# Patient Record
Sex: FEMALE | Race: WHITE | Employment: OTHER | URBAN - METROPOLITAN AREA
[De-identification: names, ages, dates, MRNs, and addresses within clinical notes are randomized per-mention and may not be internally consistent; named-entity substitution may affect disease eponyms.]

---

## 2017-10-02 ENCOUNTER — TELEPHONE (OUTPATIENT)
Dept: FAMILY MEDICINE CLINIC | Age: 47
End: 2017-10-02

## 2017-10-02 NOTE — TELEPHONE ENCOUNTER
Pt hoping to get in sooner with PCP, will be running out of medications by the end of October. Pt hoping to see if can move appt up, if Dr Glen Rice approves. Pt's family already sees Dr Glen Rice.

## 2017-10-16 ENCOUNTER — OFFICE VISIT (OUTPATIENT)
Dept: FAMILY MEDICINE CLINIC | Age: 47
End: 2017-10-16

## 2017-10-16 VITALS
BODY MASS INDEX: 20.62 KG/M2 | DIASTOLIC BLOOD PRESSURE: 67 MMHG | HEIGHT: 63 IN | OXYGEN SATURATION: 97 % | SYSTOLIC BLOOD PRESSURE: 105 MMHG | WEIGHT: 116.4 LBS | TEMPERATURE: 97.9 F | RESPIRATION RATE: 12 BRPM | HEART RATE: 73 BPM

## 2017-10-16 DIAGNOSIS — D50.9 IRON DEFICIENCY ANEMIA, UNSPECIFIED IRON DEFICIENCY ANEMIA TYPE: ICD-10-CM

## 2017-10-16 DIAGNOSIS — Z00.00 ROUTINE GENERAL MEDICAL EXAMINATION AT A HEALTH CARE FACILITY: Primary | ICD-10-CM

## 2017-10-16 DIAGNOSIS — Z23 NEEDS FLU SHOT: ICD-10-CM

## 2017-10-16 DIAGNOSIS — J30.2 SEASONAL ALLERGIC RHINITIS, UNSPECIFIED CHRONICITY, UNSPECIFIED TRIGGER: ICD-10-CM

## 2017-10-16 DIAGNOSIS — K21.9 GASTROESOPHAGEAL REFLUX DISEASE WITHOUT ESOPHAGITIS: ICD-10-CM

## 2017-10-16 DIAGNOSIS — F41.8 SITUATIONAL ANXIETY: ICD-10-CM

## 2017-10-16 LAB
FERRITIN: 292.3 NG/ML (ref 15–150)
HCT VFR BLD CALC: 38.6 % (ref 36–48)
HEMOGLOBIN: 13.2 G/DL (ref 12–16)
IRON SATURATION: 41 % (ref 15–50)
IRON: 103 UG/DL (ref 37–145)
MCH RBC QN AUTO: 32 PG (ref 26–34)
MCHC RBC AUTO-ENTMCNC: 34.1 G/DL (ref 31–36)
MCV RBC AUTO: 94 FL (ref 80–100)
PDW BLD-RTO: 13.7 % (ref 12.4–15.4)
PLATELET # BLD: 189 K/UL (ref 135–450)
PMV BLD AUTO: 8.1 FL (ref 5–10.5)
RBC # BLD: 4.11 M/UL (ref 4–5.2)
TOTAL IRON BINDING CAPACITY: 250 UG/DL (ref 260–445)
WBC # BLD: 4.3 K/UL (ref 4–11)

## 2017-10-16 PROCEDURE — 90471 IMMUNIZATION ADMIN: CPT | Performed by: FAMILY MEDICINE

## 2017-10-16 PROCEDURE — 90686 IIV4 VACC NO PRSV 0.5 ML IM: CPT | Performed by: FAMILY MEDICINE

## 2017-10-16 PROCEDURE — 99386 PREV VISIT NEW AGE 40-64: CPT | Performed by: FAMILY MEDICINE

## 2017-10-16 RX ORDER — BUTALBITAL, ACETAMINOPHEN AND CAFFEINE 50; 325; 40 MG/1; MG/1; MG/1
1 TABLET ORAL EVERY 4 HOURS PRN
COMMUNITY
End: 2017-10-20 | Stop reason: SDUPTHER

## 2017-10-16 RX ORDER — FLUTICASONE PROPIONATE 50 MCG
1 SPRAY, SUSPENSION (ML) NASAL DAILY
COMMUNITY
End: 2021-04-23

## 2017-10-16 RX ORDER — BUPROPION HYDROCHLORIDE 150 MG/1
150 TABLET ORAL EVERY MORNING
COMMUNITY
End: 2020-03-17 | Stop reason: SDUPTHER

## 2017-10-16 RX ORDER — BUPROPION HYDROCHLORIDE 150 MG/1
150 TABLET, EXTENDED RELEASE ORAL 2 TIMES DAILY
COMMUNITY
End: 2017-10-16 | Stop reason: CLARIF

## 2017-10-16 ASSESSMENT — PATIENT HEALTH QUESTIONNAIRE - PHQ9
SUM OF ALL RESPONSES TO PHQ9 QUESTIONS 1 & 2: 0
2. FEELING DOWN, DEPRESSED OR HOPELESS: 0
SUM OF ALL RESPONSES TO PHQ QUESTIONS 1-9: 0
1. LITTLE INTEREST OR PLEASURE IN DOING THINGS: 0

## 2017-10-16 NOTE — PROGRESS NOTES
SUBJECTIVE:   52 y.o. female for annual routine  Checkup. Patient is new patient, whose daughter has recently been diagnosed with anorexia and recently moved from [place]. She had labs in 5/17 at PCP and recent labs at GYN in 10/17. Current Outpatient Prescriptions   Medication Sig Dispense Refill    Fexofenadine HCl (ALLEGRA PO) Take by mouth      buPROPion (WELLBUTRIN SR) 150 MG extended release tablet Take 150 mg by mouth 2 times daily      fluticasone (FLONASE) 50 MCG/ACT nasal spray 1 spray by Nasal route daily      butalbital-acetaminophen-caffeine (FIORICET, ESGIC) -40 MG per tablet Take 1 tablet by mouth every 4 hours as needed for Headaches       No current facility-administered medications for this visit. Allergies: Review of patient's allergies indicates no known allergies. No LMP recorded (lmp unknown). Patient has had a hysterectomy. Colonoscopy : 2010, 2015   Last PAP:5/17 in [place]. History of Abnormal PAP: never  Prior mammogram: 6/17 in [place]  Sexually active: yes  Self breast exam: yes, but sporadic. LMP: 2009 s/p hysterectomy. Smoker: no   Alcohol: 0-1/ month  Caffeine: 1-2 coffee or tea/ day   Exercise: walks- was daily . Not as much now with her daughter's diagnosis with anorexia. Trying to keep her sedentary. Her Gynecologist recommended she start on Wellbutrin  mg qd. She is having some irritation with intercourse and dryness. Oldest son is in [place] . Middle son, Rohit Bautista, is in [place]. He has a girlfriend in [place] as of 5/17, who is flying up this weekend. Her son was with his girlfriend more now. Haydee Estrella lives in [place] and gets along with her . The girlfriend is coming up on Friday. She did get hot flashes the past month,but better the past 1 week. She is planning on doing [employer] school for her 17 yo daughter and is having a bit of stress about it . She is wanting to do the best for her.       H/o silent reflex per barium and laryngoscopy. Her bowel movements are qod . No black stools or rectal bleeding. H/o anemia. She had an iron infusion in 6/17. ROS:  Feeling well. No dyspnea or chest pain on exertion. No abdominal pain, change in bowel habits, black or bloody stools. No urinary tract symptoms. GYN ROS: no breast pain or new or enlarging lumps on self exam. No neurological complaints. See patient physical/  ROS questionnaire. Patient's allergies and medications were reviewed. Patient's past medical, surgical, social , and family history were reviewed. Wt Readings from Last 3 Encounters:   10/16/17 116 lb 6.4 oz (52.8 kg)     OBJECTIVE:   The patient appears well, alert, oriented x 3, in no distress, cooperative. /67   Pulse 73   Temp 97.1379414784224 °F (36.6 °C) (Oral)   Resp 12   Ht 5' 2.99\" (1.6 m)   Wt 116 lb 6.4 oz (52.8 kg)   LMP  (LMP Unknown)   SpO2 97%   Breastfeeding? No   BMI 20.62 kg/m²   HEENT: normocephalic, atraumatic, PERRLA, EOMI, tympanic membranes and nasopharynx are normal.  Neck : supple. No adenopathy or thyromegaly. FROM. Upper extremities : DTRs 2+ biceps/ triceps/ brachioradialis bilateral.  FROM. Strength 5/5. Lungs are clear, good air entry, no wheezes, rhonchi or rales. Breathing comfortably. Cardiovascular: Regular rate  and rhythm. S1 and S2 are normal, no murmurs,rubs, and gallops. No edema. Abdomen is soft without tenderness, guarding, mass or organomegaly. Normal bowel sounds. Non distended. Back: Cervical, thoracic and lumbar spine exam is normal without tenderness, masses or kyphoscoliosis. Full range of motion without pain is noted. Lower extremities : DTRs 2+ knees and ankles bilateral.  FROM. Strength 5/5. Negative straight leg-raise. No edema or erythema bilateral.  normal peripheral pulses. Neuro: Cranial nerves 2-12 are normal. Deep tendon reflexes are 2+ and equal to all extremities.   No focal sensory,

## 2017-10-18 ENCOUNTER — TELEPHONE (OUTPATIENT)
Dept: FAMILY MEDICINE CLINIC | Age: 47
End: 2017-10-18

## 2017-10-18 DIAGNOSIS — N30.10 INTERSTITIAL CYSTITIS: Primary | ICD-10-CM

## 2017-10-20 RX ORDER — BUTALBITAL, ACETAMINOPHEN AND CAFFEINE 50; 325; 40 MG/1; MG/1; MG/1
1 TABLET ORAL EVERY 6 HOURS PRN
Qty: 25 TABLET | Refills: 0 | Status: SHIPPED | OUTPATIENT
Start: 2017-10-20 | End: 2018-01-02 | Stop reason: SDUPTHER

## 2017-10-20 NOTE — TELEPHONE ENCOUNTER
RX for Fioricet was sent to the pharmacy. I am not sure why she is taking Elimiron? Please ask the patient to spell medication and verify dose too? She was a new patient on 10/16/17.

## 2017-10-20 NOTE — TELEPHONE ENCOUNTER
Medication name:butalbital-acetaminophen-caffeine (FIORICET, ESGIC) -40 MG per tablet   Medication dose:  Frequency:Take 1 tablet by mouth every 4 hours as needed for Headaches  Quantity:60 tablet  Duvall Dennis, [facility]  Pharmacy number:  Last OV:10/16  Last Labs:10/16/17    Medication name: Elimiron 100mg  Medication dose:  Frequency:take one tablet as needed  Quantity:30 tablets  Pharmacy name:  [facility] number::[facility] [address] [phone]  Last OV: 10/16/17  Last Labs: 10/16/17    (Pt states that she was unable to request these at her appt. PCP may want to review to see if refill appropriate.  Marked as urgent per pt request, as almost 72 hr christofer since initial request. Thank you!)

## 2017-10-23 PROBLEM — N30.10 INTERSTITIAL CYSTITIS: Status: ACTIVE | Noted: 2017-10-23

## 2017-10-23 NOTE — TELEPHONE ENCOUNTER
Med is Elmiron 100 mg pt takes PRN. Rx given for interstitial cystitis. Please read over pend Rx and sign.  Thanks

## 2017-11-20 RX ORDER — BUTALBITAL, ACETAMINOPHEN AND CAFFEINE 50; 325; 40 MG/1; MG/1; MG/1
TABLET ORAL
Qty: 25 TABLET | Refills: 0 | OUTPATIENT
Start: 2017-11-20

## 2018-01-02 RX ORDER — BUTALBITAL, ACETAMINOPHEN AND CAFFEINE 50; 325; 40 MG/1; MG/1; MG/1
1 TABLET ORAL EVERY 6 HOURS PRN
Qty: 25 TABLET | Refills: 1 | Status: SHIPPED | OUTPATIENT
Start: 2018-01-02 | End: 2018-02-14 | Stop reason: SDUPTHER

## 2018-01-12 ENCOUNTER — OFFICE VISIT (OUTPATIENT)
Dept: FAMILY MEDICINE CLINIC | Age: 48
End: 2018-01-12

## 2018-01-12 VITALS
OXYGEN SATURATION: 96 % | BODY MASS INDEX: 20.87 KG/M2 | WEIGHT: 117.8 LBS | TEMPERATURE: 98.1 F | SYSTOLIC BLOOD PRESSURE: 118 MMHG | HEART RATE: 89 BPM | DIASTOLIC BLOOD PRESSURE: 73 MMHG | RESPIRATION RATE: 14 BRPM

## 2018-01-12 DIAGNOSIS — G44.209 TENSION HEADACHE: ICD-10-CM

## 2018-01-12 DIAGNOSIS — R35.0 URINE FREQUENCY: Primary | ICD-10-CM

## 2018-01-12 DIAGNOSIS — N94.10 DYSPAREUNIA IN FEMALE: ICD-10-CM

## 2018-01-12 DIAGNOSIS — M50.30 DDD (DEGENERATIVE DISC DISEASE), CERVICAL: ICD-10-CM

## 2018-01-12 DIAGNOSIS — N30.10 INTERSTITIAL CYSTITIS: ICD-10-CM

## 2018-01-12 LAB
BILIRUBIN, POC: NEGATIVE
BLOOD URINE, POC: ABNORMAL
CLARITY, POC: CLEAR
COLOR, POC: YELLOW
GLUCOSE URINE, POC: NEGATIVE
KETONES, POC: NEGATIVE
LEUKOCYTE EST, POC: ABNORMAL
NITRITE, POC: NEGATIVE
PH, POC: 6.5
PROTEIN, POC: NEGATIVE
SPECIFIC GRAVITY, POC: 1.01
UROBILINOGEN, POC: 0.2

## 2018-01-12 PROCEDURE — 99214 OFFICE O/P EST MOD 30 MIN: CPT | Performed by: FAMILY MEDICINE

## 2018-01-12 PROCEDURE — 81002 URINALYSIS NONAUTO W/O SCOPE: CPT | Performed by: FAMILY MEDICINE

## 2018-01-12 RX ORDER — ESTRADIOL 0.1 MG/G
CREAM VAGINAL
Qty: 1 TUBE | Refills: 2 | Status: SHIPPED
Start: 2018-01-12 | End: 2020-06-03 | Stop reason: SDUPTHER

## 2018-01-12 RX ORDER — CHLORZOXAZONE 500 MG/1
500 TABLET ORAL 4 TIMES DAILY PRN
Qty: 60 TABLET | Refills: 1 | Status: SHIPPED | OUTPATIENT
Start: 2018-01-12 | End: 2022-08-22 | Stop reason: SDUPTHER

## 2018-01-12 RX ORDER — CHLORAL HYDRATE 500 MG
CAPSULE ORAL
COMMUNITY
End: 2018-06-13

## 2018-01-12 RX ORDER — PREDNISONE 20 MG/1
TABLET ORAL
Qty: 10 TABLET | Refills: 0 | Status: SHIPPED | OUTPATIENT
Start: 2018-01-12 | End: 2018-01-22

## 2018-01-12 NOTE — PROGRESS NOTES
gallop. No edema. Lungs : CTA bilaterally, breathing comfortably  Abdomen: positive bowel sounds, soft , non tender, non distended. No hepatosplenomegaly. No CVA tenderness. Skin: no rashes. Non tender. ASSESSMENT/  PLAN:  Luke Mclaughlin was seen today for urinary tract infection. Diagnoses and all orders for this visit:    Urine frequency with h/o Interstitial cystitis  -     POCT Urinalysis no Micro  -     Urine Culture  -     Trial of Estradiol (ESTRACE VAGINAL) 0.1 MG/GM vaginal cream; Use 2-3 x/ week. Okay to use nightly for 1 week at onset but then decrease to 2-3 times per week. -     Avoid caffeine and alcohol. Dyspareunia in female  -     Monitor with trial of Estradiol (ESTRACE VAGINAL) 0.1 MG/GM vaginal cream; Use 2-3 x/ week. Tension headache  -     Discussed rebound headaches from long term daily use of Fioricet. Will need to taper likely. -     PredniSONE (DELTASONE) 20 MG tablet; 40 mg qd X 5 days to help with rebound headaches. -     Moist heat . ROM exercises. Modify activities. -     Trial of Chlorzoxazone (PARAFON FORTE DSC) 500 MG tablet; Take 1 tablet by mouth 4 times daily as needed for Muscle spasms  DDD (degenerative disc disease), cervical  -     Omega-3-Acid Eth Est, Dietary, 1 g CAPS; 2 po bid  -     Moist heat . ROM exercises. Modify activities. -     PredniSONE (DELTASONE) 20 MG tablet; 40 mg qd X 5 days. Follow up 4 -6 weeks/ prn.

## 2018-01-14 LAB — URINE CULTURE, ROUTINE: NORMAL

## 2018-01-15 ENCOUNTER — TELEPHONE (OUTPATIENT)
Dept: FAMILY MEDICINE CLINIC | Age: 48
End: 2018-01-15

## 2018-01-15 NOTE — TELEPHONE ENCOUNTER
Office has been notified that pt is requiring Prior Authorization for the following medication:    Jessica Avila   Please initiate this request through CoverMyMeds, contacting the following Payor/Insurance:    [employer] [place]    Please see below, or the documentation attached to this encounter for any additional information that may assist in processing PA:    --     Thank you!

## 2018-01-23 ENCOUNTER — TELEPHONE (OUTPATIENT)
Dept: FAMILY MEDICINE CLINIC | Age: 48
End: 2018-01-23

## 2018-01-26 ENCOUNTER — OFFICE VISIT (OUTPATIENT)
Dept: FAMILY MEDICINE CLINIC | Age: 48
End: 2018-01-26

## 2018-01-26 VITALS
SYSTOLIC BLOOD PRESSURE: 92 MMHG | WEIGHT: 117 LBS | HEIGHT: 64 IN | HEART RATE: 76 BPM | BODY MASS INDEX: 19.97 KG/M2 | DIASTOLIC BLOOD PRESSURE: 62 MMHG | OXYGEN SATURATION: 99 %

## 2018-01-26 DIAGNOSIS — R31.29 MICROSCOPIC HEMATURIA: ICD-10-CM

## 2018-01-26 DIAGNOSIS — G44.209 TENSION HEADACHE: ICD-10-CM

## 2018-01-26 DIAGNOSIS — M50.30 DDD (DEGENERATIVE DISC DISEASE), CERVICAL: ICD-10-CM

## 2018-01-26 DIAGNOSIS — N30.10 INTERSTITIAL CYSTITIS: Primary | ICD-10-CM

## 2018-01-26 PROCEDURE — 99214 OFFICE O/P EST MOD 30 MIN: CPT | Performed by: FAMILY MEDICINE

## 2018-01-26 RX ORDER — OSELTAMIVIR PHOSPHATE 75 MG/1
75 CAPSULE ORAL 2 TIMES DAILY
Qty: 10 CAPSULE | Refills: 0 | Status: SHIPPED | OUTPATIENT
Start: 2018-01-26 | End: 2018-02-05

## 2018-01-26 RX ORDER — CHLORZOXAZONE 500 MG/1
500 TABLET ORAL 4 TIMES DAILY PRN
COMMUNITY
End: 2018-06-13

## 2018-01-26 NOTE — PATIENT INSTRUCTIONS
bend your head to the side while using gentle pressure from your fingers to keep your head from bending. 7. Hold for about 6 seconds. 8. Repeat 8 to 12 times. 9. Switch hands and repeat the same exercise on your left side. Forward bend strengthening    4. Place your first two fingers of either hand on your forehead. 5. Start to bend your head forward while using gentle pressure from your fingers to keep your head from bending. 6. Hold for about 6 seconds. 7. Repeat 8 to 12 times. Neutral position strengthening    1. Using one hand, place your fingertips on the back of your head at the top of your neck. 2. Start to bend your head backward while using gentle pressure from your fingers to keep your head from bending. 3. Hold for about 6 seconds. 4. Repeat 8 to 12 times. Chin tuck    1. Lie on the floor with a rolled-up towel under your neck. Your head should be touching the floor. 2. Slowly bring your chin toward your chest.  3. Hold for a count of 6, and then relax for up to 10 seconds. 4. Repeat 8 to 12 times. Follow-up care is a key part of your treatment and safety. Be sure to make and go to all appointments, and call your doctor if you are having problems. It's also a good idea to know your test results and keep a list of the medicines you take. Where can you learn more? Go to [email or web address] and sign in to your [employer] account. Enter M679 in the Latest Medical box to learn more about \"Neck Strain or Sprain: Rehab Exercises. \"     If you do not have an account, please click on the \"Sign Up Now\" link. Current as of: March 21, 2017  Content Version: 11.5  © 5439-4894 Healthwise, Incorporated. Care instructions adapted under license by [facility] ([facility]). If you have questions about a medical condition or this instruction, always ask your healthcare professional. [address] any warranty or liability for your use of this information.

## 2018-01-26 NOTE — PROGRESS NOTES
Patient is here for follow up . She is doing Estrace cream 2-3 times / week. She has a history of IC . Her  recommended Dr. Tenny Gowers , urologist.  Has not been taking the Elmiron. She is taking Parafon Forte at night. She is struggling to take it during the day due to it causes sedation. She has not been taking  Fioricet daily , but took 1 yesterday. She was usually taking 1 Fioricet per day. At times was taking 2 pills per day. Denies headache now. History of cervical fusion in 2013 in [facility]. She was doing better in [place]. She has been on Lyrica in the past , but is leary to take it. She is sleeping okay , except getting up 2-3 times per night due to urinary frequency. She has done Physical Therapy in the past and helped. ROS: All other systems were reviewed and are negative . Patient's allergies and medications were reviewed. Patient's past medical, surgical, social , and family history were reviewed. OBJECTIVE:  BP 92/62 (Site: Left Arm, Position: Sitting, Cuff Size: Medium Adult)   Pulse 76   Ht 5' 4\" (1.626 m)   Wt 117 lb (53.1 kg)   LMP  (LMP Unknown)   SpO2 99% Comment: RA  BMI 20.08 kg/m²   General: NAD, cooperative, alert and oriented X 3. Mood / affect is good. good insight. well hydrated. Neck : no lymphadenopathy, supple, FROM. Mild trapezius tenderness . Mild pain with hyperextension. Upper extremities : DTRs 2+ biceps/ triceps/ brachioradialis bilateral.  FROM. Strength 5/5. CV: Regular rate and rhythm , no murmurs/ rub/ gallop. No edema. Lungs : CTA bilaterally, breathing comfortably  Abdomen: positive bowel sounds, soft , non tender, non distended. No hepatosplenomegaly. No CVA tenderness. Skin: no rashes. Non tender. ASSESSMENT/  PLAN:  Miller Borrego was seen today for neck pain. Diagnoses and all orders for this visit:    Interstitial cystitis  -     Microscopic Urinalysis;  Future  -     Urology referral - [facility] Kiran Lehman MD ([facility])  -     Monitor with starting of Estrace cream   -     Discuss with Urologist restart of Elmiron.   -     Avoid caffeine, alcohol. Microscopic hematuria  -     Microscopic Urinalysis; Future  -     Urology referral - Kate Tiwari MD ([facility])  Vaginal Dryness        -     Monitor with start of Estrace cream.   DDD (degenerative disc disease), cervical        -     Moist heat . ROM exercises. Modify activities. -     Parafon Forte qhs , mostly at night prn.         -     Hold on Neurontin or Lyrica, but discussed. Tension headache  -     Moist heat . ROM exercises. Modify activities. -     Parafon Forte qhs prn.   -     External Referral To Physical Therapy        -     Recommended minimizing Fioricet to < 1x/ week. Other orders  -     oseltamivir (TAMIFLU) 75 MG capsule; Take 1 capsule by mouth 2 times daily for 10 days    F/u in 3-4 weeks/ prn increased symptoms.

## 2018-02-07 DIAGNOSIS — N30.10 INTERSTITIAL CYSTITIS: ICD-10-CM

## 2018-02-07 DIAGNOSIS — R31.29 MICROSCOPIC HEMATURIA: ICD-10-CM

## 2018-02-07 LAB
EPITHELIAL CELLS, UA: 0 /HPF (ref 0–5)
HYALINE CASTS: 0 /LPF (ref 0–8)
RBC UA: 3 /HPF (ref 0–4)
WBC UA: 0 /HPF (ref 0–5)

## 2018-02-14 RX ORDER — BUTALBITAL, ACETAMINOPHEN AND CAFFEINE 50; 325; 40 MG/1; MG/1; MG/1
1 TABLET ORAL EVERY 6 HOURS PRN
Qty: 25 TABLET | Refills: 1 | Status: SHIPPED | OUTPATIENT
Start: 2018-02-14 | End: 2018-04-13 | Stop reason: SDUPTHER

## 2018-02-19 ENCOUNTER — OFFICE VISIT (OUTPATIENT)
Dept: ORTHOPEDIC SURGERY | Age: 48
End: 2018-02-19

## 2018-02-19 VITALS
DIASTOLIC BLOOD PRESSURE: 68 MMHG | HEIGHT: 64 IN | BODY MASS INDEX: 19.63 KG/M2 | SYSTOLIC BLOOD PRESSURE: 106 MMHG | WEIGHT: 115 LBS | HEART RATE: 80 BPM

## 2018-02-19 DIAGNOSIS — R22.32 MASS OF LEFT HAND: ICD-10-CM

## 2018-02-19 DIAGNOSIS — M79.642 LEFT HAND PAIN: Primary | ICD-10-CM

## 2018-02-19 PROCEDURE — 99203 OFFICE O/P NEW LOW 30 MIN: CPT | Performed by: ORTHOPAEDIC SURGERY

## 2018-02-21 ENCOUNTER — TELEPHONE (OUTPATIENT)
Dept: FAMILY MEDICINE CLINIC | Age: 48
End: 2018-02-21

## 2018-02-21 ENCOUNTER — TELEPHONE (OUTPATIENT)
Dept: ORTHOPEDIC SURGERY | Age: 48
End: 2018-02-21

## 2018-02-21 DIAGNOSIS — N30.10 INTERSTITIAL CYSTITIS: ICD-10-CM

## 2018-02-22 ENCOUNTER — TELEPHONE (OUTPATIENT)
Dept: ORTHOPEDIC SURGERY | Age: 48
End: 2018-02-22

## 2018-02-23 ENCOUNTER — HOSPITAL ENCOUNTER (OUTPATIENT)
Dept: MRI IMAGING | Age: 48
Discharge: HOME | End: 2018-02-23
Attending: ORTHOPAEDIC SURGERY | Admitting: ORTHOPAEDIC SURGERY

## 2018-02-23 DIAGNOSIS — R22.32 LOCALIZED SWELLING, MASS AND LUMP, LEFT UPPER LIMB: ICD-10-CM

## 2018-02-23 DIAGNOSIS — R22.32 MASS OF LEFT HAND: ICD-10-CM

## 2018-02-26 ENCOUNTER — OFFICE VISIT (OUTPATIENT)
Dept: ORTHOPEDIC SURGERY | Age: 48
End: 2018-02-26

## 2018-02-26 VITALS — WEIGHT: 115.08 LBS | BODY MASS INDEX: 19.65 KG/M2 | HEIGHT: 64 IN

## 2018-02-26 DIAGNOSIS — M67.442 GANGLION CYST OF FINGER OF LEFT HAND: Primary | ICD-10-CM

## 2018-02-26 PROCEDURE — 99212 OFFICE O/P EST SF 10 MIN: CPT | Performed by: ORTHOPAEDIC SURGERY

## 2018-02-26 NOTE — LETTER
Nilda Lei M.D. Harini Linn  Your surgery has been scheduled on    Fri. 3/30/2018 . Your surgery time is at: 7:30am.          You will need to arrive for surgery by  5:30am.      Your surgery has been scheduled at:        XXXX [address]  [address]  [phone]         Your post operative appointment has been scheduled on  @ , @ the  office  location. Your hand therapy appointment has been scheduled on  @ , @ the  office location with           [facility], GO TO [email or web address] AND CLICK ON  LOCATIONS ON THE TOP TOOLBAR      INSTRUCTIONS FOR SURGERY    You will need to contact your primary care physician to schedule your History & Physical prior to surgery. Attached   is the History & Physical form. The form needs to be completed by your PCP and faxed back to the appropriate numbers. NOTHING to eat or drink after midnight prior to your surgery. No Juice, water, milk, coffee, tea, Ensure, etc,.    DO NOT wear any jewelry, make-up on face or eye make-up. Do not bring any valuable to the hospital with you. Stop any aspirin or anti-inflammatories (i.e. Advil, Motrin, Aleve) prior to surgery. If you are taking prescription   medications, please call the prescribing physician. The hospital will be calling you to discuss your medical history prior to surgery. You will need someone to drive you home after surgery. DRIVERS MUST STAY AT [address] [place] [address]. Our billing office will contact your insurance company to authorize your surgery. If you should have any questions, please contact Alysia Olson @ [phone]  OR @  [email or web address] or visit the [facility] website @ [employer]                              Romel Pérez M.D. KEEP YOUR HAND ELEVATED - Surgery always results in swelling. Most of the pain and stiffness right after surgery is due to internal pressure from swelling. To relieve the pressure, raise your hand higher than your heart as often as possible to drain fluid out of your hand for at least three (3) days after surgery. Swelling may also make the cast or bandage tight, which will cause more pain and swelling. If you feel that your cast or bandage is too tight, please contact me or Gene Cano- we would rather change it than for you to have a problem. KEEP YOUR BANDAGE DRY -  Wounds heal with the fewest problems if they are kept clean and dry. When bathing, protect your bandage in a plastic bag. If you bandage gets wet on the inside, it should be changed not simply allowed to dry. I would rather change your cast or bandage then risk a problem with your wound. DO NOT REMOVE OR CHANGE YOUR BANDAGES - unless you have been given specific instructions from Dr. Sergey Howell to change them before being seen for you post operative appointment. BRUISING OR BLEEDING - This is  Common after surgery. Bandages often become stained with blood on the day of surgery. Bruising often worsens several days after surgery. Bruising or bleeding is usually not a source of concern unless accompanied by steady drainage, worsening pain, or progressive swelling. MEDICATIONS - You will most likely be given at least one prescription following surgery. All medications should be taken only as directed on the prescription. Nausea is common when taking pain medications. Take the pain medicine only as needed and not on an empty stomach. Itching or a rash are signs of possible mild allergic reaction.   Contact our office should this persist.    HAND THERAPY:  (Only as checked off here)       No Therapy will be needed at this time      Every two (2) hours, do this (4) times daily:  With your bandage on, try

## 2018-02-27 ENCOUNTER — TELEPHONE (OUTPATIENT)
Dept: ORTHOPEDIC SURGERY | Age: 48
End: 2018-02-27

## 2018-02-27 NOTE — TELEPHONE ENCOUNTER
Auth: NPR  Date: 3/30/18  Reference # None  Type of SX: Outpatient  CPT [id number]   Location: [facility]  SX area: LT hand

## 2018-03-08 PROBLEM — M67.442 GANGLION CYST OF FINGER OF LEFT HAND: Status: ACTIVE | Noted: 2018-03-08

## 2018-03-08 NOTE — PROGRESS NOTES
[facility] / [facility] ([facility]) [address] [facility], [address]    Acknowledgment of Informed Consent for Surgical or Medical Procedure and Sedation  I agree to allow doctor(s) [facility] and his/her associates or assistants, including residents and/or other qualified medical practitioner to perform the following medical treatment or procedure and to administer or direct the administration of sedation as necessary:  Procedure(s): EXCISIONAL BIOPSY LEFT PALMAR SOFT TISSUE MASS, ANY OTHER INDICATED PROCEDURES  My doctor has explained the following regarding the proposed procedure:   the explanation of the procedure   the benefits of the procedure   the potential problems that might occur during recuperation   the risks and side effects of the procedure which could include but are not limited to severe blood loss, infection, stroke or death   the benefits, risks and side effect of alternative procedures including the consequences of declining this procedure or any alternative procedures   the likelihood of achieving satisfactory results. I acknowledge no guarantee or assurance has been made to me regarding the results. I understand that during the course of this treatment/procedure, unforeseen conditions can occur which require an additional or different procedure. I agree to allow my physician or assistants to perform such extension of the original procedure as they may find necessary. I understand that sedation will often result in temporary impairment of memory and fine motor skills and that sedation can occasionally progress to a state of deep sedation or general anesthesia. I understand the risks of anesthesia for surgery include, but are not limited to, sore throat, hoarseness, injury to face, mouth, or teeth; nausea; headache; injury to blood vessels or nerves; death, brain damage, or paralysis.     I understand that if I have a Limitation of Treatment order in effect during my hospitalization, the order may or may not be in effect during this procedure. I give my doctor permission to give me blood or blood products. I understand that there are risks with receiving blood such as hepatitis, AIDS, fever, or allergic reaction. I acknowledge that the risks, benefits, and alternatives of this treatment have been explained to me and that no express or implied warranty has been given by the hospital, any blood bank, or any person or entity as to the blood or blood components transfused. At the discretion of my doctor, I agree to allow observers, equipment/product representatives and allow photographing, and/or televising of the procedure, provided my name or identity is maintained confidentially. I agree the hospital may dispose of or use for scientific or educational purposes any tissue, fluid, or body parts which may be removed.     ________________________________Date________Time______ am/pm  ([place] [facility])  Patient or Signature of Closest Relative or Legal Guardian    ________________________________Date________Time______am/pm      Page 1 of  1  Witness

## 2018-03-09 NOTE — PROGRESS NOTES
Assessment: 63-year-old female presenting with left palmar mass, symptomatic  1. Likely ganglion cyst flexor tendon sheath left index finger    Treatment Plan: I spoke with the patient regarding findings from MRI. Correlating with her physical exam, we did discuss the likelihood of a ganglion cyst arising from her index finger flexor tendon sheath near her A1 pulley. Options for treatment were discussed today including both operative and nonoperative. I do think that observation could be considered with activity modification. Operative treatment was also discussed including excisional biopsy of the mass. After thorough discussion, the patient is symptomatic with gripping and lifting with pain in the mass. She desires removal and therefore we will plan for excisional biopsy  Risks, benefits alternatives were discussed with patient today the risks included but not limited to infection bleeding, damage to nerves to tendons and blood vessels, need for additional procedures, regrowth of the mass or recurrence, scar sensitivity, stiffness and tendon adhesions, continued pain, risks of anesthesia including stroke heart attack blood clot and death  The patient is understanding of the risks and would like to proceed. We will plan for local Mac anesthesia an outpatient surgery to be performed within the next one month. She understands if she has any change in her symptoms prior to surgery she is to notify our office for possible reevaluation    No Follow-up on file. History of Present Illness  Ariadna Gillespie is a 50 y.o. female here today for a follow-up for Her left palmar soft tissue mass with associated symptoms of pain. An MRI was performed in the interval since her last visit to further characterize the mass. She has had no change in her symptoms since last visit with continued pain with gripping and lifting between her index and long finger near her palmar MP joints.   He denies any new swelling, numbness or

## 2018-03-14 ENCOUNTER — OFFICE VISIT (OUTPATIENT)
Dept: FAMILY MEDICINE CLINIC | Age: 48
End: 2018-03-14

## 2018-03-14 VITALS
DIASTOLIC BLOOD PRESSURE: 53 MMHG | HEART RATE: 85 BPM | BODY MASS INDEX: 20.62 KG/M2 | SYSTOLIC BLOOD PRESSURE: 92 MMHG | TEMPERATURE: 95.9 F | WEIGHT: 120.2 LBS | RESPIRATION RATE: 12 BRPM | OXYGEN SATURATION: 100 %

## 2018-03-14 DIAGNOSIS — Z13.220 SCREENING CHOLESTEROL LEVEL: ICD-10-CM

## 2018-03-14 DIAGNOSIS — G47.9 SLEEP DIFFICULTIES: ICD-10-CM

## 2018-03-14 DIAGNOSIS — R51.9 FREQUENT HEADACHES: ICD-10-CM

## 2018-03-14 DIAGNOSIS — N89.8 VAGINAL DRYNESS: ICD-10-CM

## 2018-03-14 DIAGNOSIS — N30.10 INTERSTITIAL CYSTITIS: ICD-10-CM

## 2018-03-14 DIAGNOSIS — R23.2 HOT FLASHES: Primary | ICD-10-CM

## 2018-03-14 PROCEDURE — 99214 OFFICE O/P EST MOD 30 MIN: CPT | Performed by: FAMILY MEDICINE

## 2018-03-14 RX ORDER — GABAPENTIN 100 MG/1
100 CAPSULE ORAL NIGHTLY
Qty: 90 CAPSULE | Refills: 0 | Status: SHIPPED | OUTPATIENT
Start: 2018-03-14 | End: 2018-06-13

## 2018-03-15 ENCOUNTER — TELEPHONE (OUTPATIENT)
Dept: ORTHOPEDIC SURGERY | Age: 48
End: 2018-03-15

## 2018-03-19 ENCOUNTER — TELEPHONE (OUTPATIENT)
Dept: ORTHOPEDIC SURGERY | Age: 48
End: 2018-03-19

## 2018-03-19 NOTE — TELEPHONE ENCOUNTER
Returned patient call- states her cyst in her finger went down therefore she would like to cancel her surgery.  Patient states if it develops again she would call back

## 2018-03-30 ENCOUNTER — HOSPITAL ENCOUNTER (OUTPATIENT)
Dept: SURGERY | Age: 48
Discharge: HOME | End: 2018-03-08
Admitting: ORTHOPAEDIC SURGERY

## 2018-03-30 DIAGNOSIS — N30.10 INTERSTITIAL CYSTITIS: ICD-10-CM

## 2018-03-30 DIAGNOSIS — Z13.220 SCREENING CHOLESTEROL LEVEL: ICD-10-CM

## 2018-03-30 DIAGNOSIS — N89.8 VAGINAL DRYNESS: ICD-10-CM

## 2018-03-30 DIAGNOSIS — R23.2 HOT FLASHES: ICD-10-CM

## 2018-03-30 LAB
A/G RATIO: 2.1 (ref 1.1–2.2)
ALBUMIN SERPL-MCNC: 5 G/DL (ref 3.4–5)
ALP BLD-CCNC: 71 U/L (ref 40–129)
ALT SERPL-CCNC: 21 U/L (ref 10–40)
ANION GAP SERPL CALCULATED.3IONS-SCNC: 14 MMOL/L (ref 3–16)
AST SERPL-CCNC: 21 U/L (ref 15–37)
BILIRUB SERPL-MCNC: 0.4 MG/DL (ref 0–1)
BUN BLDV-MCNC: 17 MG/DL (ref 7–20)
CALCIUM SERPL-MCNC: 9.8 MG/DL (ref 8.3–10.6)
CHLORIDE BLD-SCNC: 102 MMOL/L (ref 99–110)
CHOLESTEROL, TOTAL: 223 MG/DL (ref 0–199)
CO2: 28 MMOL/L (ref 21–32)
CREAT SERPL-MCNC: 0.6 MG/DL (ref 0.6–1.1)
ESTRADIOL LEVEL: 25 PG/ML
FOLLICLE STIMULATING HORMONE: 86.1 MIU/ML
GFR AFRICAN AMERICAN: >60
GFR NON-AFRICAN AMERICAN: >60
GLOBULIN: 2.4 G/DL
GLUCOSE BLD-MCNC: 90 MG/DL (ref 70–99)
HCT VFR BLD CALC: 42.3 % (ref 36–48)
HDLC SERPL-MCNC: 104 MG/DL (ref 40–60)
HEMOGLOBIN: 14.2 G/DL (ref 12–16)
LDL CHOLESTEROL CALCULATED: 107 MG/DL
LUTEINIZING HORMONE: 57 MIU/ML
MCH RBC QN AUTO: 31.8 PG (ref 26–34)
MCHC RBC AUTO-ENTMCNC: 33.7 G/DL (ref 31–36)
MCV RBC AUTO: 94.5 FL (ref 80–100)
PDW BLD-RTO: 13.1 % (ref 12.4–15.4)
PLATELET # BLD: 211 K/UL (ref 135–450)
PMV BLD AUTO: 7.8 FL (ref 5–10.5)
POTASSIUM SERPL-SCNC: 4.6 MMOL/L (ref 3.5–5.1)
RBC # BLD: 4.48 M/UL (ref 4–5.2)
SODIUM BLD-SCNC: 144 MMOL/L (ref 136–145)
TOTAL PROTEIN: 7.4 G/DL (ref 6.4–8.2)
TRIGL SERPL-MCNC: 62 MG/DL (ref 0–150)
TSH SERPL DL<=0.05 MIU/L-ACNC: 2.11 UIU/ML (ref 0.27–4.2)
VLDLC SERPL CALC-MCNC: 12 MG/DL
WBC # BLD: 3.9 K/UL (ref 4–11)

## 2018-04-13 ENCOUNTER — OFFICE VISIT (OUTPATIENT)
Dept: FAMILY MEDICINE CLINIC | Age: 48
End: 2018-04-13

## 2018-04-13 VITALS
WEIGHT: 120.6 LBS | DIASTOLIC BLOOD PRESSURE: 73 MMHG | TEMPERATURE: 97.6 F | HEART RATE: 88 BPM | SYSTOLIC BLOOD PRESSURE: 99 MMHG | BODY MASS INDEX: 20.69 KG/M2 | RESPIRATION RATE: 16 BRPM | OXYGEN SATURATION: 98 %

## 2018-04-13 DIAGNOSIS — M54.2 NECK PAIN: Primary | ICD-10-CM

## 2018-04-13 DIAGNOSIS — R23.2 HOT FLASHES: ICD-10-CM

## 2018-04-13 DIAGNOSIS — N89.8 VAGINAL DRYNESS: ICD-10-CM

## 2018-04-13 DIAGNOSIS — G44.209 TENSION HEADACHE: ICD-10-CM

## 2018-04-13 PROCEDURE — 99214 OFFICE O/P EST MOD 30 MIN: CPT | Performed by: FAMILY MEDICINE

## 2018-04-13 RX ORDER — BUTALBITAL, ACETAMINOPHEN AND CAFFEINE 50; 325; 40 MG/1; MG/1; MG/1
1 TABLET ORAL EVERY 6 HOURS PRN
Qty: 25 TABLET | Refills: 1 | Status: SHIPPED | OUTPATIENT
Start: 2018-04-13 | End: 2020-03-17 | Stop reason: SDUPTHER

## 2018-06-13 ENCOUNTER — OFFICE VISIT (OUTPATIENT)
Dept: FAMILY MEDICINE CLINIC | Age: 48
End: 2018-06-13

## 2018-06-13 VITALS
SYSTOLIC BLOOD PRESSURE: 116 MMHG | RESPIRATION RATE: 16 BRPM | OXYGEN SATURATION: 95 % | BODY MASS INDEX: 19.56 KG/M2 | TEMPERATURE: 97.8 F | DIASTOLIC BLOOD PRESSURE: 71 MMHG | HEART RATE: 88 BPM | WEIGHT: 114 LBS

## 2018-06-13 DIAGNOSIS — H69.82 EUSTACHIAN TUBE DYSFUNCTION, LEFT: Primary | ICD-10-CM

## 2018-06-13 DIAGNOSIS — J34.89 SINUS PRESSURE: ICD-10-CM

## 2018-06-13 PROCEDURE — 99213 OFFICE O/P EST LOW 20 MIN: CPT | Performed by: FAMILY MEDICINE

## 2018-06-13 RX ORDER — AZITHROMYCIN 250 MG/1
TABLET, FILM COATED ORAL
Qty: 1 PACKET | Refills: 0 | Status: SHIPPED | OUTPATIENT
Start: 2018-06-13 | End: 2018-06-17

## 2018-07-17 ENCOUNTER — HOSPITAL ENCOUNTER (OUTPATIENT)
Dept: MAMMOGRAPHY | Age: 48
Discharge: HOME | End: 2018-07-17
Payer: COMMERCIAL

## 2018-07-17 ENCOUNTER — HOSPITAL ENCOUNTER (OUTPATIENT)
Dept: GENERAL RADIOLOGY | Age: 48
Discharge: HOME | End: 2018-07-17
Payer: COMMERCIAL

## 2018-07-17 DIAGNOSIS — Z12.31 VISIT FOR SCREENING MAMMOGRAM: ICD-10-CM

## 2018-07-17 DIAGNOSIS — E28.310 SYMPTOMATIC PREMATURE MENOPAUSE: ICD-10-CM

## 2018-07-17 PROCEDURE — 77080 DXA BONE DENSITY AXIAL: CPT

## 2018-07-17 PROCEDURE — 77067 SCR MAMMO BI INCL CAD: CPT

## 2018-09-10 ENCOUNTER — OFFICE VISIT (OUTPATIENT)
Dept: DERMATOLOGY | Age: 48
End: 2018-09-10

## 2018-09-10 DIAGNOSIS — L82.1 SK (SEBORRHEIC KERATOSIS): ICD-10-CM

## 2018-09-10 DIAGNOSIS — L57.0 AK (ACTINIC KERATOSIS): Primary | ICD-10-CM

## 2018-09-10 PROCEDURE — 17000 DESTRUCT PREMALG LESION: CPT | Performed by: DERMATOLOGY

## 2018-09-10 PROCEDURE — 99201 PR OFFICE OUTPATIENT NEW 10 MINUTES: CPT | Performed by: DERMATOLOGY

## 2018-09-10 NOTE — PROGRESS NOTES
[facility]  Jeanelle Cranker, MD  [address]  1970    50 y.o. female     Date of Visit: 9/10/2018    Chief Complaint: skin lesions    History of Present Illness:    1. She presents today for a persistent scaly lesion on the nasal dorsum. 2.  She also complains of a persistent dark lesion on the left frontal scalp. She saw a dermatologist over the summer months for a full skin examination in [place]. Splits time between [place] and [place]. Mom with hx of melanoma. Review of Systems:  Skin: No new or changing moles. Past Medical History, Family History, Surgical History, Medications and Allergies reviewed. Past Medical History:   Diagnosis Date    Allergic rhinitis     Atrophic vaginitis     DDD (degenerative disc disease), cervical     Dyspareunia in female     Interstitial cystitis 10/23/2017    Iron deficiency anemia 2017    Vocal cord nodule 2016     Past Surgical History:   Procedure Laterality Date    APPENDECTOMY  1996    CERVICAL FUSION  2013    [address]4-6   [address]    bilateral, right    LARYNGOSCOPY  2016   [facility] PARTIAL HYSTERECTOMY  2009    DUB -        No Known Allergies  Outpatient Prescriptions Marked as Taking for the 9/10/18 encounter (Office Visit) with Michelet Bryan MD   Medication Sig Dispense Refill    butalbital-acetaminophen-caffeine (FIORICET, ESGIC) -40 MG per tablet Take 1 tablet by mouth every 6 hours as needed for Headaches 25 tablet 1    UNABLE TO FIND       estradiol (ESTRACE VAGINAL) 0.1 MG/GM vaginal cream Use 2-3 x/ week. 1 Tube 2    Fexofenadine HCl (ALLEGRA PO) Take by mouth      buPROPion (WELLBUTRIN XL) 150 MG extended release tablet Take 150 mg by mouth every morning           Physical Examination     Full skin exam declined. Well appearing. 1.  Mid nasal dorsum - 1 scaly pink macule. 2.  Left frontal scalp - stuck on appearing verrucous light brown papule.

## 2019-01-29 ENCOUNTER — NURSE TRIAGE (OUTPATIENT)
Dept: OTHER | Facility: CLINIC | Age: 49
End: 2019-01-29

## 2019-05-06 ENCOUNTER — OFFICE VISIT (OUTPATIENT)
Dept: FAMILY MEDICINE CLINIC | Age: 49
End: 2019-05-06
Payer: COMMERCIAL

## 2019-05-06 VITALS
SYSTOLIC BLOOD PRESSURE: 113 MMHG | HEART RATE: 73 BPM | BODY MASS INDEX: 21.45 KG/M2 | TEMPERATURE: 98.4 F | DIASTOLIC BLOOD PRESSURE: 80 MMHG | WEIGHT: 125 LBS | RESPIRATION RATE: 16 BRPM | OXYGEN SATURATION: 97 %

## 2019-05-06 DIAGNOSIS — M77.12 LEFT LATERAL EPICONDYLITIS: Primary | ICD-10-CM

## 2019-05-06 DIAGNOSIS — S83.429A SPRAIN OF LATERAL COLLATERAL LIGAMENT OF KNEE, UNSPECIFIED LATERALITY, INITIAL ENCOUNTER: ICD-10-CM

## 2019-05-06 PROCEDURE — 99214 OFFICE O/P EST MOD 30 MIN: CPT | Performed by: FAMILY MEDICINE

## 2019-05-06 RX ORDER — METHYLPREDNISOLONE 4 MG/1
TABLET ORAL
Qty: 1 KIT | Refills: 0 | Status: SHIPPED | OUTPATIENT
Start: 2019-05-06 | End: 2019-05-12

## 2019-05-06 ASSESSMENT — PATIENT HEALTH QUESTIONNAIRE - PHQ9
2. FEELING DOWN, DEPRESSED OR HOPELESS: 0
SUM OF ALL RESPONSES TO PHQ QUESTIONS 1-9: 0
1. LITTLE INTEREST OR PLEASURE IN DOING THINGS: 0
SUM OF ALL RESPONSES TO PHQ9 QUESTIONS 1 & 2: 0
SUM OF ALL RESPONSES TO PHQ QUESTIONS 1-9: 0

## 2019-05-06 NOTE — PATIENT INSTRUCTIONS
Patient Education        Patellar Tracking Disorder: Exercises  Your Care Instructions  Here are some examples of exercises of typical rehabilitation exercises for your condition. Start each exercise slowly. Ease off the exercise if you start to have pain. Your doctor or physical therapist will tell you when you can start these exercises and which ones will work best for you. How to do the exercises  Quad sets    1. Sit with your leg straight and supported on the floor or a firm bed. (If you feel discomfort in the front or back of your knee, place a small towel roll under your knee.)  2. Tighten the muscles on top of your thigh by pressing the back of your knee flat down to the floor. (If you feel discomfort under your kneecap, place a small towel roll under your knee.)  3. Hold for about 6 seconds, then rest up to 10 seconds. 4. Do this for 8 to 12 repetitions several times a day. Mini squat    1. Stand with your feet about hip-width apart and 12 inches from a wall. 2. Lean against the wall and slide down until your knees are bent about 20 to 30 degrees. 3. Place a ball about the size of a soccer ball between your knees and squeeze your knees against the ball for about 6 seconds at a time. 4. Rest a few seconds, then squeeze again. 5. Repeat 8 to 12 times, at least 3 times a day. Straight-leg raises to the front    1. Lie on your back with your good knee bent so that your foot rests flat on the floor. Your injured leg should be straight. Make sure that your low back has a normal curve. You should be able to slip your flat hand in between the floor and the small of your back, with your palm touching the floor and your back touching the back of your hand. 2. Tighten the thigh muscles in the injured leg by pressing the back of your knee flat down to the floor. Hold your knee straight. 3. Tighten the quadriceps muscles of your straight leg and lift the leg 12 to 18 inches off the floor.  Hold for about 6 seconds, then slowly lower the leg back down and rest a few seconds. 4. Do 8 to 12 repetitions, 3 times a day. Straight-leg raises to the inside    1. Lie on your side with the leg you are going to exercise on the bottom and your other foot up on a chair. 2. Tighten your thigh muscles, and then lift your leg straight up away from the floor. 3. Hold for about 6 seconds, slowly lower the leg back down, and rest a few seconds. 4. Do 8 to 12 repetitions, 3 times a day. Straight-leg raises to the outside    1. Lie on your side with the leg you are going to exercise on top. 2. Tighten your thigh muscles, and then lift your leg straight up away from the floor. 3. Keep your hip and your leg straight in line with the rest of your body, and keep your knee pointing forward. Do not drop your hip back. 4. Hold for about 6 seconds, slowly lower the leg back down, and rest a few seconds. 5. Do 8 to 12 repetitions, 3 times a day. Straight-leg raises to the back    1. Lie on your belly. 2. Tighten your thigh muscles, and then lift your leg straight up away from the floor. 3. Hold for about 6 seconds, slowly lower the leg back down, and rest a few seconds. 4. Do 8 to 12 repetitions, 3 times a day. Shallow standing knee bends    1. Stand with your hands lightly resting on a counter or chair in front of you. Place your feet shoulder-width apart. 2. Slowly bend your knees so that you squat down like you are going to sit in a chair. Make sure your knees do not go in front of your toes. 3. Lower yourself about 6 inches. Your heels should remain on the floor at all times. 4. Rise slowly to a standing position. 5. Do 8 to 12 repetitions, 3 times a day.   6. Note for shallow knee bend on one leg: Remember to limit the bend of your knee to a 30-degree angle at first. When your knee is bent past this point, your kneecap will have more contact with the thighbone, causing more pressure, pain, and possible cartilage damage. Shallow knee bend on one leg    1. Stand on a step, on the leg you want to exercise. Let your other leg hang down off the step. 2. Keeping your head up and your back straight, lean slightly forward. Hold on to a banister if you feel unsteady. 3. Slowly bend your knee so the foot hanging down moves down toward the floor, then slowly straighten your knee again. Your heel should stay on the step, and your knee should not go any farther forward than your toe. As you bend and straighten your leg, try to keep your knee moving in a straight line with your middle toe. 4. Do 8 to 12 repetitions. Standing quadriceps stretch    1. If you are steady on your feet, stand holding a chair, counter, or wall. You can also lie on your stomach or your side to do this exercise. 2. Bend the knee of the leg you want to stretch, and grab the front of your foot with the hand on the same side. For example, if you are stretching your right leg, use your right hand. 3. Keeping your knees next to each other, pull your foot toward your buttock until you feel a gentle stretch across the front of your hip and down the front of your thigh. Your knee should be pointed directly to the ground, and not out to the side. 4. Hold the stretch for at least 15 to 30 seconds. Repeat 2 to 4 times. Hamstring stretch in doorway    1. Lie on the floor near a doorway, with your buttocks close to the wall. 2. Let the leg you are not stretching extend through the doorway. 3. Put the leg you want to stretch up on the wall, and straighten your knee to feel a gentle stretch at the back of your leg. 4. Hold the stretch for at least 15 to 30 seconds. Repeat 2 to 4 times. Hip rotator stretch    1. Lie on your back with both knees bent and your feet on the floor. 2. Put the ankle of the leg you are going to stretch on your opposite thigh near your knee.   3. Push gently on the knee of the leg you are stretching until you feel a gentle stretch around your hip. 4. Hold the stretch for at least 15 to 30 seconds. Repeat 2 to 4 times. Iliotibial band and buttock stretch    1. Sit on the floor with your legs out in front of you. 2. Bend the knee of the leg you want to stretch, and put that foot on the floor on the outside of the opposite leg. (Your legs will be crossed.)  3. Twist your shoulders toward your bent leg, and put your opposite elbow on that knee. 4. Push your arm against your knee to feel a gentle stretch at the back of your buttock and around your hip. 5. Hold the stretch for at least 15 to 30 seconds. Repeat 2 to 4 times. Calf stretch    1. Stand facing a wall with your hands on the wall at about eye level. 2. Put the leg you want to stretch about a step behind your other leg. 3. Keeping your back heel on the floor, bend your front knee until you feel a stretch in the back leg. 4. Hold the stretch for at least 15 to 30 seconds. Repeat 2 to 4 times. Follow-up care is a key part of your treatment and safety. Be sure to make and go to all appointments, and call your doctor if you are having problems. It's also a good idea to know your test results and keep a list of the medicines you take. Where can you learn more? Go to https://Fermentas InternationalermaYurbuds.Laser View. org and sign in to your Preen.Me account. Enter [phone] in the [facility] box to learn more about \"Patellar Tracking Disorder: Exercises. \"     If you do not have an account, please click on the \"Sign Up Now\" link. Current as of: September 20, 2018  Content Version: 11.9  © 7230-1447 [employer]. Care instructions adapted under license by [facility] ([facility]). If you have questions about a medical condition or this instruction, always ask your healthcare professional. [address]  any warranty or liability for your use of this information.          Patient Education        Lateral Collateral Ligament Sprain: Rehab Exercises  Your Care Instructions  Here are some examples of typical rehabilitation exercises for your condition. Start each exercise slowly. Ease off the exercise if you start to have pain. Your doctor or physical therapist will tell you when you can start these exercises and which ones will work best for you. How to do the exercises  Knee flexion with heel slide    1. Lie on your back with your knees bent. 2. Slide your heel back by bending your affected knee as far as you can. Then hook your other foot around your ankle to help pull your heel even farther back. 3. Hold for about 6 seconds, then rest for up to 10 seconds. 4. Repeat 8 to 12 times. Heel slides on a wall    1. Lie on the floor close enough to a wall so that you can place both legs up on the wall. Your hips should be as close to the wall as is comfortable for you. 2. Start with both feet resting on the wall. Slowly let the foot of your affected leg slide down the wall until you feel a stretch in your knee. 3. Hold for 15 to 30 seconds. 4. Then slowly slide your foot up to where you started. 5. Repeat 2 to 4 times. Quad sets    1. Sit with your affected leg straight and supported on the floor or a firm bed. Place a small, rolled-up towel under your knee. Your other leg should be bent, with that foot flat on the floor. 2. Tighten the thigh muscles of your affected leg by pressing the back of your knee down into the towel. 3. Hold for about 6 seconds, then rest for up to 10 seconds. 4. Repeat 8 to 12 times. Short-arc quad    1. Lie on your back with your knees bent over a foam roll or a large rolled-up towel. 2. Lift the lower part of your affected leg and straighten your knee by tightening your thigh muscle. Keep the bottom of your knee on the foam roll or rolled-up towel. 3. Hold your knee straight for about 6 seconds, then slowly bend your knee and lower your leg back to the floor. Rest for up to 10 seconds between repetitions.   4. Repeat 8 to 12 the step and your other foot on the floor. Hold on to the banister or wall. 2. Use your injured leg to raise yourself up, bringing your other foot level with the stair step. Make sure to keep your hips level as you do this. And try to keep your knee moving in a straight line with your middle toe. Do not put the foot you are raising on the stair step. 3. Slowly lower your foot back down. 4. Repeat 8 to 12 times. Wall squats with ball    1. Stand with your back facing a wall. Place your feet about a shoulder-width apart. 2. Place the therapy ball between your back and the wall, and move your feet out in front of you so they are about a foot in front of your hips. 3. Keep your arms at your sides, or put your hands on your hips. 4. Slowly squat down as if you are going to sit in a chair, rolling your back over the ball as you squat. The ball should move with you but stay pressed into the wall. 5. Be sure that your knees do not go in front of your toes as you squat. 6. Hold for 6 seconds. 7. Slowly rise to your standing position. 8. Repeat 8 to 12 times. Follow-up care is a key part of your treatment and safety. Be sure to make and go to all appointments, and call your doctor if you are having problems. It's also a good idea to know your test results and keep a list of the medicines you take. Where can you learn more? Go to https://PEMREDpelettyCAD Best.Cartago Software. org and sign in to your SkyBridge account. Enter A255 in the [facility] box to learn more about \"Lateral Collateral Ligament Sprain: Rehab Exercises. \"     If you do not have an account, please click on the \"Sign Up Now\" link. Current as of: September 20, 2018  Content Version: 11.9  © 3402-3972 [employer]. Care instructions adapted under license by [address]  If you have questions about a medical condition or this instruction, always ask your healthcare professional. Chris Bagley disclaims any warranty or liability for your use of this information.

## 2019-05-06 NOTE — PROGRESS NOTES
Patient is here for elbows left > right. She is also having knee pain bilateral x 3 months. She walks on beach almost daily. No pain with wrist and ankles. No pain to fingers and toes, except right index finger. Right handed. Pain is more to left elbow. She had labs in [place] ( CBC, sed rate, CYNDI, RA , TSH). She is stiff in the am . Her daughter is a jeffrey and living in [place]. She plans to dual enroll at [employer] in the fall and finish high school at [employer] - 2 classes left. She is willing to come back to [place]. She does no;t like to socialize as much. She is past high school drama. She is not want to go to the beach. She does not want to go out to eat with friends often. She goes to gym, every other day. She got hurt in soccer and continued to run. She is in a boot. She states doing laundry may have irritated left elbow as she has to go outside house she was/ is renting in [place]. She was started on LInzess 145 mg qd likely 2/19  and on Omeprazole. 40 mg initially ,but decreasd to 20 mg qd or Lansaprazole 15 mg qd. No prior EGD. She had abdominal ultrasound in [place] which was normal, as well ad HIDA scan was done on Friday . She is seeing Gastroenterologist in [place] . She is to have EGD 5/10/19 in [place]. No heartburn or indigestion. Some RUQ abdominal pain . Normal bowel movements with Ctrucel, Colace off and on , daily probiotic qd. She feels Linzess helps with RUQ abdominal pain ,but not constipation issues. She is moving back to [place] 6/2/19. Review of Systems    ROS: All other systems were reviewed and are negative . Patient's allergies and medications were reviewed. Patient's past medical, surgical, social , and family history were reviewed. OBJECTIVE:  LMP  (LMP Unknown)     Physical Exam    General: NAD, cooperative, alert and oriented X 3. Mood / affect is good. good insight. well hydrated.   Neck : no lymphadenopathy, supple, FROM  Left elbow with tenderness to lateral epicondyle. Pain with resistance of wrist hyperextension and supination. Upper extremities : DTRs 2+ biceps/ triceps/ brachioradialis bilateral.  FROM. Strength 5/5. CV: Regular rate and rhythm , no murmurs/ rub/ gallop. No edema. Lungs : CTA bilaterally, breathing comfortably  Abdomen: positive bowel sounds, soft , non tender, non distended. No hepatosplenomegaly. No CVA tenderness. Knees: no edema or erythema. FROM . Negative anterior/ posterior drawer. Negative Sweetie's / Lachman's bilaterally. Strength 5/5. DTRs 2+ bilateral. Tenderness to bilateral collateral lateral ligament. Skin: no rashes. Non tender. ASSESSMENT/  PLAN:  1. Left lateral epicondylitis  - Moist heat . ROM exercises- handout given. Modify activities. - methylPREDNISolone (MEDROL DOSEPACK) 4 MG tablet; Take by mouth. Dispense: 1 kit; Refill: 0  - wrist splint . 2. Sprain of lateral collateral ligament of knee, unspecified laterality, initial encounter  - Moist heat . ROM exercises- handout given. Modify activities. - methylPREDNISolone (MEDROL DOSEPACK) 4 MG tablet; Take by mouth. Dispense: 1 kit; Refill: 0     F/u if no improvement 7-10d/ prn increased symptoms.

## 2019-05-14 ENCOUNTER — NURSE ONLY (OUTPATIENT)
Dept: FAMILY MEDICINE CLINIC | Age: 49
End: 2019-05-14
Payer: COMMERCIAL

## 2019-05-14 DIAGNOSIS — Z23 NEED FOR HEPATITIS A AND B VACCINATION: Primary | ICD-10-CM

## 2019-05-14 PROCEDURE — 90746 HEPB VACCINE 3 DOSE ADULT IM: CPT | Performed by: FAMILY MEDICINE

## 2019-05-14 PROCEDURE — 90632 HEPA VACCINE ADULT IM: CPT | Performed by: FAMILY MEDICINE

## 2019-05-14 PROCEDURE — 90472 IMMUNIZATION ADMIN EACH ADD: CPT | Performed by: FAMILY MEDICINE

## 2019-05-14 PROCEDURE — 90471 IMMUNIZATION ADMIN: CPT | Performed by: FAMILY MEDICINE

## 2019-05-14 NOTE — PROGRESS NOTES
Patient received the Hep. B injection in her right deltoid and Hep A in left deltoid today and tolerated it well.

## 2019-05-15 ENCOUNTER — TELEPHONE (OUTPATIENT)
Dept: FAMILY MEDICINE CLINIC | Age: 49
End: 2019-05-15

## 2019-05-15 ENCOUNTER — NURSE ONLY (OUTPATIENT)
Dept: FAMILY MEDICINE CLINIC | Age: 49
End: 2019-05-15
Payer: COMMERCIAL

## 2019-05-15 DIAGNOSIS — Z23 NEED FOR TDAP VACCINATION: Primary | ICD-10-CM

## 2019-05-15 PROCEDURE — 90471 IMMUNIZATION ADMIN: CPT | Performed by: FAMILY MEDICINE

## 2019-05-15 PROCEDURE — 90715 TDAP VACCINE 7 YRS/> IM: CPT | Performed by: FAMILY MEDICINE

## 2019-05-15 NOTE — TELEPHONE ENCOUNTER
Patient Just Had her Hep A & B done yesterday and realized her Tetanus Shot is also Due. Wanted to know if she's able to come in today to get it so close to getting those other Vaccinations? Will be leaving tomorrow to go out of town.

## 2019-05-15 NOTE — TELEPHONE ENCOUNTER
Yes . That is fine to give TdaP. I am unsure of her prior Tdap,but am assuming she knows and  > 5 years.

## 2019-05-30 PROBLEM — K29.70 GASTRITIS: Status: ACTIVE | Noted: 2019-05-01

## 2019-05-30 PROBLEM — K20.90 ESOPHAGITIS: Status: ACTIVE | Noted: 2019-05-01

## 2019-06-18 ENCOUNTER — TELEPHONE (OUTPATIENT)
Dept: DERMATOLOGY | Age: 49
End: 2019-06-18

## 2019-06-18 NOTE — TELEPHONE ENCOUNTER
Patient is scheduled for a skin check for 08/19   But has a wart on her finger that she will like to be treated prior to her appt. It is on her middle finger, and she had it treated with cryotherapy while in [place]. She c/o it is a little sore. Please review.  Thank you   Phone [phone]

## 2019-06-19 ENCOUNTER — NURSE ONLY (OUTPATIENT)
Dept: FAMILY MEDICINE CLINIC | Age: 49
End: 2019-06-19
Payer: COMMERCIAL

## 2019-06-19 DIAGNOSIS — Z23 NEED FOR HEPATITIS B VACCINATION: Primary | ICD-10-CM

## 2019-06-19 PROCEDURE — 90471 IMMUNIZATION ADMIN: CPT | Performed by: FAMILY MEDICINE

## 2019-06-19 PROCEDURE — 90746 HEPB VACCINE 3 DOSE ADULT IM: CPT | Performed by: FAMILY MEDICINE

## 2019-06-20 ENCOUNTER — TELEPHONE (OUTPATIENT)
Dept: FAMILY MEDICINE CLINIC | Age: 49
End: 2019-06-20

## 2019-06-20 RX ORDER — OMEPRAZOLE 40 MG/1
40 CAPSULE, DELAYED RELEASE ORAL DAILY
Qty: 30 CAPSULE | Refills: 3 | Status: SHIPPED | OUTPATIENT
Start: 2019-06-20 | End: 2019-11-18 | Stop reason: SDUPTHER

## 2019-06-20 NOTE — TELEPHONE ENCOUNTER
[facility] called and said Ms Derrek Baumgarten needs a prescription filled for OMEPRAZOLE PO 40 mg  Last ov 5/6/19     Please send to:  [address] [facility] [phone] - F [phone]

## 2019-07-22 ENCOUNTER — OFFICE VISIT (OUTPATIENT)
Dept: DERMATOLOGY | Age: 49
End: 2019-07-22
Payer: COMMERCIAL

## 2019-07-22 DIAGNOSIS — L82.1 SK (SEBORRHEIC KERATOSIS): ICD-10-CM

## 2019-07-22 DIAGNOSIS — D22.72 NEVUS OF LEFT THIGH: ICD-10-CM

## 2019-07-22 DIAGNOSIS — L81.4 SOLAR LENTIGO: ICD-10-CM

## 2019-07-22 DIAGNOSIS — B07.8 OTHER VIRAL WARTS: Primary | ICD-10-CM

## 2019-07-22 PROCEDURE — 17110 DESTRUCTION B9 LES UP TO 14: CPT | Performed by: DERMATOLOGY

## 2019-07-22 PROCEDURE — 99213 OFFICE O/P EST LOW 20 MIN: CPT | Performed by: DERMATOLOGY

## 2019-07-22 NOTE — PROGRESS NOTES
(WELLBUTRIN XL) 150 MG extended release tablet Take 150 mg by mouth every morning           Physical Examination       The following were examined and determined to be normal: Psych/Neuro, Scalp/hair, Head/face, Conjunctivae/eyelids, Gums/teeth/lips, Neck, Breast/axilla/chest, Abdomen, Back, RUE, LUE, RLE and LLE. The following were examined and determined to be abnormal: Nails/digits. Well appearing. 1.  Dorsum of the right middle finger overlying the PIP joint - 3 round verrucous pink papules. 2.  Central face with scattered small tan macules. 3.  Medial aspect of the left thigh with a round soft smooth pink-brown papule. 4.  Left central chest with a small verrucous light brown papule. Assessment and Plan     1. Other viral warts -     2 cycles of liquid nitrogen applied to 3 warts on the R middle finger. Patient was educated regarding the potential risks of blister formation, discomfort, hypopigmentation, and scar. Wound care was discussed. 2. Solar lentigines     Reassurance. Continue sun protective behaviors. 3. Nevus of left thigh - benign appearing    Reassurance. 4. SK (seborrheic keratosis)     Reassurance. Return in about 4 weeks (around 8/19/2019).

## 2019-07-23 ENCOUNTER — HOSPITAL ENCOUNTER (OUTPATIENT)
Dept: MAMMOGRAPHY | Age: 49
Discharge: HOME | End: 2019-07-23
Payer: COMMERCIAL

## 2019-07-23 DIAGNOSIS — Z12.31 VISIT FOR SCREENING MAMMOGRAM: ICD-10-CM

## 2019-07-23 PROBLEM — E55.9 VITAMIN D DEFICIENCY: Status: ACTIVE | Noted: 2019-07-01

## 2019-07-23 PROCEDURE — 77063 BREAST TOMOSYNTHESIS BI: CPT

## 2019-07-23 RX ORDER — MELATONIN
1000 DAILY
Qty: 90 TABLET | Refills: 3 | COMMUNITY
Start: 2019-07-23

## 2019-08-16 ENCOUNTER — OFFICE VISIT (OUTPATIENT)
Dept: DERMATOLOGY | Age: 49
End: 2019-08-16
Payer: COMMERCIAL

## 2019-08-16 DIAGNOSIS — B07.8 OTHER VIRAL WARTS: Primary | ICD-10-CM

## 2019-08-16 PROCEDURE — 17110 DESTRUCTION B9 LES UP TO 14: CPT | Performed by: DERMATOLOGY

## 2019-09-18 ENCOUNTER — OFFICE VISIT (OUTPATIENT)
Dept: DERMATOLOGY | Age: 49
End: 2019-09-18
Payer: COMMERCIAL

## 2019-09-18 DIAGNOSIS — B07.8 OTHER VIRAL WARTS: Primary | ICD-10-CM

## 2019-09-18 PROCEDURE — 17110 DESTRUCTION B9 LES UP TO 14: CPT | Performed by: DERMATOLOGY

## 2019-09-23 ENCOUNTER — NURSE TRIAGE (OUTPATIENT)
Dept: OTHER | Facility: CLINIC | Age: 49
End: 2019-09-23

## 2019-10-16 ENCOUNTER — IMMUNIZATION (OUTPATIENT)
Dept: FAMILY MEDICINE CLINIC | Age: 49
End: 2019-10-16
Payer: COMMERCIAL

## 2019-10-16 DIAGNOSIS — Z23 NEED FOR INFLUENZA VACCINATION: Primary | ICD-10-CM

## 2019-10-16 PROCEDURE — 90471 IMMUNIZATION ADMIN: CPT | Performed by: FAMILY MEDICINE

## 2019-10-16 PROCEDURE — 90686 IIV4 VACC NO PRSV 0.5 ML IM: CPT | Performed by: FAMILY MEDICINE

## 2019-11-18 ENCOUNTER — OFFICE VISIT (OUTPATIENT)
Dept: FAMILY MEDICINE CLINIC | Age: 49
End: 2019-11-18
Payer: COMMERCIAL

## 2019-11-18 VITALS
DIASTOLIC BLOOD PRESSURE: 56 MMHG | TEMPERATURE: 96.8 F | BODY MASS INDEX: 20.72 KG/M2 | SYSTOLIC BLOOD PRESSURE: 95 MMHG | WEIGHT: 120.8 LBS | RESPIRATION RATE: 12 BRPM | OXYGEN SATURATION: 98 % | HEART RATE: 80 BPM

## 2019-11-18 DIAGNOSIS — K21.9 GASTROESOPHAGEAL REFLUX DISEASE WITHOUT ESOPHAGITIS: ICD-10-CM

## 2019-11-18 DIAGNOSIS — R10.11 RUQ ABDOMINAL PAIN: Primary | ICD-10-CM

## 2019-11-18 DIAGNOSIS — Z23 NEED FOR HEPATITIS B VACCINATION: ICD-10-CM

## 2019-11-18 DIAGNOSIS — Z23 NEED FOR HEPATITIS A IMMUNIZATION: ICD-10-CM

## 2019-11-18 PROCEDURE — 90746 HEPB VACCINE 3 DOSE ADULT IM: CPT | Performed by: FAMILY MEDICINE

## 2019-11-18 PROCEDURE — 90472 IMMUNIZATION ADMIN EACH ADD: CPT | Performed by: FAMILY MEDICINE

## 2019-11-18 PROCEDURE — 90632 HEPA VACCINE ADULT IM: CPT | Performed by: FAMILY MEDICINE

## 2019-11-18 PROCEDURE — 99214 OFFICE O/P EST MOD 30 MIN: CPT | Performed by: FAMILY MEDICINE

## 2019-11-18 PROCEDURE — 90471 IMMUNIZATION ADMIN: CPT | Performed by: FAMILY MEDICINE

## 2019-11-18 RX ORDER — OMEPRAZOLE 40 MG/1
40 CAPSULE, DELAYED RELEASE ORAL DAILY
Qty: 90 CAPSULE | Refills: 1 | Status: SHIPPED | OUTPATIENT
Start: 2019-11-18 | End: 2020-07-21

## 2019-11-18 RX ORDER — LUBIPROSTONE 24 UG/1
24 CAPSULE, GELATIN COATED ORAL 2 TIMES DAILY WITH MEALS
COMMUNITY
End: 2020-06-22

## 2020-02-10 ENCOUNTER — OFFICE VISIT (OUTPATIENT)
Dept: FAMILY MEDICINE CLINIC | Age: 50
End: 2020-02-10
Payer: COMMERCIAL

## 2020-02-10 ENCOUNTER — HOSPITAL ENCOUNTER (OUTPATIENT)
Dept: GENERAL RADIOLOGY | Age: 50
Discharge: HOME | End: 2020-02-10
Payer: COMMERCIAL

## 2020-02-10 ENCOUNTER — HOSPITAL ENCOUNTER (OUTPATIENT)
Age: 50
Discharge: HOME | End: 2020-02-10
Payer: COMMERCIAL

## 2020-02-10 ENCOUNTER — TELEPHONE (OUTPATIENT)
Dept: FAMILY MEDICINE CLINIC | Age: 50
End: 2020-02-10

## 2020-02-10 VITALS
BODY MASS INDEX: 21.38 KG/M2 | RESPIRATION RATE: 16 BRPM | DIASTOLIC BLOOD PRESSURE: 70 MMHG | SYSTOLIC BLOOD PRESSURE: 106 MMHG | HEART RATE: 88 BPM | OXYGEN SATURATION: 98 % | WEIGHT: 124.6 LBS

## 2020-02-10 PROCEDURE — 73080 X-RAY EXAM OF ELBOW: CPT

## 2020-02-10 PROCEDURE — 99214 OFFICE O/P EST MOD 30 MIN: CPT | Performed by: FAMILY MEDICINE

## 2020-02-10 RX ORDER — ICOSAPENT ETHYL 1000 MG/1
2 CAPSULE ORAL 2 TIMES DAILY
Qty: 120 CAPSULE | Refills: 3 | Status: SHIPPED | OUTPATIENT
Start: 2020-02-10

## 2020-02-10 RX ORDER — MELOXICAM 7.5 MG/1
7.5 TABLET ORAL DAILY
Qty: 30 TABLET | Refills: 1 | Status: SHIPPED | OUTPATIENT
Start: 2020-02-10 | End: 2020-04-17 | Stop reason: SDUPTHER

## 2020-02-10 ASSESSMENT — PATIENT HEALTH QUESTIONNAIRE - PHQ9
SUM OF ALL RESPONSES TO PHQ9 QUESTIONS 1 & 2: 0
SUM OF ALL RESPONSES TO PHQ QUESTIONS 1-9: 0
2. FEELING DOWN, DEPRESSED OR HOPELESS: 0
1. LITTLE INTEREST OR PLEASURE IN DOING THINGS: 0
SUM OF ALL RESPONSES TO PHQ QUESTIONS 1-9: 0

## 2020-02-10 NOTE — TELEPHONE ENCOUNTER
Pt called and keyona said that they usaully cover a tennis elbow strap vs Elastic Bandages & Supports (WRIST SPLINT/ELASTIC LEFT SM) MIS . please call pt back at [phone]

## 2020-02-10 NOTE — PROGRESS NOTES
Patient is here for follow up . Her daughter is graduating from high school. She is loving psychology classes. Daughter is going to run [place] marathon- 4 weeks. Her son is getting  in 8/19. He went to [employer] . Her son will be going to [employer]. Her middle son is in [place] and will go to pharmacy school likely. She is off Prilosec and abdominal pain has received off the Prilosec. Her bowel movements are qd to qod. She takes Miralax qd . She is using Amitiza prn. She is taking stool softener every day. She complains of bilateral elbow pain X 1 years. No swelling . No hand /wrist or feet / ankle pain. She has tried CBD Oil on neck and has helped some. Denies lifting weights or playing tennis. Right handed. Energy is okay . Last PAP= 5/19 . Never had abnormal PAP. Review of Systems    ROS: All other systems were reviewed and are negative . Patient's allergies and medications were reviewed. Patient's past medical, surgical, social , and family history were reviewed. OBJECTIVE:  /70   Pulse 88   Resp 16   Wt 124 lb 9.6 oz (56.5 kg)   LMP  (LMP Unknown)   SpO2 98%   BMI 21.38 kg/m²     Physical Exam    General: NAD, cooperative, alert and oriented X 3. Mood / affect is good. good insight. well hydrated. Neck : no lymphadenopathy, supple, FROM. Non tender. Upper extremities : DTRs 2+ biceps/ triceps/ brachioradialis bilateral.  FROM. Strength 5/5. Tenderness to lateral epicondyle right > left . Pain with resistance of wrist hyperextension and pronation. CV: Regular rate and rhythm , no murmurs/ rub/ gallop. No edema. Lungs : CTA bilaterally, breathing comfortably  Abdomen: positive bowel sounds, soft , non tender, non distended. No hepatosplenomegaly. No CVA tenderness. Skin: no rashes. Non tender. ASSESSMENT/  PLAN:  1. Constipation, unspecified constipation type  - Advised to take Amitiza bid, but verify dose.    - Continue fiber supplement 1x/ day. (Fibercon, Citrucel, Metamucil ). - Continue Miralax qd, but use prn.      2. Inflammation of elbow joint  - likely related to lateral epicondylitis. - patient wanting to try prescription omega 3 fisho oil- VASCEPA 1 g CAPS capsule; Take 2 capsules by mouth 2 times daily  Dispense: 120 capsule; Refill: 3. Advised likely would not be covered . Recommended Mobic instead for lateral epicondylitis. - XR ELBOW RIGHT (MIN 3 VIEWS); Future    3. Right elbow pain/ 4. Lateral epicondylitis of both elbows  - Moist heat . ROM exercises. Modify activities. - XR ELBOW RIGHT (MIN 3 VIEWS); Future  - meloxicam (MOBIC) 7.5 MG tablet; Take 1 tablet by mouth daily Prn elbow pain bilateral.  Dispense: 30 tablet; Refill: 1  - Elastic Bandages & Supports (WRIST SPLINT/ELASTIC RIGHT SM) MISC; DX: lateral epicondylitis  Dispense: 1 each; Refill: 0  - [facility] [place]    5. Menopause  - per patient request, referred to Tony Duncan MD, Gynecology, [facility]    Follow up 2-3 months/ prn.

## 2020-02-10 NOTE — PATIENT INSTRUCTIONS
Patient Education        Tennis Elbow: Exercises  Introduction  Here are some examples of exercises for you to try. The exercises may be suggested for a condition or for rehabilitation. Start each exercise slowly. Ease off the exercises if you start to have pain. You will be told when to start these exercises and which ones will work best for you. How to do the exercises  Wrist flexor stretch   1. Extend your arm in front of you with your palm up. 2. Bend your wrist, pointing your hand toward the floor. 3. With your other hand, gently bend your wrist farther until you feel a mild to moderate stretch in your forearm. 4. Hold for at least 15 to 30 seconds. Repeat 2 to 4 times. Wrist extensor stretch   1. Repeat steps 1 to 4 of the stretch above but begin with your extended hand palm down. Ball or sock squeeze   1. Hold a tennis ball (or a rolled-up sock) in your hand. 2. Make a fist around the ball (or sock) and squeeze. 3. Hold for about 6 seconds, and then relax for up to 10 seconds. 4. Repeat 8 to 12 times. 5. Switch the ball (or sock) to your other hand and do 8 to 12 times. Wrist deviation   1. Sit so that your arm is supported but your hand hangs off the edge of a flat surface, such as a table. 2. Hold your hand out like you are shaking hands with someone. 3. Move your hand up and down. 4. Repeat this motion 8 to 12 times. 5. Switch arms. 6. Try to do this exercise twice with each hand. Wrist curls   1. Place your forearm on a table with your hand hanging over the edge of the table, palm up. 2. Place a 1- to 2-pound weight in your hand. This may be a dumbbell, a can of food, or a filled water bottle. 3. Slowly raise and lower the weight while keeping your forearm on the table and your palm facing up. 4. Repeat this motion 8 to 12 times. 5. Switch arms, and do steps 1 through 4.  6. Repeat with your hand facing down toward the floor. Switch arms. Biceps curls   1.  Sit leaning

## 2020-03-17 ENCOUNTER — TELEPHONE (OUTPATIENT)
Dept: FAMILY MEDICINE CLINIC | Age: 50
End: 2020-03-17

## 2020-03-17 RX ORDER — BUPROPION HYDROCHLORIDE 150 MG/1
150 TABLET ORAL EVERY MORNING
Qty: 90 TABLET | Refills: 1 | Status: SHIPPED | OUTPATIENT
Start: 2020-03-17 | End: 2021-02-18

## 2020-03-17 RX ORDER — BUTALBITAL, ACETAMINOPHEN AND CAFFEINE 50; 325; 40 MG/1; MG/1; MG/1
1 TABLET ORAL EVERY 6 HOURS PRN
Qty: 25 TABLET | Refills: 1 | Status: SHIPPED | OUTPATIENT
Start: 2020-03-17

## 2020-04-09 ENCOUNTER — VIRTUAL VISIT (OUTPATIENT)
Dept: FAMILY MEDICINE CLINIC | Age: 50
End: 2020-04-09
Payer: COMMERCIAL

## 2020-04-09 PROCEDURE — 99214 OFFICE O/P EST MOD 30 MIN: CPT | Performed by: FAMILY MEDICINE

## 2020-04-16 ENCOUNTER — PATIENT MESSAGE (OUTPATIENT)
Dept: FAMILY MEDICINE CLINIC | Age: 50
End: 2020-04-16

## 2020-04-16 NOTE — TELEPHONE ENCOUNTER
From: Lynn Almendarez  To: Humaira Pickard MD  Sent: 4/16/2020  5:12 AM EDT  Subject: Non-Urgent Medical Question    Good  Morning Dr. Rand Clay,        I wanted to touch base per my last  tele-health appointment . I am still having vaginal discomfort. I finished the Monistat yeast cream but the discomfort came right back. I do not think I am having a yeast problem but possibly a bacterial issue. I wasnt sure what to do since we are trying to avoid appointments. Appreciate your insight.      Arianna Loredo

## 2020-04-17 ENCOUNTER — OFFICE VISIT (OUTPATIENT)
Dept: FAMILY MEDICINE CLINIC | Age: 50
End: 2020-04-17
Payer: COMMERCIAL

## 2020-04-17 VITALS
DIASTOLIC BLOOD PRESSURE: 75 MMHG | RESPIRATION RATE: 14 BRPM | OXYGEN SATURATION: 99 % | HEART RATE: 81 BPM | SYSTOLIC BLOOD PRESSURE: 108 MMHG | TEMPERATURE: 97.8 F

## 2020-04-17 LAB
CANDIDA SPECIES, DNA PROBE: ABNORMAL
GARDNERELLA VAGINALIS, DNA PROBE: ABNORMAL
TRICHOMONAS VAGINALIS DNA: ABNORMAL

## 2020-04-17 PROCEDURE — 99214 OFFICE O/P EST MOD 30 MIN: CPT | Performed by: FAMILY MEDICINE

## 2020-04-17 RX ORDER — MELOXICAM 7.5 MG/1
7.5 TABLET ORAL DAILY
Qty: 90 TABLET | Refills: 0 | Status: SHIPPED | OUTPATIENT
Start: 2020-04-17 | End: 2021-08-18 | Stop reason: SDUPTHER

## 2020-04-17 NOTE — PROGRESS NOTES
Patient is here for vaginal irritation . She used Monistat for yellow discharge. She denies abnormal vaginal bleeding, discharge or unusual pelvic pain, no dysuria or hematuria. Some urinary frequency. Gets up 1x/ day . Caffeine - 2 coffee/ day . Alcohol is 1x/ month or so . Monistat seemed to help initially , but then symptoms to return. No itching. S/p hysterectomy. No odor vaginally or with urine. No rash. Right ear is better with Flonase NS qhs. She is taking Mobic 7.5 mg qod usually and helps with elbow. Takes every other day or so. Review of Systems    ROS: All other systems were reviewed and are negative . Patient's allergies and medications were reviewed. Patient's past medical, surgical, social , and family history were reviewed. OBJECTIVE:  /75   Pulse 81   Temp 97.8 °F (36.6 °C) (Oral)   Resp 14   LMP  (LMP Unknown)   SpO2 99%   Breastfeeding No     Physical Exam    General: NAD, cooperative, alert and oriented X 3. Mood / affect is good. good insight. well hydrated. HEENT: PERRLA, EOMI, TMs - clear. Nasopharynx clear. Neck : no lymphadenopathy, supple, FROM  CV: Regular rate and rhythm , no murmurs/ rub/ gallop. No edema. Lungs : CTA bilaterally, breathing comfortably  Abdomen: positive bowel sounds, soft , non tender, non distended. No hepatosplenomegaly. No CVA tenderness. : Vagina and vulva are normal, except mild erythema and irritation at vaginal vault;  clear discharge is noted. Cervix normal without lesions. Uterus anteverted and mobile, normal in size and shape without tenderness. Adnexa normal in size without masses or tenderness  Skin: no rashes. Non tender. ASSESSMENT/  PLAN:  1. Vaginal irritation/ 2. Vaginal discharge  - further treatment pending results. Suspect BV.  - C.trachomatis N.gonorrhoeae DNA  - Vaginal Pathogens Probes *A  - Culture, Genital    3. Lateral epicondylitis of both elbows  - stable.  Continue Mobic prn.   - meloxicam

## 2020-04-19 LAB — GENITAL CULTURE, ROUTINE: NORMAL

## 2020-04-19 RX ORDER — METRONIDAZOLE 500 MG/1
500 TABLET ORAL 2 TIMES DAILY
Qty: 14 TABLET | Refills: 0 | Status: SHIPPED | OUTPATIENT
Start: 2020-04-19 | End: 2020-04-26

## 2020-04-21 LAB
C TRACH DNA GENITAL QL NAA+PROBE: NEGATIVE
N. GONORRHOEAE DNA: NEGATIVE

## 2020-04-23 ENCOUNTER — PATIENT MESSAGE (OUTPATIENT)
Dept: FAMILY MEDICINE CLINIC | Age: 50
End: 2020-04-23

## 2020-04-24 ENCOUNTER — PATIENT MESSAGE (OUTPATIENT)
Dept: FAMILY MEDICINE CLINIC | Age: 50
End: 2020-04-24

## 2020-05-01 ENCOUNTER — PATIENT MESSAGE (OUTPATIENT)
Dept: FAMILY MEDICINE CLINIC | Age: 50
End: 2020-05-01

## 2020-05-01 NOTE — TELEPHONE ENCOUNTER
From: Valerio Bullock  To: Sebastián Hurt MD  Sent: 5/1/2020 12:11 PM EDT  Subject: Non-Urgent Cindy Sheen Dr. Jemima Valentine,    My chest has felt heavy and some pain (with slight congestion). Do I need to take any precautions for Covid? I have taken Mucinex and Sudafed this morning.     Earlean Osler

## 2020-05-02 ENCOUNTER — NURSE TRIAGE (OUTPATIENT)
Dept: OTHER | Facility: CLINIC | Age: 50
End: 2020-05-02

## 2020-05-02 ENCOUNTER — OFFICE VISIT (OUTPATIENT)
Dept: PRIMARY CARE CLINIC | Age: 50
End: 2020-05-02
Payer: COMMERCIAL

## 2020-05-02 VITALS — OXYGEN SATURATION: 98 % | HEART RATE: 87 BPM | TEMPERATURE: 98 F

## 2020-05-02 PROCEDURE — 99213 OFFICE O/P EST LOW 20 MIN: CPT | Performed by: NURSE PRACTITIONER

## 2020-05-02 NOTE — TELEPHONE ENCOUNTER
Reason for Disposition   [1] COVID-19 suspected (e.g., cough, fever, shortness of breath) AND [2] mild symptoms AND [0] public health department recommends testing   [1] Mild body aches, chills, diarrhea, headache, runny nose, or sore throat AND [2] within 14 days of COVID-19 EXPOSURE    Answer Assessment - Initial Assessment Questions  1. COVID-19 DIAGNOSIS: \"Who made your Coronavirus (COVID-19) diagnosis? \" \"Was it confirmed by a positive lab test?\" If not diagnosed by a HCP, ask \"Are there lots of cases (community spread) where you live? \" (See public health department website, if unsure)    * MAJOR community spread: high number of cases; numbers of cases are increasing; many people hospitalized. * MINOR community spread: low number of cases; not increasing; few or no people hospitalized      major  2. ONSET: \"When did the COVID-19 symptoms start? \"       2 days ago  3. WORST SYMPTOM: \"What is your worst symptom? \" (e.g., cough, fever, shortness of breath, muscle aches)      Chest pain/ heaviness with breathing  4. COUGH: \"Do you have a cough? \" If so, ask: \"How bad is the cough? \"        Cough is persistent irritant. Not a deep cough  5. FEVER: \"Do you have a fever? \" If so, ask: \"What is your temperature, how was it measured, and when did it start? \"      No fever 97.0, 98.0  6. RESPIRATORY STATUS: \"Describe your breathing? \" (e.g., shortness of breath, wheezing, unable to speak)       None,   7. BETTER-SAME-WORSE: Gisel Maffucci you getting better, staying the same or getting worse compared to yesterday? \"  If getting worse, ask, \"In what way? \"      Chest pressure is getting worse  8. HIGH RISK DISEASE: \"Do you have any chronic medical problems? \" (e.g., asthma, heart or lung disease, weak immune system, etc.)      none  9. PREGNANCY: \"Is there any chance you are pregnant? \" \"When was your last menstrual period? \"      non3  10. OTHER SYMPTOMS: \"Do you have any other symptoms? \"  (e.g., runny nose, headache, sore throat,

## 2020-05-04 ENCOUNTER — TELEPHONE (OUTPATIENT)
Dept: PRIMARY CARE CLINIC | Age: 50
End: 2020-05-04

## 2020-05-05 LAB
SARS-COV-2: NOT DETECTED
SOURCE: NORMAL

## 2020-05-11 ENCOUNTER — TELEMEDICINE (OUTPATIENT)
Dept: FAMILY MEDICINE CLINIC | Age: 50
End: 2020-05-11
Payer: COMMERCIAL

## 2020-05-11 PROCEDURE — 99213 OFFICE O/P EST LOW 20 MIN: CPT | Performed by: FAMILY MEDICINE

## 2020-05-11 NOTE — PROGRESS NOTES
2020    TELEHEALTH EVALUATION -- Audio/Visual (During YHHFV-84 public health emergency)    Due to Surya 19 outbreak, patient's office visit was converted to a virtual visit. Patient was contacted and agreed to proceed with a virtual visit via [address] Visit  The risks and benefits of converting to a virtual visit were discussed in light of the current infectious disease epidemic. Patient also understood that insurance coverage and co-pays are up to their individual insurance plans. HPI:    Roopa Mercado (:  1970) has requested an audio/video evaluation for the following concern(s):    Nasal congestion and cough started 1-2 weeks ago. She had COVID test negative on 2020. No fever now or prior. Yellow green sputum at times. Not able to blow much out of nose. No headaches . No watery, scratchy eyes. No sneezing. H/o allergies , but taking Allegra. Allergies tend to be worse in the Fall. No shortness of breath or wheezing. Inhaling is painful to chest.  Touch chest hurts. Sleeping okay . Taking Sudafed generic and Mucinex. Some rib pain . Cough is slowly worsening. Review of Systems    Prior to Visit Medications    Medication Sig Taking? Authorizing Provider   buPROPion (WELLBUTRIN XL) 150 MG extended release tablet Take 1 tablet by mouth every morning Yes Ramez Tan MD   VASCEPA 1 g CAPS capsule Take 2 capsules by mouth 2 times daily Yes Ramez Tan MD   omeprazole (PRILOSEC) 40 MG delayed release capsule Take 1 capsule by mouth daily Yes Ramez Tan MD   Cholecalciferol (VITAMIN D3) 1000 units TABS Take 1 tablet by mouth daily Yes Ramez Tan MD   estradiol (ESTRACE VAGINAL) 0.1 MG/GM vaginal cream Use 2-3 x/ week.  Yes Ramez Tan MD   meloxicam (MOBIC) 7.5 MG tablet Take 1 tablet by mouth daily Prn elbow pain bilateral.  Patient not taking: Reported on 2020  Ramez Tan MD   butalbital-acetaminophen-caffeine (FIORICET, ESGIC) -56 MG per tablet Take 1 tablet by mouth every 6 hours as needed for Headaches  Patient not taking: Reported on 4/17/2020  Stanislav Haines MD   Elastic Bandages & Supports (WRIST SPLINT/ELASTIC RIGHT SM) MISC DX: lateral epicondylitis  Patient not taking: Reported on 4/17/2020  Stanislav Haines MD   Elastic Bandages & Supports (WRIST SPLINT/ELASTIC LEFT SM) MISC DX: Lateral Epicondylitis  Patient not taking: Reported on 4/17/2020  Stanislav Haines MD   lubiprostone (AMITIZA) 24 MCG capsule Take 24 mcg by mouth 2 times daily (with meals)  Historical Provider, MD   Fexofenadine HCl (ALLEGRA PO) Take by mouth  Historical Provider, MD   fluticasone (FLONASE) 50 MCG/ACT nasal spray 1 spray by Nasal route daily  Historical Provider, MD       No Known Allergies    Social History     Tobacco Use    Smoking status: Never Smoker    Smokeless tobacco: Never Used   Substance Use Topics    Alcohol use:  Yes     Alcohol/week: 0.0 standard drinks    Drug use: No        Past Medical History:   Diagnosis Date    Allergic rhinitis     Atrophic vaginitis     DDD (degenerative disc disease), cervical     Dyspareunia in female     Esophagitis 05/2019    Gastritis 05/2019    Interstitial cystitis 10/23/2017    Iron deficiency anemia 2017    Vitamin D deficiency 07/2019    Vocal cord nodule 2016       Past Surgical History:   Procedure Laterality Date    APPENDECTOMY  1996    CERVICAL FUSION  2013    C4-6   [facility] [address]    bilateral, right    LARYNGOSCOPY  2016    PARTIAL HYSTERECTOMY  2009    [facility] -     UPPER GASTROINTESTINAL ENDOSCOPY  05/2019       Health Maintenance   Topic Date Due    HIV screen  01/20/1985    Cervical cancer screen  01/20/1991    Shingles Vaccine (1 of 2) 01/20/2020    Breast cancer screen  07/23/2021    Lipid screen  07/23/2024    Colon cancer screen colonoscopy  12/06/2026    DTaP/Tdap/Td vaccine (2 - Td) 05/15/2029    Flu vaccine  Completed    Hepatitis A vaccine  Aged Out    Hepatitis B vaccine  Aged Moist heat . ROM exercises. Modify activities. - Aleve 1-2 po bid prn. F/u if no improvement 7-10d/ prn increased symptoms. The time that was spent with the family/patient addressing care on this video call was 15 minutes. An  electronic signature was used to authenticate this note. --Roberto Humphreys MD on 5/11/2020 at 8:30 AM    Pursuant to the emergency declaration under the [address] [place] [address], Critical access hospital waiver authority and the [employer] and [employer] Act, this Virtual  Visit was conducted, with patient's consent, to reduce the patient's risk of exposure to COVID-19 and provide continuity of care for an established patient. Services were provided through a video synchronous discussion virtually to substitute for in-person clinic visit.

## 2020-05-22 ENCOUNTER — TELEPHONE (OUTPATIENT)
Dept: FAMILY MEDICINE CLINIC | Age: 50
End: 2020-05-22

## 2020-05-22 RX ORDER — BUPROPION HYDROCHLORIDE 300 MG/1
300 TABLET ORAL EVERY MORNING
Qty: 90 TABLET | Refills: 0 | Status: SHIPPED | OUTPATIENT
Start: 2020-05-22 | End: 2020-07-21

## 2020-05-22 NOTE — TELEPHONE ENCOUNTER
Pt has dosage amount questions for t her buPROPion (WELLBUTRIN XL) 150 MG extended release tablet. Can you please contact pt?

## 2020-05-22 NOTE — TELEPHONE ENCOUNTER
Yes the Wellbutrin XL can be increased to 300 mg qam. It can be taken bid , but is not to be used prn bid. Usually it is taken together at 300 mg dose. Please let me know if RX for 300 mg Wellbutrin XL is needed.

## 2020-05-26 ENCOUNTER — PATIENT MESSAGE (OUTPATIENT)
Dept: FAMILY MEDICINE CLINIC | Age: 50
End: 2020-05-26

## 2020-06-03 ENCOUNTER — OFFICE VISIT (OUTPATIENT)
Dept: GYNECOLOGY | Age: 50
End: 2020-06-03
Payer: COMMERCIAL

## 2020-06-03 VITALS — HEIGHT: 64 IN | RESPIRATION RATE: 18 BRPM | BODY MASS INDEX: 20.85 KG/M2 | WEIGHT: 122.13 LBS

## 2020-06-03 LAB
BILIRUBIN, POC: NEGATIVE
BLOOD URINE, POC: NORMAL
CLARITY, POC: NORMAL
COLOR, POC: YELLOW
GLUCOSE URINE, POC: NEGATIVE
KETONES, POC: NEGATIVE
LEUKOCYTE EST, POC: NEGATIVE
NITRITE, POC: NEGATIVE
PH, POC: 7
PROTEIN, POC: NEGATIVE
SPECIFIC GRAVITY, POC: 1.01
UROBILINOGEN, POC: 0.2

## 2020-06-03 PROCEDURE — 99386 PREV VISIT NEW AGE 40-64: CPT | Performed by: OBSTETRICS & GYNECOLOGY

## 2020-06-03 PROCEDURE — 81002 URINALYSIS NONAUTO W/O SCOPE: CPT | Performed by: OBSTETRICS & GYNECOLOGY

## 2020-06-03 RX ORDER — ESTRADIOL 0.1 MG/G
CREAM VAGINAL
Qty: 1 TUBE | Refills: 1 | Status: SHIPPED | OUTPATIENT
Start: 2020-06-03 | End: 2021-07-19 | Stop reason: SDUPTHER

## 2020-06-03 RX ORDER — CLOTRIMAZOLE AND BETAMETHASONE DIPROPIONATE 10; .64 MG/G; MG/G
CREAM TOPICAL
Qty: 45 G | Refills: 0 | Status: SHIPPED | OUTPATIENT
Start: 2020-06-03 | End: 2021-02-18

## 2020-06-03 ASSESSMENT — ENCOUNTER SYMPTOMS
BACK PAIN: 0
COLOR CHANGE: 0
SORE THROAT: 0
VOMITING: 0
ABDOMINAL DISTENTION: 0
APNEA: 0
CHEST TIGHTNESS: 0
TROUBLE SWALLOWING: 0
NAUSEA: 0
WHEEZING: 0
DIARRHEA: 0
ABDOMINAL PAIN: 1
SHORTNESS OF BREATH: 0
CONSTIPATION: 0
ANAL BLEEDING: 0
COUGH: 0
BLOOD IN STOOL: 0
PHOTOPHOBIA: 0
RECTAL PAIN: 0

## 2020-06-03 NOTE — PROGRESS NOTES
Annual GYN Visit    Alex Avilez  Date ofBirth:  1970    Date of Service:  6/3/2020    Chief Complaint:   Alex Avilez is a 48 y.o.  female who presents for routine annual gynecologic visit. HPI:  Patient is postmenopausal- she had a laparoscopic supracervical hysterectomy in  secondary to heavy menses. Denies vaginal bleeding since her surgery. Patient reports history of left lower abdominal pain and discomfort \"feels a mass when she presses on her left lower belly\", has history of interstitial cystitis - typically managed with lifestyle modifications.  She has history of vaginal dryness and vulvar irritation and has used estrace cream and lotrisone as needed for it in the past. Wants refills on these medications     Health Maintenance   Topic Date Due    HIV screen  1985    Cervical cancer screen  1991    Shingles Vaccine (1 of 2) 2020    Breast cancer screen  2021    Lipid screen  2024    Colon cancer screen colonoscopy  2026    DTaP/Tdap/Td vaccine (2 - Td) 05/15/2029    Flu vaccine  Completed    Hepatitis A vaccine  Aged Out    Hepatitis B vaccine  Aged Out    Hib vaccine  Aged Out    Meningococcal (ACWY) vaccine  Aged Out    Pneumococcal 0-64 years Vaccine  Aged Out       Past Medical History:   Diagnosis Date    Allergic rhinitis     Atrophic vaginitis     DDD (degenerative disc disease), cervical     Dyspareunia in female     Esophagitis 2019    Gastritis 2019    HPV (human papilloma virus) infection 1992    Exposed back in [facility]    Interstitial cystitis 10/23/2017    Iron deficiency anemia 2017    Menopause ovarian failure     Vitamin D deficiency 2019    Vocal cord nodule 2016     Past Surgical History:   Procedure Laterality Date    APPENDECTOMY  1996    CERVICAL FUSION      C4-6   [facility] 70    bilateral, right    LARYNGOSCOPY  2016    [facility]     [facility] GASTRO[facility]OSCOPY  2019     OB History    Para Term  AB Living   3 3 3     3   SAB TAB Ectopic Molar Multiple Live Births             3      # Outcome Date GA Lbr Ray/2nd Weight Sex Delivery Anes PTL Lv   3 Term      Vag-Spont   JULIO   2 Term      Vag-Spont   JULIO   1 Term      Vag-Spont   JULIO     Social History     Socioeconomic History    Marital status:      Spouse name: Not on file    Number of children: Not on file    Years of education: Not on file    Highest education level: Not on file   Occupational History    Not on file   Social Needs    Financial resource strain: Not on file    Food insecurity     Worry: Not on file     Inability: Not on file    Transportation needs     Medical: Not on file     Non-medical: Not on file   Tobacco Use    Smoking status: Never Smoker    Smokeless tobacco: Never Used   Substance and Sexual Activity    Alcohol use:  Yes     Alcohol/week: 0.0 standard drinks    Drug use: No    Sexual activity: Yes     Partners: Male   Lifestyle    Physical activity     Days per week: Not on file     Minutes per session: Not on file    Stress: Not on file   Relationships    Social connections     Talks on phone: Not on file     Gets together: Not on file     Attends [employer] service: Not on file     Active member of club or organization: Not on file     Attends meetings of clubs or organizations: Not on file     Relationship status: Not on file    Intimate partner violence     Fear of current or ex partner: Not on file     Emotionally abused: Not on file     Physically abused: Not on file     Forced sexual activity: Not on file   Other Topics Concern    Not on file   Social History Narrative    Not on file     No Known Allergies  Outpatient Medications Marked as Taking for the 6/3/20 encounter (Office Visit) with Isidro Johnson MD   Medication Sig Dispense Refill    clotrimazole-betamethasone (LOTRISONE) 1-0.05 % cream Apply topically 2

## 2020-06-04 ENCOUNTER — HOSPITAL ENCOUNTER (OUTPATIENT)
Dept: ULTRASOUND IMAGING | Age: 50
Discharge: HOME | End: 2020-06-04
Payer: COMMERCIAL

## 2020-06-04 ENCOUNTER — TELEPHONE (OUTPATIENT)
Dept: GYNECOLOGY | Age: 50
End: 2020-06-04

## 2020-06-04 LAB
HPV COMMENT: NORMAL
HPV TYPE 16: NOT DETECTED
HPV TYPE 18: NOT DETECTED
HPVOH (OTHER TYPES): NOT DETECTED

## 2020-06-04 PROCEDURE — 76830 TRANSVAGINAL US NON-OB: CPT

## 2020-06-04 PROCEDURE — 76856 US EXAM PELVIC COMPLETE: CPT

## 2020-06-10 ENCOUNTER — TELEPHONE (OUTPATIENT)
Dept: GYNECOLOGY | Age: 50
End: 2020-06-10

## 2020-06-12 ENCOUNTER — HOSPITAL ENCOUNTER (OUTPATIENT)
Dept: CT IMAGING | Age: 50
Discharge: HOME | End: 2020-06-12
Payer: COMMERCIAL

## 2020-06-12 PROCEDURE — 6360000004 HC RX CONTRAST MEDICATION: Performed by: OBSTETRICS & GYNECOLOGY

## 2020-06-12 PROCEDURE — 74177 CT ABD & PELVIS W/CONTRAST: CPT

## 2020-06-12 RX ADMIN — IOPAMIDOL 80 ML: 755 INJECTION, SOLUTION INTRAVENOUS at 08:18

## 2020-06-12 RX ADMIN — IOHEXOL 50 ML: 240 INJECTION, SOLUTION INTRATHECAL; INTRAVASCULAR; INTRAVENOUS; ORAL at 08:18

## 2020-06-17 ENCOUNTER — OFFICE VISIT (OUTPATIENT)
Dept: GYNECOLOGY | Age: 50
End: 2020-06-17
Payer: COMMERCIAL

## 2020-06-17 VITALS — RESPIRATION RATE: 18 BRPM | WEIGHT: 124.38 LBS | HEIGHT: 64 IN | TEMPERATURE: 98.2 F | BODY MASS INDEX: 21.24 KG/M2

## 2020-06-17 PROCEDURE — 99213 OFFICE O/P EST LOW 20 MIN: CPT | Performed by: OBSTETRICS & GYNECOLOGY

## 2020-06-17 ASSESSMENT — ENCOUNTER SYMPTOMS
BACK PAIN: 0
ANAL BLEEDING: 0
VOMITING: 0
NAUSEA: 0
DIARRHEA: 0
TROUBLE SWALLOWING: 0
COUGH: 0
BLOOD IN STOOL: 0
APNEA: 0
RECTAL PAIN: 0
SORE THROAT: 0
ABDOMINAL PAIN: 1
PHOTOPHOBIA: 0
SHORTNESS OF BREATH: 0
WHEEZING: 0
CONSTIPATION: 1
ABDOMINAL DISTENTION: 0
CHEST TIGHTNESS: 0
COLOR CHANGE: 0

## 2020-06-17 NOTE — PROGRESS NOTES
Pete Salazar  YOB: 1970    Date of Service:  2020    Chief Complaint:   Pete Salazar is a 48 y.o.  female who presents for follow-up on lower abdominal pain s/p CT scan of abdomen and pelvis        HPI:  Patient is postmenopausal- she is here for follow-up on lower abdominal pain. HPI last visit -> she had a laparoscopic supracervical hysterectomy in  secondary to heavy menses. Denies vaginal bleeding since her surgery. Patient reports history of left lower abdominal pain and discomfort \"feels a mass when she presses on her left lower belly\", has history of interstitial cystitis - typically managed with lifestyle modifications. She has history of vaginal dryness and vulvar irritation and has used estrace cream and lotrisone as needed for it in the past. Wants refills on these medications      CT scan of abdomen/pelvis done 2020 ->   FINDINGS: CT ABDOMEN:        The lung bases  are clear . Heart size is normal        There is normal appearance of the liver. The spleen is of normal size.     The adrenal glands appear normal in size.        A large amount of stool is noted throughout the entire colon most likely secondary to constipation       The gallbladder appears normal.     The pancreas is normal.        The right and left kidney excrete contrast without delay or hydronephrosis.        The aorta is of normal caliber without aneurysm. There is no sign of free air or free fluid.       Some fluid filled loops of small bowel are noted.           CT THE PELVIS:       CT pelvis performed demonstrates a mild amount of stool throughout the colon.  There is no mass or free fluid. Urinary bladder appears unremarkable. The uterus is intact, retroflexed.  No dominant adnexal mass is identified but the majority of the pelvis is filled with bowel making it difficult to assess           Impression       1.  Large amount of stool throughout the entire colon without signs of any small bowel

## 2020-06-22 RX ORDER — PLECANATIDE 3 MG/1
TABLET ORAL
Qty: 30 TABLET | Refills: 3 | Status: SHIPPED | OUTPATIENT
Start: 2020-06-22 | End: 2020-06-22

## 2020-06-22 RX ORDER — PLECANATIDE 3 MG/1
TABLET ORAL
Qty: 90 TABLET | Refills: 3 | Status: SHIPPED | OUTPATIENT
Start: 2020-06-22 | End: 2021-02-18

## 2020-07-21 ENCOUNTER — OFFICE VISIT (OUTPATIENT)
Dept: DERMATOLOGY | Age: 50
End: 2020-07-21
Payer: COMMERCIAL

## 2020-07-21 VITALS — TEMPERATURE: 97.9 F

## 2020-07-21 PROCEDURE — 99213 OFFICE O/P EST LOW 20 MIN: CPT | Performed by: DERMATOLOGY

## 2020-07-21 NOTE — PROGRESS NOTES
[facility]  MD [address]  1970    48 y.o. female     Date of Visit: 7/21/2020    Chief Complaint: skin moles    History of Present Illness:    1. She presents today for evaluation of multiple nevi in the trunk and extremities-not of any changes in size, color, or shape. 2.  She has noticed few brightly erythematous lesions on the abdomen and thighs that are asymptomatic. 3.  She has a family history of melanoma: Mom with hx of melanoma.        Review of Systems:  Skin: no rash. Past Medical History, Family History, Surgical History, Medications and Allergies reviewed.     Past Medical History:   Diagnosis Date    Allergic rhinitis     Atrophic vaginitis     DDD (degenerative disc disease), cervical     Dyspareunia in female     Esophagitis 05/2019    Gastritis 05/2019    HPV (human papilloma virus) infection 1992    Exposed back in [facility]    Interstitial cystitis 10/23/2017    Iron deficiency anemia 2017    Menopause ovarian failure     Vitamin D deficiency 07/2019    Vocal cord nodule 2016     Past Surgical History:   Procedure Laterality Date    APPENDECTOMY  1996    CERVICAL FUSION  2013    C4-6   [facility] 70    bilateral, right    LARYNGOSCOPY  2016    PARTIAL HYSTERECTOMY  2009    [facility] -     UPPER GASTROINTESTINAL ENDOSCOPY  05/2019       No Known Allergies  Outpatient Medications Marked as Taking for the 7/21/20 encounter (Office Visit) with Estelita Otero MD   Medication Sig Dispense Refill    linaclotide (LINZESS) 145 MCG capsule Take 1 capsule by mouth every morning (before breakfast) 30 capsule 1    estradiol (ESTRACE VAGINAL) 0.1 MG/GM vaginal cream Place 1/2 gram per vagina twice weekly 1 Tube 1    meloxicam (MOBIC) 7.5 MG tablet Take 1 tablet by mouth daily Prn elbow pain bilateral. 90 tablet 0    buPROPion (WELLBUTRIN XL) 150 MG extended release tablet Take 1 tablet by mouth every morning 90 tablet 1    butalbital-acetaminophen-caffeine (FIORICET, ESGIC) -40 MG per tablet Take 1 tablet by mouth every 6 hours as needed for Headaches 25 tablet 1    VASCEPA 1 g CAPS capsule Take 2 capsules by mouth 2 times daily 120 capsule 3    Cholecalciferol (VITAMIN D3) 1000 units TABS Take 1 tablet by mouth daily 90 tablet 3    Fexofenadine HCl (ALLEGRA PO) Take by mouth      fluticasone (FLONASE) 50 MCG/ACT nasal spray 1 spray by Nasal route daily           Physical Examination       The following were examined and determined to be normal: Psych/Neuro, Scalp/hair, Head/face, Conjunctivae/eyelids, Gums/teeth/lips, Neck, Breast/axilla/chest, Abdomen, Back, RUE, LUE, RLE, LLE and Nails/digits. The following were examined and determined to be abnormal: None. Well appearing. 1.  Trunk and extremities with multiple well defined round to oval smooth brown macules and papules. 2.  Abdomen, thighs - few small brightly erythematous macules. Assessment and Plan     1. Multiple nevi - benign appearing    Sun protective behaviors and self skin examinations were encouraged. Call for any new or concerning lesions. 2. Cherry angiomas    Reassurance. 3. Family history of melanoma     Sun protective behaviors and self skin examinations were encouraged. Call for any new or concerning lesions. Return in about 1 year (around 7/21/2021).

## 2020-07-29 ENCOUNTER — HOSPITAL ENCOUNTER (OUTPATIENT)
Dept: MAMMOGRAPHY | Age: 50
Discharge: HOME | End: 2020-07-29
Payer: COMMERCIAL

## 2020-07-29 PROCEDURE — 77063 BREAST TOMOSYNTHESIS BI: CPT

## 2020-09-22 ENCOUNTER — OFFICE VISIT (OUTPATIENT)
Dept: ENT CLINIC | Age: 50
End: 2020-09-22
Payer: COMMERCIAL

## 2020-09-22 VITALS
HEIGHT: 64 IN | WEIGHT: 126 LBS | TEMPERATURE: 97.9 F | SYSTOLIC BLOOD PRESSURE: 99 MMHG | DIASTOLIC BLOOD PRESSURE: 66 MMHG | HEART RATE: 76 BPM | BODY MASS INDEX: 21.51 KG/M2

## 2020-09-22 PROCEDURE — 99203 OFFICE O/P NEW LOW 30 MIN: CPT | Performed by: OTOLARYNGOLOGY

## 2020-09-22 PROCEDURE — 31575 DIAGNOSTIC LARYNGOSCOPY: CPT | Performed by: OTOLARYNGOLOGY

## 2020-09-22 ASSESSMENT — ENCOUNTER SYMPTOMS
COUGH: 0
COLOR CHANGE: 0
SHORTNESS OF BREATH: 0
WHEEZING: 0
STRIDOR: 0
SINUS PRESSURE: 0
CHOKING: 0
BLOOD IN STOOL: 0
NAUSEA: 0
BACK PAIN: 0
TROUBLE SWALLOWING: 0
SORE THROAT: 0
VOMITING: 0
FACIAL SWELLING: 0
CONSTIPATION: 0
VOICE CHANGE: 1
EYE ITCHING: 0
DIARRHEA: 0
EYE DISCHARGE: 0
RHINORRHEA: 0
PHOTOPHOBIA: 0
SINUS PAIN: 0

## 2020-09-22 NOTE — PROGRESS NOTES
[facility]  [address] [address] [facility], [address] [place] [address] [place]  P: [phone]  F: [phone]       Patient     Lance Dockery  1970    ChiefComplaint     Chief Complaint   Patient presents with    Oral Swelling     Patient is here today because she has surgery on her vocal cord a couple years to remove a nodule and she just wants to make sure everything becasue around this time of the day her voice will become painful       History of Present Illness     Lance Dockery is a pleasant 48 y.o. female who presents as a new patient today for dysphonia. Patient has a history of vocal fold nodules. She underwent surgical excision of these in [place] years ago. Over the past few months she is been having worsening hoarseness. She has frequent breaks in the voice and she feels like she is straining her voice. Typically with day-to-day vocal use, she does not experience significant issues. However if she is in an environment that is noisy and she has to raise her voice, she knows that she will experience vocal fatigue and then developed further hoarseness. She drinks 1 cup of coffee a day and also iced tea throughout the day. She drinks water regularly. Denies any significant history of alcohol or tobacco use. Denies hemoptysis. Denies dysphagia or odynophagia. She does have a history of allergic rhinitis. She takes Allegra. She has taken Flonase in the past, but it causes nosebleeds. She is curious if there is anything worsening with her vocal cords that needs to be done.     Past Medical History     Past Medical History:   Diagnosis Date    Allergic rhinitis     Atrophic vaginitis     DDD (degenerative disc disease), cervical     Dyspareunia in female     Esophagitis 05/2019    Gastritis 05/2019    HPV (human papilloma virus) infection 1992    Exposed back in [employer]    Interstitial cystitis 10/23/2017    Iron deficiency anemia 2017    Menopause ovarian failure     Vitamin mucosal edema or rhinorrhea. Right Sinus: No maxillary sinus tenderness or frontal sinus tenderness. Left Sinus: No maxillary sinus tenderness or frontal sinus tenderness. Mouth/Throat:      Mouth: Mucous membranes are not pale, not dry and not cyanotic. No lacerations or oral lesions. Dentition: Normal dentition. No dental caries or dental abscesses. Pharynx: Uvula midline. No oropharyngeal exudate, posterior oropharyngeal erythema or uvula swelling. Tonsils: No tonsillar abscesses. Eyes:      General: Lids are normal.         Right eye: No discharge. Left eye: No discharge. Extraocular Movements:      Right eye: Normal extraocular motion and no nystagmus. Left eye: Normal extraocular motion and no nystagmus. Conjunctiva/sclera:      Right eye: No chemosis or exudate. Left eye: No chemosis or exudate. Neck:      Musculoskeletal: Neck supple. Thyroid: No thyroid mass or thyromegaly. Vascular: Normal carotid pulses. Trachea: No tracheal tenderness or tracheal deviation. Cardiovascular:      Rate and Rhythm: Normal rate and regular rhythm. Pulmonary:      Effort: No tachypnea, bradypnea or respiratory distress. Breath sounds: No stridor. Musculoskeletal:      Right shoulder: She exhibits normal range of motion. Lymphadenopathy:      Head:      Right side of head: No submental, submandibular, tonsillar, preauricular, posterior auricular or occipital adenopathy. Left side of head: No submental, submandibular, tonsillar, preauricular, posterior auricular or occipital adenopathy. Cervical: No cervical adenopathy. Right cervical: No superficial, deep or posterior cervical adenopathy. Left cervical: No superficial, deep or posterior cervical adenopathy. Skin:     General: Skin is warm and dry. Findings: No bruising, erythema, laceration, lesion or rash.    Neurological:      Mental Status: She is alert and

## 2020-10-05 ENCOUNTER — TELEPHONE (OUTPATIENT)
Dept: FAMILY MEDICINE CLINIC | Age: 50
End: 2020-10-05

## 2020-10-05 ENCOUNTER — OFFICE VISIT (OUTPATIENT)
Dept: SPEECH THERAPY | Age: 50
End: 2020-10-05
Payer: COMMERCIAL

## 2020-10-05 PROCEDURE — 31579 LARYNGOSCOPY TELESCOPIC: CPT | Performed by: SPEECH-LANGUAGE PATHOLOGIST

## 2020-10-06 NOTE — TELEPHONE ENCOUNTER
----- Message from Leana Ganser, SLP sent at 10/5/2020  4:31 PM EDT -----  Regarding: PT referral  Good afternoon! This patient is interested in pursuing physical therapy to assist with reduced neck ROM and ongoing head/neck tension that is impacting her voice. I provided her with some cervical exercises, but feel that a skilled PT may assist her best! Thank you!     César Felton) [place], [place], [address] [facility]; [phone]  Speech-Language Pathologist

## 2020-10-06 NOTE — TELEPHONE ENCOUNTER
Order is in [facility] for Physical Therapy . Please call the patient and give her options. If not going to [facility] PT, a prescription will need to be sent/ faxed.

## 2020-10-06 NOTE — PROGRESS NOTES
[facility] ([facility]) ENT  Videostroboscopic Examination of the Larynx    BACKGROUND HISTORY:  Hx of vocal nodules in 2017; surgically removed in [place] and continued to follow-up w/ ENT until moving to [place]. 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 Recently, began to experience globus sensation and laryngeal tension, specifically on R side; vocal fatigue and intermittent strain t/o the day. Denied daily hoarseness or voice loss; experiences most frequently after raising voice in noisy environments. Pt endorsed concern about recurrence of nodules or additional etiologies. Pt reported R ACDF in 2012; no dysphonia or dysphagia post-surgery but does experience limited neck ROM and therefor increased head/neck tension. Denied dysphagia, dyspnea, or symptoms of acid reflux. Surgical/Medical History: ACDF 2012; Laryngoscopy surgery 2017  Hydration: >64 oz  Smoking History: None  Caffeine Intake: Occasional coffee    PER ENT NOTE, Dr. Fatmata Johnson 9/22/20: The etiology of the patient's dysphonia is unclear. She may just simply be suffering from some overuse and strain. Additionally I suspect her allergic rhinitis may be contributing to her symptoms. She did not tolerate Flonase in the past.  I recommend she try Rhinocort and Nasacort AQ over-the-counter. Additionally I recommend she switch from Allegra to Zyrtec. When to refer her to speech-language pathology for video stroboscopy. I will see her back in 2 months. Perceptual Quality: Pt presented with mild dysphonia characterized by reduced volume and intermittent roughness at end of sentences. Rigid Stroboscopy Laryngoscopy  Procedure : Rigid Stroboscopy Laryngoscopy  Performed by: CHINO Cardoza  Anesthesia: None  Description:  The scope was passed along the superior lingual surface to the level of the larynx.  There was no evidence of concerning masses or lesions of the base of tongue, vallecula, epiglottis, aryepiglottic folds, arytenoids, false vocal folds, true vocal folds, or pyriform sinuses. The scope was removed. The patient tolerated the procedure without difficulty. There were no complications. Pertinent findings: See Assessment and Plan of Care    Pt difficult to complete rigid strobe given positioning and decreased neck ROM. High pitch adduction            Modal pitch adduction; AP compression     Incomplete closure           Abduction    ASSESSMENT AND PLAN OF CARE:   48 y.o. female w/ hx of vocal nodules and surgical excision. VLS revealed smooth & straight TVFs bilaterally w/ no indication of recurrent nodules or post-surgical scarring. Complete glottic closure during modal and low pitch; incomplete closure during high pitch. Mild supraglottic compression (AP) during phonation. Functional mucosal wave and mildly irregular periodicity. Mild interarytenoid pachydermia and posterior edema, erythema and thick mucus were observed, indicative of laryngopharyngeal reflux. Subglottis was patent without evidence of narrowing. No mass lesions, paresis or paralysis was noted. Suspect pt continues to experience some residual muscle tension, possibly d/t previous ACDF and restricted neck ROM, now causing observed discomfort and tension, and increasing vocal fatigue. Pt provided w/ SOVT strawflow to begin decreasing laryngeal tension, and cervical stretches for head/neck tension. However, given limited ROM, pt would benefit from skilled PT intervention to assist w/ ROM and reduction of tension/compensatory habits. Will discuss further w/ pt's PCP. RECOMMENDATIONS:   1. Dr. Abimael Shaffer reviewed or will review recorded evaluation to assist in diagnosis and provide assessment and plan for treatment. 2. Voice therapy to focus on re-strengthening and balancing the laryngeal musculature, to promote to open oral front focus, to promote using adequate diaphragmatic breath support to sustain conversational speech.    3. Pt is a good candidate for further medical evaluation/intervention at the discretion of the treating physician. 4. Pt to follow up with ENT/Dr. Desiree Luevano.     Thank you,    Randolph Cordero) [place], [place] [facility]; [id number]  Speech-Language Pathologist

## 2020-10-12 ENCOUNTER — TELEPHONE (OUTPATIENT)
Dept: ENT CLINIC | Age: 50
End: 2020-10-12

## 2020-10-12 ENCOUNTER — HOSPITAL ENCOUNTER (OUTPATIENT)
Dept: PHYSICAL THERAPY | Age: 50
Setting detail: THERAPIES SERIES
Discharge: HOME | End: 2020-10-12
Payer: COMMERCIAL

## 2020-10-12 PROCEDURE — 97162 PT EVAL MOD COMPLEX 30 MIN: CPT

## 2020-10-12 PROCEDURE — 97110 THERAPEUTIC EXERCISES: CPT

## 2020-10-12 NOTE — PROGRESS NOTES
Physical Therapy  Initial Assessment  Date: 10/12/2020  Patient Name: Alva Albright  MRN: [id number]  : 1970     Treatment Diagnosis: neck pain with R UE paresthesias    Restrictions  Position Activity Restriction  Other position/activity restrictions: cervical DDD with fusion , Hx anxiety, vocal nodule removed ~ 4-5 years ago    Subjective   General  Chart Reviewed: Yes  Patient assessed for rehabilitation services?: Yes  Additional Pertinent Hx: cervical DDD with fusion , Hx anxiety, vocal nodule removed ~ 4-5 years ago  Referring Practitioner: Jami Medrano MD  Referral Date : 10/05/20  Diagnosis: Dysphonia (R49.0); Neck pain (M54.2); Tension headache (G44.209)  PT Visit Information  Onset Date: (about 6 months ago)  PT Insurance Information: [employer] [place], 30 visits per viktor year combined  Total # of Visits Approved: 10  Total # of Visits to Date: 1  Subjective  Subjective: Pt reports history of cervical fusion  and vocal nodule removed about 4-5 years ago. About 6 months ago pt reports that she started to notice that it felt like something was in her throat and her voice gets tired with talking. She has also noted increased R neck pain/tightness with decreased ROM, especially with R rot, numbness entire R UE and tingling R hand (this is about the same recently). Paresthesias are worse when riding bike. Saw ENT and no new nodules and saw SLP who recommended PT due to neck tightness and limited ROM. Pt reports pain 1-2/10 at best when resting, 8/10 at worst, currently 5/10. Pt does report has had PT in the past but > 3 years ago and cortizone injections R scapular region but also a couple of years ago and felt relief of numbness and neck tension with these. Pt notes pain is worse with looking to the R, driving, OH movements, household chores and talking (voice gets very fatigued). Pt is R hand dominant.   Pt does note HA with increased neck pain about 2x per month but this hasn't really changed recently. She states she has to do household chores such as vacuuming and getting under bed in short doses due to pain/paresthesias. PLOF: less tension/paresthesias and better neck ROM prior to 6 months ago, also no vocal issues prior to 6 months ago (seeing SLP for specific vocal treatment). Social/Functional History  Social/Functional History  Lives With: Family  Type of Home: House  Active : Yes    Objective     Observation/Palpation  Posture: (mild forward rounded shoulders at times)  Palpation: significant tightness R UT/levator and moderate R rhomboid  Healed scar anterior neck from fusion 2012 with good scar mobility  AROM RUE (degrees)  RUE AROM : WFL  AROM LUE (degrees)  LUE AROM : WFL  Spine  Cervical: flexion 35 deg, extension 25 deg (can feel \"screws\" from fusion), L SB 15 deg, R SB 10 deg, L rot 50%, R rot 25%    Strength RUE  Comment: shoulder 5/5 with the exception of ER 4/5, elbow 5/5, wrist 5/5, good  strength  Strength LUE  Comment: shoulder 5/5, elbow 5/5, wrist 5/5, good  strength     Additional Measures  Other: NDI = 22% impaired    Assessment   Conditions Requiring Skilled Therapeutic Intervention  Body structures, Functions, Activity limitations: Decreased functional mobility ; Decreased ROM; Decreased strength; Increased pain;Decreased posture  Assessment: Pt presents with pain, paresthesias, decreased ROM/strength/posture limiting N OH movements, driving, riding bike, household chores, talking  Treatment Diagnosis: neck pain with R UE paresthesias  Prognosis: Good  Decision Making: Medium Complexity  REQUIRES PT FOLLOW UP: Yes  Activity Tolerance  Activity Tolerance: Patient Tolerated treatment well         Plan   Plan  Times per week: 2  Plan weeks: 5  Plan Comment: Plan of care initiated    Goals  Short term goals  Time Frame for Short term goals: 3 weeks  Short term goal 1: Pt will demo good understanding and knowldge of initial HEP  Short term goal 2: Decreased pain 5/10 at worst to allow for improved tolerance to cervical ROM  Short term goal 3: Pt demo good postural awareness consistently t/o treatment  Long term goals  Time Frame for Long term goals : 5 weeks  Long term goal 1: Pt will demo good understanding and knowledge of HEP progressions  Long term goal 2: Decreased pain 2/10 at worst to allow for pt able to improve cervical ROM and less strain with talking  Long term goal 3: Cervical AROM R Rot and R SB = L to allow for driving without increased complaints  Long term goal 4: Strength R shoulder ER 5/5 to allow for improved tolerance to OH movements and household chores  Long term goal 5: Decreased impairment per NDI < 10% impaired  Patient Goals   Patient goals : \"vocal relief, relieve neck pain/arm numbness\"       Therapy Time   Individual Concurrent Group Co-treatment   Time In 1033         Time Out 1114         Minutes 41         Timed Code Treatment Minutes: [address], [place], [address] [facility]

## 2020-10-12 NOTE — FLOWSHEET NOTE
recently. She states she has to do household chores such as vacuuming and getting under bed in short doses due to pain/paresthesias. PLOF: less tension/paresthesias and better neck ROM prior to 6 months ago, also no vocal issues prior to 6 months ago (seeing SLP for specific vocal treatment). Objective:10/12/2020   Observation:    Posture: (mild forward rounded shoulders at times)  Palpation: significant tightness R UT/levator and moderate R rhomboid     Test measurements:    AROM RUE (degrees)  RUE AROM : WFL  AROM LUE (degrees)  LUE AROM : WFL  Spine  Cervical: flexion 35 deg, extension 25 deg (can feel \"screws\" from fusion), L SB 15 deg, R SB 10 deg, L rot 50%, R rot 25%     Strength RUE  Comment: shoulder 5/5 with the exception of ER 4/5, elbow 5/5, wrist 5/5, good  strength  Strength LUE  Comment: shoulder 5/5, elbow 5/5, wrist 5/5, good  strength  Additional Measures  Other: NDI = 22% impaired     Exercises, Neuro Facilitation & Gait Training [phone], [id number], [id number]): Activity Resistance/Repetitions Other comments   R UT stretch 15\" x 3    R levator stretch 15\" x 3    Chin tuck 2-3\" x 10    pec stretch                                                         Therapeutic Activities (95032):  NA    Home Exercise Program:   Pt. demonstrated good understanding and knowledge of HEP. Written instructions provided. 10/12/2020: R UT and levator stretches, chin tuck (already doing shoulder shrugs, shoulder rolls - recommended posterior rolls)    Manual Treatments (14145):   Add STM R UT/levator/rhomboid    Modalities:  prn    Timed Code Treatment Minutes: TE: 23     Total Treatment Minutes:  41 (+eval mod)    Treatment/Activity Tolerance:  [x] Patient tolerated treatment well [] Patient limited by fatigue  [] Patient limited by pain  [] Patient limited by other medical complications  [] Other:     Assessment: Pt presents with pain, paresthesias, decreased ROM/strength/posture limiting N OH

## 2020-10-12 NOTE — TELEPHONE ENCOUNTER
----- Message from Jaya Regalado DO sent at 10/12/2020  7:29 AM EDT -----  That's fine  ----- Message -----  From: Christopher Gan LPN  Sent: 45/0/8221   3:40 PM EDT  To: Jaya Regalado DO    Has appointment 11/2020 is that ok for follow up from SLP notes? (you CC\"D chart to me/staff)

## 2020-10-12 NOTE — PROGRESS NOTES
cannot do any work at all   [address]  []A. I can lift heavy weights without extra pain  []B. I can lift heavy weights, but it gives me extra pain  []C. Pain prevents me from lifting heavy weights off the floor but I can manage if they are conveniently positioned, for example, on a table  [x]D. Pain prevents me from lifting heavy weights, but I can manage light to medium weights if they are conveniently positioned  []E. I can lift very light weights  []F. I cannot lift or carry anything at all SECTION 8 - Driving  []A. I can drive my car without any neck pain  [x]B. I can drive my car as long as I want with slight pain in my neck  []C. I can drive my car as long as I want with moderate pain in my neck  []D. I cannot drive my car as long as I want because of moderate pain  []E. I can hardly drive at all because of severe pain in my neck  []F. I cannot drive my car at all   SECTION 4 - Reading  [x]A. I can read as much as I want to with no pain in my neck  []B. I can read as much as I want to with slight pain in my neck  []C. I can read as much as I want to with moderate pain in my neck   []D. I cannot read as much as I want because of moderate pain in my neck  []E. I cannot read as much as I want because of severe pain in my neck  []F. I cannot read at all SECTION 9 - Sleeping  [x]A. I have no trouble sleeping  []B. My sleep is slightly disturbed (< 1 hour sleepless)  []C. My sleep is mildly disturbed (1-2 hours sleepless)  []D. My sleep is moderately disturbed (2-3 hours sleepless)  []E. My sleep is greatly disturbed (3-5 hours sleepless)  []F. My sleep is completely disturbed (5-7 hours sleepless)   SECTION 5 - Headaches  []A. I have no headaches at all  []B. I have slight headaches which come infrequently  [x]C. I have moderate headaches which come infrequently  []D. I have moderate headaches which come frequently  []E. I have severe headaches which come frequently  []F.  I have headaches almost all the 80-99% []CM   50 100% []CN

## 2020-10-12 NOTE — PLAN OF CARE
Outpatient Physical Therapy  Phone: [phone] Fax: [phone]    To: Referring Practitioner: Claudell Maid, MD  From: Evelio Ballesteros, PT, DPT [id number]   Date: 10/12/2020  Patient: Diego Yost     : 1970 MRN: [id number]  Diagnosis: Diagnosis: Dysphonia (R49.0); Neck pain (M54.2); Tension headache (G44.209)   Treatment Diagnosis: Treatment Diagnosis: neck pain with R UE paresthesias     Physical Therapy Certification/Re-Certification Form  Dear Dr. Breana Carias,   The following patient has been evaluated for physical therapy services and for therapy to continue, Medicare requires monthly physician review of the treatment plan. Please review the attached evaluation and/or summary of the patient's plan of care, and verify that you agree therapy should continue by signing the attached document and sending it back to our office.     Plan of Care/Treatment to date:  [x] Therapeutic Exercise (Review/Progress HEP and provide verbal/tactile cueing for activities related to strengthening, flexibility,  endurance, ROM.)       [x] Therapeutic Activity (Provide verbal/tactile cueing for dynamic activities to promote functional tasks.)          [] Gait Training (Provide verbal/tactile/visual cueing for facilitation of normalized gait pattern without or with the least restrictive AD to decrease pain and/or risk for falling.)          [x] Neuromuscular Re-education (Review/Progress HEP and provide verbal/tactile cueing for activities related to improving balance, coordination, kinesthetic sense, posture, motor skill, proprioception.)         [x] Manual Therapy (Provide manual therapy to mobilize soft tissue/joints for the purpose of modulating pain, promoting relaxation, increasing ROM, reducing/eliminating soft tissue swelling/inflammation/tightness, improving soft tissue extensibility)   [x] Dry Needling    [] Aquatic Therapy (Facilitate muscle relaxation and increases peripheral circulation; stimulates body awareness, days   [] 4 weeks [] 8 weeks    Rehab Potential: [] Excellent [x] Good [] Fair  [] Poor       Electronically signed by: Yousuf Jin, PT, DPT [id number]        If you have any questions or concerns, please don't hesitate to call.   Thank you for your referral.      Physician Signature:________________________________Date:__________________  By signing above, therapists plan is approved by physician

## 2020-10-14 ENCOUNTER — HOSPITAL ENCOUNTER (OUTPATIENT)
Dept: PHYSICAL THERAPY | Age: 50
Setting detail: THERAPIES SERIES
Discharge: HOME | End: 2020-10-14
Payer: COMMERCIAL

## 2020-10-14 PROCEDURE — 97110 THERAPEUTIC EXERCISES: CPT

## 2020-10-14 PROCEDURE — 97140 MANUAL THERAPY 1/> REGIONS: CPT

## 2020-10-14 NOTE — FLOWSHEET NOTE
she has to do household chores such as vacuuming and getting under bed in short doses due to pain/paresthesias. PLOF: less tension/paresthesias and better neck ROM prior to 6 months ago, also no vocal issues prior to 6 months ago (seeing SLP for specific vocal treatment). 10/14/2020: Pt reports no complaints after IE. Does notice sometimes that R UE has N/T but unsure if related to a specific position that she is in - will try to pay attention to if any pattern. Objective:10/12/2020   Observation:    Posture: (mild forward rounded shoulders at times)  Palpation: significant tightness R UT/levator and moderate R rhomboid     Test measurements:    AROM RUE (degrees)  RUE AROM : WFL  AROM LUE (degrees)  LUE AROM : WFL  Spine  Cervical: flexion 35 deg, extension 25 deg (can feel \"screws\" from fusion), L SB 15 deg, R SB 10 deg, L rot 50%, R rot 25%     Strength RUE  Comment: shoulder 5/5 with the exception of ER 4/5, elbow 5/5, wrist 5/5, good  strength  Strength LUE  Comment: shoulder 5/5, elbow 5/5, wrist 5/5, good  strength  Additional Measures  Other: NDI = 22% impaired     Exercises, Neuro Facilitation & Gait Training [phone] [id number], [id number], [id number]): Activity Resistance/Repetitions Other comments   R UT stretch 15\" x 3 review   R levator stretch 15\" x 3 review   Chin tuck 2-3\" x 10 review   Corner pec stretch 15\" x 3                                                        Therapeutic Activities (41295):  NA    Home Exercise Program:   Pt. demonstrated good understanding and knowledge of HEP. Written instructions provided.     10/12/2020: R UT and levator stretches, chin tuck (already doing shoulder shrugs, shoulder rolls - recommended posterior rolls)  10/14/2020: corner pec stretch    Manual Treatments (10788):  STM R UT/levator/rhomboid with pt in L S/L with good tolerance reported     Modalities:  prn    Timed Code Treatment Minutes: TE: 12, MT: 26     Total Treatment Minutes:  38 Treatment/Activity Tolerance:  [x] Patient tolerated treatment well [] Patient limited by fatigue  [] Patient limited by pain  [] Patient limited by other medical complications  [] Other:     Assessment: pt noticed that she felt looser after treatment today and could stretch neck a little easier with stretches upon attempt at conclusion    Prognosis: [x] Good [] Fair  [] Poor    Patient Requires Follow-up: [x] Yes  [] No    Goals:  Short term goals  Time Frame for Short term goals: 3 weeks  Short term goal 1: Pt will demo good understanding and knowldge of initial HEP  Short term goal 2: Decreased pain 5/10 at worst to allow for improved tolerance to cervical ROM  Short term goal 3: Pt demo good postural awareness consistently t/o treatment  Long term goals  Time Frame for Long term goals : 5 weeks  Long term goal 1: Pt will demo good understanding and knowledge of HEP progressions  Long term goal 2: Decreased pain 2/10 at worst to allow for pt able to improve cervical ROM and less strain with talking  Long term goal 3: Cervical AROM R Rot and R SB = L to allow for driving without increased complaints  Long term goal 4: Strength R shoulder ER 5/5 to allow for improved tolerance to OH movements and household chores  Long term goal 5: Decreased impairment per NDI < 10% impaired    Plan:   [x] Continue per plan of care [] Alter current plan (see comments)  [] Plan of care initiated [] Hold pending MD visit [] Discharge    Plan for Next Session:  Review  HEP, add exercises/manual as tolerated    Electronically signed by:   Nany Elise DPT [id number]

## 2020-10-19 ENCOUNTER — OFFICE VISIT (OUTPATIENT)
Dept: SPEECH THERAPY | Age: 50
End: 2020-10-19
Payer: COMMERCIAL

## 2020-10-19 PROCEDURE — 92507 TX SP LANG VOICE COMM INDIV: CPT | Performed by: SPEECH-LANGUAGE PATHOLOGIST

## 2020-10-20 ENCOUNTER — HOSPITAL ENCOUNTER (OUTPATIENT)
Dept: PHYSICAL THERAPY | Age: 50
Setting detail: THERAPIES SERIES
Discharge: HOME | End: 2020-10-20
Payer: COMMERCIAL

## 2020-10-20 PROCEDURE — 97140 MANUAL THERAPY 1/> REGIONS: CPT

## 2020-10-20 PROCEDURE — 97110 THERAPEUTIC EXERCISES: CPT

## 2020-10-20 NOTE — PROGRESS NOTES
[facility] ([facility]) ENT  Voice Therapy      Pt Seen for Therapy - Session # 1     Diagnosis/Indication:   Primary: Dysphonia  Secondary: MTD  Tertiary: Edema    Background History: Hx of vocal nodules in 2017; surgically removed in [place] and continued to follow-up w/ ENT until moving to [place]. 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 Recently, began to experience globus sensation and laryngeal tension, specifically on R side; vocal fatigue and intermittent strain t/o the day. Denied daily hoarseness or voice loss; experiences most frequently after raising voice in noisy environments. Pt endorsed concern about recurrence of nodules or additional etiologies. Pt reported R ACDF in 2012; no dysphonia or dysphagia post-surgery but does experience limited neck ROM and therefor increased head/neck tension. Denied dysphagia, dyspnea, or symptoms of acid reflux. Previous SLP Evaluations: 10/5/2020  VLS revealed smooth & straight TVFs bilaterally w/ no indication of recurrent nodules or post-surgical scarring. Complete glottic closure during modal and low pitch; incomplete closure during high pitch. Mild supraglottic compression (AP) during phonation. Functional mucosal wave and mildly irregular periodicity. SUBJECTIVE:   Pt endorsed moderate improvement in vocal symptoms, but continues to experience fatigue towards the end of the day. Has been seeing PT and endorsed increased soreness from stretching/movement and did not realize poor compensatory techniques she had adapted. Motivated to continue w/ VOT. OBJECTIVE:   Voice Therapy    Goal: Session 1 Session 2 Session 3   Pt will adhere to vocal hygiene protocol during daily life activities w/ 80% acc Pt adhered to vocal hygiene protocol w/ 70% acc    Pt endorsed consuming >64oz water for systemic hydration, especially w/ ongoing PT and physical movement/stretching. Completes exercises as recommended daily.        Pt will perform SOVT techniques during various voicing tasks w/ 80% acc Pt performed SOVT techniques via    Strawflow w/ sustained pitch, ascending/descending glides, and sirens w/ 70% acc; pt required mod cues for increased airflow t/o, but able to achieve adequate forward-focused phonation w/ clear resonance. Pt will perform RVT techniques during various voicing tasks w/ 80% acc Pt performed RVT techniques via    Humming in isolation w/ 65% acc; pt w/ initial difficulties identifying forward vs back phonation, and utilized negative practice to assist. Noted to have decreased volume secondary to decreased breath support and required cues for airflow. Humming + vowels w/ 70% acc; pt w/ no difficulties transitioning into vowels and able to maintain placement. Pt endorsed feeling voice was placed in nose. Humming + /m/ words w/ 70% acc; again pt w/o difficulties maintaining forward-focused placement, but required cues for carry-over of breath support to support end of words. Tendency to drop down into back-focused. Finally, transitioned into humming + sentences w/ 65% acc; increased difficulty w/ this step d/t maintaining forward placement w/o dropping at end of words. Pt became increasingly vocally aware and able to self-identify when drop occurred. ASSESSMENT:      48 y.o. female w/ hx of VF nodules and ongoing vocal fatigue/hoarseness. Pt has been compliant w/ all recommendations thus far, and has noted moderate improvement in symptoms. Currently seeing PT to assist w/ tension and ROM s/p ACDF in 2012. Reviewed SOVT strawflow and required cues for increased breath support; continued to require cues for respiration t/o session and during RVT. Initial difficulties w/ obtaining forward-focused placement during RVT humming, and utilized negative practice to assist. Pt is extremely stimulable, and easily transitioned all the way to RVT humming + sentences.  Tendency to drop at end of sentences, but became vocally aware and able to self-correct at end of session. Overall, pt has made excellent progress and prognosis is good w/ ongoing VOT and completion of RVT program.    RECOMMENDATIONS:      1.  Follow HEP and RTC in one-week      Thank you,    Ole Teresa) [place], [place], [address] [facility] [place]; .[id number]  Speech-Language Pathologist

## 2020-10-20 NOTE — FLOWSHEET NOTE
Physical Therapy Daily Treatment Note  Date:  10/20/2020    Patient Name:  Shimon Regalado    :  1970  MRN: [id number]  Restrictions/Precautions:   cervical DDD with fusion , Hx anxiety, vocal nodule removed ~ 4-5 years ago   Medical/Treatment Diagnosis Information:  · Diagnosis: Dysphonia (R49.0); Neck pain (M54.2); Tension headache (G44.209)  · Treatment Diagnosis: neck pain with R UE paresthesias  Insurance/Certification information:  PT Insurance Information: [facility], 30 visits per viktor year combined  Physician Information:  Referring Practitioner: Douglas Linares MD MD Follow-up Visit: ?   Plan of care signed (Y/N):  Y  Visit# / total visits:  3/10  Pain level: 2/10     Progress Note Due (10 visits or 30 days, whichever is less):    Recertification Note Due (End of POC or 90 days, whichever is less):      Subjective:  10/12/2020: Pt reports history of cervical fusion  and vocal nodule removed about 4-5 years ago. About 6 months ago pt reports that she started to notice that it felt like something was in her throat and her voice gets tired with talking. She has also noted increased R neck pain/tightness with decreased ROM, especially with R rot, numbness entire R UE and tingling R hand (this is about the same recently). Paresthesias are worse when riding bike. Saw ENT and no new nodules and saw SLP who recommended PT due to neck tightness and limited ROM. Pt reports pain 1-2/10 at best when resting, 8/10 at worst, currently 5/10. Pt does report has had PT in the past but > 3 years ago and cortizone injections R scapular region but also a couple of years ago and felt relief of numbness and neck tension with these. Pt notes pain is worse with looking to the R, driving, OH movements, household chores and talking (voice gets very fatigued). Pt is R hand dominant. Pt does note HA with increased neck pain about 2x per month but this hasn't really changed recently.   She states she has to do household chores such as vacuuming and getting under bed in short doses due to pain/paresthesias. PLOF: less tension/paresthesias and better neck ROM prior to 6 months ago, also no vocal issues prior to 6 months ago (seeing SLP for specific vocal treatment). 10/14/2020: Pt reports no complaints after IE. Does notice sometimes that R UE has N/T but unsure if related to a specific position that she is in - will try to pay attention to if any pattern. 10/20/2020:  Pt reports that she can tell she is getting looser as can turn her head a little easier. Objective:10/12/2020   Observation:    Posture: (mild forward rounded shoulders at times)  Palpation: significant tightness R UT/levator and moderate R rhomboid     Test measurements:    AROM RUE (degrees)  RUE AROM : WFL  AROM LUE (degrees)  LUE AROM : WFL  Spine  Cervical: flexion 35 deg, extension 25 deg (can feel \"screws\" from fusion), L SB 15 deg, R SB 10 deg, L rot 50%, R rot 25%     Strength RUE  Comment: shoulder 5/5 with the exception of ER 4/5, elbow 5/5, wrist 5/5, good  strength  Strength LUE  Comment: shoulder 5/5, elbow 5/5, wrist 5/5, good  strength  Additional Measures  Other: NDI = 22% impaired     Exercises, Neuro Facilitation & Gait Training [phone] [id number], [id number], [id number]): Activity Resistance/Repetitions Other comments   R UT stretch 15\" x 3  Attempted L UT stretch but no stretch felt so held    R levator stretch 15\" x 3  Add L 15\" x 3 - good stretch felt    Corner pec stretch 15\" x 3                                                        Therapeutic Activities (97171):  NA    Home Exercise Program:   Pt. demonstrated good understanding and knowledge of HEP. Written instructions provided.     10/12/2020: R UT and levator stretches, chin tuck (already doing shoulder shrugs, shoulder rolls - recommended posterior rolls)  10/14/2020: corner pec stretch  10/20/2020: L levator stretch    Manual Treatments (24010):  [facility] R UT/levator/rhomboid with pt in L S/L with good tolerance reported - did feel looser than last visit     Modalities:  prn    Timed Code Treatment Minutes: TE: 15, MT: 23     Total Treatment Minutes:  38     Treatment/Activity Tolerance:  [x] Patient tolerated treatment well [] Patient limited by fatigue  [] Patient limited by pain  [] Patient limited by other medical complications  [] Other:     Assessment: looser after completion of manual noted by pt    Prognosis: [x] Good [] Fair  [] Poor    Patient Requires Follow-up: [x] Yes  [] No    Goals:  Short term goals  Time Frame for Short term goals: 3 weeks  Short term goal 1: Pt will demo good understanding and knowldge of initial HEP  Short term goal 2: Decreased pain 5/10 at worst to allow for improved tolerance to cervical ROM  Short term goal 3: Pt demo good postural awareness consistently t/o treatment  Long term goals  Time Frame for Long term goals : 5 weeks  Long term goal 1: Pt will demo good understanding and knowledge of HEP progressions  Long term goal 2: Decreased pain 2/10 at worst to allow for pt able to improve cervical ROM and less strain with talking  Long term goal 3: Cervical AROM R Rot and R SB = L to allow for driving without increased complaints  Long term goal 4: Strength R shoulder ER 5/5 to allow for improved tolerance to OH movements and household chores  Long term goal 5: Decreased impairment per NDI < 10% impaired    Plan:   [x] Continue per plan of care [] Alter current plan (see comments)  [] Plan of care initiated [] Hold pending MD visit [] Discharge    Plan for Next Session:  Review  HEP, add exercises/manual as tolerated    Electronically signed by:   Kelsey Louis DPLILLY [id number]

## 2020-10-22 ENCOUNTER — HOSPITAL ENCOUNTER (OUTPATIENT)
Dept: PHYSICAL THERAPY | Age: 50
Setting detail: THERAPIES SERIES
Discharge: HOME | End: 2020-10-22
Payer: COMMERCIAL

## 2020-10-22 PROCEDURE — 97140 MANUAL THERAPY 1/> REGIONS: CPT

## 2020-10-22 PROCEDURE — 97110 THERAPEUTIC EXERCISES: CPT

## 2020-10-22 NOTE — FLOWSHEET NOTE
Physical Therapy Daily Treatment Note  Date:  10/22/2020    Patient Name:  Mikaela Cox    :  1970  MRN: [id number]  Restrictions/Precautions:   cervical DDD with fusion , Hx anxiety, vocal nodule removed ~ 4-5 years ago   Medical/Treatment Diagnosis Information:  · Diagnosis: Dysphonia (R49.0); Neck pain (M54.2); Tension headache (G44.209)  · Treatment Diagnosis: neck pain with R UE paresthesias  Insurance/Certification information:  PT Insurance Information: Medical [place], 30 visits per viktor year combined  Physician Information:  Referring Practitioner: Evelyne Wyman MD MD Follow-up Visit: ?   Plan of care signed (Y/N):  Y  Visit# / total visits:  4/10  Pain level: 1/10     Progress Note Due (10 visits or 30 days, whichever is less):    Recertification Note Due (End of POC or 90 days, whichever is less):      Subjective:  10/12/2020: Pt reports history of cervical fusion  and vocal nodule removed about 4-5 years ago. About 6 months ago pt reports that she started to notice that it felt like something was in her throat and her voice gets tired with talking. She has also noted increased R neck pain/tightness with decreased ROM, especially with R rot, numbness entire R UE and tingling R hand (this is about the same recently). Paresthesias are worse when riding bike. Saw ENT and no new nodules and saw SLP who recommended PT due to neck tightness and limited ROM. Pt reports pain 1-2/10 at best when resting, 8/10 at worst, currently 5/10. Pt does report has had PT in the past but > 3 years ago and cortizone injections R scapular region but also a couple of years ago and felt relief of numbness and neck tension with these. Pt notes pain is worse with looking to the R, driving, OH movements, household chores and talking (voice gets very fatigued). Pt is R hand dominant. Pt does note HA with increased neck pain about 2x per month but this hasn't really changed recently.   She states she has to do household chores such as vacuuming and getting under bed in short doses due to pain/paresthesias. PLOF: less tension/paresthesias and better neck ROM prior to 6 months ago, also no vocal issues prior to 6 months ago (seeing SLP for specific vocal treatment). 10/14/2020: Pt reports no complaints after IE. Does notice sometimes that R UE has N/T but unsure if related to a specific position that she is in - will try to pay attention to if any pattern. 10/20/2020:  Pt reports that she can tell she is getting looser as can turn her head a little easier. 10/22/2020:  Overall pain is improving and can also feel that she continues to feel looser with moving head. Objective:10/12/2020   Observation:    Posture: (mild forward rounded shoulders at times)  Palpation: significant tightness R UT/levator and moderate R rhomboid     Test measurements:    AROM RUE (degrees)  RUE AROM : WFL  AROM LUE (degrees)  LUE AROM : WFL  Spine  Cervical: flexion 35 deg, extension 25 deg (can feel \"screws\" from fusion), L SB 15 deg, R SB 10 deg, L rot 50%, R rot 25%     Strength RUE  Comment: shoulder 5/5 with the exception of ER 4/5, elbow 5/5, wrist 5/5, good  strength  Strength LUE  Comment: shoulder 5/5, elbow 5/5, wrist 5/5, good  strength  Additional Measures  Other: NDI = 22% impaired     Exercises, Neuro Facilitation & Gait Training [phone] [id number], [id number], [phone]): Activity Resistance/Repetitions Other comments   R UT stretch 15\" x 3      R levator stretch 15\" x 3  Add L 15\" x 3 - good stretch felt    Chin tuck 2-3\" x 10 Review as pt forgot      B ER with scap retraction Red x 10                                                   Therapeutic Activities (69398):  NA    Home Exercise Program:   Pt. demonstrated good understanding and knowledge of HEP. Written instructions provided.     10/12/2020: R UT and levator stretches, chin tuck (already doing shoulder shrugs, shoulder rolls - recommended posterior rolls)  10/14/2020: corner pec stretch  10/20/2020: L levator stretch  10/22/2020: B ER with scap retraction    Manual Treatments (93640):  STM R UT/levator/rhomboid with pt in L S/L with good tolerance reported - tightness continues to improve    Modalities:  prn    Timed Code Treatment Minutes: TE: 15, MT: 25     Total Treatment Minutes:  40     Treatment/Activity Tolerance:  [x] Patient tolerated treatment well [] Patient limited by fatigue  [] Patient limited by pain  [] Patient limited by other medical complications  [] Other:     Assessment: looser after completion of manual noted by pt and also looser overall    Prognosis: [x] Good [] Fair  [] Poor    Patient Requires Follow-up: [x] Yes  [] No    Goals:  Short term goals  Time Frame for Short term goals: 3 weeks  Short term goal 1: Pt will demo good understanding and knowldge of initial HEP  Short term goal 2: Decreased pain 5/10 at worst to allow for improved tolerance to cervical ROM  Short term goal 3: Pt demo good postural awareness consistently t/o treatment  Long term goals  Time Frame for Long term goals : 5 weeks  Long term goal 1: Pt will demo good understanding and knowledge of HEP progressions  Long term goal 2: Decreased pain 2/10 at worst to allow for pt able to improve cervical ROM and less strain with talking  Long term goal 3: Cervical AROM R Rot and R SB = L to allow for driving without increased complaints  Long term goal 4: Strength R shoulder ER 5/5 to allow for improved tolerance to OH movements and household chores  Long term goal 5: Decreased impairment per NDI < 10% impaired    Plan:   [x] Continue per plan of care [] Alter current plan (see comments)  [] Plan of care initiated [] Hold pending MD visit [] Discharge    Plan for Next Session:  Review  HEP, add exercises/manual as tolerated    Electronically signed by:   Eren Longoria DPT [id number]

## 2020-10-26 ENCOUNTER — PROCEDURE VISIT (OUTPATIENT)
Dept: SPEECH THERAPY | Age: 50
End: 2020-10-26
Payer: COMMERCIAL

## 2020-10-26 PROCEDURE — 92507 TX SP LANG VOICE COMM INDIV: CPT | Performed by: SPEECH-LANGUAGE PATHOLOGIST

## 2020-10-27 ENCOUNTER — HOSPITAL ENCOUNTER (OUTPATIENT)
Dept: PHYSICAL THERAPY | Age: 50
Setting detail: THERAPIES SERIES
Discharge: HOME | End: 2020-10-27
Payer: COMMERCIAL

## 2020-10-27 PROCEDURE — 97140 MANUAL THERAPY 1/> REGIONS: CPT

## 2020-10-27 PROCEDURE — 97110 THERAPEUTIC EXERCISES: CPT

## 2020-10-27 NOTE — FLOWSHEET NOTE
Physical Therapy Daily Treatment Note  Date:  10/27/2020    Patient Name:  Rupinder Mcneal    :  1970  MRN: [id number]  Restrictions/Precautions:   cervical DDD with fusion , Hx anxiety, vocal nodule removed ~ 4-5 years ago   Medical/Treatment Diagnosis Information:  · Diagnosis: Dysphonia (R49.0); Neck pain (M54.2); Tension headache (G44.209)  · Treatment Diagnosis: neck pain with R UE paresthesias  Insurance/Certification information:  PT Insurance Information: Medical [place], 30 visits per viktor year combined  Physician Information:  Referring Practitioner: Yannick Rodrigues MD MD Follow-up Visit: ?   Plan of care signed (Y/N):  Y  Visit# / total visits:  5/10  Pain level: 2/10     Progress Note Due (10 visits or 30 days, whichever is less):    Recertification Note Due (End of POC or 90 days, whichever is less):      Subjective:  10/12/2020: Pt reports history of cervical fusion  and vocal nodule removed about 4-5 years ago. About 6 months ago pt reports that she started to notice that it felt like something was in her throat and her voice gets tired with talking. She has also noted increased R neck pain/tightness with decreased ROM, especially with R rot, numbness entire R UE and tingling R hand (this is about the same recently). Paresthesias are worse when riding bike. Saw ENT and no new nodules and saw SLP who recommended PT due to neck tightness and limited ROM. Pt reports pain 1-2/10 at best when resting, 8/10 at worst, currently 5/10. Pt does report has had PT in the past but > 3 years ago and cortizone injections R scapular region but also a couple of years ago and felt relief of numbness and neck tension with these. Pt notes pain is worse with looking to the R, driving, OH movements, household chores and talking (voice gets very fatigued). Pt is R hand dominant. Pt does note HA with increased neck pain about 2x per month but this hasn't really changed recently.   She states she has to do household chores such as vacuuming and getting under bed in short doses due to pain/paresthesias. PLOF: less tension/paresthesias and better neck ROM prior to 6 months ago, also no vocal issues prior to 6 months ago (seeing SLP for specific vocal treatment). 10/14/2020: Pt reports no complaints after IE. Does notice sometimes that R UZULEYMA has N/T but unsure if related to a specific position that she is in - will try to pay attention to if any pattern. 10/20/2020:  Pt reports that she can tell she is getting looser as can turn her head a little easier. 10/22/2020:  Overall pain is improving and can also feel that she continues to feel looser with moving head. 10/27/2020: Pt states that it feels like she is still loosening up. The sensation she has been having in her throat is fading - almost finished with speech therapy. Feels maybe decreased elasticity near scar left ant neck and is sensitive. Unable to do t-band exercise for ER due to chronic tennis elbow R > L. Pt states she reports she is 75% improved overall. Hasn't had arm falling asleep as much. Objective:10/12/2020   Observation:    Posture: (mild forward rounded shoulders at times)  Palpation: significant tightness R UT/levator and moderate R rhomboid     Test measurements:    AROM RUE (degrees)  RUE AROM : WFL  AROM LUE (degrees)  LUE AROM : WFL  Spine  Cervical: flexion 35 deg, extension 25 deg (can feel \"screws\" from fusion), L SB 15 deg, R SB 10 deg, L rot 50%, R rot 25%     Strength RUE  Comment: shoulder 5/5 with the exception of ER 4/5, elbow 5/5, wrist 5/5, good  strength  Strength LUE  Comment: shoulder 5/5, elbow 5/5, wrist 5/5, good  strength  Additional Measures  Other: NDI = 22% impaired     10/27/2020: good postural awareness demo t/o treatment    Exercises, Neuro Facilitation & Gait Training (00323, [id number], [id number]):   Activity Resistance/Repetitions Other comments         Held due to tennis elbow pain   Shoulder isometric ER, t-band 3-5\" x 5-10 B    Cervical ext stretch Just feels screws in neck so held                                         Therapeutic Activities (20156):  Suggested desensitization techniques to scar left ant neck but pt reported that it bothered her right ant neck so held    Home Exercise Program:   Pt. demonstrated good understanding and knowledge of HEP. Written instructions provided. 10/12/2020: R UT and levator stretches, chin tuck (already doing shoulder shrugs, shoulder rolls - recommended posterior rolls)  10/14/2020: corner pec stretch  10/20/2020: L levator stretch  10/22/2020:  B ER with scap retraction  10/27/2020: ER isometric - hold t-band    Manual Treatments (58460):  STM R UT/levator/rhomboid with pt in L S/L with good tolerance reported - tightness continues to improve    Modalities:  prn    Timed Code Treatment Minutes: TE: 10, TA: 3, MT: 25     Total Treatment Minutes:  38     Treatment/Activity Tolerance:  [x] Patient tolerated treatment well [] Patient limited by fatigue  [] Patient limited by pain  [] Patient limited by other medical complications  [] Other:     Assessment: good tolerance to treatment - improving symptoms    Prognosis: [x] Good [] Fair  [] Poor    Patient Requires Follow-up: [x] Yes  [] No    Goals:  Short term goals  Time Frame for Short term goals: 3 weeks  Short term goal 1: Pt will demo good understanding and knowldge of initial HEP  Short term goal 2: Decreased pain 5/10 at worst to allow for improved tolerance to cervical ROM  Short term goal 3: Pt demo good postural awareness consistently t/o treatment  Long term goals  Time Frame for Long term goals : 5 weeks  Long term goal 1: Pt will demo good understanding and knowledge of HEP progressions  Long term goal 2: Decreased pain 2/10 at worst to allow for pt able to improve cervical ROM and less strain with talking  Long term goal 3: Cervical AROM R Rot and R SB = L to allow for driving without increased complaints  Long term goal 4: Strength R shoulder ER 5/5 to allow for improved tolerance to OH movements and household chores  Long term goal 5: Decreased impairment per NDI < 10% impaired    Plan:   [x] Continue per plan of care [] Alter current plan (see comments)  [] Plan of care initiated [] Hold pending MD visit [] Discharge    Plan for Next Session:  Review  HEP, add exercises/manual as tolerated    Electronically signed by:   Ward Hernandez DPT [id number]

## 2020-10-27 NOTE — PROGRESS NOTES
[facility] ([facility]) ENT  Voice Therapy      Pt Seen for Therapy - Session # 2     Diagnosis/Indication:   Primary: Dysphonia  Secondary: MTD  Tertiary: Edema    Background History: Hx of vocal nodules in 2017; surgically removed in [place] and continued to follow-up w/ ENT until moving to [place]. 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 Recently, began to experience globus sensation and laryngeal tension, specifically on R side; vocal fatigue and intermittent strain t/o the day. Denied daily hoarseness or voice loss; experiences most frequently after raising voice in noisy environments. Pt endorsed concern about recurrence of nodules or additional etiologies. Pt reported R ACDF in 2012; no dysphonia or dysphagia post-surgery but does experience limited neck ROM and therefor increased head/neck tension. Denied dysphagia, dyspnea, or symptoms of acid reflux. Previous SLP Evaluations: 10/5/2020  VLS revealed smooth & straight TVFs bilaterally w/ no indication of recurrent nodules or post-surgical scarring. Complete glottic closure during modal and low pitch; incomplete closure during high pitch. Mild supraglottic compression (AP) during phonation. Functional mucosal wave and mildly irregular periodicity. SUBJECTIVE:   Pt reported ongoing improvement w/ vocal quality but feels that sustained clear quality is more difficult at this time. Continues w/ PT and endorsed increased ROM and decreased neck tension; feels this has been beneficial and will continue. Pt questioned regarding long-term exercise regiment and completion of exercises. OBJECTIVE:   Voice Therapy    Goal: Session 1 Session 2 Session 3   Pt will adhere to vocal hygiene protocol during daily life activities w/ 80% acc Pt adhered to vocal hygiene protocol w/ 70% acc    Pt endorsed consuming >64oz water for systemic hydration, especially w/ ongoing PT and physical movement/stretching.     Completes exercises as recommended daily. Pt adhered to vocal hygiene protocol w/ 75% acc    Pt continues to complete both VOT and PT exercises daily and does endorse ongoing improvement in all symptoms at this time. Becoming more aware of phonotraumatic behaviors and decreasing. Pt will perform SOVT techniques during various voicing tasks w/ 80% acc Pt performed SOVT techniques via    Strawflow w/ sustained pitch, ascending/descending glides, and sirens w/ 70% acc; pt required mod cues for increased airflow t/o, but able to achieve adequate forward-focused phonation w/ clear resonance. Pt performed SOVT techniques via    Strawflow w/ sustained pitch, ascending/descending glides, and sirens w/ 75% acc; no difficulties w/ forward-focused phonation, but continues to require min-mod cues for increased airflow, specifically in higher ranges w/ tendency to strain. Pt will perform RVT techniques during various voicing tasks w/ 80% acc Pt performed RVT techniques via    Humming in isolation w/ 65% acc; pt w/ initial difficulties identifying forward vs back phonation, and utilized negative practice to assist. Noted to have decreased volume secondary to decreased breath support and required cues for airflow. Humming + vowels w/ 70% acc; pt w/ no difficulties transitioning into vowels and able to maintain placement. Pt endorsed feeling voice was placed in nose. Humming + /m/ words w/ 70% acc; again pt w/o difficulties maintaining forward-focused placement, but required cues for carry-over of breath support to support end of words. Tendency to drop down into back-focused. Finally, transitioned into humming + sentences w/ 65% acc; increased difficulty w/ this step d/t maintaining forward placement w/o dropping at end of words. Pt became increasingly vocally aware and able to self-identify when drop occurred.     Pt performed RVT techniques via    Humming in isolation w/ 60% acc; no difficulties achieving forward-focused placement however, pt noted to have decreased volume and required mod cues for increased diaphragmatic breathing for increased support. Transitioned into humming + sentences w/ 70% acc; tendency to become back-focused at end of sentences d/t decreased breath support. Pt began to independently correct but required ongoing min cues. Pt w/ moderate confusion regarding lowering pitch vs lowering placement (back-focused) and began to talk in elevated pitch rather than modal; required mod cues to maintain modal speaking pitch and focus on placement rather than pitches. Humming + conversational speech w/ 55% acc; pt again noted to speak in elevated pitch, decreased breath support, and drop of placement specifically at end of sentences. Pt required mod-max cues t/o but was able to self-correct by end of session. Created functional phrases to assist w/ generalization and carry-over into daily speech. ASSESSMENT:      48 y.o. female w/ hx of VF nodules and ongoing vocal fatigue/hoarseness. Pt has been compliant w/ all exercises, and continues to see PT for ongoing tension. Pt endorsed SOVT strawflow has been most beneficial for releasing tension and fatigue at this time. Reviewed previous steps of RVT and noted to have decreased breath support and volume this date; continually speaking in elevated pitch and educated on placement (forward and back) vs pitch changes. Required mod cues t/o to maintain modal pitch. When pt able to implement increased breath support and maintain forward-focused placement, noted to have clear, strong vocal quality that pt reported w/o strain or discomfort. Encouraged to begin implementing techniques into daily conversations and will RTC in two-weeks to ensure adequate generalization of strategies. Overall, prognosis is good w/ continued VOT. RECOMMENDATIONS:      1.  Follow HEP and RTC in one-week      Thank you,    Kami Wellington) [place], [place], [address] [facility]; [id number]  Speech-Language Pathologist

## 2020-10-29 ENCOUNTER — HOSPITAL ENCOUNTER (OUTPATIENT)
Dept: PHYSICAL THERAPY | Age: 50
Setting detail: THERAPIES SERIES
Discharge: HOME | End: 2020-10-29
Payer: COMMERCIAL

## 2020-10-29 PROCEDURE — 97035 APP MDLTY 1+ULTRASOUND EA 15: CPT

## 2020-10-29 PROCEDURE — 97140 MANUAL THERAPY 1/> REGIONS: CPT

## 2020-10-29 NOTE — FLOWSHEET NOTE
strength  Additional Measures  Other: NDI = 22% impaired     10/27/2020: good postural awareness demo t/o treatment    Exercises, Neuro Facilitation & Gait Training (53463, [id number], [id number]): verbal review of all - still good tolerance even after \"jolted\" neck last night  Activity Resistance/Repetitions Other comments         Held due to tennis elbow pain                                              Therapeutic Activities ():      Home Exercise Program:   Pt. demonstrated good understanding and knowledge of HEP. Written instructions provided. 10/12/2020: R UT and levator stretches, chin tuck (already doing shoulder shrugs, shoulder rolls - recommended posterior rolls)  10/14/2020: corner pec stretch  10/20/2020: L levator stretch  10/22/2020:  B ER with scap retraction  10/27/2020: ER isometric - hold t-band    Manual Treatments (58449):  STM R UT/levator/rhomboid with pt in L S/L with good tolerance reported - tightness continues to improve    Modalities:  US x 8 mins to R UT with pt in L S/L, 100%, 1.0 w/cm2, 1 MHz with good tolerance reported    Timed Code Treatment Minutes: TE: 5, US: 8, MT: 25     Total Treatment Minutes:  38     Treatment/Activity Tolerance:  [x] Patient tolerated treatment well [] Patient limited by fatigue  [] Patient limited by pain  [] Patient limited by other medical complications  [] Other:     Assessment: good tolerance with addition of US - will monitor    Prognosis: [x] Good [] Fair  [] Poor    Patient Requires Follow-up: [x] Yes  [] No    Goals:  Short term goals  Time Frame for Short term goals: 3 weeks  Short term goal 1: Pt will demo good understanding and knowldge of initial HEP  Short term goal 2: Decreased pain 5/10 at worst to allow for improved tolerance to cervical ROM  Short term goal 3: Pt demo good postural awareness consistently t/o treatment  Long term goals  Time Frame for Long term goals : 5 weeks  Long term goal 1: Pt will demo good understanding and knowledge of HEP progressions  Long term goal 2: Decreased pain 2/10 at worst to allow for pt able to improve cervical ROM and less strain with talking  Long term goal 3: Cervical AROM R Rot and R SB = L to allow for driving without increased complaints  Long term goal 4: Strength R shoulder ER 5/5 to allow for improved tolerance to OH movements and household chores  Long term goal 5: Decreased impairment per NDI < 10% impaired    Plan:   [x] Continue per plan of care [] Alter current plan (see comments)  [] Plan of care initiated [] Hold pending MD visit [] Discharge    Plan for Next Session:  Review  HEP, add exercises/manual as tolerated    Electronically signed by:   Maria Eugenia Anderson DPT [id number]

## 2020-11-03 ENCOUNTER — HOSPITAL ENCOUNTER (OUTPATIENT)
Dept: PHYSICAL THERAPY | Age: 50
Setting detail: THERAPIES SERIES
Discharge: HOME | End: 2020-11-03
Payer: COMMERCIAL

## 2020-11-03 PROCEDURE — 97140 MANUAL THERAPY 1/> REGIONS: CPT

## 2020-11-03 PROCEDURE — 97035 APP MDLTY 1+ULTRASOUND EA 15: CPT

## 2020-11-03 NOTE — FLOWSHEET NOTE
Physical Therapy Daily Treatment Note  Date:  11/3/2020    Patient Name:  Loyda Lowery    :  1970  MRN: [id number]  Restrictions/Precautions:   cervical DDD with fusion , Hx anxiety, vocal nodule removed ~ 4-5 years ago   Medical/Treatment Diagnosis Information:  · Diagnosis: Dysphonia (R49.0); Neck pain (M54.2); Tension headache (G44.209)  · Treatment Diagnosis: neck pain with R UE paresthesias  Insurance/Certification information:  PT Insurance Information: Medical [place], 30 visits per viktor year combined  Physician Information:  Referring Practitioner: Suad Lam MD MD Follow-up Visit: ?   Plan of care signed (Y/N):  Y  Visit# / total visits:  7/10  Pain level: 0/10     Progress Note Due (10 visits or 30 days, whichever is less):    Recertification Note Due (End of POC or 90 days, whichever is less):      Subjective:  10/12/2020: Pt reports history of cervical fusion  and vocal nodule removed about 4-5 years ago. About 6 months ago pt reports that she started to notice that it felt like something was in her throat and her voice gets tired with talking. She has also noted increased R neck pain/tightness with decreased ROM, especially with R rot, numbness entire R UE and tingling R hand (this is about the same recently). Paresthesias are worse when riding bike. Saw ENT and no new nodules and saw SLP who recommended PT due to neck tightness and limited ROM. Pt reports pain 1-2/10 at best when resting, 8/10 at worst, currently 5/10. Pt does report has had PT in the past but > 3 years ago and cortizone injections R scapular region but also a couple of years ago and felt relief of numbness and neck tension with these. Pt notes pain is worse with looking to the R, driving, OH movements, household chores and talking (voice gets very fatigued). Pt is R hand dominant. Pt does note HA with increased neck pain about 2x per month but this hasn't really changed recently.   She states exception of ER 4/5, elbow 5/5, wrist 5/5, good  strength  Strength LUE  Comment: shoulder 5/5, elbow 5/5, wrist 5/5, good  strength  Additional Measures  Other: NDI = 22% impaired     10/27/2020: good postural awareness demo t/o treatment    Exercises, Neuro Facilitation & Gait Training (40659, [id number], [id number]): verbal review of all - feels that B ER t-band may not have been reason for irritation in elbows but was maybe from vacuum - may try again as tolerated  Activity Resistance/Repetitions Other comments         Held due to tennis elbow pain                                              Therapeutic Activities (83840):      Home Exercise Program:   Pt. demonstrated good understanding and knowledge of HEP. Written instructions provided. 10/12/2020: R UT and levator stretches, chin tuck (already doing shoulder shrugs, shoulder rolls - recommended posterior rolls)  10/14/2020: corner pec stretch  10/20/2020: L levator stretch  10/22/2020:  B ER with scap retraction  10/27/2020: ER isometric - hold t-band    Manual Treatments (72477):  STM R UT/levator/rhomboid with pt in L S/L with good tolerance reported - tightness continues to improve    Modalities:  US x 8 mins to R UT with pt in L S/L, 100%, 1.0 w/cm2, 1 MHz with good tolerance reported    Timed Code Treatment Minutes: TE: 5, US: 8, MT: 25     Total Treatment Minutes:  38     Treatment/Activity Tolerance:  [x] Patient tolerated treatment well [] Patient limited by fatigue  [] Patient limited by pain  [] Patient limited by other medical complications  [] Other:     Assessment: overall decreasing tightness/symptoms    Prognosis: [x] Good [] Fair  [] Poor    Patient Requires Follow-up: [x] Yes  [] No    Goals:  Short term goals  Time Frame for Short term goals: 3 weeks  Short term goal 1: Pt will demo good understanding and knowldge of initial HEP  Short term goal 2: Decreased pain 5/10 at worst to allow for improved tolerance to cervical ROM  Short term goal 3: Pt demo good postural awareness consistently t/o treatment  Long term goals  Time Frame for Long term goals : 5 weeks  Long term goal 1: Pt will demo good understanding and knowledge of HEP progressions  Long term goal 2: Decreased pain 2/10 at worst to allow for pt able to improve cervical ROM and less strain with talking  Long term goal 3: Cervical AROM R Rot and R SB = L to allow for driving without increased complaints  Long term goal 4: Strength R shoulder ER 5/5 to allow for improved tolerance to OH movements and household chores  Long term goal 5: Decreased impairment per NDI < 10% impaired    Plan:   [x] Continue per plan of care [] Alter current plan (see comments)  [] Plan of care initiated [] Hold pending MD visit [] Discharge    Plan for Next Session:  Review  HEP, add exercises/manual as tolerated    Electronically signed by:   Berta Castellanos DPT [id number]

## 2020-11-05 ENCOUNTER — HOSPITAL ENCOUNTER (OUTPATIENT)
Dept: PHYSICAL THERAPY | Age: 50
Setting detail: THERAPIES SERIES
Discharge: HOME | End: 2020-11-05
Payer: COMMERCIAL

## 2020-11-05 PROCEDURE — 97140 MANUAL THERAPY 1/> REGIONS: CPT

## 2020-11-05 PROCEDURE — 97035 APP MDLTY 1+ULTRASOUND EA 15: CPT

## 2020-11-05 PROCEDURE — 97110 THERAPEUTIC EXERCISES: CPT

## 2020-11-05 NOTE — FLOWSHEET NOTE
Physical Therapy Daily Treatment Note  Date:  2020    Patient Name:  Tia Garzon    :  1970  MRN: [id number]  Restrictions/Precautions:   cervical DDD with fusion , Hx anxiety, vocal nodule removed ~ 4-5 years ago   Medical/Treatment Diagnosis Information:  · Diagnosis: Dysphonia (R49.0); Neck pain (M54.2); Tension headache (G44.209)  · Treatment Diagnosis: neck pain with R UE paresthesias  Insurance/Certification information:  PT Insurance Information: Medical [place], 30 visits per viktor year combined  Physician Information:  Referring Practitioner: Arnol Dykes MD MD Follow-up Visit: ?   Plan of care signed (Y/N):  Y  Visit# / total visits:  8/10  Pain level: 1/10     Progress Note Due (10 visits or 30 days, whichever is less):    Recertification Note Due (End of POC or 90 days, whichever is less):      Subjective:  10/12/2020: Pt reports history of cervical fusion  and vocal nodule removed about 4-5 years ago. About 6 months ago pt reports that she started to notice that it felt like something was in her throat and her voice gets tired with talking. She has also noted increased R neck pain/tightness with decreased ROM, especially with R rot, numbness entire R UE and tingling R hand (this is about the same recently). Paresthesias are worse when riding bike. Saw ENT and no new nodules and saw SLP who recommended PT due to neck tightness and limited ROM. Pt reports pain 1-2/10 at best when resting, 8/10 at worst, currently 5/10. Pt does report has had PT in the past but > 3 years ago and cortizone injections R scapular region but also a couple of years ago and felt relief of numbness and neck tension with these. Pt notes pain is worse with looking to the R, driving, OH movements, household chores and talking (voice gets very fatigued). Pt is R hand dominant. Pt does note HA with increased neck pain about 2x per month but this hasn't really changed recently.   She states she has to do household chores such as vacuuming and getting under bed in short doses due to pain/paresthesias. PLOF: less tension/paresthesias and better neck ROM prior to 6 months ago, also no vocal issues prior to 6 months ago (seeing SLP for specific vocal treatment). 10/14/2020: Pt reports no complaints after IE. Does notice sometimes that R UE has N/T but unsure if related to a specific position that she is in - will try to pay attention to if any pattern. 10/20/2020:  Pt reports that she can tell she is getting looser as can turn her head a little easier. 10/22/2020:  Overall pain is improving and can also feel that she continues to feel looser with moving head. 10/27/2020: Pt states that it feels like she is still loosening up. The sensation she has been having in her throat is fading - almost finished with speech therapy. Feels maybe decreased elasticity near scar left ant neck and is sensitive. Unable to do t-band exercise for ER due to chronic tennis elbow R > L. Pt states she reports she is 75% improved overall. Hasn't had arm falling asleep as much. 10/29/2020:  Still tense on R side. 2nd-3rd fingers fell asleep yesterday as pt noticed in AM.  Neck is \"jolted\" from fall last night as slipped on hula hoop. Overall though is improved from IE - less tension/pain and better mobility in neck. 11/03/2020: Pt reports she has been feeling pretty good overall with less pain and tightness, still with tightness to R UT.       11/05/2020:  Did feel sensation in throat this AM but doesn't currently feel. Pt feels that knots are improving in neck.                 Objective:10/12/2020   Observation:    Posture: (mild forward rounded shoulders at times)  Palpation: significant tightness R UT/levator and moderate R rhomboid     Test measurements:    AROM RUE (degrees)  RUE AROM : WFL  AROM LUE (degrees)  LUE AROM : WFL  Spine  Cervical: flexion 35 deg, extension 25 deg (can feel \"screws\" from fusion), L SB 15 deg, R SB 10 deg, L rot 50%, R rot 25%     Strength RUE  Comment: shoulder 5/5 with the exception of ER 4/5, elbow 5/5, wrist 5/5, good  strength  Strength LUE  Comment: shoulder 5/5, elbow 5/5, wrist 5/5, good  strength  Additional Measures  Other: NDI = 22% impaired     10/27/2020: good postural awareness demo t/o treatment    Exercises, Neuro Facilitation & Gait Training (04203, [id number], [id number]): Activity Resistance/Repetitions Other comments         Held due to tennis elbow pain                                              Therapeutic Activities (22774):      Home Exercise Program:   Pt. demonstrated good understanding and knowledge of HEP. Written instructions provided. 10/12/2020: R UT and levator stretches, chin tuck (already doing shoulder shrugs, shoulder rolls - recommended posterior rolls)  10/14/2020: corner pec stretch  10/20/2020: L levator stretch  10/22/2020:  B ER with scap retraction  10/27/2020: ER isometric - hold t-band    Manual Treatments (40382):  STM R UT/levator/rhomboid with pt in L S/L with good tolerance reported - tightness continues to improve and noted pt looking back into R rot without guarding with >75% ROM    Modalities:  US x 8 mins to R UT with pt in L S/L, 100%, 1.0 w/cm2, 1 MHz with good tolerance reported    Timed Code Treatment Minutes: US: 8, MT: 30     Total Treatment Minutes:  38     Treatment/Activity Tolerance:  [x] Patient tolerated treatment well [] Patient limited by fatigue  [] Patient limited by pain  [] Patient limited by other medical complications  [] Other:     Assessment: overall decreasing tightness/symptoms with improving ROM    Prognosis: [x] Good [] Fair  [] Poor    Patient Requires Follow-up: [x] Yes  [] No    Goals:  Short term goals  Time Frame for Short term goals: 3 weeks  Short term goal 1: Pt will demo good understanding and knowldge of initial HEP  Short term goal 2: Decreased pain 5/10 at worst to allow for improved tolerance to cervical ROM  Short term goal 3: Pt demo good postural awareness consistently t/o treatment  Long term goals  Time Frame for Long term goals : 5 weeks  Long term goal 1: Pt will demo good understanding and knowledge of HEP progressions  Long term goal 2: Decreased pain 2/10 at worst to allow for pt able to improve cervical ROM and less strain with talking  Long term goal 3: Cervical AROM R Rot and R SB = L to allow for driving without increased complaints  Long term goal 4: Strength R shoulder ER 5/5 to allow for improved tolerance to OH movements and household chores  Long term goal 5: Decreased impairment per NDI < 10% impaired    Plan:   [x] Continue per plan of care [] Alter current plan (see comments)  [] Plan of care initiated [] Hold pending MD visit [] Discharge    Plan for Next Session:  Review  HEP, add exercises/manual as tolerated    Electronically signed by:   MASON NegreteT [id number]

## 2020-11-09 ENCOUNTER — PROCEDURE VISIT (OUTPATIENT)
Dept: SPEECH THERAPY | Age: 50
End: 2020-11-09
Payer: COMMERCIAL

## 2020-11-09 ENCOUNTER — NURSE ONLY (OUTPATIENT)
Dept: PRIMARY CARE CLINIC | Age: 50
End: 2020-11-09
Payer: COMMERCIAL

## 2020-11-09 PROCEDURE — 99211 OFF/OP EST MAY X REQ PHY/QHP: CPT | Performed by: NURSE PRACTITIONER

## 2020-11-09 PROCEDURE — 92507 TX SP LANG VOICE COMM INDIV: CPT | Performed by: SPEECH-LANGUAGE PATHOLOGIST

## 2020-11-09 NOTE — PROGRESS NOTES
Dominga Rodney received a viral test for COVID-19. They were educated on isolation and quarantine as appropriate. For any symptoms, they were directed to seek care from their PCP, given contact information to establish with a doctor, directed to an urgent care or the emergency room.

## 2020-11-10 ENCOUNTER — APPOINTMENT (OUTPATIENT)
Dept: PHYSICAL THERAPY | Age: 50
End: 2020-11-10
Payer: COMMERCIAL

## 2020-11-10 NOTE — PROGRESS NOTES
[facility] ([facility]) ENT  Voice Therapy      Pt Seen for Therapy - Session # 3     Diagnosis/Indication:   Primary: Dysphonia  Secondary: MTD  Tertiary: Edema    Background History: Hx of vocal nodules in 2017; surgically removed in [place] and continued to follow-up w/ ENT until moving to [place]. 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 Recently, began to experience globus sensation and laryngeal tension, specifically on R side; vocal fatigue and intermittent strain t/o the day. Denied daily hoarseness or voice loss; experiences most frequently after raising voice in noisy environments. Pt endorsed concern about recurrence of nodules or additional etiologies. Pt reported R ACDF in 2012; no dysphonia or dysphagia post-surgery but does experience limited neck ROM and therefor increased head/neck tension. Denied dysphagia, dyspnea, or symptoms of acid reflux. Previous SLP Evaluations: 10/5/2020  VLS revealed smooth & straight TVFs bilaterally w/ no indication of recurrent nodules or post-surgical scarring. Complete glottic closure during modal and low pitch; incomplete closure during high pitch. Mild supraglottic compression (AP) during phonation. Functional mucosal wave and mildly irregular periodicity. SUBJECTIVE:   Pt endorsed resolution of vocal difficulties as long as she is compliant w/ completion of vocal exercises. Pt reported SOVT strawflow feels most effective for relaxation of laryngeal musculature; ongoing R-sided tension if she does not do daily. Large improvement w/ tension and ROM from ongoing PT; will continue post-VOT. Overall, pt feels she is doing well and back to baseline. Motivated to continue w/ progress.      OBJECTIVE:   Voice Therapy    Goal: Session 1 Session 2 Session 3   Pt will adhere to vocal hygiene protocol during daily life activities w/ 80% acc Pt adhered to vocal hygiene protocol w/ 70% acc    Pt endorsed consuming >64oz water for systemic hydration, especially w/ ongoing PT and physical movement/stretching. Completes exercises as recommended daily. Pt adhered to vocal hygiene protocol w/ 75% acc    Pt continues to complete both VOT and PT exercises daily and does endorse ongoing improvement in all symptoms at this time. Becoming more aware of phonotraumatic behaviors and decreasing. Pt adhered to vocal hygiene protocol w/ 80% acc    GOAL MET    Pt w/ ongoing awareness of phonotraumatic behaviors and proper voice use. Noted differences in tension and vocal quality when compliant w/ exercises. Motivated to continue independently. Pt will perform SOVT techniques during various voicing tasks w/ 80% acc Pt performed SOVT techniques via    Strawflow w/ sustained pitch, ascending/descending glides, and sirens w/ 70% acc; pt required mod cues for increased airflow t/o, but able to achieve adequate forward-focused phonation w/ clear resonance. Pt performed SOVT techniques via    Strawflow w/ sustained pitch, ascending/descending glides, and sirens w/ 75% acc; no difficulties w/ forward-focused phonation, but continues to require min-mod cues for increased airflow, specifically in higher ranges w/ tendency to strain. Pt performed SOVT techniques via     Strawflow w/ sustained pitch, ascending/descending glides, and sirens w/ 80% acc; adequate coordination of respiration and phonation w/ good forward-focused placement. Required min cues for body positioning; noted to have anterior lean w/ head-down position initially. Positioned in a more neutral, upright stance and noted to have improved breath.      GOAL MET     Pt will perform RVT techniques during various voicing tasks w/ 80% acc Pt performed RVT techniques via    Humming in isolation w/ 65% acc; pt w/ initial difficulties identifying forward vs back phonation, and utilized negative practice to assist. Noted to have decreased volume secondary to decreased breath support and required cues for airflow. Humming + vowels w/ 70% acc; pt w/ no difficulties transitioning into vowels and able to maintain placement. Pt endorsed feeling voice was placed in nose. Humming + /m/ words w/ 70% acc; again pt w/o difficulties maintaining forward-focused placement, but required cues for carry-over of breath support to support end of words. Tendency to drop down into back-focused. Finally, transitioned into humming + sentences w/ 65% acc; increased difficulty w/ this step d/t maintaining forward placement w/o dropping at end of words. Pt became increasingly vocally aware and able to self-identify when drop occurred. Pt performed RVT techniques via    Humming in isolation w/ 60% acc; no difficulties achieving forward-focused placement however, pt noted to have decreased volume and required mod cues for increased diaphragmatic breathing for increased support. Transitioned into humming + sentences w/ 70% acc; tendency to become back-focused at end of sentences d/t decreased breath support. Pt began to independently correct but required ongoing min cues. Pt w/ moderate confusion regarding lowering pitch vs lowering placement (back-focused) and began to talk in elevated pitch rather than modal; required mod cues to maintain modal speaking pitch and focus on placement rather than pitches. Humming + conversational speech w/ 55% acc; pt again noted to speak in elevated pitch, decreased breath support, and drop of placement specifically at end of sentences. Pt required mod-max cues t/o but was able to self-correct by end of session. Created functional phrases to assist w/ generalization and carry-over into daily speech. Pt performed RVT techniques via    Humming + paragraph w/ 80% acc; noted to have light, clear vocal quality t/o w/ ongoing tendency to become back-focused at end of sentences. Utilized visual feedback where pt had to highlight \"dropped\" areas to improve awareness and carry-over. Conversational speech w/ 80% acc; greatly improved carry-over this date w/ good, forward-focused placement and intermittent back-focused phonation at end of sentences. Pt more aware and independently correcting. Endorsed increased cognitive load but reported confidence in completion during daily conversation. GOAL MET       ASSESSMENT:      48 y.o. female w/ hx of VF nodules and ongoing vocal fatigue/hoarseness. Pt has been extremely compliant w/ vocal exercises and reported noticeable difference in tension (R side specifically) when completing vs taking breaks. Resolved fatigue and hoarseness; pt feels voice is back to baseline at this time w/ use of RVT. Reviewed SOVT strawflow and required min cues for positioning, as noted to have anterior chin tuck; encouraged neutral position for adequate breath support and promoting decreased laryngeal tension. Completed RVT paragraph reading; endorsed increased cognition required but improved carry-over w/ intermittent back-focused phonation at end of sentences. Completed conversational speech and observed forward and resonant vocal quality that pt endorsed felt \"light and w/o difficulties\". Independently correcting errors by end of session. Overall, pt has made excellent progress and is motivated to continue w/ exercises and carry-over of strategies independently. Prognosis is excellent w/ pt compliance to new HEP. No further needs at this time; encouraged to return as needed. RECOMMENDATIONS:      1.  Follow HEP and RTC as needed      Thank you,    Richard Nj) [place], [place], [address]; [phone]  Speech-Language Pathologist

## 2020-11-11 LAB — SARS-COV-2, NAA: NOT DETECTED

## 2020-11-12 ENCOUNTER — HOSPITAL ENCOUNTER (OUTPATIENT)
Dept: PHYSICAL THERAPY | Age: 50
Setting detail: THERAPIES SERIES
Discharge: HOME | End: 2020-11-12
Payer: COMMERCIAL

## 2020-11-12 NOTE — CARE COORDINATION
Physical Therapy  Cancellation/No-show Note  Patient Name:  Jorge Najera  :  1970   Date:  2020  MRN: [id number]  Cancelled visits to date: 2020  No-shows to date: 0    For today's appointment patient:  [x]  Cancelled  []  Rescheduled appointment  []  No-show     Reason given by patient:  []  Patient ill  []  Conflicting appointment  []  No transportation    []  Conflict with work  []  No reason given  [x]  Other:     Comments:  Dog broke its leg and pt up all night    Electronically signed by:   Tray Vieyra, DPT [id number]

## 2020-11-17 ENCOUNTER — APPOINTMENT (OUTPATIENT)
Dept: PHYSICAL THERAPY | Age: 50
End: 2020-11-17
Payer: COMMERCIAL

## 2020-11-17 ENCOUNTER — HOSPITAL ENCOUNTER (OUTPATIENT)
Dept: PHYSICAL THERAPY | Age: 50
Setting detail: THERAPIES SERIES
Discharge: HOME | End: 2020-11-17
Payer: COMMERCIAL

## 2020-11-17 PROCEDURE — 97035 APP MDLTY 1+ULTRASOUND EA 15: CPT

## 2020-11-17 PROCEDURE — 97140 MANUAL THERAPY 1/> REGIONS: CPT

## 2020-11-17 NOTE — FLOWSHEET NOTE
Physical Therapy Daily Treatment Note  Date:  2020    Patient Name:  Chalino Bray    :  1970  MRN: [id number]  Restrictions/Precautions:   cervical DDD with fusion , Hx anxiety, vocal nodule removed ~ 4-5 years ago   Medical/Treatment Diagnosis Information:  · Diagnosis: Dysphonia (R49.0); Neck pain (M54.2); Tension headache (G44.209)  · Treatment Diagnosis: neck pain with R UE paresthesias  Insurance/Certification information:  PT Insurance Information: [employer] [place], 30 visits per viktor year combined  Physician Information:  Referring Practitioner: Liudmila Laureano MD MD Follow-up Visit: ?   Plan of care signed (Y/N):  Y  Visit# / total visits:  9/10  Pain level: 2/10     Progress Note Due (10 visits or 30 days, whichever is less):    Recertification Note Due (End of POC or 90 days, whichever is less):      Subjective:  10/12/2020: Pt reports history of cervical fusion  and vocal nodule removed about 4-5 years ago. About 6 months ago pt reports that she started to notice that it felt like something was in her throat and her voice gets tired with talking. She has also noted increased R neck pain/tightness with decreased ROM, especially with R rot, numbness entire R UE and tingling R hand (this is about the same recently). Paresthesias are worse when riding bike. Saw ENT and no new nodules and saw SLP who recommended PT due to neck tightness and limited ROM. Pt reports pain 1-2/10 at best when resting, 8/10 at worst, currently 5/10. Pt does report has had PT in the past but > 3 years ago and cortizone injections R scapular region but also a couple of years ago and felt relief of numbness and neck tension with these. Pt notes pain is worse with looking to the R, driving, OH movements, household chores and talking (voice gets very fatigued). Pt is R hand dominant. Pt does note HA with increased neck pain about 2x per month but this hasn't really changed recently.   She states she has to do household chores such as vacuuming and getting under bed in short doses due to pain/paresthesias. PLOF: less tension/paresthesias and better neck ROM prior to 6 months ago, also no vocal issues prior to 6 months ago (seeing SLP for specific vocal treatment). 10/14/2020: Pt reports no complaints after IE. Does notice sometimes that R UE has N/T but unsure if related to a specific position that she is in - will try to pay attention to if any pattern. 10/20/2020:  Pt reports that she can tell she is getting looser as can turn her head a little easier. 10/22/2020:  Overall pain is improving and can also feel that she continues to feel looser with moving head. 10/27/2020: Pt states that it feels like she is still loosening up. The sensation she has been having in her throat is fading - almost finished with speech therapy. Feels maybe decreased elasticity near scar left ant neck and is sensitive. Unable to do t-band exercise for ER due to chronic tennis elbow R > L. Pt states she reports she is 75% improved overall. Hasn't had arm falling asleep as much. 10/29/2020:  Still tense on R side. 2nd-3rd fingers fell asleep yesterday as pt noticed in AM.  Neck is \"jolted\" from fall last night as slipped on hula hoop. Overall though is improved from IE - less tension/pain and better mobility in neck. 11/03/2020: Pt reports she has been feeling pretty good overall with less pain and tightness, still with tightness to R UT.       11/05/2020:  Did feel sensation in throat this AM but doesn't currently feel. Pt feels that knots are improving in neck. 11/17/2020:  Is carrying dog around more and is having more numbing sensation in R hand, pt stating she may need to get an injection R shoulder like has had in the past.  Maybe a little more tight too from carrying dog. \"I haven't had a lot of neck pain recently, just feels tightness. \"  Overall feels 90% improved from IE.   No longer feeling sensation with something being stuck in throat. No issues with driving or strain with talking. Objective:10/12/2020   Observation:    Posture: (mild forward rounded shoulders at times)  Palpation: significant tightness R UT/levator and moderate R rhomboid     Test measurements:    AROM RUE (degrees)  RUE AROM : WFL  AROM LUE (degrees)  LUE AROM : WFL  Spine  Cervical: flexion 35 deg, extension 25 deg (can feel \"screws\" from fusion), L SB 15 deg, R SB 10 deg, L rot 50%, R rot 25%     Strength RUE  Comment: shoulder 5/5 with the exception of ER 4/5, elbow 5/5, wrist 5/5, good  strength  Strength LUE  Comment: shoulder 5/5, elbow 5/5, wrist 5/5, good  strength  Additional Measures  Other: NDI = 22% impaired     10/27/2020: good postural awareness demo t/o treatment  11/17/2020: Cervical AROM flexion 40 deg, ext 25 deg, B rot 50%, B SB 20 deg all without pain. Strength R shoulder ER 4+/5. Good postural awareness t/o session. Exercises, Neuro Facilitation & Gait Training (13660, [id number]): Activity Resistance/Repetitions Other comments         Held due to tennis elbow pain                                              Therapeutic Activities (09879):      Home Exercise Program:   Pt. demonstrated good understanding and knowledge of HEP. Written instructions provided. 10/12/2020: R UT and levator stretches, chin tuck (already doing shoulder shrugs, shoulder rolls - recommended posterior rolls)  10/14/2020: corner pec stretch  10/20/2020: L levator stretch  10/22/2020:  B ER with scap retraction  10/27/2020: ER isometric - hold t-band    Manual Treatments (67236):  STM R UT/levator/rhomboid with pt in L S/L with good tolerance reported - increased tightness noted vs last visit  Manual R scap depression stretch with good stretch noted    Modalities:  US x 8 mins to R UT with pt in L S/L, 100%, 1.0 w/cm2, 1 MHz with good tolerance reported    Timed Code Treatment Minutes: US: 8, MT: 29, TE: 6    Total Treatment Minutes:  43    Treatment/Activity Tolerance:  [x] Patient tolerated treatment well [] Patient limited by fatigue  [] Patient limited by pain  [] Patient limited by other medical complications  [] Other:     Assessment: see goal assessment below    Prognosis: [x] Good [] Fair  [] Poor    Patient Requires Follow-up: [x] Yes  [] No    Goals: assessed 11/17/2020  Short term goals  Time Frame for Short term goals: 3 weeks  Short term goal 1: Pt will demo good understanding and knowldge of initial HEP MET  Short term goal 2: Decreased pain 5/10 at worst to allow for improved tolerance to cervical ROM MET  Short term goal 3: Pt demo good postural awareness consistently t/o treatment MET  Long term goals  Time Frame for Long term goals : 5 weeks  Long term goal 1: Pt will demo good understanding and knowledge of HEP progressions ongoing  Long term goal 2: Decreased pain 2/10 at worst to allow for pt able to improve cervical ROM and less strain with talking MET  Long term goal 3: Cervical AROM R Rot and R SB = L to allow for driving without increased complaints MET  Long term goal 4: Strength R shoulder ER 5/5 to allow for improved tolerance to OH movements and household chores partially met  Long term goal 5: Decreased impairment per NDI < 10% impaired not assessed    Plan:   [x] Continue per plan of care x 1 visit with likely D/C [] Alter current plan (see comments)  [] Plan of care initiated [] Hold pending MD visit [] Discharge    Plan for Next Session:  Review  HEP, add exercises/manual as tolerated    Electronically signed by:   Misael Shannon DPT [id number]

## 2020-11-17 NOTE — PROGRESS NOTES
Outpatient Physical Therapy  Phone: [phone] Fax: [phone]    To: Enrrique Stewart MD   From: Paulo Barnes, PT, DPT [id number]   Date: 2020  Patient: Janet Perez     : 1970 MRN: [id number]  Medical Diagnosis:  Dysphonia (R49.0); Neck pain (M54.2); Tension headache (G44.209)  Treatment Diagnosis:  neck pain with R UE paresthesias      Physical Therapy Progress Note    Time Period for Report: 10/12/2020 - 2020     Total Visits to date:   9 Cancels/No-shows to date:      Plan of Care/Treatment to date:  [x] Therapeutic Exercise (Review/Progress HEP and provide verbal/tactile cueing for activities related to strengthening, flexibility,  endurance, ROM.)       [x] Therapeutic Activity (Provide verbal/tactile cueing for dynamic activities to promote functional tasks.)          [] Gait Training (Provide verbal/tactile/visual cueing for facilitation of normalized gait pattern without or with the least restrictive AD to decrease pain and/or risk for falling.)          [] Neuromuscular Re-education (Review/Progress HEP and provide verbal/tactile cueing for activities related to improving balance, coordination, kinesthetic sense, posture, motor skill, proprioception.)         [x] Manual Therapy (Provide manual therapy to mobilize soft tissue/joints for the purpose of modulating pain, promoting relaxation, increasing ROM, reducing/eliminating soft tissue swelling/inflammation/tightness, improving soft tissue extensibility)                [x] Modalities (For modulating pain/tenderness/paresthesias, reducing swelling/inflammation/tightness, improving soft tissue extensibility, and/or to increase muscle tone/strength):     [x] Ultrasound  [] Electrical Stimulation        [] Cervical Traction [] Lumbar Traction    ? [] Cold/hotpack [] Iontophoresis   Other:      []          []     Significant Findings At Last Visit/Comments:    · Current pain 2/10.   \"I haven't had a lot of neck pain recently, just feels tightness. \"  Overall feels 90% improved from IE. No longer feeling sensation with something being stuck in throat. No issues with driving or strain with talking. Cervical AROM flexion 40 deg, ext 25 deg, B rot 50%, B SB 20 deg all without pain. Strength R shoulder ER 4+/5. Good postural awareness t/o session. Progress towards goals:    Short term goals  Time Frame for Short term goals: 3 weeks  Short term goal 1: Pt will demo good understanding and knowldge of initial HEP MET  Short term goal 2: Decreased pain 5/10 at worst to allow for improved tolerance to cervical ROM MET  Short term goal 3: Pt demo good postural awareness consistently t/o treatment MET  Long term goals  Time Frame for Long term goals : 5 weeks  Long term goal 1: Pt will demo good understanding and knowledge of HEP progressions ongoing  Long term goal 2: Decreased pain 2/10 at worst to allow for pt able to improve cervical ROM and less strain with talking MET  Long term goal 3: Cervical AROM R Rot and R SB = L to allow for driving without increased complaints MET  Long term goal 4: Strength R shoulder ER 5/5 to allow for improved tolerance to OH movements and household chores partially met  Long term goal 5: Decreased impairment per NDI < 10% impaired not assessed    Frequency/Duration:  # Days per week: [] 1 day # Weeks: [] 1 week [] 4 weeks      [x] 2 days? [] 2 weeks [x] 5 weeks      [] 3 days   [] 3 weeks [] 6 weeks     Rehab Potential: [] Excellent [x] Good [] Fair  [] Poor     Goal Status:  [] Achieved [x] Partially Achieved  [] Not Achieved     Patient Status: [x] Continue per initial plan of Care, pt has made good progress towards goals and plan to finish 1 visit on POC with likely D/C if no increased complaints. Will finalize HEP and monitor increased tightness today.        [] Patient now discharged     [] Additional visits requested, Please re-certify for additional visits:      Requested frequency/duration: X/week for weeks    Electronically signed by: Hafsa Lamar PT, DPT [id number]    If you have any questions or concerns, please don't hesitate to call.   Thank you for your referral.    Physician Signature:________________________________Date:__________________  By signing above, therapists plan is approved by physician

## 2020-11-18 ENCOUNTER — PATIENT MESSAGE (OUTPATIENT)
Dept: FAMILY MEDICINE CLINIC | Age: 50
End: 2020-11-18

## 2020-11-18 NOTE — TELEPHONE ENCOUNTER
From: Rena Aragon  To: Yannick Rodrigues MD  Sent: 11/18/2020 12:37 PM EST  Subject: Non-Urgent Medical Question    Hi Dr. Enmanuel Ramirez-   Glad you got to visit with Kathe. If you are comfortable with it, I would like for Kathe to come back in to get the pneumonia shot due to her past history with lung congestion. She is doing great now after being off milk products, but with the Covid outbreak, I would feel more comfortable with her lungs being extra protected. Also, my boys got the Bexsero (Meningococcal- Group B) vaccine when they were in college. Do you offer this in your office? Thank you for your time.    Luciano Newell

## 2020-11-23 ENCOUNTER — APPOINTMENT (OUTPATIENT)
Dept: PHYSICAL THERAPY | Age: 50
End: 2020-11-23
Payer: COMMERCIAL

## 2020-11-25 ENCOUNTER — HOSPITAL ENCOUNTER (OUTPATIENT)
Dept: PHYSICAL THERAPY | Age: 50
Setting detail: THERAPIES SERIES
Discharge: HOME | End: 2020-11-25
Payer: COMMERCIAL

## 2020-11-25 PROCEDURE — 97035 APP MDLTY 1+ULTRASOUND EA 15: CPT

## 2020-11-25 PROCEDURE — 97140 MANUAL THERAPY 1/> REGIONS: CPT

## 2020-11-25 NOTE — PROGRESS NOTES
Outpatient Physical Therapy Phone: [phone] Fax: [phone]    Discharge Date: 2020    To: Claudell Maid, MD   From: Evelio Ballesteros, PT, DPT [id number]     Patient: Kandis Avilez     : 1970 MRN: [id number]  Medical Diagnosis: Dysphonia (R49.0); Neck pain (M54.2); Tension headache (G44.209)  Treatment Diagnosis: neck pain with R UE paresthesias    Physical Therapy Discharge Note    Time Period for Report:  10/12/2020 - 2020    Total Visits to date:   8   Cancels/No-shows to date:     Plan of Care/Treatment to date:  [x] Therapeutic Exercise  [x] Therapeutic Activity   [] Gait Training  [] Neuromuscular Re-education  [x] Manual Therapy  [x] Modalities:         [x] Ultrasound  [] Electrical Stimulation            [] Cervical Traction [] Lumbar Traction    ? [] Cold/hotpack [] Iontophoresis   Comments:      Pain (Eval) 8/10   Pain (D/C) 1/10     Functional Outcome used:   NDI  Functional Outcome (Eval) 22% impaired   Functional Outcome (D/C) 16% impaired     Other significant findings at last visit:  · Current pain 1/10. Hand still goes numb once in awhile. Pt notes that pain has been staying minimal since last seen, \"I'm okay. \"  Pt feels flare-up from last visit is improved. \"I haven't had a lot of neck pain recently, just feels tightness. \"  Overall feels 90% improved from IE. No longer feeling sensation with something being stuck in throat. No issues with driving or strain with talking. Cervical AROM flexion 40 deg, ext 25 deg, B rot 50%, B SB 20 deg all without pain. Strength R shoulder ER 4+/5. Good postural awareness t/o session.  NDI =  = 16% impaired    Progress towards goals:    Short term goals  Time Frame for Short term goals: 3 weeks  Short term goal 1: Pt will demo good understanding and knowldge of initial HEP MET  Short term goal 2: Decreased pain 5/10 at worst to allow for improved tolerance to cervical ROM MET  Short term goal 3: Pt demo good postural awareness consistently t/o treatment MET  Long term goals  Time Frame for Long term goals : 5 weeks  Long term goal 1: Pt will demo good understanding and knowledge of HEP progressions MET  Long term goal 2: Decreased pain 2/10 at worst to allow for pt able to improve cervical ROM and less strain with talking MET  Long term goal 3: Cervical AROM R Rot and R SB = L to allow for driving without increased complaints MET  Long term goal 4: Strength R shoulder ER 5/5 to allow for improved tolerance to OH movements and household chores partially met  Long term goal 5: Decreased impairment per NDI < 10% impaired partially met     Goal Status:  [x] Nearly Achieved [] Partially Achieved  [] Not Achieved     Patient Status: [x] Patient now discharged, pt has made great progress towards goals and plans to continue with HEP. Recommend pt continue with HEP as instructed and F/U with  as needed. Electronically signed by:   Rolando Morrison, [facility], DPT [id number]

## 2020-11-25 NOTE — FLOWSHEET NOTE
Physical Therapy Daily Treatment Note  Date:  2020    Patient Name:  Jessi Jamison    :  1970  MRN: [id number]  Restrictions/Precautions:   cervical DDD with fusion , Hx anxiety, vocal nodule removed ~ 4-5 years ago   Medical/Treatment Diagnosis Information:  · Diagnosis: Dysphonia (R49.0); Neck pain (M54.2); Tension headache (G44.209)  · Treatment Diagnosis: neck pain with R UE paresthesias  Insurance/Certification information:  PT Insurance Information: [employer] [place], 30 visits per viktor year combined  Physician Information:  Referring Practitioner: Yaneth Monge MD MD Follow-up Visit: ?   Plan of care signed (Y/N):  Y  Visit# / total visits:  10/10  Pain level: 1/10     Progress Note Due (10 visits or 30 days, whichever is less):    Recertification Note Due (End of POC or 90 days, whichever is less):      Subjective:  10/12/2020: Pt reports history of cervical fusion  and vocal nodule removed about 4-5 years ago. About 6 months ago pt reports that she started to notice that it felt like something was in her throat and her voice gets tired with talking. She has also noted increased R neck pain/tightness with decreased ROM, especially with R rot, numbness entire R UE and tingling R hand (this is about the same recently). Paresthesias are worse when riding bike. Saw ENT and no new nodules and saw SLP who recommended PT due to neck tightness and limited ROM. Pt reports pain 1-2/10 at best when resting, 8/10 at worst, currently 5/10. Pt does report has had PT in the past but > 3 years ago and cortizone injections R scapular region but also a couple of years ago and felt relief of numbness and neck tension with these. Pt notes pain is worse with looking to the R, driving, [place] movements, household chores and talking (voice gets very fatigued). Pt is R hand dominant. Pt does note HA with increased neck pain about 2x per month but this hasn't really changed recently.   She states she has to do household chores such as vacuuming and getting under bed in short doses due to pain/paresthesias. PLOF: less tension/paresthesias and better neck ROM prior to 6 months ago, also no vocal issues prior to 6 months ago (seeing SLP for specific vocal treatment). 10/14/2020: Pt reports no complaints after IE. Does notice sometimes that R UE has N/T but unsure if related to a specific position that she is in - will try to pay attention to if any pattern. 10/20/2020:  Pt reports that she can tell she is getting looser as can turn her head a little easier. 10/22/2020:  Overall pain is improving and can also feel that she continues to feel looser with moving head. 10/27/2020: Pt states that it feels like she is still loosening up. The sensation she has been having in her throat is fading - almost finished with speech therapy. Feels maybe decreased elasticity near scar left ant neck and is sensitive. Unable to do t-band exercise for ER due to chronic tennis elbow R > L. Pt states she reports she is 75% improved overall. Hasn't had arm falling asleep as much. 10/29/2020:  Still tense on R side. 2nd-3rd fingers fell asleep yesterday as pt noticed in AM.  Neck is \"jolted\" from fall last night as slipped on hula hoop. Overall though is improved from IE - less tension/pain and better mobility in neck. 11/03/2020: Pt reports she has been feeling pretty good overall with less pain and tightness, still with tightness to R UT.       11/05/2020:  Did feel sensation in throat this AM but doesn't currently feel. Pt feels that knots are improving in neck. 11/17/2020:  Is carrying dog around more and is having more numbing sensation in R hand, pt stating she may need to get an injection R shoulder like has had in the past.  Maybe a little more tight too from carrying dog. \"I haven't had a lot of neck pain recently, just feels tightness. \"  Overall feels 90% improved from IE.   No longer feeling sensation with something being stuck in throat. No issues with driving or strain with talking. 11/25/2020:  Hand still goes numb once in awhile. Pt notes that pain has been staying minimal since last seen, \"I'm okay. \"  Pt feels flare-up from last visit is improved. Objective:10/12/2020   Observation:    Posture: (mild forward rounded shoulders at times)  Palpation: significant tightness R UT/levator and moderate R rhomboid     Test measurements:    AROM RUE (degrees)  RUE AROM : WFL  AROM LUE (degrees)  LUE AROM : WFL  Spine  Cervical: flexion 35 deg, extension 25 deg (can feel \"screws\" from fusion), L SB 15 deg, R SB 10 deg, L rot 50%, R rot 25%     Strength RUE  Comment: shoulder 5/5 with the exception of ER 4/5, elbow 5/5, wrist 5/5, good  strength  Strength LUE  Comment: shoulder 5/5, elbow 5/5, wrist 5/5, good  strength  Additional Measures  Other: NDI = 22% impaired     10/27/2020: good postural awareness demo t/o treatment  11/17/2020: Cervical AROM flexion 40 deg, ext 25 deg, B rot 50%, B SB 20 deg all without pain. Strength R shoulder ER 4+/5. Good postural awareness t/o session. 11/25/2020: NDI = 8/50 = 16% impaired    Exercises, Neuro Facilitation & Gait Training ([id number], [id number], [id number]): Activity Resistance/Repetitions Other comments         Held due to tennis elbow pain                                              Therapeutic Activities (30665):      Home Exercise Program:   Pt. demonstrated good understanding and knowledge of HEP. Written instructions provided. 10/12/2020: R UT and levator stretches, chin tuck (already doing shoulder shrugs, shoulder rolls - recommended posterior rolls)  10/14/2020: corner pec stretch  10/20/2020: L levator stretch  10/22/2020:  B ER with scap retraction  10/27/2020: ER isometric - hold t-band    Manual Treatments (13658):  STM R UT/levator/rhomboid with pt in L S/L with good tolerance reported - decreased

## 2020-11-25 NOTE — PROGRESS NOTES
Neck Disability Index Questionnaire    Patient: Nancy Sierra  : 1970  MRN: [id number]  Date: 2020  Electronically Signed by: Hunter Luther PT, DPT [id number]     Please Read: This questionnaire is designed to enable us to understand how much your neck pain has affected your ability to manage your everyday activities. Please answer each section by selecting ONE CHOICE that most applies to you. We realize that you may feel that more than one statement may relate to you, but PLEASE JUST SELECT ONE CHOICE THAT MOST CLOSELY DESCRIBES YOUR PROBLEM RIGHT NOW. SECTION 1 - Pain Intensity  []A. I have no pain at the moment  [x]B. The pain is very mild at the moment  []C. The pain is moderate at the moment  []D. The pain is fairly severe at the moment  []E. The pain is very severe at the moment  []F. The pain is the worst imaginable at the moment SECTION 6 - Concentration  []A. I can concentrate fully when I want to with no difficulty  [x]B. I can concentrate fully when I want to with slight difficulty  []C. I have a fair degree of difficulty in concentrating when I want to  []D. I have a lot of difficulty in concentrating when I want to  []E. I have a great deal of difficulty in concentrating when I want to  []F. I cannot concentrate at all   SECTION 2 - Personal Care Hernandez Minder, etc.)  [x]A. I can look after myself normally without causing extra pain  []B. I can look after myself normally, but it causes me extra pain  []C. It is painful to look after myself and I am slow and careful  []D. I need some help, but manage most of my personal care  []E. I need help every day in most aspects of personal care  []F. I do not get dressed, I wash with difficulty and stay in bed SECTION 7 - Work  []A. I can do as much work as I want to  [x]B. I can only do my usual work, but no more  []C. I can do most of my usual work, but no more  []D. I cannot do my usual work  []E. I can hardly do any work at all  []F.  I cannot do any work at all   [address]  []A. I can lift heavy weights without extra pain  [x]B. I can lift heavy weights, but it gives me extra pain  []C. Pain prevents me from lifting heavy weights off the floor but I can manage if they are conveniently positioned, for example, on a table  []D. Pain prevents me from lifting heavy weights, but I can manage light to medium weights if they are conveniently positioned  []E. I can lift very light weights  []F. I cannot lift or carry anything at all SECTION 8 - Driving  []A. I can drive my car without any neck pain  [x]B. I can drive my car as long as I want with slight pain in my neck  []C. I can drive my car as long as I want with moderate pain in my neck  []D. I cannot drive my car as long as I want because of moderate pain  []E. I can hardly drive at all because of severe pain in my neck  []F. I cannot drive my car at all   SECTION 4 - Reading  [x]A. I can read as much as I want to with no pain in my neck  []B. I can read as much as I want to with slight pain in my neck  []C. I can read as much as I want to with moderate pain in my neck   []D. I cannot read as much as I want because of moderate pain in my neck  []E. I cannot read as much as I want because of severe pain in my neck  []F. I cannot read at all SECTION 9 - Sleeping  []A. I have no trouble sleeping  [x]B. My sleep is slightly disturbed (< 1 hour sleepless)  []C. My sleep is mildly disturbed (1-2 hours sleepless)  []D. My sleep is moderately disturbed (2-3 hours sleepless)  []E. My sleep is greatly disturbed (3-5 hours sleepless)  []F. My sleep is completely disturbed (5-7 hours sleepless)   SECTION 5 - Headaches  []A. I have no headaches at all  [x]B. I have slight headaches which come infrequently  []C. I have moderate headaches which come infrequently  []D. I have moderate headaches which come frequently  []E. I have severe headaches which come frequently  []F.  I have headaches almost all the

## 2020-12-04 ENCOUNTER — VIRTUAL VISIT (OUTPATIENT)
Dept: FAMILY MEDICINE CLINIC | Age: 50
End: 2020-12-04
Payer: COMMERCIAL

## 2020-12-04 PROCEDURE — 99213 OFFICE O/P EST LOW 20 MIN: CPT | Performed by: FAMILY MEDICINE

## 2020-12-04 RX ORDER — CETIRIZINE HYDROCHLORIDE 10 MG/1
10 TABLET ORAL DAILY
COMMUNITY

## 2020-12-04 NOTE — PROGRESS NOTES
butalbital-acetaminophen-caffeine (FIORICET, ESGIC) -40 MG per tablet Take 1 tablet by mouth every 6 hours as needed for Headaches Yes Claudell Maid, MD   VASCEPA 1 g CAPS capsule Take 2 capsules by mouth 2 times daily Yes Claudell Maid, MD   Cholecalciferol (VITAMIN D3) 1000 units TABS Take 1 tablet by mouth daily Yes Claudell Maid, MD   fluticasone (FLONASE) 50 MCG/ACT nasal spray 1 spray by Nasal route daily Yes Historical Provider, MD   Plecanatide (TRULANCE) 3 MG TABS Take one pill po qd  Patient not taking: Reported on 12/4/2020  Eileen Ruano MD   clotrimazole-betamethasone (Camilla Ego) 1-0.05 % cream Apply topically 2 times daily as needed for itching  Patient not taking: Reported on 7/21/2020  Eileen Ruano MD   Fexofenadine HCl (ALLEGRA PO) Take by mouth  Historical Provider, MD       No Known Allergies    Social History     Tobacco Use    Smoking status: Never Smoker    Smokeless tobacco: Never Used   Substance Use Topics    Alcohol use:  Yes     Alcohol/week: 0.0 standard drinks    Drug use: No        Past Medical History:   Diagnosis Date    Allergic rhinitis     Atrophic vaginitis     DDD (degenerative disc disease), cervical     Dyspareunia in female     Esophagitis 05/2019    Gastritis 05/2019    HPV (human papilloma virus) infection 1992    [facility]    Interstitial cystitis 10/23/2017    Iron deficiency anemia 2017    Menopause ovarian failure     Vitamin D deficiency 07/2019    Vocal cord nodule 2016       Past Surgical History:   Procedure Laterality Date    APPENDECTOMY  1996    CERVICAL FUSION  2013    C4-6   [facility] 70    bilateral, right    LARYNGOSCOPY  2016    [facility] -     UPPER GASTROINTESTINAL ENDOSCOPY  05/2019       Health Maintenance   Topic Date Due    HIV screen  01/20/1985    Breast cancer screen  07/29/2022    Lipid screen  07/23/2024    Cervical cancer screen  06/03/2025    Colon cancer screen colonoscopy  12/06/2026    DTaP/Tdap/Td vaccine (2 - Td) 05/15/2029    Flu vaccine  Completed    Shingles Vaccine  Completed    Hepatitis A vaccine  Aged Out    Hepatitis B vaccine  Aged Out    Hib vaccine  Aged Out    Meningococcal (ACWY) vaccine  Aged Out    Pneumococcal 0-64 years Vaccine  Aged Out       Family History   Problem Relation Age of Onset    Cancer Mother 76        melanoma    High Blood Pressure Father     Colon Cancer Maternal Grandfather 46    Other Paternal Grandmother         PE       PHYSICAL EXAMINATION:    Vital Signs: (As obtained by patient/caregiver or practitioner observation)     No flowsheet data found. Heart rate= 80  Respiratory rate= 14 Temperature= 98      Constitutional:  Appears well-developed and well-nourished. No apparent distress                              Mental status:  Alert and awake. Oriented to person/place/time. Able to follow commands       Eyes: EOM intact. Sclera-normal. No erythema of conjunctiva. No eye discharge. Right lower eyelid with tenderness and mild swelling. HENT: Normocephalic, atraumatic. Mouth/Throat: normal. Mucous membranes are moist.      External Ears: Normal       Neck: No visualized mass      Pulmonary/Chest:  Respiratory effort normal.  No visualized signs of difficulty breathing or respiratory distress         Musculoskeletal:   Normal gait with no signs of ataxia. Normal range of motion of neck. Neurological:         No Facial Asymmetry (Cranial nerve 7 motor function) (limited exam to video visit) . No gaze palsy              Skin:                     No significant exanthematous lesions or discoloration noted on facial skin                                        Psychiatric:          Normal Affect. No Hallucinations           Other pertinent observable physical exam findings:       ASSESSMENT/PLAN:  1. Chalazion of right lower eyelid/ 2. Pain of right eye  - Warm compresses 4-5 x/ day.    - Avoid eye makeup . F/u if no improvement 3-4d/ prn increased symptoms. The time that was spent with the family/patient addressing care on this video call was 15 minutes. An  electronic signature was used to authenticate this note. --Devorah Perry MD on 12/4/2020 at 10:19 AM    Pursuant to the emergency declaration under the [address] [place] [address] [facility] authority and the [employer] Act, this Virtual  Visit was conducted, with patient's consent, to reduce the patient's risk of exposure to COVID-19 and provide continuity of care for an established patient. Services were provided through a video synchronous discussion virtually to substitute for in-person clinic visit.

## 2021-01-14 ENCOUNTER — NURSE ONLY (OUTPATIENT)
Dept: PRIMARY CARE CLINIC | Age: 51
End: 2021-01-14
Payer: COMMERCIAL

## 2021-01-14 DIAGNOSIS — Z20.822 SUSPECTED COVID-19 VIRUS INFECTION: Primary | ICD-10-CM

## 2021-01-14 PROCEDURE — 99211 OFF/OP EST MAY X REQ PHY/QHP: CPT | Performed by: NURSE PRACTITIONER

## 2021-01-14 NOTE — PROGRESS NOTES
Aislinn Fisher received a viral test for COVID-19. They were educated on isolation and quarantine as appropriate. For any symptoms, they were directed to seek care from their PCP, given contact information to establish with a doctor, directed to an urgent care or the emergency room.

## 2021-01-15 LAB — SARS-COV-2, NAA: NOT DETECTED

## 2021-01-21 ENCOUNTER — OFFICE VISIT (OUTPATIENT)
Dept: PRIMARY CARE CLINIC | Age: 51
End: 2021-01-21
Payer: COMMERCIAL

## 2021-01-21 DIAGNOSIS — Z20.822 SUSPECTED COVID-19 VIRUS INFECTION: Primary | ICD-10-CM

## 2021-01-21 LAB — SARS-COV-2: NOT DETECTED

## 2021-01-21 PROCEDURE — 99211 OFF/OP EST MAY X REQ PHY/QHP: CPT | Performed by: NURSE PRACTITIONER

## 2021-01-21 NOTE — PROGRESS NOTES
Venancio Suzie received a viral test for COVID-19. They were educated on isolation and quarantine as appropriate. For any symptoms, they were directed to seek care from their PCP, given contact information to establish with a doctor, directed to an urgent care or the emergency room.

## 2021-01-27 ENCOUNTER — OFFICE VISIT (OUTPATIENT)
Dept: FAMILY MEDICINE CLINIC | Age: 51
End: 2021-01-27
Payer: COMMERCIAL

## 2021-01-27 VITALS
OXYGEN SATURATION: 97 % | WEIGHT: 130.4 LBS | SYSTOLIC BLOOD PRESSURE: 109 MMHG | TEMPERATURE: 97 F | HEART RATE: 85 BPM | HEIGHT: 64 IN | RESPIRATION RATE: 16 BRPM | BODY MASS INDEX: 22.26 KG/M2 | DIASTOLIC BLOOD PRESSURE: 72 MMHG

## 2021-01-27 DIAGNOSIS — Z78.0 MENOPAUSE: ICD-10-CM

## 2021-01-27 DIAGNOSIS — Z11.59 NEED FOR HEPATITIS C SCREENING TEST: ICD-10-CM

## 2021-01-27 DIAGNOSIS — R23.2 HOT FLASHES: ICD-10-CM

## 2021-01-27 DIAGNOSIS — N89.8 VAGINAL DRYNESS: ICD-10-CM

## 2021-01-27 DIAGNOSIS — Z11.4 ENCOUNTER FOR SCREENING FOR HIV: ICD-10-CM

## 2021-01-27 DIAGNOSIS — E55.9 VITAMIN D DEFICIENCY: ICD-10-CM

## 2021-01-27 DIAGNOSIS — M65.331 TRIGGER FINGER, RIGHT MIDDLE FINGER: ICD-10-CM

## 2021-01-27 DIAGNOSIS — Z00.00 ROUTINE GENERAL MEDICAL EXAMINATION AT A HEALTH CARE FACILITY: Primary | ICD-10-CM

## 2021-01-27 DIAGNOSIS — M50.30 DDD (DEGENERATIVE DISC DISEASE), CERVICAL: ICD-10-CM

## 2021-01-27 DIAGNOSIS — J30.9 ALLERGIC RHINITIS, UNSPECIFIED SEASONALITY, UNSPECIFIED TRIGGER: ICD-10-CM

## 2021-01-27 PROCEDURE — 99396 PREV VISIT EST AGE 40-64: CPT | Performed by: FAMILY MEDICINE

## 2021-01-27 ASSESSMENT — PATIENT HEALTH QUESTIONNAIRE - PHQ9: SUM OF ALL RESPONSES TO PHQ QUESTIONS 1-9: 0

## 2021-01-27 NOTE — PROGRESS NOTES
Dispense Refill    cetirizine (ZYRTEC) 10 MG tablet Take 10 mg by mouth daily      linaclotide (LINZESS) 145 MCG capsule Take 1 capsule by mouth every morning (before breakfast) 90 capsule 3    linaclotide (LINZESS) 145 MCG capsule Take 1 capsule by mouth every morning (before breakfast) 30 capsule 8    linaclotide (LINZESS) 145 MCG capsule Take 1 capsule by mouth every morning (before breakfast) 30 capsule 1    estradiol (ESTRACE VAGINAL) 0.1 MG/GM vaginal cream Place 1/2 gram per vagina twice weekly 1 Tube 1    meloxicam (MOBIC) 7.5 MG tablet Take 1 tablet by mouth daily Prn elbow pain bilateral. 90 tablet 0    butalbital-acetaminophen-caffeine (FIORICET, ESGIC) -40 MG per tablet Take 1 tablet by mouth every 6 hours as needed for Headaches 25 tablet 1    VASCEPA 1 g CAPS capsule Take 2 capsules by mouth 2 times daily 120 capsule 3    Cholecalciferol (VITAMIN D3) 1000 units TABS Take 1 tablet by mouth daily 90 tablet 3    Plecanatide (TRULANCE) 3 MG TABS Take one pill po qd (Patient not taking: Reported on 12/4/2020) 90 tablet 3    clotrimazole-betamethasone (LOTRISONE) 1-0.05 % cream Apply topically 2 times daily as needed for itching (Patient not taking: Reported on 7/21/2020) 45 g 0    buPROPion (WELLBUTRIN XL) 150 MG extended release tablet Take 1 tablet by mouth every morning (Patient not taking: Reported on 1/27/2021) 90 tablet 1    Fexofenadine HCl (ALLEGRA PO) Take by mouth      fluticasone (FLONASE) 50 MCG/ACT nasal spray 1 spray by Nasal route daily       No current facility-administered medications for this visit. No Known Allergies    Social History     Tobacco Use    Smoking status: Never Smoker    Smokeless tobacco: Never Used   Substance Use Topics    Alcohol use:  Yes     Alcohol/week: 0.0 standard drinks    Drug use: No        Family History   Problem Relation Age of Onset    Cancer Mother 76        melanoma    High Blood Pressure Father     Colon Cancer Maternal Grandfather 46    Other Paternal Grandmother         PE        ROS:  Feeling well. No dyspnea or chest pain on exertion. No abdominal pain, change in bowel habits, black or bloody stools. No urinary tract symptoms. No neurological complaints. See patient physical/  ROS questionnaire. OBJECTIVE:   /72   Pulse 85   Temp 97 °F (36.1 °C) (Temporal)   Resp 16   Ht 5' 4.25\" (1.632 m)   Wt 130 lb 6.4 oz (59.1 kg)   LMP  (LMP Unknown)   SpO2 97%   BMI 22.21 kg/m²   There were no vitals filed for this visit. The patient appears well, alert, oriented x 3, in no distress. HEENT : normocephalic, atraumatic, PERRLA, EOMI, tympanic membranes and nasopharynx are normal.  Neck supple. No adenopathy or thyromegaly. Upper extremities : DTRs 2+ biceps/ triceps/ brachioradialis bilateral.  FROM. Strength 5/5. Lungs are clear, good air entry, no wheezes, rhonchi or rales. Breathing comfortably. Cardiovascular: regular rate and rhythm. S1 and S2 are normal, no murmurs,rubs, and gallops. No edema. Abdomen is soft without tenderness, guarding, mass or organomegaly. Normal bowel sounds. Back: non tender. FROM. negative straight leg-raise. Right hand   Lower extremities : DTRs 2+ knees and ankles bilateral.  FROM. Strength 5/5. Negative straight leg-raise. No edema or erythema bilateral.  normal peripheral pulses. Neuro: Cranial nerves 2-12 are normal. Deep tendon reflexes are 2+ and equal to all extremities. No focal sensory, or motor deficit noted. Skin: no rashes or suspicious lesions. ASSESSMENT:   1. Routine general medical examination at a health care facility  - Hemoglobin A1C; Future  - TSH without Reflex; Future  - T4, Free; Future  - CBC; Future  - Comprehensive Metabolic Panel; Future    2. DDD (degenerative disc disease), cervical  - Stable. Moist heat . ROM exercises. Modify activities.      3. Trigger finger, right middle finger  - Referral - Irma Lucas MD, Hand Surgery (Hand, Wrist, Upper Extremity), [place]    4. Allergic rhinitis, unspecified seasonality, unspecified trigger  - Stable. 5. Vitamin D deficiency  - Vitamin D 25 Hydroxy; Future    6. Menopause  - h/o hysterectomy in 2009  - ESTRADIOL; Future  - LUTEINIZING HORMONE; Future  - Follicle Stimulating Hormone; Future    7. Vaginal dryness  - Increased vaginal estrogen qhs X 7 days then 3d/ week. 8. Hot flashes  - ESTRADIOL; Future  - LUTEINIZING HORMONE; Future  - Follicle Stimulating Hormone; Future    9. Encounter for screening for HIV  - HIV Screen; Future    10. Need for hepatitis C screening test  - Hepatitis C Antibody; Future    PLAN:   Follow a low fat, low cholesterol diet,  continue current healthy lifestyle patterns, including regular cardiovascular exercise >150 minutes per week,  and return for routine annual checkups  Repeat colonoscopy screening recommended per Gastroenterologist .  Yearly mammogram recommended , as well as monthly self breast exam.   Calcium 1500 mg/ day , Vitamin D 800 IU/ day, and weight bearing exercise. Follow up yearly or as needed.

## 2021-01-28 ENCOUNTER — TELEPHONE (OUTPATIENT)
Dept: FAMILY MEDICINE CLINIC | Age: 51
End: 2021-01-28

## 2021-01-28 DIAGNOSIS — Z20.822 CLOSE EXPOSURE TO COVID-19 VIRUS: Primary | ICD-10-CM

## 2021-01-28 NOTE — TELEPHONE ENCOUNTER
Dayton Jones called stating she was seen in the office yesterday and Dr. Earnest Jennings ordered lab work. She would like to know if Dr. Earnest Jennings can order a Covid 19 antibody test for her. She states she has been exposed multiple times last year and even took care of her spouse with Covid, but she never had any symptoms. She also states she took amoxicillin 500 mg last night. It was prescribed by her dentist.  She just wants to make sure that will not have an effect on her lab work. She wants to get her lab work done this morning at the [facility]. She is fasting. She would like a call back from our office as soon as possible. Please advise. Thanks.         Dayton Jones [phone] (home)

## 2021-01-28 NOTE — TELEPHONE ENCOUNTER
Spoke with Julietet Hollis, patient can not have labs drawn while she in on an antibiotic. She should wait 1-2 weeks after completing the antibiotics to have her blood work done. Advised patient.

## 2021-02-02 ENCOUNTER — OFFICE VISIT (OUTPATIENT)
Dept: ORTHOPEDIC SURGERY | Age: 51
End: 2021-02-02
Payer: COMMERCIAL

## 2021-02-02 VITALS — WEIGHT: 125 LBS | HEIGHT: 64 IN | TEMPERATURE: 97.5 F | BODY MASS INDEX: 21.34 KG/M2

## 2021-02-02 DIAGNOSIS — M65.30 TRIGGER FINGER, ACQUIRED: Primary | ICD-10-CM

## 2021-02-02 PROCEDURE — 99243 OFF/OP CNSLTJ NEW/EST LOW 30: CPT | Performed by: ORTHOPAEDIC SURGERY

## 2021-02-02 PROCEDURE — 20550 NJX 1 TENDON SHEATH/LIGAMENT: CPT | Performed by: ORTHOPAEDIC SURGERY

## 2021-02-02 RX ORDER — TRIAMCINOLONE ACETONIDE 40 MG/ML
20 INJECTION, SUSPENSION INTRA-ARTICULAR; INTRAMUSCULAR ONCE
Status: COMPLETED | OUTPATIENT
Start: 2021-02-02 | End: 2021-02-02

## 2021-02-02 RX ORDER — LIDOCAINE HYDROCHLORIDE 10 MG/ML
0.5 INJECTION, SOLUTION INFILTRATION; PERINEURAL ONCE
Status: COMPLETED | OUTPATIENT
Start: 2021-02-02 | End: 2021-02-02

## 2021-02-02 RX ADMIN — LIDOCAINE HYDROCHLORIDE 0.5 ML: 10 INJECTION, SOLUTION INFILTRATION; PERINEURAL at 08:48

## 2021-02-02 RX ADMIN — TRIAMCINOLONE ACETONIDE 20 MG: 40 INJECTION, SUSPENSION INTRA-ARTICULAR; INTRAMUSCULAR at 08:48

## 2021-02-02 NOTE — PROGRESS NOTES
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 Symptoms have been present for 1 month. The digit is stiff, especially in the morning and will frequently stick in the palm when flexed and pop when extended. This is often associated with pain. Mild swelling has been noticed. Treatment has not been prescribed. The problem has worsened slightly recently. There is no history of injury or significant overuse. The pain assessment has been reviewed and is correct as stated. .    The patient's social history, past medical history, family history, medications, allergies and review of systems, entered 2/2/21, have been reviewed, and dated and are recorded in the chart. On examination the patient is Height: 5' 4\" (162.6 cm) tall and weighs Weight: 125 lb (56.7 kg). Respirations are 18 per minute. The patient is well nourished, is oriented to time and place, demonstrates appropriate mood and affect as well as normal gait and station. There is mild soft tissue swelling of the digit. There is no deformity. There is tenderness, thickening and nodularity at the base of the flexor tendon sheath. Range of motion is slightly limited in flexion and extension. The digit sticks in flexion and pops into extension, accompanied by some pain. Skin is intact without lesions. Distal circulation and sensation are intact. Muscle strength and coordination are normal.  Reflexes are present bilaterally. Joints are stable. There are no subcutaneous nodules or enlarged epitrochlear lymph nodes. Impression: Right middle finger trigger digit. The nature of this medical problem is fully discussed with the patient, including all treatment options. All questions are answered.     The right  hand is prepared with Betadine and alcohol and the flexor tendon sheath of the right middle finger is injected with 1/2 milliliter of 1% lidocaine and 20 mg.of triamcinalone, with good filling. The patient is advised regarding the expected response and possible reactions from the injection. The patient is asked to call me if full, painless function has not returned within 4 weeks. The possibility of recurrence of the problem is discussed.

## 2021-02-12 ENCOUNTER — TELEPHONE (OUTPATIENT)
Dept: DERMATOLOGY | Age: 51
End: 2021-02-12

## 2021-02-12 ENCOUNTER — PATIENT MESSAGE (OUTPATIENT)
Dept: DERMATOLOGY | Age: 51
End: 2021-02-12

## 2021-02-12 NOTE — TELEPHONE ENCOUNTER
From: Graciela Escalona  To: Bear Rapp MD  Sent: 2/12/2021 10:28 AM EST  Subject: Non-Urgent Medical Question    Hi Doctor Laila Gonsalez ,   I cut the tip of my finger about 2 months ago. A weird callus formed where it healed. The callus now hurts and it splits open at times. Are you able to please see me for treatment so my finger can heal properly ?      Thank you ,   Armando Andrade

## 2021-02-12 NOTE — TELEPHONE ENCOUNTER
Patient cut her finger with a knife and says it healed in a strange way- says it has calloused- was hoping Dr. Natasha Rain could look at it. Patient asked to come in more urgently than May appt. Advised patient to send a photo through [employer] for evaluation.  Please call patient to schedule    [phone]

## 2021-02-15 NOTE — TELEPHONE ENCOUNTER
Per Dr Lin Rodriguez verbal orders instructed patient to use antibiotic ointment or Aquaphor/Vaseline on the area with a bandage and see if it improves and if not can come in for an evaluation. Heavy snow is predicted for this week and we will not be adding any patients on for the rest of the week.

## 2021-02-16 NOTE — TELEPHONE ENCOUNTER
Tried calling patient but the voicemail was full. Please offer openings on 2/18/21 if still available.

## 2021-02-18 ENCOUNTER — OFFICE VISIT (OUTPATIENT)
Dept: DERMATOLOGY | Age: 51
End: 2021-02-18
Payer: COMMERCIAL

## 2021-02-18 VITALS — TEMPERATURE: 97.3 F

## 2021-02-18 DIAGNOSIS — B07.8 OTHER VIRAL WARTS: Primary | ICD-10-CM

## 2021-02-18 PROCEDURE — 17110 DESTRUCTION B9 LES UP TO 14: CPT | Performed by: DERMATOLOGY

## 2021-02-18 NOTE — PROGRESS NOTES
[facility]  Jazmin Marte MD  [address]  1970    46 y.o. female     Date of Visit: 2/18/2021    Chief Complaint: wart    History of Present Illness:    She presents today for a tender lesion on the left 4th finger that appeared soon after cutting her finger. Review of Systems:  Gen: Feels well, good sense of health. Past Medical History, Family History, Surgical History, Medications and Allergies reviewed.     Past Medical History:   Diagnosis Date    Allergic rhinitis     Atrophic vaginitis     DDD (degenerative disc disease), cervical     Dyspareunia in female     Esophagitis 05/2019    Gastritis 05/2019    HPV (human papilloma virus) infection 1992    Exposed back in college    Interstitial cystitis 10/23/2017    Iron deficiency anemia 2017    Menopause ovarian failure     Vitamin D deficiency 07/2019    Vocal cord nodule 2016     Past Surgical History:   Procedure Laterality Date    APPENDECTOMY  1996    CERVICAL FUSION  2013    C4-6   [facility] [address]    bilateral, right    LARYNGOSCOPY  2016    PARTIAL HYSTERECTOMY  2009    [facility] -     UPPER GASTROINTESTINAL ENDOSCOPY  05/2019       No Known Allergies  Outpatient Medications Marked as Taking for the 2/18/21 encounter (Office Visit) with Mariaa Hinojosa MD   Medication Sig Dispense Refill    cetirizine (ZYRTEC) 10 MG tablet Take 10 mg by mouth daily      linaclotide (LINZESS) 145 MCG capsule Take 1 capsule by mouth every morning (before breakfast) 90 capsule 3    linaclotide (LINZESS) 145 MCG capsule Take 1 capsule by mouth every morning (before breakfast) 30 capsule 8    linaclotide (LINZESS) 145 MCG capsule Take 1 capsule by mouth every morning (before breakfast) 30 capsule 1    estradiol (ESTRACE VAGINAL) 0.1 MG/GM vaginal cream Place 1/2 gram per vagina twice weekly 1 Tube 1    meloxicam (MOBIC) 7.5 MG tablet Take 1 tablet by mouth daily Prn elbow pain bilateral. 90 tablet 0    butalbital-acetaminophen-caffeine (FIORICET, ESGIC) -40 MG per tablet Take 1 tablet by mouth every 6 hours as needed for Headaches 25 tablet 1    VASCEPA 1 g CAPS capsule Take 2 capsules by mouth 2 times daily 120 capsule 3    Cholecalciferol (VITAMIN D3) 1000 units TABS Take 1 tablet by mouth daily 90 tablet 3    fluticasone (FLONASE) 50 MCG/ACT nasal spray 1 spray by Nasal route daily           Physical Examination       Well-appearing. 1.  Left fourth fingertip with a round verrucous yellowish papule with black dots. Assessment and Plan     1. Verruca vulgaris of the left 4th finger    2 cycles of liquid nitrogen applied to 1 wart on the 4th finger using a Qtip. Patient was educated regarding the potential risks of blister formation, discomfort. Wound care was discussed.           --Shelby Hu MD

## 2021-02-22 ENCOUNTER — TELEPHONE (OUTPATIENT)
Dept: FAMILY MEDICINE CLINIC | Age: 51
End: 2021-02-22

## 2021-02-22 ENCOUNTER — OFFICE VISIT (OUTPATIENT)
Dept: FAMILY MEDICINE CLINIC | Age: 51
End: 2021-02-22
Payer: COMMERCIAL

## 2021-02-22 VITALS
WEIGHT: 131.2 LBS | RESPIRATION RATE: 24 BRPM | BODY MASS INDEX: 22.52 KG/M2 | DIASTOLIC BLOOD PRESSURE: 52 MMHG | OXYGEN SATURATION: 98 % | SYSTOLIC BLOOD PRESSURE: 88 MMHG | TEMPERATURE: 97.2 F | HEART RATE: 80 BPM

## 2021-02-22 DIAGNOSIS — Z12.31 ENCOUNTER FOR SCREENING MAMMOGRAM FOR MALIGNANT NEOPLASM OF BREAST: Primary | ICD-10-CM

## 2021-02-22 DIAGNOSIS — T75.3XXA MOTION SICKNESS, INITIAL ENCOUNTER: ICD-10-CM

## 2021-02-22 DIAGNOSIS — H60.391 ACUTE INFECTIVE OTITIS EXTERNA, RIGHT: Primary | ICD-10-CM

## 2021-02-22 DIAGNOSIS — H92.01 RIGHT EAR PAIN: ICD-10-CM

## 2021-02-22 PROCEDURE — 99213 OFFICE O/P EST LOW 20 MIN: CPT | Performed by: FAMILY MEDICINE

## 2021-02-22 RX ORDER — SCOLOPAMINE TRANSDERMAL SYSTEM 1 MG/1
1 PATCH, EXTENDED RELEASE TRANSDERMAL
Qty: 4 PATCH | Refills: 0 | Status: SHIPPED | OUTPATIENT
Start: 2021-02-22 | End: 2021-04-06 | Stop reason: ALTCHOICE

## 2021-02-22 NOTE — TELEPHONE ENCOUNTER
Please advise  Thank you    Reason for referral request? Patient would like to schedule for her   mammogram in June of 2021. Her last one was around June of 2020. Select a date? 2020-06-01  Select the physician (PCP or Specialist)? Eileen Valverde   Additional Information for Provider?  Pt sees Dr. Dora Bullock and would like an   order so that she can schedule a mammogram.

## 2021-02-22 NOTE — TELEPHONE ENCOUNTER
Discussed at her appointment today . She does not need order for screening mammogram, but I did place order for her.

## 2021-02-22 NOTE — TELEPHONE ENCOUNTER
Alia Garrett called stating she was in the office today and Dr. Huang Nielson was going to send a prescription for motion sickness patches to [facility]. She states [facility] did not receive the prescription. Please advise. Thanks.       Alia Garrett [phone] (home)       [employer] [address] [place] [address] [phone]

## 2021-02-22 NOTE — TELEPHONE ENCOUNTER
Patient was in the office today. She was expecting a motion sickness patch to be called in to the [facility] on [address] in [facility]. She needs to pick this up asap.      [facility] [address] [facility]  [address] [facility]

## 2021-02-22 NOTE — PROGRESS NOTES
Patient is here for right ear pain- ache X 4 days. Tried decongestants and not helping. Gets once per season . No nasal congestion or post nasal drip . Wearing . Has been on Amoxil for gum recession X 2 and finishing last dose today. Taking Sudafed and Sensitive Flonase NS and gets nosebleeds. Drinks coffee 1-2 / day    Denies fever, chest pain , shortness of breath or cough. Review of Systems    ROS: All other systems were reviewed and are negative . Patient's allergies and medications were reviewed. Patient's past medical, surgical, social , and family history were reviewed. OBJECTIVE:  BP (!) 88/52   Pulse 80   Temp 97.2 °F (36.2 °C) (Temporal)   Resp 24   Wt 131 lb 3.2 oz (59.5 kg)   LMP  (LMP Unknown)   SpO2 98%   BMI 22.52 kg/m²     Physical Exam    General: NAD, cooperative, alert and oriented X 3. Mood / affect is good. good insight. well hydrated. HEENT: PERRLA, EOMI, right EAC with mild tenderness and erythema to inferior EAC . TMs - clear. Nasopharynx clear. Neck : no lymphadenopathy, supple, FROM  CV: Regular rate and rhythm , no murmurs/ rub/ gallop. No edema. Lungs : CTA bilaterally, breathing comfortably  Abdomen: positive bowel sounds, soft , non tender, non distended. No hepatosplenomegaly. No CVA tenderness. Skin: no rashes. Non tender. ASSESSMENT/  PLAN:  1. Acute infective otitis externa, right  - neomycin-polymyxin-hydrocortisone (CORTISPORIN) 3.5-29157-1 otic solution; Place 4 drops into the right ear 3 times daily for 10 days Instill into right Ear  Dispense: 1 Bottle; Refill: 0    2. Right ear pain  - Continue Flonase NS qhs   - likely ETD component. - Sudafed prn 1 hour prior to flight. 3. Motion sickness, initial encounter  - Scopolamine patch #4 . F/u if no improvement 7-10d/ prn increased symptoms.   / otherwise follow up in 3 months.

## 2021-02-24 ENCOUNTER — TELEPHONE (OUTPATIENT)
Dept: ENT CLINIC | Age: 51
End: 2021-02-24

## 2021-02-24 NOTE — TELEPHONE ENCOUNTER
Pt feels like she has fluid in her eustation tube. And she is flying tomorrow. Pt saw Dr. Grecia Tom who didn't see fluid and she prescribed neomyicn- poly-hydro   Pt. Says it's not working and she is sure she has fluid in there. No openings avail today. Pt says Nasocourt makes pt's nose bleed. Asking if Dr. Liz Landeros could recommend something.

## 2021-02-24 NOTE — TELEPHONE ENCOUNTER
Use over the counter afrin nasal spray, 2 sprays each nostril one hour before the flight. May also take a sudafed one hour prior to flight.

## 2021-03-09 ENCOUNTER — OFFICE VISIT (OUTPATIENT)
Dept: ORTHOPEDIC SURGERY | Age: 51
End: 2021-03-09
Payer: COMMERCIAL

## 2021-03-09 VITALS — BODY MASS INDEX: 22.36 KG/M2 | HEIGHT: 64 IN | WEIGHT: 131 LBS | TEMPERATURE: 97.3 F

## 2021-03-09 DIAGNOSIS — M65.30 TRIGGER FINGER, ACQUIRED: Primary | ICD-10-CM

## 2021-03-09 PROCEDURE — 99212 OFFICE O/P EST SF 10 MIN: CPT | Performed by: ORTHOPAEDIC SURGERY

## 2021-03-09 NOTE — PROGRESS NOTES
I last examined this patient 4 1/2 weeks ago at which time I injected the right middle finger for treatment of trigger finger. She obtained \"50%\" relief of all symptoms. She denies triggering or locking but the digit still feels \"funny\". She returns to the office to see if additional treatment is needed. The patient's social history, past medical history, family history, medications, allergies and review of systems have been reviewed, dated 2/2/21 and are recorded in the chart. On examination there is no soft tissue swelling of the digit. There is no deformity. There is minimal residual thickening at the base of the flexor tendon sheath, but no nodularity or tenderness. Active range of motion is full in flexion and extension, without pain. There is no triggering, even with manual pressure over the A1 pulley of the right middle finger. Skin is intact without lesions. Distal circulation and sensation are intact. Muscle strength and coordination are normal.  Reflexes and present bilaterally. Joints are stable. There are no subcutaneous nodules or enlarged epitrochlear lymph nodes. The patient is reassured that she has made marked improvement and that it is quite likely that her finger will improve to normal over the next few weeks, without any additional treatment. If her symptoms worsen she may return for re evaluation as needed.

## 2021-03-11 ENCOUNTER — OFFICE VISIT (OUTPATIENT)
Dept: DERMATOLOGY | Age: 51
End: 2021-03-11
Payer: COMMERCIAL

## 2021-03-11 VITALS — TEMPERATURE: 97.3 F

## 2021-03-11 DIAGNOSIS — B07.8 OTHER VIRAL WARTS: Primary | ICD-10-CM

## 2021-03-11 PROCEDURE — 17110 DESTRUCTION B9 LES UP TO 14: CPT | Performed by: DERMATOLOGY

## 2021-03-11 NOTE — PROGRESS NOTES
[facility]  Rogers Councilman, MD [address]  1970    46 y.o. female     Date of Visit: 3/11/2021    Chief Complaint: warts    History of Present Illness: Follow-up for a persistent wart on the left fourth finger. It has nearly cleared with cryotherapy. She is here today for additional treatment. She also has 1 new wart on the left middle finger. Review of Systems:  None. Past Medical History, Family History, Surgical History, Medications and Allergies reviewed. Past Medical History:   Diagnosis Date    Allergic rhinitis     Atrophic vaginitis     DDD (degenerative disc disease), cervical     Dyspareunia in female     Esophagitis 05/2019    Gastritis 05/2019    HPV (human papilloma virus) infection 1992    Exposed back in college    Interstitial cystitis 10/23/2017    Iron deficiency anemia 2017    Menopause ovarian failure     Vitamin D deficiency 07/2019    Vocal cord nodule 2016     Past Surgical History:   Procedure Laterality Date    APPENDECTOMY  1996    CERVICAL FUSION  2013    C4-6   [address]    bilateral, right    LARYNGOSCOPY  2016    PARTIAL HYSTERECTOMY  2009    DUB -     UPPER GASTROINTESTINAL ENDOSCOPY  05/2019       No Known Allergies  Outpatient Medications Marked as Taking for the 3/11/21 encounter (Office Visit) with Derrell Gonzales MD   Medication Sig Dispense Refill    scopolamine (TRANSDERM-SCOP, 1.5 MG,) transdermal patch Place 1 patch onto the skin every 72 hours Start 4 hours prior to travel.  4 patch 0    cetirizine (ZYRTEC) 10 MG tablet Take 10 mg by mouth daily      linaclotide (LINZESS) 145 MCG capsule Take 1 capsule by mouth every morning (before breakfast) 90 capsule 3    linaclotide (LINZESS) 145 MCG capsule Take 1 capsule by mouth every morning (before breakfast) 30 capsule 8    estradiol (ESTRACE VAGINAL) 0.1 MG/GM vaginal cream Place 1/2 gram per vagina twice weekly 1 Tube 1  meloxicam (MOBIC) 7.5 MG tablet Take 1 tablet by mouth daily Prn elbow pain bilateral. 90 tablet 0    butalbital-acetaminophen-caffeine (FIORICET, ESGIC) -40 MG per tablet Take 1 tablet by mouth every 6 hours as needed for Headaches 25 tablet 1    VASCEPA 1 g CAPS capsule Take 2 capsules by mouth 2 times daily 120 capsule 3    Cholecalciferol (VITAMIN D3) 1000 units TABS Take 1 tablet by mouth daily 90 tablet 3    fluticasone (FLONASE) 50 MCG/ACT nasal spray 1 spray by Nasal route daily           Physical Examination       Well appearing. 1.  Distal/lateral portion of the left 3rd finger and medial portion of the left 4th finger - 2 small round verrucous pink papules. Assessment and Plan     1. Other viral warts with evidence of spread    2 cycles of liquid nitrogen applied to 2 warts: left middle and 4th finger. Patient was educated regarding the potential risks of blister formation, discomfort. Wound care was discussed.           --Ross Sam MD

## 2021-03-12 ENCOUNTER — IMMUNIZATION (OUTPATIENT)
Dept: PRIMARY CARE CLINIC | Age: 51
End: 2021-03-12
Payer: COMMERCIAL

## 2021-03-12 PROCEDURE — 91300 COVID-19, PFIZER VACCINE 30MCG/0.3ML DOSE: CPT | Performed by: FAMILY MEDICINE

## 2021-03-12 PROCEDURE — 0001A COVID-19, PFIZER VACCINE 30MCG/0.3ML DOSE: CPT | Performed by: FAMILY MEDICINE

## 2021-03-22 ENCOUNTER — PATIENT MESSAGE (OUTPATIENT)
Dept: DERMATOLOGY | Age: 51
End: 2021-03-22

## 2021-03-22 NOTE — TELEPHONE ENCOUNTER
From: Aura Donovan  To: Dillon Waters MD  Sent: 3/22/2021 12:20 PM EDT  Subject: Non-Urgent Medical Question    Hi Dr. Yi Brooks,   The wart on my left ring finger appears to be returning . I was wondering when I can please come in again for it to be treated .     Thank you,   Preet Garcia

## 2021-03-28 ENCOUNTER — PATIENT MESSAGE (OUTPATIENT)
Dept: DERMATOLOGY | Age: 51
End: 2021-03-28

## 2021-04-02 ENCOUNTER — IMMUNIZATION (OUTPATIENT)
Dept: PRIMARY CARE CLINIC | Age: 51
End: 2021-04-02
Payer: COMMERCIAL

## 2021-04-02 PROCEDURE — 91300 COVID-19, PFIZER VACCINE 30MCG/0.3ML DOSE: CPT | Performed by: FAMILY MEDICINE

## 2021-04-02 PROCEDURE — 0002A COVID-19, PFIZER VACCINE 30MCG/0.3ML DOSE: CPT | Performed by: FAMILY MEDICINE

## 2021-04-05 ENCOUNTER — OFFICE VISIT (OUTPATIENT)
Dept: DERMATOLOGY | Age: 51
End: 2021-04-05
Payer: COMMERCIAL

## 2021-04-05 VITALS — TEMPERATURE: 97.3 F

## 2021-04-05 DIAGNOSIS — B07.8 OTHER VIRAL WARTS: Primary | ICD-10-CM

## 2021-04-05 PROCEDURE — 17110 DESTRUCTION B9 LES UP TO 14: CPT | Performed by: DERMATOLOGY

## 2021-04-05 NOTE — PROGRESS NOTES
[facility]  MD [address]  1970    46 y.o. female     Date of Visit: 4/5/2021    Chief Complaint: warts     History of Present Illness: Follow up for spreading warts on the hands/fingers. Complains of 1 new lesion on the right hand and 1 persistent lesion on the left 4th finger. Review of Systems:  Gen: Feels well, good sense of health. Past Medical History, Family History, Surgical History, Medications and Allergies reviewed. Past Medical History:   Diagnosis Date    Allergic rhinitis     Atrophic vaginitis     DDD (degenerative disc disease), cervical     Dyspareunia in female     Esophagitis 05/2019    Gastritis 05/2019    HPV (human papilloma virus) infection 1992    Exposed back in college    Interstitial cystitis 10/23/2017    Iron deficiency anemia 2017    Menopause ovarian failure     Vitamin D deficiency 07/2019    Vocal cord nodule 2016     Past Surgical History:   Procedure Laterality Date    APPENDECTOMY  1996    CERVICAL FUSION  2013    C4-6   [address] 70    bilateral, right    LARYNGOSCOPY  2016    PARTIAL HYSTERECTOMY  2009    DUB -     UPPER GASTROINTESTINAL ENDOSCOPY  05/2019       No Known Allergies  Outpatient Medications Marked as Taking for the 4/5/21 encounter (Office Visit) with Isaias Daniels MD   Medication Sig Dispense Refill    scopolamine (TRANSDERM-SCOP, 1.5 MG,) transdermal patch Place 1 patch onto the skin every 72 hours Start 4 hours prior to travel.  4 patch 0    cetirizine (ZYRTEC) 10 MG tablet Take 10 mg by mouth daily      linaclotide (LINZESS) 145 MCG capsule Take 1 capsule by mouth every morning (before breakfast) 90 capsule 3    linaclotide (LINZESS) 145 MCG capsule Take 1 capsule by mouth every morning (before breakfast) 30 capsule 8    estradiol (ESTRACE VAGINAL) 0.1 MG/GM vaginal cream Place 1/2 gram per vagina twice weekly 1 Tube 1    meloxicam (MOBIC) 7.5 MG tablet Take 1 tablet by mouth daily Prn elbow pain bilateral. 90 tablet 0    butalbital-acetaminophen-caffeine (FIORICET, ESGIC) -40 MG per tablet Take 1 tablet by mouth every 6 hours as needed for Headaches 25 tablet 1    VASCEPA 1 g CAPS capsule Take 2 capsules by mouth 2 times daily 120 capsule 3    Cholecalciferol (VITAMIN D3) 1000 units TABS Take 1 tablet by mouth daily 90 tablet 3    fluticasone (FLONASE) 50 MCG/ACT nasal spray 1 spray by Nasal route daily           Physical Examination     Well appearing. 1.  Dorsum right hand with a small verrucous pink papule. Left 4th fingertip - small round verrucous pink papule. Assessment and Plan     1. Other viral warts - 1 new, 1 persistent with evidence of spread    2 cycles of liquid nitrogen applied to both warts: right hand - 1, left 4th fingertip - 1. Patient was educated regarding the potential risks of blister formation, discomfort. Wound care was discussed. Return in about 4 weeks (around 5/3/2021).     --Christie Pelletier MD

## 2021-04-06 ENCOUNTER — PATIENT MESSAGE (OUTPATIENT)
Dept: FAMILY MEDICINE CLINIC | Age: 51
End: 2021-04-06

## 2021-04-06 ENCOUNTER — OFFICE VISIT (OUTPATIENT)
Dept: FAMILY MEDICINE CLINIC | Age: 51
End: 2021-04-06
Payer: COMMERCIAL

## 2021-04-06 VITALS
TEMPERATURE: 97.7 F | WEIGHT: 131 LBS | DIASTOLIC BLOOD PRESSURE: 52 MMHG | OXYGEN SATURATION: 95 % | BODY MASS INDEX: 22.49 KG/M2 | RESPIRATION RATE: 19 BRPM | SYSTOLIC BLOOD PRESSURE: 90 MMHG | HEART RATE: 85 BPM

## 2021-04-06 DIAGNOSIS — H00.13 CHALAZION OF BOTH EYES: Primary | ICD-10-CM

## 2021-04-06 DIAGNOSIS — H00.16 CHALAZION OF BOTH EYES: Primary | ICD-10-CM

## 2021-04-06 DIAGNOSIS — K59.00 CONSTIPATION, UNSPECIFIED CONSTIPATION TYPE: ICD-10-CM

## 2021-04-06 DIAGNOSIS — K08.89 TOOTH PAIN: ICD-10-CM

## 2021-04-06 DIAGNOSIS — R68.89 ITCHY EYES: ICD-10-CM

## 2021-04-06 PROCEDURE — 99214 OFFICE O/P EST MOD 30 MIN: CPT | Performed by: FAMILY MEDICINE

## 2021-04-06 RX ORDER — CHLORHEXIDINE GLUCONATE 0.12 MG/ML
RINSE ORAL
COMMUNITY
Start: 2021-03-05 | End: 2021-08-18

## 2021-04-06 RX ORDER — SULFACETAMIDE SODIUM 100 MG/ML
2 SOLUTION/ DROPS OPHTHALMIC 4 TIMES DAILY
Qty: 10 ML | Refills: 0 | Status: SHIPPED | OUTPATIENT
Start: 2021-04-06 | End: 2021-04-16

## 2021-04-06 RX ORDER — TRIAMCINOLONE ACETONIDE 55 UG/1
2 SPRAY, METERED NASAL DAILY
COMMUNITY
End: 2021-07-26

## 2021-04-06 RX ORDER — BUPROPION HYDROCHLORIDE 150 MG/1
150 TABLET ORAL EVERY MORNING
COMMUNITY
End: 2021-06-11

## 2021-04-06 NOTE — PROGRESS NOTES
Patient is here for bilateral eye irritation X . Her mother in law had pink eye and was at her house over the weekend for Easter. Her eyes started throbbing and irritated this am.  Some drainage this am.  No nasal congestion or post nasal drip . No fever. H/o spring allergies - takes Nasacort and Zyrtec qd. Her mother in law was prescribed eye drops - Tobramycin - Dexamethasone suspension and over the counter eyelid cleanser antibacterial.     She has seen dentist and endodontist for right front tooth pain. She had panoramic was done at oral surgeon 1-2 weeks ago. She has had grafting done in 2/21. She was on Amoxil last week per endodontist. She was on Clindamycin 1 month ago. Review of Systems    ROS: All other systems were reviewed and are negative . Patient's allergies and medications were reviewed. Patient's past medical, surgical, social , and family history were reviewed. OBJECTIVE:  BP (!) 90/52   Pulse 85   Temp 97.7 °F (36.5 °C) (Temporal)   Resp 19   Wt 131 lb (59.4 kg)   LMP  (LMP Unknown)   SpO2 95%   BMI 22.49 kg/m²     Physical Exam    General: NAD, cooperative, alert and oriented X 3. Mood / affect is good. good insight. well hydrated. HEENT: PERRLA, EOMI, upper eyelids with chalazion and tenderness. No erythema of conjunctiva. TMs - clear. Nasopharynx clear. Neck : no lymphadenopathy, supple, FROM  CV: Regular rate and rhythm , no murmurs/ rub/ gallop. No edema. Lungs : CTA bilaterally, breathing comfortably  Abdomen: positive bowel sounds, soft , non tender, non distended. No hepatosplenomegaly. No CVA tenderness. Skin: no rashes. Non tender. ASSESSMENT/  PLAN:  1. Chalazion of both eyes  - Warm compresses 4-5 x/ day. Avoid eye makeup and discard. - Lid scrubs with baby shampoo 2x/ day . 2. Itchy eyes  - Warm compresses 4-5 x/ day.    - Hold, but if increased symptoms over the next 48 hours, start Sulfacetamide (BLEPH-10) 10 % ophthalmic solution; Place 2 drops into both eyes 4 times daily for 10 days  Dispense: 10 mL; Refill: 0    3. Tooth pain  - Encouraged follow up with dentist.     4. Constipation, unspecified constipation type  - Stable. Push fluids - water and dietary fiber qd. - Satble and continue Linaclotide (LINZESS) 145 MCG capsule; Take 1 capsule by mouth every morning (before breakfast)  Dispense: 90 capsule; Refill: 3     Follow up if no improvement in 2- 3 weeks/ as needed for increased symptoms.

## 2021-04-06 NOTE — TELEPHONE ENCOUNTER
From: Lesa Rodriguez  To: Rk Burr MD  Sent: 4/6/2021 7:54 AM EDT  Subject: Prescription Question    Hi Dr. Simba Maldonado-   During Easter at our home , my mother in law found out she had pink eye. This morning my eyes are starting to itch and burn with some drainage. Can you please help me get some drops for my eyes?    Thank you ,   Ministerio Conn

## 2021-04-23 ENCOUNTER — OFFICE VISIT (OUTPATIENT)
Dept: FAMILY MEDICINE CLINIC | Age: 51
End: 2021-04-23
Payer: COMMERCIAL

## 2021-04-23 VITALS
OXYGEN SATURATION: 96 % | SYSTOLIC BLOOD PRESSURE: 106 MMHG | DIASTOLIC BLOOD PRESSURE: 70 MMHG | TEMPERATURE: 97.5 F | HEART RATE: 85 BPM | RESPIRATION RATE: 14 BRPM | BODY MASS INDEX: 22.21 KG/M2 | WEIGHT: 129.4 LBS

## 2021-04-23 DIAGNOSIS — R51.9 RIGHT-SIDED FACE PAIN: Primary | ICD-10-CM

## 2021-04-23 DIAGNOSIS — F41.9 ANXIETY: ICD-10-CM

## 2021-04-23 DIAGNOSIS — K08.89 PAIN IN TOOTH: ICD-10-CM

## 2021-04-23 PROCEDURE — 99214 OFFICE O/P EST MOD 30 MIN: CPT | Performed by: FAMILY MEDICINE

## 2021-04-23 RX ORDER — HYDROXYZINE HYDROCHLORIDE 25 MG/1
25 TABLET, FILM COATED ORAL EVERY 6 HOURS PRN
Qty: 40 TABLET | Refills: 0 | Status: SHIPPED | OUTPATIENT
Start: 2021-04-23 | End: 2021-05-03
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
## 2021-04-24 ENCOUNTER — PATIENT MESSAGE (OUTPATIENT)
Dept: DERMATOLOGY | Age: 51
End: 2021-04-24

## 2021-04-26 ENCOUNTER — PATIENT MESSAGE (OUTPATIENT)
Dept: DERMATOLOGY | Age: 51
End: 2021-04-26

## 2021-04-26 NOTE — TELEPHONE ENCOUNTER
From: Galindo Curry  To: Liv Spears MD  Sent: 4/24/2021 4:29 PM EDT  Subject: Non-Urgent Medical Question    Hi Dr. Samuel Crystal,   Our son, Param Adams, is a [facility] patient. He is home from [employer] school (at [employer]) for only a month and he needs a couple of areas looked at on his skin. Is there a possibility for him to please get an appointment soon in May? Thank you for your help.      Kee Tian

## 2021-05-04 ENCOUNTER — OFFICE VISIT (OUTPATIENT)
Dept: ENT CLINIC | Age: 51
End: 2021-05-04
Payer: COMMERCIAL

## 2021-05-04 ENCOUNTER — OFFICE VISIT (OUTPATIENT)
Dept: DERMATOLOGY | Age: 51
End: 2021-05-04
Payer: COMMERCIAL

## 2021-05-04 VITALS
HEART RATE: 76 BPM | WEIGHT: 130 LBS | SYSTOLIC BLOOD PRESSURE: 101 MMHG | DIASTOLIC BLOOD PRESSURE: 71 MMHG | HEIGHT: 64 IN | TEMPERATURE: 98.3 F | BODY MASS INDEX: 22.2 KG/M2

## 2021-05-04 VITALS — TEMPERATURE: 98 F

## 2021-05-04 DIAGNOSIS — R20.9 DISTURBANCE OF SKIN SENSATION: ICD-10-CM

## 2021-05-04 DIAGNOSIS — R51.9 FACIAL PAIN: ICD-10-CM

## 2021-05-04 DIAGNOSIS — J34.89 SINUS PAIN: Primary | ICD-10-CM

## 2021-05-04 DIAGNOSIS — B07.8 OTHER VIRAL WARTS: Primary | ICD-10-CM

## 2021-05-04 DIAGNOSIS — J32.0 CHRONIC MAXILLARY SINUSITIS: ICD-10-CM

## 2021-05-04 PROCEDURE — 17110 DESTRUCTION B9 LES UP TO 14: CPT | Performed by: DERMATOLOGY

## 2021-05-04 PROCEDURE — 99214 OFFICE O/P EST MOD 30 MIN: CPT | Performed by: OTOLARYNGOLOGY

## 2021-05-04 PROCEDURE — 31231 NASAL ENDOSCOPY DX: CPT | Performed by: OTOLARYNGOLOGY

## 2021-05-04 RX ORDER — CARBAMAZEPINE 100 MG/1
100 CAPSULE, EXTENDED RELEASE ORAL 2 TIMES DAILY
Qty: 60 CAPSULE | Refills: 0 | Status: SHIPPED | OUTPATIENT
Start: 2021-05-04 | End: 2021-05-27 | Stop reason: SDUPTHER

## 2021-05-04 ASSESSMENT — ENCOUNTER SYMPTOMS
CHOKING: 0
DIARRHEA: 0
EYE ITCHING: 0
TROUBLE SWALLOWING: 0
SINUS PRESSURE: 0
SORE THROAT: 0
EYE REDNESS: 0
COUGH: 0
VOICE CHANGE: 0
STRIDOR: 0
FACIAL SWELLING: 0
PHOTOPHOBIA: 0
SHORTNESS OF BREATH: 0
SINUS PAIN: 1
NAUSEA: 0
COLOR CHANGE: 0
RHINORRHEA: 0
EYE PAIN: 0

## 2021-05-04 NOTE — PROGRESS NOTES
History   Problem Relation Age of Onset    Cancer Mother 76        melanoma    High Blood Pressure Father     Colon Cancer Maternal Grandfather 52    Other Paternal Grandmother         PE       Social History     Social History     Socioeconomic History    Marital status:      Spouse name: Not on file    Number of children: Not on file    Years of education: Not on file    Highest education level: Not on file   Occupational History    Not on file   Social Needs    Financial resource strain: Not on file    Food insecurity     Worry: Not on file     Inability: Not on file    Transportation needs     Medical: Not on file     Non-medical: Not on file   Tobacco Use    Smoking status: Never Smoker    Smokeless tobacco: Never Used   Substance and Sexual Activity    Alcohol use:  Yes     Alcohol/week: 0.0 standard drinks    Drug use: No    Sexual activity: Yes     Partners: Male   Lifestyle    Physical activity     Days per week: Not on file     Minutes per session: Not on file    Stress: Not on file   Relationships    Social connections     Talks on phone: Not on file     Gets together: Not on file     Attends Nondenominational service: Not on file     Active member of club or organization: Not on file     Attends meetings of clubs or organizations: Not on file     Relationship status: Not on file    Intimate partner violence     Fear of current or ex partner: Not on file     Emotionally abused: Not on file     Physically abused: Not on file     Forced sexual activity: Not on file   Other Topics Concern    Not on file   Social History Narrative    Not on file       Allergies     No Known Allergies    Medications     Current Outpatient Medications   Medication Sig Dispense Refill    carBAMazepine (CARBATROL) 100 MG extended release capsule Take 1 capsule by mouth 2 times daily 60 capsule 0    chlorhexidine (PERIDEX) 0.12 % solution       triamcinolone (NASACORT ALLERGY 24HR) 55 MCG/ACT nasal inhaler 2 sprays by Each Nostril route daily      Probiotic Product (PROBIOTIC-10 PO) Take by mouth      buPROPion (WELLBUTRIN XL) 150 MG extended release tablet Take 150 mg by mouth every morning      linaclotide (LINZESS) 145 MCG capsule Take 1 capsule by mouth every morning (before breakfast) 90 capsule 3    cetirizine (ZYRTEC) 10 MG tablet Take 10 mg by mouth daily      linaclotide (LINZESS) 145 MCG capsule Take 1 capsule by mouth every morning (before breakfast) 30 capsule 8    estradiol (ESTRACE VAGINAL) 0.1 MG/GM vaginal cream Place 1/2 gram per vagina twice weekly 1 Tube 1    meloxicam (MOBIC) 7.5 MG tablet Take 1 tablet by mouth daily Prn elbow pain bilateral. 90 tablet 0    butalbital-acetaminophen-caffeine (FIORICET, ESGIC) -40 MG per tablet Take 1 tablet by mouth every 6 hours as needed for Headaches 25 tablet 1    VASCEPA 1 g CAPS capsule Take 2 capsules by mouth 2 times daily 120 capsule 3    Cholecalciferol (VITAMIN D3) 1000 units TABS Take 1 tablet by mouth daily 90 tablet 3     No current facility-administered medications for this visit. Review of Systems     Review of Systems   Constitutional: Negative for chills, fatigue and fever. HENT: Positive for sinus pain. Negative for congestion, ear discharge, ear pain, facial swelling, hearing loss, nosebleeds, postnasal drip, rhinorrhea, sinus pressure, sneezing, sore throat, tinnitus, trouble swallowing and voice change. Eyes: Negative for photophobia, pain, redness, itching and visual disturbance. Respiratory: Negative for cough, choking, shortness of breath and stridor. Gastrointestinal: Negative for diarrhea and nausea. Musculoskeletal: Negative for neck pain and neck stiffness. Skin: Negative for color change and rash. Neurological: Negative for dizziness, facial asymmetry and light-headedness. Hematological: Negative for adenopathy. Psychiatric/Behavioral: Negative for agitation and confusion.          PhysicalExam Vitals:    05/04/21 1408   BP: 101/71   Pulse: 76   Temp: 98.3 °F (36.8 °C)       Physical Exam  Constitutional:       Appearance: She is well-developed. HENT:      Head: Normocephalic and atraumatic. Jaw: No trismus. Right Ear: Tympanic membrane, ear canal and external ear normal. No drainage. No middle ear effusion. Tympanic membrane is not perforated. Left Ear: Tympanic membrane, ear canal and external ear normal. No drainage. No middle ear effusion. Tympanic membrane is not perforated. Nose: No septal deviation, mucosal edema or rhinorrhea. Mouth/Throat:      Dentition: Normal dentition. Pharynx: Uvula midline. No oropharyngeal exudate. Eyes:      General: No scleral icterus. Right eye: No discharge. Left eye: No discharge. Pupils: Pupils are equal, round, and reactive to light. Neck:      Musculoskeletal: Neck supple. Thyroid: No thyromegaly. Trachea: Phonation normal. No tracheal deviation. Pulmonary:      Effort: Pulmonary effort is normal. No respiratory distress. Breath sounds: No stridor. Lymphadenopathy:      Cervical: No cervical adenopathy. Skin:     General: Skin is warm and dry. Neurological:      Mental Status: She is alert and oriented to person, place, and time. Cranial Nerves: No cranial nerve deficit. Psychiatric:         Behavior: Behavior normal.           Procedure     Rigid Nasal Endoscopy    Preop: Sinus pain, chronic sinusitis  Anes: topical lidocaine with afrin  Consent: verbal  Description:  After obtaining verbal consent from the patient 4% lidocaine with afrin was sprayed into the nasal cavities. After allowing a time for anesthesia, a nasal endoscope was placed into the naris. The septum, inferior, and middle turbinates were examined. The middle meatus, and sphenoethmoid recess was examined bilaterally.       Patent maxillary os with no evidence of purulent drainage    Tolerated well without complication. Assessment and Plan     1. Sinus pain  Etiology the patient's facial pain I believe is secondary to some trigeminal referred pain from her recent procedure. Nasal endoscopy today reveals a right patent maxillary os with no evidence of mucopurulent drainage or inflammation. I do not think sinusitis is the cause of her symptoms. The pain began after procedure. It is located in the V2 distribution. Discussed with the patient that treatment similar to trigeminal neuralgia could possibly help her symptoms. We will do a trial of carbamazepine. Risks of medication discussed including drowsiness, ataxia, diplopia, fatigue and dizziness. Patient understands his risks. Additionally, may decrease her levels of bupropion within her body. Will do a trial and see her back in 1 week to possibly titrate up dosing. 2. Facial pain    - carBAMazepine (CARBATROL) 100 MG extended release capsule; Take 1 capsule by mouth 2 times daily  Dispense: 60 capsule; Refill: 0    3. Chronic maxillary sinusitis        Return in about 1 week (around 5/11/2021). Portions of this note were dictated using Dragon.  There may be linguistic errors secondary to the use of this program.

## 2021-05-04 NOTE — PROGRESS NOTES
[facility]  MD Jose Lakhanitraat 46  1970    46 y.o. female     Date of Visit: 5/4/2021    Chief Complaint: warts    History of Present Illness: Follow-up for 2 persistent warts on the dorsum of the right hand and left fourth fingertip she reports improvement with cryotherapy at last visit. Review of Systems:  Gen: Feels well, good sense of health. Past Medical History, Family History, Surgical History, Medications and Allergies reviewed.     Past Medical History:   Diagnosis Date    Allergic rhinitis     Atrophic vaginitis     DDD (degenerative disc disease), cervical     Dyspareunia in female     Esophagitis 05/2019    Gastritis 05/2019    HPV (human papilloma virus) infection 1992    Exposed back in college    Interstitial cystitis 10/23/2017    Iron deficiency anemia 2017    Menopause ovarian failure     Vitamin D deficiency 07/2019    Vocal cord nodule 2016     Past Surgical History:   Procedure Laterality Date    APPENDECTOMY  1996    CERVICAL FUSION  2013    C4-6   [facility] 70    bilateral, right    LARYNGOSCOPY  2016    PARTIAL HYSTERECTOMY  2009    [facility] -     UPPER GASTROINTESTINAL ENDOSCOPY  05/2019       No Known Allergies  Outpatient Medications Marked as Taking for the 5/4/21 encounter (Office Visit) with Preeti Arroyo MD   Medication Sig Dispense Refill    carBAMazepine (CARBATROL) 100 MG extended release capsule Take 1 capsule by mouth 2 times daily 60 capsule 0    chlorhexidine (PERIDEX) 0.12 % solution       triamcinolone (NASACORT ALLERGY 24HR) 55 MCG/ACT nasal inhaler 2 sprays by Each Nostril route daily      Probiotic Product (PROBIOTIC-10 PO) Take by mouth      buPROPion (WELLBUTRIN XL) 150 MG extended release tablet Take 150 mg by mouth every morning      linaclotide (LINZESS) 145 MCG capsule Take 1 capsule by mouth every morning (before breakfast) 90 capsule 3    cetirizine (ZYRTEC) 10 MG tablet Take 10 mg by mouth daily      linaclotide (LINZESS) 145 MCG capsule Take 1 capsule by mouth every morning (before breakfast) 30 capsule 8    estradiol (ESTRACE VAGINAL) 0.1 MG/GM vaginal cream Place 1/2 gram per vagina twice weekly 1 Tube 1    meloxicam (MOBIC) 7.5 MG tablet Take 1 tablet by mouth daily Prn elbow pain bilateral. 90 tablet 0    butalbital-acetaminophen-caffeine (FIORICET, ESGIC) -40 MG per tablet Take 1 tablet by mouth every 6 hours as needed for Headaches 25 tablet 1    VASCEPA 1 g CAPS capsule Take 2 capsules by mouth 2 times daily 120 capsule 3    Cholecalciferol (VITAMIN D3) 1000 units TABS Take 1 tablet by mouth daily 90 tablet 3         Physical Examination       Well apopearing. 1.  Left 4th finger - 2 small adjacent verrucous pink papules. Assessment and Plan     1. Persistent painful warts-improving with cryotherapy, still with a couple of small persistent lesions on the left fourth finger today. 2 cycles of liquid nitrogen applied to 2 warts on the left 4th finger. Patient was educated regarding the potential risks of blister formation and discomfort. Wound care was discussed.         --Poncho Conte MD

## 2021-05-11 ENCOUNTER — OFFICE VISIT (OUTPATIENT)
Dept: ENT CLINIC | Age: 51
End: 2021-05-11
Payer: COMMERCIAL

## 2021-05-11 VITALS
SYSTOLIC BLOOD PRESSURE: 103 MMHG | HEIGHT: 64 IN | BODY MASS INDEX: 22.2 KG/M2 | WEIGHT: 130 LBS | HEART RATE: 78 BPM | TEMPERATURE: 97.7 F | DIASTOLIC BLOOD PRESSURE: 72 MMHG

## 2021-05-11 DIAGNOSIS — J34.89 SINUS PAIN: Primary | ICD-10-CM

## 2021-05-11 DIAGNOSIS — R51.9 FACIAL PAIN: ICD-10-CM

## 2021-05-11 PROCEDURE — 99213 OFFICE O/P EST LOW 20 MIN: CPT | Performed by: OTOLARYNGOLOGY

## 2021-05-11 ASSESSMENT — ENCOUNTER SYMPTOMS
SINUS PRESSURE: 0
FACIAL SWELLING: 0
VOICE CHANGE: 0
EYE ITCHING: 0
EYE PAIN: 0
SHORTNESS OF BREATH: 0
COLOR CHANGE: 0
RHINORRHEA: 0
PHOTOPHOBIA: 0
COUGH: 0
EYE REDNESS: 0
CHOKING: 0
NAUSEA: 0
DIARRHEA: 0
TROUBLE SWALLOWING: 0
SINUS PAIN: 1
STRIDOR: 0
SORE THROAT: 0

## 2021-05-11 NOTE — PROGRESS NOTES
[facility]  [address] [address] [place]  P: [phone]  F: [phone]       Patient     Chris Carrion  1970    ChiefComplaint     Chief Complaint   Patient presents with    Follow-up     Patient is here today for her follow up for facial pain from last week, there has been no change       History of Present Illness     Chris Carrion is a pleasant 46 y.o. female here for follow-up for right-sided facial pain. She has not noticed any improvement of her symptoms since beginning the medication. However she has not noticed any side effects either.     Past Medical History     Past Medical History:   Diagnosis Date    Allergic rhinitis     Atrophic vaginitis     DDD (degenerative disc disease), cervical     Dyspareunia in female     Esophagitis 05/2019    Gastritis 05/2019    HPV (human papilloma virus) infection 1992    Exposed back in college    Interstitial cystitis 10/23/2017    Iron deficiency anemia 2017    Menopause ovarian failure     Vitamin D deficiency 07/2019    Vocal cord nodule 2016       Past Surgical History     Past Surgical History:   Procedure Laterality Date    APPENDECTOMY  1996    CERVICAL FUSION  2013    C4-6   [facility] 70    bilateral, right    LARYNGOSCOPY  2016    PARTIAL HYSTERECTOMY  2009    [facility] -     UPPER GASTROINTESTINAL ENDOSCOPY  05/2019       Family History     Family History   Problem Relation Age of Onset    Cancer Mother 76        melanoma    High Blood Pressure Father     Colon Cancer Maternal Grandfather 46    Other Paternal Grandmother         PE       Social History     Social History     Socioeconomic History    Marital status:      Spouse name: Not on file    Number of children: Not on file    Years of education: Not on file    Highest education level: Not on file   Occupational History    Not on file   Social Needs    Financial resource strain: Not on file    Food insecurity Worry: Not on file     Inability: Not on file    Transportation needs     Medical: Not on file     Non-medical: Not on file   Tobacco Use    Smoking status: Never Smoker    Smokeless tobacco: Never Used   Substance and Sexual Activity    Alcohol use:  Yes     Alcohol/week: 0.0 standard drinks    Drug use: No    Sexual activity: Yes     Partners: Male   Lifestyle    Physical activity     Days per week: Not on file     Minutes per session: Not on file    Stress: Not on file   Relationships    Social connections     Talks on phone: Not on file     Gets together: Not on file     Attends [employer] service: Not on file     Active member of club or organization: Not on file     Attends meetings of clubs or organizations: Not on file     Relationship status: Not on file    Intimate partner violence     Fear of current or ex partner: Not on file     Emotionally abused: Not on file     Physically abused: Not on file     Forced sexual activity: Not on file   Other Topics Concern    Not on file   Social History Narrative    Not on file       Allergies     No Known Allergies    Medications     Current Outpatient Medications   Medication Sig Dispense Refill    carBAMazepine (CARBATROL) 100 MG extended release capsule Take 1 capsule by mouth 2 times daily 60 capsule 0    chlorhexidine (PERIDEX) 0.12 % solution       triamcinolone (NASACORT ALLERGY 24HR) 55 MCG/ACT nasal inhaler 2 sprays by Each Nostril route daily      Probiotic Product (PROBIOTIC-10 PO) Take by mouth      buPROPion (WELLBUTRIN XL) 150 MG extended release tablet Take 150 mg by mouth every morning      linaclotide (LINZESS) 145 MCG capsule Take 1 capsule by mouth every morning (before breakfast) 90 capsule 3    cetirizine (ZYRTEC) 10 MG tablet Take 10 mg by mouth daily      linaclotide (LINZESS) 145 MCG capsule Take 1 capsule by mouth every morning (before breakfast) 30 capsule 8    estradiol (ESTRACE VAGINAL) 0.1 MG/GM vaginal cream Place 1/2 gram per vagina twice weekly 1 Tube 1    meloxicam (MOBIC) 7.5 MG tablet Take 1 tablet by mouth daily Prn elbow pain bilateral. 90 tablet 0    butalbital-acetaminophen-caffeine (FIORICET, ESGIC) -40 MG per tablet Take 1 tablet by mouth every 6 hours as needed for Headaches 25 tablet 1    VASCEPA 1 g CAPS capsule Take 2 capsules by mouth 2 times daily 120 capsule 3    Cholecalciferol (VITAMIN D3) 1000 units TABS Take 1 tablet by mouth daily 90 tablet 3     No current facility-administered medications for this visit. Review of Systems     Review of Systems   Constitutional: Negative for chills, fatigue and fever. HENT: Positive for sinus pain. Negative for congestion, ear discharge, ear pain, facial swelling, hearing loss, nosebleeds, postnasal drip, rhinorrhea, sinus pressure, sneezing, sore throat, tinnitus, trouble swallowing and voice change. Eyes: Negative for photophobia, pain, redness, itching and visual disturbance. Respiratory: Negative for cough, choking, shortness of breath and stridor. Gastrointestinal: Negative for diarrhea and nausea. Musculoskeletal: Negative for neck pain and neck stiffness. Skin: Negative for color change and rash. Neurological: Negative for dizziness, facial asymmetry and light-headedness. Hematological: Negative for adenopathy. Psychiatric/Behavioral: Negative for agitation and confusion. PhysicalExam     Vitals:    05/11/21 1019   BP: 103/72   Pulse: 78   Temp: 97.7 °F (36.5 °C)       Physical Exam  Constitutional:       Appearance: She is well-developed. HENT:      Head: Normocephalic and atraumatic. Jaw: No trismus. Right Ear: Tympanic membrane, ear canal and external ear normal. No drainage. No middle ear effusion. Tympanic membrane is not perforated. Left Ear: Tympanic membrane, ear canal and external ear normal. No drainage. No middle ear effusion. Tympanic membrane is not perforated.       Nose: No septal deviation, mucosal edema or rhinorrhea. Mouth/Throat:      Dentition: Normal dentition. Pharynx: Uvula midline. No oropharyngeal exudate. Eyes:      General: No scleral icterus. Right eye: No discharge. Left eye: No discharge. Pupils: Pupils are equal, round, and reactive to light. Neck:      Musculoskeletal: Neck supple. Thyroid: No thyromegaly. Trachea: Phonation normal. No tracheal deviation. Pulmonary:      Effort: Pulmonary effort is normal. No respiratory distress. Breath sounds: No stridor. Lymphadenopathy:      Cervical: No cervical adenopathy. Skin:     General: Skin is warm and dry. Neurological:      Mental Status: She is alert and oriented to person, place, and time. Cranial Nerves: No cranial nerve deficit. Psychiatric:         Behavior: Behavior normal.           Procedure           Assessment and Plan     1. Facial pain  No significant change in symptoms. No side effects from medication. Increase carbamazepine to 200mg twice a day. Contact me through my chart in one week. If no side effects, will continue to increase dose. Patient will obtain CT of the sinuses ordered by her primary care physician to rule out any other pathology. 2. Sinus pain        Return in about 4 weeks (around 6/8/2021). Portions of this note were dictated using Dragon.  There may be linguistic errors secondary to the use of this program.

## 2021-05-17 ENCOUNTER — HOSPITAL ENCOUNTER (OUTPATIENT)
Dept: CT IMAGING | Age: 51
Discharge: HOME | End: 2021-05-17
Payer: COMMERCIAL

## 2021-05-17 DIAGNOSIS — R51.9 RIGHT-SIDED FACE PAIN: ICD-10-CM

## 2021-05-17 PROCEDURE — 70486 CT MAXILLOFACIAL W/O DYE: CPT

## 2021-05-27 ENCOUNTER — TELEPHONE (OUTPATIENT)
Dept: ENT CLINIC | Age: 51
End: 2021-05-27

## 2021-05-27 DIAGNOSIS — R51.9 FACIAL PAIN: Primary | ICD-10-CM

## 2021-05-27 RX ORDER — CARBAMAZEPINE 100 MG/1
300 CAPSULE, EXTENDED RELEASE ORAL 2 TIMES DAILY
Qty: 180 CAPSULE | Refills: 0 | Status: SHIPPED | OUTPATIENT
Start: 2021-05-27 | End: 2021-07-29

## 2021-05-27 NOTE — TELEPHONE ENCOUNTER
Dr Winifred Mcgregor please responds to pt  msg she sent thru mychart she is leaving town @2:00 today

## 2021-06-02 ENCOUNTER — OFFICE VISIT (OUTPATIENT)
Dept: ENT CLINIC | Age: 51
End: 2021-06-02
Payer: COMMERCIAL

## 2021-06-02 VITALS
WEIGHT: 135 LBS | BODY MASS INDEX: 23.05 KG/M2 | HEART RATE: 72 BPM | TEMPERATURE: 98.3 F | HEIGHT: 64 IN | DIASTOLIC BLOOD PRESSURE: 73 MMHG | SYSTOLIC BLOOD PRESSURE: 106 MMHG

## 2021-06-02 DIAGNOSIS — R51.9 FACIAL PAIN: Primary | ICD-10-CM

## 2021-06-02 PROCEDURE — 99213 OFFICE O/P EST LOW 20 MIN: CPT | Performed by: OTOLARYNGOLOGY

## 2021-06-02 ASSESSMENT — ENCOUNTER SYMPTOMS
EYE PAIN: 0
CHOKING: 0
COUGH: 0
COLOR CHANGE: 0
SHORTNESS OF BREATH: 0
TROUBLE SWALLOWING: 0
DIARRHEA: 0
VOICE CHANGE: 0
RHINORRHEA: 0
NAUSEA: 0
STRIDOR: 0
EYE ITCHING: 0
PHOTOPHOBIA: 0
SINUS PRESSURE: 0
SORE THROAT: 0
EYE REDNESS: 0
SINUS PAIN: 0
FACIAL SWELLING: 0

## 2021-06-02 NOTE — PROGRESS NOTES
[facility]  [address] [facility], [address] [place], [address] [place] [address] [place]  P: [phone]  F: [phone]       Patient     Mary Jane Waters  1970    ChiefComplaint     Chief Complaint   Patient presents with    Facial Pain     Patient is here today because she is still having facial / sinus pain       History of Present Illness     [place] is a pleasant 46 y.o. female here for 1 month follow-up for facial pain. Last week, increased carbamazepine dose to 300 mg twice a day. CT sinus performed which is essentially normal, showing post surgical maxillary antrostomies and enlarged turbinates. No mucosal thickening or active infection. Patient continues to have persistent right-sided pain in the V2 distribution.     Past Medical History     Past Medical History:   Diagnosis Date    Allergic rhinitis     Atrophic vaginitis     DDD (degenerative disc disease), cervical     Dyspareunia in female     Esophagitis 05/2019    Gastritis 05/2019    HPV (human papilloma virus) infection 1992    Exposed back in college    Interstitial cystitis 10/23/2017    Iron deficiency anemia 2017    Menopause ovarian failure     Vitamin D deficiency 07/2019    Vocal cord nodule 2016       Past Surgical History     Past Surgical History:   Procedure Laterality Date    APPENDECTOMY  1996    CERVICAL FUSION  2013    [facility]    bilateral, right    LARYNGOSCOPY  2016    PARTIAL HY[facility]CTOMY  2009    [facility] -     UPPER GASTROINTESTINAL ENDOSCOPY  05/2019       Family History     Family History   Problem Relation Age of Onset    Cancer Mother 76        melanoma    High Blood Pressure Father     Colon Cancer Maternal Grandfather 46    Other Paternal Grandmother         PE       Social History     Social History     Socioeconomic History    Marital status:      Spouse name: Not on file    Number of children: Not on file    Years of education: Not on file    Highest education 3    cetirizine (ZYRTEC) 10 MG tablet Take 10 mg by mouth daily      linaclotide (LINZESS) 145 MCG capsule Take 1 capsule by mouth every morning (before breakfast) 30 capsule 8    estradiol (ESTRACE VAGINAL) 0.1 MG/GM vaginal cream Place 1/2 gram per vagina twice weekly 1 Tube 1    meloxicam (MOBIC) 7.5 MG tablet Take 1 tablet by mouth daily Prn elbow pain bilateral. 90 tablet 0    butalbital-acetaminophen-caffeine (FIORICET, ESGIC) -40 MG per tablet Take 1 tablet by mouth every 6 hours as needed for Headaches 25 tablet 1    VASCEPA 1 g CAPS capsule Take 2 capsules by mouth 2 times daily 120 capsule 3    Cholecalciferol (VITAMIN D3) 1000 units TABS Take 1 tablet by mouth daily 90 tablet 3     No current facility-administered medications for this visit. Review of Systems     Review of Systems   Constitutional: Negative for chills, fatigue and fever. HENT: Negative for congestion, ear discharge, ear pain, facial swelling, hearing loss, nosebleeds, postnasal drip, rhinorrhea, sinus pressure, sinus pain, sneezing, sore throat, tinnitus, trouble swallowing and voice change. Eyes: Negative for photophobia, pain, redness, itching and visual disturbance. Respiratory: Negative for cough, choking, shortness of breath and stridor. Gastrointestinal: Negative for diarrhea and nausea. Musculoskeletal: Negative for neck pain and neck stiffness. Skin: Negative for color change and rash. Neurological: Negative for dizziness, facial asymmetry and light-headedness. Hematological: Negative for adenopathy. Psychiatric/Behavioral: Negative for agitation and confusion. PhysicalExam     Vitals:    06/02/21 1454   BP: 106/73   Pulse: 72   Temp: 98.3 °F (36.8 °C)       Physical Exam  Constitutional:       Appearance: She is well-developed. HENT:      Head: Normocephalic and atraumatic. Jaw: No trismus. Right Ear: Tympanic membrane, ear canal and external ear normal. No drainage.  No program.

## 2021-06-04 ENCOUNTER — OFFICE VISIT (OUTPATIENT)
Dept: DERMATOLOGY | Age: 51
End: 2021-06-04
Payer: COMMERCIAL

## 2021-06-04 VITALS — TEMPERATURE: 97.1 F

## 2021-06-04 DIAGNOSIS — B07.8 OTHER VIRAL WARTS: Primary | ICD-10-CM

## 2021-06-04 DIAGNOSIS — R20.9 DISTURBANCE OF SKIN SENSATION: ICD-10-CM

## 2021-06-04 PROCEDURE — 17110 DESTRUCTION B9 LES UP TO 14: CPT | Performed by: DERMATOLOGY

## 2021-06-04 NOTE — PROGRESS NOTES
[facility]  MD Jose Renteria Cas[address]ra[address] 46  1970    46 y.o. female     Date of Visit: 6/4/2021    Chief Complaint: warts    History of Present Illness:    1. She returns today for a persistent painful wart on the left 4th finger. Has gotten smaller with cryotherapy. Review of Systems:  Gen: Feels well, good sense of health. Past Medical History, Family History, Surgical History, Medications and Allergies reviewed.     Past Medical History:   Diagnosis Date    Allergic rhinitis     Atrophic vaginitis     DDD (degenerative disc disease), cervical     Dyspareunia in female     Esophagitis 05/2019    Gastritis 05/2019    HPV (human papilloma virus) infection 1992    Exposed back in college    Interstitial cystitis 10/23/2017    Iron deficiency anemia 2017    Menopause ovarian failure     Vitamin D deficiency 07/2019    Vocal cord nodule 2016     Past Surgical History:   Procedure Laterality Date    APPENDECTOMY  1996    CERVICAL FUSION  2013    C4-6   [facility] 70    bilateral, right    LARYNGOSCOPY  2016    PARTIAL HYSTERECTOMY  2009    [facility] -     UPPER GASTROINTESTINAL ENDOSCOPY  05/2019       No Known Allergies  Outpatient Medications Marked as Taking for the 6/4/21 encounter (Office Visit) with Amber Anderson MD   Medication Sig Dispense Refill    carBAMazepine (CARBATROL) 100 MG extended release capsule Take 3 capsules by mouth 2 times daily 180 capsule 0    chlorhexidine (PERIDEX) 0.12 % solution       triamcinolone (NASACORT ALLERGY 24HR) 55 MCG/ACT nasal inhaler 2 sprays by Each Nostril route daily      Probiotic Product (PROBIOTIC-10 PO) Take by mouth      buPROPion (WELLBUTRIN XL) 150 MG extended release tablet Take 150 mg by mouth every morning      linaclotide (LINZESS) 145 MCG capsule Take 1 capsule by mouth every morning (before breakfast) 90 capsule 3    cetirizine (ZYRTEC) 10 MG tablet Take 10 mg by mouth daily      linaclotide (LINZESS) 145 MCG capsule Take 1 capsule by mouth every morning (before breakfast) 30 capsule 8    estradiol (ESTRACE VAGINAL) 0.1 MG/GM vaginal cream Place 1/2 gram per vagina twice weekly 1 Tube 1    meloxicam (MOBIC) 7.5 MG tablet Take 1 tablet by mouth daily Prn elbow pain bilateral. 90 tablet 0    butalbital-acetaminophen-caffeine (FIORICET, ESGIC) -40 MG per tablet Take 1 tablet by mouth every 6 hours as needed for Headaches 25 tablet 1    VASCEPA 1 g CAPS capsule Take 2 capsules by mouth 2 times daily 120 capsule 3    Cholecalciferol (VITAMIN D3) 1000 units TABS Take 1 tablet by mouth daily 90 tablet 3         Physical Examination       Well appearing. Left 4th finger - round verrucous pink papule. Assessment and Plan     1. Painful viral wart on the left 4th finger -     2 cycles of liquid nitrogen applied to 1 wart on the left 4th finger after soaking and paring with a #15 blade. Patient was educated regarding the potential risks of blister formation and discomfort. Wound care was discussed.         --Bret Moura MD

## 2021-06-07 ENCOUNTER — OFFICE VISIT (OUTPATIENT)
Dept: INTERNAL MEDICINE CLINIC | Age: 51
End: 2021-06-07
Payer: COMMERCIAL

## 2021-06-07 VITALS
HEIGHT: 64 IN | WEIGHT: 130 LBS | SYSTOLIC BLOOD PRESSURE: 118 MMHG | DIASTOLIC BLOOD PRESSURE: 66 MMHG | OXYGEN SATURATION: 98 % | HEART RATE: 72 BPM | BODY MASS INDEX: 22.2 KG/M2

## 2021-06-07 DIAGNOSIS — B97.89 SORE THROAT (VIRAL): ICD-10-CM

## 2021-06-07 DIAGNOSIS — J02.8 SORE THROAT (VIRAL): ICD-10-CM

## 2021-06-07 DIAGNOSIS — G51.8 PAIN DUE TO NEUROPATHY OF FACIAL NERVE: ICD-10-CM

## 2021-06-07 PROCEDURE — 99214 OFFICE O/P EST MOD 30 MIN: CPT | Performed by: NURSE PRACTITIONER

## 2021-06-07 RX ORDER — HYDROXYZINE HYDROCHLORIDE 10 MG/1
25 TABLET, FILM COATED ORAL 3 TIMES DAILY PRN
COMMUNITY
End: 2022-02-04 | Stop reason: SDUPTHER

## 2021-06-07 ASSESSMENT — ENCOUNTER SYMPTOMS
SINUS PRESSURE: 0
NAUSEA: 0
ABDOMINAL PAIN: 0
CONSTIPATION: 0
DIARRHEA: 0
CHEST TIGHTNESS: 0
BACK PAIN: 0
WHEEZING: 0
BLOOD IN STOOL: 0
EYE REDNESS: 0
SINUS PAIN: 1
COUGH: 0
VOMITING: 0
EYE ITCHING: 0
SORE THROAT: 1
COLOR CHANGE: 0
RHINORRHEA: 0
SHORTNESS OF BREATH: 0

## 2021-06-07 ASSESSMENT — PATIENT HEALTH QUESTIONNAIRE - PHQ9
SUM OF ALL RESPONSES TO PHQ9 QUESTIONS 1 & 2: 0
SUM OF ALL RESPONSES TO PHQ QUESTIONS 1-9: 0
1. LITTLE INTEREST OR PLEASURE IN DOING THINGS: 0
SUM OF ALL RESPONSES TO PHQ QUESTIONS 1-9: 0
SUM OF ALL RESPONSES TO PHQ QUESTIONS 1-9: 0
2. FEELING DOWN, DEPRESSED OR HOPELESS: 0

## 2021-06-07 NOTE — PROGRESS NOTES
Danielito Albright (:  1970) is a 46 y.o. female,Established patient, here for evaluation of the following chief complaint(s):  Pharyngitis      ASSESSMENT/PLAN:  1. Sore throat (viral)  Assessment & Plan:  Continue salt water rinses  F/U if needed  Plans to see neurology in July  2. 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 SUBJECTIVE/OBJECTIVE:  Tran Peterson presents today with complaints of a sore throat since Wednesday. Patient states sore throat has been improving and she considered cancelling the appt. Also reporting a \"sore\" to the left side of her tongue. Reports chronic sinus pain. Following with ENT for nerve pain after a dental grafting procedure. ENT is now referring the patient to neuro for which patient has an appt. in July. Denies rhinorrhea, cough, SOB, or difficulty swallowing.       Current Outpatient Medications   Medication Sig Dispense Refill    hydrOXYzine (ATARAX) 10 MG tablet Take 25 mg by mouth 3 times daily as needed for Itching       carBAMazepine (CARBATROL) 100 MG extended release capsule Take 3 capsules by mouth 2 times daily 180 capsule 0    chlorhexidine (PERIDEX) 0.12 % solution       triamcinolone (NASACORT ALLERGY 24HR) 55 MCG/ACT nasal inhaler 2 sprays by Each Nostril route daily      Probiotic Product (PROBIOTIC-10 PO) Take by mouth      linaclotide (LINZESS) 145 MCG capsule Take 1 capsule by mouth every morning (before breakfast) 90 capsule 3    cetirizine (ZYRTEC) 10 MG tablet Take 10 mg by mouth daily      linaclotide (LINZESS) 145 MCG capsule Take 1 capsule by mouth every morning (before breakfast) 30 capsule 8    estradiol (ESTRACE VAGINAL) 0.1 MG/GM vaginal cream Place 1/2 gram per vagina twice weekly 1 Tube 1    meloxicam (MOBIC) 7.5 MG tablet Take 1 tablet by mouth daily Prn elbow pain bilateral. 90 tablet 0    butalbital-acetaminophen-caffeine (FIORICET, ESGIC) -40 MG per tablet Take 1 tablet by mouth every 6 hours as needed for Headaches 25 tablet 1    VASCEPA 1 g CAPS capsule Take 2 capsules by mouth 2 times daily 120 capsule 3    Cholecalciferol (VITAMIN D3) 1000 units TABS Take 1 tablet by mouth daily 90 tablet 3    buPROPion (WELLBUTRIN XL) 150 MG extended release tablet Take 150 mg by mouth every morning (Patient not taking: Reported on 6/7/2021)       No current facility-administered medications for this visit. Review of Systems   Constitutional: Negative for chills, fatigue and fever. HENT: Positive for mouth sores, sinus pain and sore throat. Negative for congestion, ear pain, postnasal drip, rhinorrhea, sinus pressure and sneezing. Eyes: Negative for redness and itching. Respiratory: Negative for cough, chest tightness, shortness of breath and wheezing. Cardiovascular: Negative for chest pain and palpitations. Gastrointestinal: Negative for abdominal pain, blood in stool, constipation, diarrhea, nausea and vomiting. Endocrine: Negative for cold intolerance and heat intolerance. Genitourinary: Negative for difficulty urinating, dysuria, flank pain, frequency, hematuria and urgency. Musculoskeletal: Negative for arthralgias, back pain, joint swelling and myalgias. Skin: Negative for color change, pallor, rash and wound. Allergic/Immunologic: Negative for environmental allergies and food allergies. Neurological: Negative for dizziness, seizures, syncope, weakness, light-headedness, numbness and headaches. Hematological: Negative for adenopathy. Does not bruise/bleed easily. Psychiatric/Behavioral: Negative for confusion, sleep disturbance and suicidal ideas. The patient is not nervous/anxious and is not hyperactive.         Vitals:    06/07/21 1145   BP: 118/66   Site: Left Upper Arm   Position: Sitting   Cuff Size: Medium Adult   Pulse: 72 SpO2: 98%   Weight: 130 lb (59 kg)   Height: 5' 4\" (1.626 m)       Physical Exam  Vitals reviewed. Constitutional:       Appearance: Normal appearance. She is well-developed. HENT:      Head: Normocephalic and atraumatic. Right Ear: Hearing normal.      Left Ear: Hearing normal.      Nose: Nose normal. No mucosal edema. Right Sinus: No maxillary sinus tenderness or frontal sinus tenderness. Left Sinus: No maxillary sinus tenderness or frontal sinus tenderness. Mouth/Throat:      Lips: Pink. Mouth: Mucous membranes are moist.      Pharynx: Oropharynx is clear. Uvula midline. No pharyngeal swelling, oropharyngeal exudate or posterior oropharyngeal erythema. Tonsils: No tonsillar exudate or tonsillar abscesses. Eyes:      Extraocular Movements: Extraocular movements intact. Pupils: Pupils are equal, round, and reactive to light. Cardiovascular:      Rate and Rhythm: Normal rate and regular rhythm. Pulses: Normal pulses. Heart sounds: Normal heart sounds. Pulmonary:      Effort: Pulmonary effort is normal.      Breath sounds: Normal breath sounds. Musculoskeletal:      Cervical back: Normal range of motion and neck supple. Lymphadenopathy:      Head:      Right side of head: No submental, submandibular, tonsillar, preauricular, posterior auricular or occipital adenopathy. Left side of head: No submental, submandibular, tonsillar, preauricular, posterior auricular or occipital adenopathy. Skin:     General: Skin is warm and dry. Neurological:      Mental Status: She is alert. Psychiatric:         Mood and Affect: Mood normal.         Behavior: Behavior normal.                 An electronic signature was used to authenticate this note.     --Rocio Snyder, ELENA - CNP

## 2021-06-11 ENCOUNTER — OFFICE VISIT (OUTPATIENT)
Dept: NEUROLOGY | Age: 51
End: 2021-06-11
Payer: COMMERCIAL

## 2021-06-11 VITALS
HEART RATE: 74 BPM | SYSTOLIC BLOOD PRESSURE: 113 MMHG | HEIGHT: 64 IN | WEIGHT: 130 LBS | BODY MASS INDEX: 22.2 KG/M2 | DIASTOLIC BLOOD PRESSURE: 76 MMHG

## 2021-06-11 DIAGNOSIS — G50.1 ATYPICAL FACIAL PAIN: Primary | ICD-10-CM

## 2021-06-11 PROCEDURE — 99244 OFF/OP CNSLTJ NEW/EST MOD 40: CPT | Performed by: PSYCHIATRY & NEUROLOGY

## 2021-06-11 RX ORDER — PREGABALIN 75 MG/1
75 CAPSULE ORAL 2 TIMES DAILY
Qty: 60 CAPSULE | Refills: 0 | Status: SHIPPED | OUTPATIENT
Start: 2021-06-11 | End: 2021-07-29 | Stop reason: SINTOL

## 2021-06-11 NOTE — PROGRESS NOTES
NEUROLOGY CONSULTATION     Chief Complaint   Patient presents with    New Patient     Garrett DO Terri / facial pain       HISTORY OF PRESENT ILLNESS :    Ministerio Conn is a 46 y.o. female who is referred by Dr. Blanca Cabrera  History was obtained from patient  Patient was referred for evaluation of facial pain, bilateral but right worse than left. Patient states that she had some type of graft oral surgery procedure of the hard palate done back in December 2020 and then again in February 2021. Patient states that since that time patient has had some pain. She feels that the teeth on the right upper jaw hurt and it spreads to involve the right side of the face. Recently she has noticed similar pain on the left side as well but not on the teeth. The pain is rather constant but she has times when it is worse. No clear aggravating or relieving factors. Patient was seen by her family physician and then referred to ENT. CT of the sinuses were done which did not show any significant abnormalities. She was treated with various medications including carbamazepine without any improvement.     REVIEW OF SYSTEMS    Constitutional:  []   Chills   []  Fatigue   []  Fevers   []  Malaise   []  Weight loss     [x] Denies all of the above    Eyes:  []  Double vision   []  Blurry vision     [x] Denies all of the above    Ears, nose, mouth, throat, and face:   [] Hearing loss    []   Hoarseness      []  Snoring    []  Tinnitus       [x] Denies all of the above     Respiratory:   []  Cough    []  Shortness of breath         [x] Denies all of the above     Cardiovascular:   []  Chest pain    []  Exertional chest pressure/discomfort           [] Palpitations    []  Syncope     [x] Denies all of the above    Gastrointestinal:   []  Abdominal pain   [x]  Constipation    []  Diarrhea    []   Dysphagia                      [] Denies all of the above    Genitourinary:      []  Frequency   []  Hematuria     []  Urinary incontinence           [x] Denies all of the above     Hematologic/lymphatic:  []  Bleeding    []  Easy bruising   []  Anemia  [x] Denies all of the above     Musculoskeletal:   [] Back pain       []  Myalgias    [x]  Neck pain           [] Denies all of the above    Neurological: As noted in HPI    Behavioral/Psych:   [x] Anxiety    []  Depression     []  Mood swings     [] Denies all of the above     Endocrine:   []  Temperature intolerance     [] Fatigue      [x] Denies all of the above     Allergic/Immunologic:   [x] Hay fever    [] Denies all of the above     Past Medical History:   Diagnosis Date    Allergic rhinitis     Atrophic vaginitis     DDD (degenerative disc disease), cervical     Dyspareunia in female     Esophagitis 05/2019    Gastritis 05/2019    HPV (human papilloma virus) infection 1992    Exposed back in [facility]    Interstitial cystitis 10/23/2017    Iron deficiency anemia 2017    Menopause ovarian failure     Vitamin D deficiency 07/2019    Vocal cord nodule 2016     Family History   Problem Relation Age of Onset    Cancer Mother 76        melanoma    High Blood Pressure Father     Colon Cancer Maternal Grandfather 46    Other Paternal Grandmother         PE     Social History     Socioeconomic History    Marital status:      Spouse name: None    Number of children: None    Years of education: None    Highest education level: None   Occupational History    None   Tobacco Use    Smoking status: Never Smoker    Smokeless tobacco: Never Used   Vaping Use    Vaping Use: Never used   Substance and Sexual Activity    Alcohol use:  Yes     Alcohol/week: 0.0 standard drinks    Drug use: No    Sexual activity: Yes     Partners: Male   Other Topics Concern    None   Social History Narrative    None     Social Determinants of Health     Financial Resource Strain:     Difficulty of Paying Living Expenses:    Food Insecurity:     Worried About Running Out of Food in the Last Year:     [address] in the Last Year:    Transportation Needs:     Lack of Transportation (Medical):  Lack of Transportation (Non-Medical):    Physical Activity:     Days of Exercise per Week:     Minutes of Exercise per Session:    Stress:     Feeling of Stress :    Social Connections:     Frequency of Communication with Friends and Family:     Frequency of Social Gatherings with Friends and Family:     Attends [employer] Services:     Active Member of Clubs or Organizations:     Attends Club or Organization Meetings:     Marital Status:    Intimate Partner Violence:     Fear of Current or Ex-Partner:     Emotionally Abused:     Physically Abused:     Sexually Abused:        PHYSICAL EXAMINATION:  /76   Pulse 74   Ht 5' 4\" (1.626 m)   Wt 130 lb (59 kg)   LMP  (LMP Unknown)   BMI 22.31 kg/m²   Appearance: Well appearing, well nourished and in no distress  Mental Status Exam: Patient is alert, oriented to person, place and time. Recent and remote memory is normal  Fund of Knowledge is normal  Attention/concentration is normal.   Speech : No dysarthria  Language : No aphasia  Funduscopic Exam: sharp disc margins  Cranial Nerves:   II: Visual fields:  Full to confrontation  III: Pupils:  equal, round, reactive to light  III,IV,VI: Extra Ocular Movements are intact. No nystagmus  V: Facial sensation is intact to pin prick and light touch  VII: Facial strength and movements: intact and symmetric smile,cheek puffing and eyebrow elevation  VIII: Hearing:  Intact to finger rub bilaterally  IX: Palate  elevation is symmetric  XI: Shoulder shrug is intact  XII: Tongue movements are normal  Motor:  Muscle tone and bulk are normal.   Strength is symmetrical 5/5 in all four extremities.   Sensory: Intact to light touch and  pin prick in all four extremities  Coordination:  Normal  Finger to Nose and Heel to [employer] bilaterally    . Reflexes:  DTR +2 and symmetric bilaterally  Plantar response: Flexor bilaterally  Gait: Gait and station is normal. Patient can toe/ heel and tandem walk without difficulty  Romberg: negative  Vascular: No carotid bruit bilaterally        DATA:  LABS:  General Labs:    CBC:   Lab Results   Component Value Date    WBC 3.3 07/23/2019    RBC 4.14 07/23/2019    HGB 12.9 07/23/2019    HCT 38.7 07/23/2019    MCV 93.4 07/23/2019    MCH 31.2 07/23/2019    MCHC 33.4 07/23/2019    RDW 13.5 07/23/2019     07/23/2019    MPV 8.0 07/23/2019     BMP:    Lab Results   Component Value Date     07/23/2019    K 4.1 07/23/2019     07/23/2019    CO2 29 07/23/2019    BUN 15 07/23/2019    LABALBU 4.5 07/23/2019    CREATININE 0.8 07/23/2019    CALCIUM 9.6 07/23/2019    GFRAA >60 07/23/2019    LABGLOM >60 07/23/2019    GLUCOSE 83 07/23/2019     RADIOLOGY REVIEW:  I have reviewed radiology report(s) of: CT scan of the sinuses    IMPRESSION :  Atypical facial pain now involving both sides of the face even though the right side is worse than the left  History is not typical for classical trigeminal neuralgia  Patient has been on carbamazepine with no improvement  CT scan of the sinuses did not show any abnormalities  Patient has had an ENT evaluation as well    RECOMMENDATIONS :  Discussed at length with patient about treatment options. I agree that it is reasonable to give her a trial of either Lyrica and or amitriptyline to see if we can help with the facial pain  We decided to start the patient on Lyrica 75 mg twice daily. Side effects were discussed. Patient has been on Lyrica in the past after she had cervical surgery  I have also recommended that she get another opinion from an oral surgeon to see if anything else could be done. I will see her back in a month for follow-up  If there is no improvement, then I will consider MRI brain to look for any structural lesions.         Please note a portion of this chart was generated using dragon dictation software. Although every effort was made to ensure the accuracy of this automated transcription, some errors in transcription may have occurred.

## 2021-06-22 ENCOUNTER — OFFICE VISIT (OUTPATIENT)
Dept: ENT CLINIC | Age: 51
End: 2021-06-22
Payer: COMMERCIAL

## 2021-06-22 VITALS
HEART RATE: 76 BPM | WEIGHT: 129 LBS | TEMPERATURE: 97.7 F | SYSTOLIC BLOOD PRESSURE: 100 MMHG | BODY MASS INDEX: 22.02 KG/M2 | DIASTOLIC BLOOD PRESSURE: 68 MMHG | HEIGHT: 64 IN

## 2021-06-22 DIAGNOSIS — R51.9 FACIAL PAIN: Primary | ICD-10-CM

## 2021-06-22 PROCEDURE — 99214 OFFICE O/P EST MOD 30 MIN: CPT | Performed by: OTOLARYNGOLOGY

## 2021-06-22 RX ORDER — PREDNISONE 10 MG/1
TABLET ORAL
Qty: 34 TABLET | Refills: 0 | Status: SHIPPED | OUTPATIENT
Start: 2021-06-22 | End: 2021-06-24 | Stop reason: SDUPTHER

## 2021-06-22 ASSESSMENT — ENCOUNTER SYMPTOMS
CHOKING: 0
SORE THROAT: 0
COUGH: 0
TROUBLE SWALLOWING: 0
SINUS PRESSURE: 0
PHOTOPHOBIA: 0
FACIAL SWELLING: 0
NAUSEA: 0
RHINORRHEA: 0
EYE PAIN: 0
DIARRHEA: 0
STRIDOR: 0
VOICE CHANGE: 0
EYE REDNESS: 0
SINUS PAIN: 1
SHORTNESS OF BREATH: 0
EYE ITCHING: 0
COLOR CHANGE: 0

## 2021-06-22 NOTE — PROGRESS NOTES
[facility]  [address], [facility] [place] [address]  P: [phone]  F: [phone]       Patient     Galindo Curry  1970    ChiefComplaint     Chief Complaint   Patient presents with    Follow-up     Patient is here today for her follow up visit, she is still having facial pain       History of Present Illness     Galindo Curry is a pleasant 46 y.o. female here for follow-up for facial pain. She recently saw neurology and was placed on Lyrica 75 mg twice a day. Continues to experience right-sided facial pain. She is weaning off her carbamazepine. She will start her Lyrica after she has weaned off to avoid dizziness. Upon further discussion, the patient states she did have similar symptoms years ago and it was related to temporomandibular joint inflammation. She has not been on any steroids since she has experienced this pain.     Past Medical History     Past Medical History:   Diagnosis Date    Allergic rhinitis     Atrophic vaginitis     DDD (degenerative disc disease), cervical     Dyspareunia in female     Esophagitis 05/2019    Gastritis 05/2019    HPV (human papilloma virus) infection 1992    [facility] in [facility]    Interstitial cystitis 10/23/2017    Iron deficiency anemia 2017    Menopause ovarian failure     Vitamin D deficiency 07/2019    Vocal cord nodule 2016       Past Surgical History     Past Surgical History:   Procedure Laterality Date    APPENDECTOMY  1996    CERVICAL FUSION  2013    C4-6   [address] 70    bilateral, right    LARYNGOSCOPY  2016    PARTIAL HYSTERECTOMY  2009    DUB -     UPPER GASTROINTESTINAL ENDOSCOPY  05/2019       Family History     Family History   Problem Relation Age of Onset    Cancer Mother 76        melanoma    High Blood Pressure Father     Colon Cancer Maternal Grandfather 46    Other Paternal Grandmother         PE       Social History     Social History     Socioeconomic History    Marital status:      Spouse name: Not on file    Number of children: Not on file    Years of education: Not on file    Highest education level: Not on file   Occupational History    Not on file   Tobacco Use    Smoking status: Never Smoker    Smokeless tobacco: Never Used   Vaping Use    Vaping Use: Never used   Substance and Sexual Activity    Alcohol use: Yes     Alcohol/week: 0.0 standard drinks    Drug use: No    Sexual activity: Yes     Partners: Male   Other Topics Concern    Not on file   Social History Narrative    Not on file     Social Determinants of Health     Financial Resource Strain:     Difficulty of Paying Living Expenses:    Food Insecurity:     Worried About Running Out of Food in the Last Year:     [address] in the Last Year:    Transportation Needs:     Lack of Transportation (Medical):  Lack of Transportation (Non-Medical):    Physical Activity:     Days of Exercise per Week:     Minutes of Exercise per Session:    Stress:     Feeling of Stress :    Social Connections:     Frequency of Communication with Friends and Family:     Frequency of Social Gatherings with Friends and Family:     Attends [employer] Services:     Active Member of Clubs or Organizations:     Attends Club or Organization Meetings:     Marital Status:    Intimate Partner Violence:     Fear of Current or Ex-Partner:     Emotionally Abused:     Physically Abused:     Sexually Abused: Allergies     No Known Allergies    Medications     Current Outpatient Medications   Medication Sig Dispense Refill    predniSONE (DELTASONE) 10 MG tablet 4 tablets daily for 7 days, 3 tablets daily for 1 day, 2 tablets daily for 1 day, 1 tablet daily for 1 day 34 tablet 0    pregabalin (LYRICA) 75 MG capsule Take 1 capsule by mouth 2 times daily for 30 days.  60 capsule 0    hydrOXYzine (ATARAX) 10 MG tablet Take 25 mg by mouth 3 times daily as needed for Itching       carBAMazepine Hematological: Negative for adenopathy. Psychiatric/Behavioral: Negative for agitation and confusion. PhysicalExam     Vitals:    06/22/21 0937   BP: 100/68   Pulse: 76   Temp: 97.7 °F (36.5 °C)       Physical Exam  Constitutional:       Appearance: She is well-developed. HENT:      Head: Normocephalic and atraumatic. Jaw: No trismus. Right Ear: Tympanic membrane, ear canal and external ear normal. No drainage. No middle ear effusion. Tympanic membrane is not perforated. Left Ear: Tympanic membrane, ear canal and external ear normal. No drainage. No middle ear effusion. Tympanic membrane is not perforated. Nose: No septal deviation, mucosal edema or rhinorrhea. Mouth/Throat:      Dentition: Normal dentition. Pharynx: Uvula midline. No oropharyngeal exudate. Eyes:      General: No scleral icterus. Right eye: No discharge. Left eye: No discharge. Pupils: Pupils are equal, round, and reactive to light. Neck:      Thyroid: No thyromegaly. Trachea: Phonation normal. No tracheal deviation. Pulmonary:      Effort: Pulmonary effort is normal. No respiratory distress. Breath sounds: No stridor. Musculoskeletal:      Cervical back: Neck supple. Lymphadenopathy:      Cervical: No cervical adenopathy. Skin:     General: Skin is warm and dry. Neurological:      Mental Status: She is alert and oriented to person, place, and time. Cranial Nerves: No cranial nerve deficit. Psychiatric:         Behavior: Behavior normal.           Procedure           Assessment and Plan     1. Facial pain  Patient with persistent facial pain despite treatment with carbamazepine. Recently saw neurology. Weaning off carbamazepine and will begin Lyrica. In the meantime, if this is related to possible TMJ, trial of steroids I believe is indicated. We will do a 10-day course of prednisone. Risks of medication discussed with patient.   - predniSONE (DELTASONE) 10 MG tablet; 4 tablets daily for 7 days, 3 tablets daily for 1 day, 2 tablets daily for 1 day, 1 tablet daily for 1 day  Dispense: 34 tablet; Refill: 0      Return if symptoms worsen or fail to improve. Portions of this note were dictated using Dragon.  There may be linguistic errors secondary to the use of this program.

## 2021-06-23 ENCOUNTER — TELEPHONE (OUTPATIENT)
Dept: NEUROLOGY | Age: 51
End: 2021-06-23

## 2021-06-23 NOTE — TELEPHONE ENCOUNTER
Spoke to pt. She started 1st dose of  Lyrica this morning and c/o dizziness. Pt advised to continue Lyrica 1 at night throughout the weekend. She will c/b on Monday to discuss tolerability.

## 2021-06-24 ENCOUNTER — TELEPHONE (OUTPATIENT)
Dept: ENT CLINIC | Age: 51
End: 2021-06-24

## 2021-06-24 DIAGNOSIS — R51.9 FACIAL PAIN: ICD-10-CM

## 2021-06-24 RX ORDER — PREDNISONE 10 MG/1
TABLET ORAL
Qty: 34 TABLET | Refills: 0 | Status: SHIPPED | OUTPATIENT
Start: 2021-06-24 | End: 2021-07-26

## 2021-06-24 NOTE — TELEPHONE ENCOUNTER
Please call pt she is traveling and left her RX given this week prednisone  She will find a pharmacy and have it ready when office calls

## 2021-06-24 NOTE — TELEPHONE ENCOUNTER
Spoke with patient and routed a message to Dr. Jaun Black along with the script and changed the pharmacy as well

## 2021-07-08 ENCOUNTER — OFFICE VISIT (OUTPATIENT)
Dept: DERMATOLOGY | Age: 51
End: 2021-07-08
Payer: COMMERCIAL

## 2021-07-08 VITALS — TEMPERATURE: 97.4 F

## 2021-07-08 DIAGNOSIS — B07.8 OTHER VIRAL WARTS: Primary | ICD-10-CM

## 2021-07-08 DIAGNOSIS — R20.9 DISTURBANCE OF SKIN SENSATION: ICD-10-CM

## 2021-07-08 PROCEDURE — 17110 DESTRUCTION B9 LES UP TO 14: CPT | Performed by: DERMATOLOGY

## 2021-07-08 RX ORDER — AMOXICILLIN 500 MG/1
TABLET, FILM COATED ORAL
COMMUNITY
Start: 2021-07-01 | End: 2021-07-26

## 2021-07-08 NOTE — PROGRESS NOTES
[facility]  MD Jose Sotelotra[address] 46  1970    46 y.o. female     Date of Visit: 7/8/2021    Chief Complaint: wart    History of Present Illness:    1. She returns today for a persistent wart on the left 4th finger. Review of Systems:  Gen: Feels well, good sense of health. Past Medical History, Family History, Surgical History, Medications and Allergies reviewed. Past Medical History:   Diagnosis Date    Allergic rhinitis     Atrophic vaginitis     DDD (degenerative disc disease), cervical     Dyspareunia in female     Esophagitis 05/2019    Gastritis 05/2019    HPV (human papilloma virus) infection 1992    [facility]    Interstitial cystitis 10/23/2017    Iron deficiency anemia 2017    Menopause ovarian failure     Vitamin D deficiency 07/2019    Vocal cord nodule 2016     Past Surgical History:   Procedure Laterality Date   [address] CERVICAL FUSION  2013    C4-6   Michael Aguilar 70    bilateral, right    LARYNGOSCOPY  2016    PARTIAL HYSTERECTOMY  2009    DUB -     UPPER GASTROINTESTINAL ENDOSCOPY  05/2019       No Known Allergies  Outpatient Medications Marked as Taking for the 7/8/21 encounter (Office Visit) with Penny Padgett MD   Medication Sig Dispense Refill    Amoxicillin 500 MG TABS take 1 tablet by mouth three times a day for 10 days      predniSONE (DELTASONE) 10 MG tablet 4 tablets daily for 7 days, 3 tablets daily for 1 day, 2 tablets daily for 1 day, 1 tablet daily for 1 day 34 tablet 0    pregabalin (LYRICA) 75 MG capsule Take 1 capsule by mouth 2 times daily for 30 days.  60 capsule 0    hydrOXYzine (ATARAX) 10 MG tablet Take 25 mg by mouth 3 times daily as needed for Itching       carBAMazepine (CARBATROL) 100 MG extended release capsule Take 3 capsules by mouth 2 times daily 180 capsule 0    chlorhexidine (PERIDEX) 0.12 % solution       triamcinolone (NASACORT ALLERGY 24HR) 55 MCG/ACT nasal inhaler 2 sprays by Each Nostril route daily      Probiotic Product (PROBIOTIC-10 PO) Take by mouth      linaclotide (LINZESS) 145 MCG capsule Take 1 capsule by mouth every morning (before breakfast) 90 capsule 3    cetirizine (ZYRTEC) 10 MG tablet Take 10 mg by mouth daily      linaclotide (LINZESS) 145 MCG capsule Take 1 capsule by mouth every morning (before breakfast) 30 capsule 8    estradiol (ESTRACE VAGINAL) 0.1 MG/GM vaginal cream Place 1/2 gram per vagina twice weekly 1 Tube 1    meloxicam (MOBIC) 7.5 MG tablet Take 1 tablet by mouth daily Prn elbow pain bilateral. 90 tablet 0    butalbital-acetaminophen-caffeine (FIORICET, ESGIC) -40 MG per tablet Take 1 tablet by mouth every 6 hours as needed for Headaches 25 tablet 1    VASCEPA 1 g CAPS capsule Take 2 capsules by mouth 2 times daily 120 capsule 3    Cholecalciferol (VITAMIN D3) 1000 units TABS Take 1 tablet by mouth daily 90 tablet 3           Physical Examination       Well appearing. 1.  Medial aspect of the tip of the right 4th finger with a smaller slightly verrucous yellowish pink papule. Assessment and Plan     1. Painful wart of the right 4th finger    2 cycles of liquid nitrogen applied to 1 wart on the right 4th finger after soaking in tap water. Patient was educated regarding the potential risks of blister formation and discomfort. Wound care was discussed.           --Solo Molina MD

## 2021-07-19 ENCOUNTER — OFFICE VISIT (OUTPATIENT)
Dept: GYNECOLOGY | Age: 51
End: 2021-07-19
Payer: COMMERCIAL

## 2021-07-19 VITALS
SYSTOLIC BLOOD PRESSURE: 110 MMHG | OXYGEN SATURATION: 98 % | DIASTOLIC BLOOD PRESSURE: 70 MMHG | HEIGHT: 65 IN | WEIGHT: 131.6 LBS | BODY MASS INDEX: 21.92 KG/M2 | HEART RATE: 84 BPM | RESPIRATION RATE: 17 BRPM

## 2021-07-19 DIAGNOSIS — N95.1 VAGINAL DRYNESS, MENOPAUSAL: ICD-10-CM

## 2021-07-19 DIAGNOSIS — Z01.419 WELL WOMAN EXAM WITH ROUTINE GYNECOLOGICAL EXAM: Primary | ICD-10-CM

## 2021-07-19 DIAGNOSIS — K59.00 CONSTIPATION, UNSPECIFIED CONSTIPATION TYPE: ICD-10-CM

## 2021-07-19 DIAGNOSIS — N94.9 ADNEXAL FULLNESS: ICD-10-CM

## 2021-07-19 DIAGNOSIS — K64.8 OTHER HEMORRHOIDS: ICD-10-CM

## 2021-07-19 PROCEDURE — 99396 PREV VISIT EST AGE 40-64: CPT | Performed by: OBSTETRICS & GYNECOLOGY

## 2021-07-19 RX ORDER — HYDROCORTISONE ACETATE 25 MG/1
25 SUPPOSITORY RECTAL EVERY 12 HOURS
Qty: 30 SUPPOSITORY | Refills: 3 | Status: SHIPPED | OUTPATIENT
Start: 2021-07-19

## 2021-07-19 RX ORDER — ESTRADIOL 0.1 MG/G
1 CREAM VAGINAL
Qty: 1 TUBE | Refills: 3 | Status: SHIPPED | OUTPATIENT
Start: 2021-07-19 | End: 2022-07-12 | Stop reason: SDUPTHER

## 2021-07-19 RX ORDER — ESTRADIOL 0.05 MG/D
1 FILM, EXTENDED RELEASE TRANSDERMAL
Qty: 24 PATCH | Refills: 3 | Status: SHIPPED | OUTPATIENT
Start: 2021-07-19 | End: 2022-05-03 | Stop reason: SDUPTHER

## 2021-07-20 ENCOUNTER — HOSPITAL ENCOUNTER (OUTPATIENT)
Dept: ULTRASOUND IMAGING | Age: 51
Discharge: HOME | End: 2021-07-20
Payer: COMMERCIAL

## 2021-07-20 DIAGNOSIS — N94.9 ADNEXAL FULLNESS: ICD-10-CM

## 2021-07-20 PROCEDURE — 76856 US EXAM PELVIC COMPLETE: CPT

## 2021-07-20 PROCEDURE — 76830 TRANSVAGINAL US NON-OB: CPT

## 2021-07-21 DIAGNOSIS — Z11.4 ENCOUNTER FOR SCREENING FOR HIV: ICD-10-CM

## 2021-07-21 DIAGNOSIS — Z00.00 ROUTINE GENERAL MEDICAL EXAMINATION AT A HEALTH CARE FACILITY: ICD-10-CM

## 2021-07-21 DIAGNOSIS — Z11.59 NEED FOR HEPATITIS C SCREENING TEST: ICD-10-CM

## 2021-07-21 DIAGNOSIS — Z78.0 MENOPAUSE: ICD-10-CM

## 2021-07-21 DIAGNOSIS — E55.9 VITAMIN D DEFICIENCY: ICD-10-CM

## 2021-07-21 DIAGNOSIS — R23.2 HOT FLASHES: ICD-10-CM

## 2021-07-21 LAB
A/G RATIO: 2 (ref 1.1–2.2)
ALBUMIN SERPL-MCNC: 4.5 G/DL (ref 3.4–5)
ALP BLD-CCNC: 89 U/L (ref 40–129)
ALT SERPL-CCNC: 27 U/L (ref 10–40)
ANION GAP SERPL CALCULATED.3IONS-SCNC: 12 MMOL/L (ref 3–16)
AST SERPL-CCNC: 26 U/L (ref 15–37)
BILIRUB SERPL-MCNC: 0.3 MG/DL (ref 0–1)
BUN BLDV-MCNC: 12 MG/DL (ref 7–20)
CALCIUM SERPL-MCNC: 9.7 MG/DL (ref 8.3–10.6)
CHLORIDE BLD-SCNC: 105 MMOL/L (ref 99–110)
CO2: 27 MMOL/L (ref 21–32)
CREAT SERPL-MCNC: 0.7 MG/DL (ref 0.6–1.1)
FOLLICLE STIMULATING HORMONE: 93.9 MIU/ML
GFR AFRICAN AMERICAN: >60
GFR NON-AFRICAN AMERICAN: >60
GLOBULIN: 2.3 G/DL
GLUCOSE BLD-MCNC: 81 MG/DL (ref 70–99)
HCT VFR BLD CALC: 38.2 % (ref 36–48)
HEMOGLOBIN: 13.1 G/DL (ref 12–16)
HEPATITIS C ANTIBODY INTERPRETATION: NORMAL
LUTEINIZING HORMONE: 55.4 MIU/ML
MCH RBC QN AUTO: 31.6 PG (ref 26–34)
MCHC RBC AUTO-ENTMCNC: 34.4 G/DL (ref 31–36)
MCV RBC AUTO: 91.8 FL (ref 80–100)
PDW BLD-RTO: 13.7 % (ref 12.4–15.4)
PLATELET # BLD: 218 K/UL (ref 135–450)
PMV BLD AUTO: 7.6 FL (ref 5–10.5)
POTASSIUM SERPL-SCNC: 4.1 MMOL/L (ref 3.5–5.1)
RBC # BLD: 4.16 M/UL (ref 4–5.2)
SODIUM BLD-SCNC: 144 MMOL/L (ref 136–145)
T4 FREE: 0.9 NG/DL (ref 0.9–1.8)
TOTAL PROTEIN: 6.8 G/DL (ref 6.4–8.2)
TSH SERPL DL<=0.05 MIU/L-ACNC: 2.68 UIU/ML (ref 0.27–4.2)
VITAMIN D 25-HYDROXY: 30.5 NG/ML
WBC # BLD: 3.6 K/UL (ref 4–11)

## 2021-07-21 ASSESSMENT — ENCOUNTER SYMPTOMS
RESPIRATORY NEGATIVE: 1
EYES NEGATIVE: 1
GASTROINTESTINAL NEGATIVE: 1

## 2021-07-22 LAB
ESTIMATED AVERAGE GLUCOSE: 105.4 MG/DL
ESTRADIOL LEVEL: <5 PG/ML
HBA1C MFR BLD: 5.3 %
HIV AG/AB: NORMAL
HIV ANTIGEN: NORMAL
HIV-1 ANTIBODY: NORMAL
HIV-2 AB: NORMAL

## 2021-07-22 NOTE — PROGRESS NOTES
Subjective:      Patient ID: Jorge Najera is a 46 y.o. female. Patient is here for annual. Patient with pain with intercourse. Gynecologic Exam        Review of Systems   Constitutional: Negative. HENT: Negative. Eyes: Negative. Respiratory: Negative. Cardiovascular: Negative. Gastrointestinal: Negative. Genitourinary: Negative. Musculoskeletal: Negative. Skin: Negative. Neurological: Negative. Psychiatric/Behavioral: Negative.       Date of Birth 1970  Past Medical History:   Diagnosis Date    Allergic rhinitis     Atrophic vaginitis     DDD (degenerative disc disease), cervical     Dysmenorrhea     Dyspareunia in female     Esophagitis 2019    Fibrocystic breast     Gastritis 2019    HPV (human papilloma virus) infection 1992    Exposed back in college    Interstitial cystitis 10/23/2017    Iron deficiency anemia 2017    Menopause ovarian failure     Menorrhagia     Vitamin D deficiency 2019    Vocal cord nodule 2016     Past Surgical History:   Procedure Laterality Date    APPENDECTOMY  1996    CERVICAL FUSION      C4-6   Michael Aguilar 70    bilateral, right    LARYNGOSCOPY  2016    PARTIAL HYSTERECTOMY  2009    DUB -     UPPER GASTROINTESTINAL ENDOSCOPY  2019     OB History    Para Term  AB Living   3 3 3     3   SAB TAB Ectopic Molar Multiple Live Births             3      # Outcome Date GA Lbr Ray/2nd Weight Sex Delivery Anes PTL Lv   3 Term      Vag-Spont   JULIO   2 Term      Vag-Spont   JULIO   1 Term      Vag-Spont   JULIO     Social History     Socioeconomic History    Marital status:      Spouse name: Not on file    Number of children: Not on file    Years of education: Not on file    Highest education level: Not on file   Occupational History    Not on file   Tobacco Use    Smoking status: Never Smoker    Smokeless tobacco: Never Used   Vaping Use    Vaping Use: Never used   Substance and Sexual Activity    Alcohol use: Yes     Alcohol/week: 0.0 standard drinks    Drug use: No    Sexual activity: Yes     Partners: Male   Other Topics Concern    Not on file   Social History Narrative    Not on file     Social Determinants of Health     Financial Resource Strain:     Difficulty of Paying Living Expenses:    Food Insecurity:     Worried About Running Out of Food in the Last Year:     [address] in the Last Year:    Transportation Needs:     Lack of Transportation (Medical):      Lack of Transportation (Non-Medical):    Physical Activity:     Days of Exercise per Week:     Minutes of Exercise per Session:    Stress:     Feeling of Stress :    Social Connections:     Frequency of Communication with Friends and Family:     Frequency of Social Gatherings with Friends and Family:     Attends [employer] Services:     Active Member of Clubs or Organizations:     Attends Club or Organization Meetings:     Marital Status:    Intimate Partner Violence:     Fear of Current or Ex-Partner:     Emotionally Abused:     Physically Abused:     Sexually Abused:      No Known Allergies  Outpatient Medications Marked as Taking for the 7/19/21 encounter (Office Visit) with Job Burgos MD   Medication Sig Dispense Refill    estradiol (Helayne Gondola) 0.05 MG/24HR Place 1 patch onto the skin Twice a Week 24 patch 3    estradiol (ESTRACE VAGINAL) 0.1 MG/GM vaginal cream Place 1 g vaginally Twice a Week 1 Tube 3    linaclotide (LINZESS) 145 MCG capsule Take 1 capsule by mouth every morning (before breakfast) 90 capsule 3    hydrocortisone (ANUSOL-HC) 25 MG suppository Place 1 suppository rectally every 12 hours prn 30 suppository 3    hydrOXYzine (ATARAX) 10 MG tablet Take 25 mg by mouth 3 times daily as needed for Itching       chlorhexidine (PERIDEX) 0.12 % solution       triamcinolone (NASACORT ALLERGY 24HR) 55 MCG/ACT nasal inhaler 2 sprays by Each Nostril route daily      Probiotic Product (PROBIOTIC-10 PO) Take by mouth      cetirizine (ZYRTEC) 10 MG tablet Take 10 mg by mouth daily      linaclotide (LINZESS) 145 MCG capsule Take 1 capsule by mouth every morning (before breakfast) 30 capsule 8    meloxicam (MOBIC) 7.5 MG tablet Take 1 tablet by mouth daily Prn elbow pain bilateral. 90 tablet 0    VASCEPA 1 g CAPS capsule Take 2 capsules by mouth 2 times daily 120 capsule 3    Cholecalciferol (VITAMIN D3) 1000 units TABS Take 1 tablet by mouth daily 90 tablet 3     Family History   Problem Relation Age of Onset    Cancer Mother 76        melanoma    High Blood Pressure Father     Colon Cancer Maternal Grandfather 46    Other Paternal Grandmother         PE     /70   Pulse 84   Resp 17   Ht 5' 5\" (1.651 m)   Wt 131 lb 9.6 oz (59.7 kg)   LMP  (LMP Unknown)   SpO2 98%   BMI 21.90 kg/m²       Objective:   Physical Exam  Constitutional:       General: She is not in acute distress. Appearance: Normal appearance. She is well-developed and normal weight. She is not diaphoretic. HENT:      Head: Normocephalic. Nose: Nose normal.      Mouth/Throat:      Mouth: Mucous membranes are moist.      Pharynx: Oropharynx is clear. Eyes:      Pupils: Pupils are equal, round, and reactive to light. Neck:      Thyroid: No thyromegaly. Cardiovascular:      Rate and Rhythm: Normal rate and regular rhythm. Heart sounds: Normal heart sounds. No murmur heard. No friction rub. No gallop. Pulmonary:      Effort: Pulmonary effort is normal. No respiratory distress. Breath sounds: Normal breath sounds. No wheezing or rales. Chest:      Chest wall: No tenderness. Breasts:         Right: No swelling, bleeding, inverted nipple, mass, nipple discharge, skin change or tenderness. Left: No swelling, bleeding, inverted nipple, mass, nipple discharge, skin change or tenderness. Abdominal:      General: Abdomen is flat. There is no distension.       Palpations: Abdomen is soft. There is no hepatomegaly or mass. Tenderness: There is no abdominal tenderness. There is no guarding or rebound. Hernia: No hernia is present. There is no hernia in the left inguinal area or right inguinal area. Genitourinary:     Exam position: Lithotomy position. Labia:         Right: No rash, tenderness, lesion or injury. Left: No rash, tenderness, lesion or injury. Urethra: No prolapse, urethral pain, urethral swelling or urethral lesion. Vagina: Normal. No signs of injury and foreign body. No vaginal discharge, erythema, tenderness, bleeding or lesions. Cervix: No cervical motion tenderness, discharge, friability, lesion, erythema, cervical bleeding or eversion. Adnexa: Left adnexa normal.        Right: Mass present. No tenderness or fullness. Left: No mass, tenderness or fullness. Rectum: Guaiac result negative. External hemorrhoid present. No mass, tenderness, anal fissure or internal hemorrhoid. Normal anal tone. Comments: Normal urethral meatus, nl urethra, nl bladder. Musculoskeletal:         General: No tenderness. Normal range of motion. Cervical back: Normal range of motion and neck supple. No rigidity. Lymphadenopathy:      Cervical: No cervical adenopathy. Skin:     General: Skin is warm and dry. Neurological:      General: No focal deficit present. Mental Status: She is alert and oriented to person, place, and time. Mental status is at baseline. Deep Tendon Reflexes: Reflexes are normal and symmetric. Psychiatric:         Mood and Affect: Mood normal.         Behavior: Behavior normal.         Thought Content: Thought content normal.         Judgment: Judgment normal.         Assessment:      1. Annual  2. Menopause  3. Dyspareunia  4. Adnexal fullness  5. External hemorrhoid      Plan:      1. Pap, calcium, exercise, mammogram, hemocult negative  2.  Refill patch  3. Vaginal dryness-estrogen vaginal cream  4. Pelvic US  5.  Anusol HC supp-Linzess for constipation        Polly Sanford MD

## 2021-07-23 ENCOUNTER — PATIENT MESSAGE (OUTPATIENT)
Dept: FAMILY MEDICINE CLINIC | Age: 51
End: 2021-07-23

## 2021-07-23 DIAGNOSIS — Z13.220 SCREENING CHOLESTEROL LEVEL: Primary | ICD-10-CM

## 2021-07-23 NOTE — TELEPHONE ENCOUNTER
Pt viewed results of labs on [facility] but would like to discuss with pcp. Are you able to call her or would you like an appt to be scheduled? Hi Dr. Apryl Kapoor-   Just wanted to discuss my blood work results with you  . Pertaining to my Low WBC. Also, did we happen to check my cholesterol ? I did not see it in my results.      Thank you,  Juan Quintero

## 2021-07-26 ENCOUNTER — OFFICE VISIT (OUTPATIENT)
Dept: DERMATOLOGY | Age: 51
End: 2021-07-26
Payer: COMMERCIAL

## 2021-07-26 VITALS — TEMPERATURE: 97.7 F

## 2021-07-26 DIAGNOSIS — D22.9 MULTIPLE NEVI: Primary | ICD-10-CM

## 2021-07-26 DIAGNOSIS — Z13.220 SCREENING CHOLESTEROL LEVEL: ICD-10-CM

## 2021-07-26 DIAGNOSIS — L81.4 SOLAR LENTIGO: ICD-10-CM

## 2021-07-26 LAB
CHOLESTEROL, TOTAL: 229 MG/DL (ref 0–199)
HDLC SERPL-MCNC: 100 MG/DL (ref 40–60)
LDL CHOLESTEROL CALCULATED: 116 MG/DL
TRIGL SERPL-MCNC: 64 MG/DL (ref 0–150)
VLDLC SERPL CALC-MCNC: 13 MG/DL

## 2021-07-26 PROCEDURE — 99213 OFFICE O/P EST LOW 20 MIN: CPT | Performed by: DERMATOLOGY

## 2021-07-26 NOTE — TELEPHONE ENCOUNTER
Please see if lipids can be added if the patient was fasting. In regard to the patient 's white blood cell count , she has had a decreased white blood cell count in the past, but improved ( It was 3.3 in 7/19 and 3.4 in 7/18). If she would like to discuss further, please have her schedule an appointment .

## 2021-07-27 ENCOUNTER — TELEPHONE (OUTPATIENT)
Dept: NEUROLOGY | Age: 51
End: 2021-07-27

## 2021-07-27 NOTE — TELEPHONE ENCOUNTER
PT would like to have a call back regarding the Pregabalin 75 mg (generic for Lyrica) . She is having some dizzy spells and would like to know if she should go off the generic and get the name brand of the medication.

## 2021-07-28 DIAGNOSIS — G50.1 ATYPICAL FACIAL PAIN: Primary | ICD-10-CM

## 2021-07-28 RX ORDER — PREGABALIN 75 MG/1
CAPSULE ORAL
Qty: 60 CAPSULE | Refills: 0 | Status: SHIPPED | OUTPATIENT
Start: 2021-07-28 | End: 2021-07-29 | Stop reason: SINTOL

## 2021-07-28 NOTE — TELEPHONE ENCOUNTER
After speaking to the MD about the pt wanted to switch from name brand to generic lyrica     The MD stated that rather the pt was on branded name or generic the dizziness will happen due to the side effects     The pt was not available to speak at the time of me calling    Left a detailed message pertaining the nature of me calling

## 2021-07-28 NOTE — TELEPHONE ENCOUNTER
After speaking to the MD he stated that the pt can be written for the branded name of pregabalin. He wanted to me make sure that the pt is aware that it might have a different cost as far as the co pay, as well as the pharmacy to know that the generic medication has great side effects on the pt.     The pt was made aware of information     And verbalized understanding     The pt asked if the medication could be sent to the [address] [place] [address] listed

## 2021-07-29 ENCOUNTER — TELEPHONE (OUTPATIENT)
Dept: NEUROLOGY | Age: 51
End: 2021-07-29

## 2021-07-29 DIAGNOSIS — G50.1 ATYPICAL FACIAL PAIN: Primary | ICD-10-CM

## 2021-07-29 RX ORDER — BACLOFEN 10 MG/1
10 TABLET ORAL 2 TIMES DAILY
Qty: 60 TABLET | Refills: 0 | Status: SHIPPED | OUTPATIENT
Start: 2021-07-29 | End: 2021-09-07 | Stop reason: SDUPTHER

## 2021-07-29 NOTE — TELEPHONE ENCOUNTER
Per Dr Gertrudis Gonzalez, baclofen 10mg bid has been prescribed. Pt advised to begin taking once daily for a few days then increase to bid. Pt states understanding and agrees with recommendation.

## 2021-07-29 NOTE — TELEPHONE ENCOUNTER
Pharmacy states they did not receive Lyrica RAY script that was sent to them yesterday. Gave pharmacist verbal info. Pt called after I spoke to the pharmacist. Mary Canales this was given via phone today. Pt called back and states Matthew BELL is >$200 and she is requesting a different medication for facial pain. States pain started after a dental procedure when the dentist presumably hit a nerve.

## 2021-07-29 NOTE — TELEPHONE ENCOUNTER
PT called and stated that the medication refills that she requested have not been called into her pharmacy. She said she called the pharmacy and they are not there. She would like to have a call regarding this issue.

## 2021-08-02 ENCOUNTER — HOSPITAL ENCOUNTER (OUTPATIENT)
Dept: MAMMOGRAPHY | Age: 51
Discharge: HOME | End: 2021-08-02
Payer: COMMERCIAL

## 2021-08-02 VITALS — HEIGHT: 64 IN | BODY MASS INDEX: 21.34 KG/M2 | WEIGHT: 125 LBS

## 2021-08-02 DIAGNOSIS — Z01.419 WELL WOMAN EXAM WITH ROUTINE GYNECOLOGICAL EXAM: ICD-10-CM

## 2021-08-02 PROCEDURE — 77063 BREAST TOMOSYNTHESIS BI: CPT

## 2021-08-16 ENCOUNTER — OFFICE VISIT (OUTPATIENT)
Dept: NEUROLOGY | Age: 51
End: 2021-08-16
Payer: COMMERCIAL

## 2021-08-16 ENCOUNTER — TELEPHONE (OUTPATIENT)
Dept: NEUROLOGY | Age: 51
End: 2021-08-16

## 2021-08-16 VITALS
WEIGHT: 125 LBS | HEART RATE: 74 BPM | DIASTOLIC BLOOD PRESSURE: 79 MMHG | SYSTOLIC BLOOD PRESSURE: 115 MMHG | BODY MASS INDEX: 21.34 KG/M2 | HEIGHT: 64 IN

## 2021-08-16 DIAGNOSIS — G50.1 ATYPICAL FACIAL PAIN: Primary | ICD-10-CM

## 2021-08-16 PROCEDURE — 99214 OFFICE O/P EST MOD 30 MIN: CPT | Performed by: PSYCHIATRY & NEUROLOGY

## 2021-08-16 NOTE — PROGRESS NOTES
Oralee Kind   Neurology followup    Subjective:   CC/HP  History was obtained from the patient. She still continues to have severe facial pain. Patient states the pain is not any better with Lyrica. She also has headaches. She is quite concerned and frustrated that her symptoms were not getting better and she does not have a clear diagnosis. She is already had an ENT evaluation. She has tried carbamazepine without any improvement. I tried her on Lyrica and baclofen without any improvement. Detailed history:  Patient was referred for evaluation of facial pain, bilateral but right worse than left. Patient states that she had some type of graft oral surgery procedure of the hard palate done back in December 2020 and then again in February 2021. Patient states that since that time patient has had some pain. She feels that the teeth on the right upper jaw hurt and it spreads to involve the right side of the face. Recently she has noticed similar pain on the left side as well but not on the teeth. The pain is rather constant but she has times when it is worse. No clear aggravating or relieving factors. Patient was seen by her family physician and then referred to ENT. CT of the sinuses were done which did not show any significant abnormalities. She was treated with various medications including carbamazepine without any improvement.     REVIEW OF SYSTEMS    Constitutional:  []   Chills   []  Fatigue   []  Fevers   []  Malaise   []  Weight loss     [x] Denies all of the above    Respiratory:   []  Cough    []  Shortness of breath         [x] Denies all of the above     Cardiovascular:   []  Chest pain    []  Exertional chest pressure/discomfort           [] Palpitations    []  Syncope     [x] Denies all of the above        Past Medical History:   Diagnosis Date    Allergic rhinitis     Atrophic vaginitis     DDD (degenerative disc disease), cervical     Dysmenorrhea     Dyspareunia in female    Toney Esophagitis 05/2019    Fibrocystic breast     Gastritis 05/2019    HPV (human papilloma virus) infection 1992    Exposed back in college    Interstitial cystitis 10/23/2017    Iron deficiency anemia 2017    Menopause ovarian failure     Menorrhagia     Vitamin D deficiency 07/2019    Vocal cord nodule 2016     Family History   Problem Relation Age of Onset    Cancer Mother 76        melanoma    High Blood Pressure Father     Colon Cancer Maternal Grandfather 46    Other Paternal Grandmother         PE     Social History     Socioeconomic History    Marital status:      Spouse name: Not on file    Number of children: Not on file    Years of education: Not on file    Highest education level: Not on file   Occupational History    Not on file   Tobacco Use    Smoking status: Never Smoker    Smokeless tobacco: Never Used   Vaping Use    Vaping Use: Never used   Substance and Sexual Activity    Alcohol use: Yes     Alcohol/week: 0.0 standard drinks    Drug use: No    Sexual activity: Yes     Partners: Male   Other Topics Concern    Not on file   Social History Narrative    Not on file     Social Determinants of Health     Financial Resource Strain:     Difficulty of Paying Living Expenses:    Food Insecurity:     Worried About Running Out of Food in the Last Year:     [address] in the Last Year:    Transportation Needs:     Lack of Transportation (Medical):      Lack of Transportation (Non-Medical):    Physical Activity:     Days of Exercise per Week:     Minutes of Exercise per Session:    Stress:     Feeling of Stress :    Social Connections:     Frequency of Communication with Friends and Family:     Frequency of Social Gatherings with Friends and Family:     Attends Scientology Services:     Active Member of Clubs or Organizations:     Attends Club or Organization Meetings:     Marital Status:    Intimate Partner Violence:     Fear of Current or Ex-Partner:     Emotionally Abused:     Physically Abused:     Sexually Abused:         Objective:  Exam:  /79   Pulse 74   Ht 5' 4\" (1.626 m)   Wt 125 lb (56.7 kg)   LMP  (LMP Unknown)   BMI 21.46 kg/m²   This is a well-nourished patient in no acute distress  Patient is awake, alert and oriented x3. Speech is normal.  Pupils are equal round reacting to light. Extraocular movements intact. Face symmetrical. Tongue midline. Motor exam shows normal symmetrical strength. Deep tendon reflexes normal. Plantar reflexes downgoing. Sensory exam normal. Coordination normal. Gait normal. No carotid bruit. No neck stiffness.       Data :  LABS:  General Labs:    CBC:   Lab Results   Component Value Date    WBC 3.6 07/21/2021    RBC 4.16 07/21/2021    HGB 13.1 07/21/2021    HCT 38.2 07/21/2021    MCV 91.8 07/21/2021    MCH 31.6 07/21/2021    MCHC 34.4 07/21/2021    RDW 13.7 07/21/2021     07/21/2021    MPV 7.6 07/21/2021     BMP:    Lab Results   Component Value Date     07/21/2021    K 4.1 07/21/2021     07/21/2021    CO2 27 07/21/2021    BUN 12 07/21/2021    LABALBU 4.5 07/21/2021    CREATININE 0.7 07/21/2021    CALCIUM 9.7 07/21/2021    GFRAA >60 07/21/2021    LABGLOM >60 07/21/2021    GLUCOSE 81 07/21/2021     RADIOLOGY REVIEW:  I have reviewed radiology report(s) of: CT scan of the sinuses    Impression :  Atypical facial pain now involving both sides of the face even though the right side is worse than the left  History is not typical for classical trigeminal neuralgia  Patient has been on carbamazepine Lyrica and baclofen with no improvement  CT scan of the sinuses did not show any abnormalities  Patient has had an ENT evaluation as well    Plan :  Discussed at length with patient  I have told her to stop the baclofen since it is not helping  Patient states that she is seeing oral surgeons again and they have not told him thing abnormal.  I will get an MRI brain to make sure there is no intracranial structural lesion  If this is normal, patient may need another opinion from a teaching hospital like a tertiary care center. I have recommended that she discuss this with her primary care physician as well. Please note a portion of  this chart was generated using dragon dictation software. Although every effort was made to ensure the accuracy of this automated transcription, some errors in transcription may have occurred.

## 2021-08-16 NOTE — TELEPHONE ENCOUNTER
Pt states she needs documentation so she can get her MRI done sooner than her appt that she scheduled for this  Frid 8-20-21 @ [facility].  Pt states they had sooner appt's but she still needs documentation stating that this is urgent for her headaches  # [phone]

## 2021-08-17 NOTE — TELEPHONE ENCOUNTER
The pt called in to inquire about how to take the listed medication   Baclofen     The pt stated that at the [address] on 8/16/2021 that the medication was not helping, the pt thought that if she takes the medication that MRI results will be altered. I assured her that she can take the medication is she is hurting       The Md advised that if the patient is pain that she should go to ER, the pt was stated that she could get the MRI at the ED is she needed to.

## 2021-08-18 ENCOUNTER — OFFICE VISIT (OUTPATIENT)
Dept: FAMILY MEDICINE CLINIC | Age: 51
End: 2021-08-18
Payer: COMMERCIAL

## 2021-08-18 ENCOUNTER — TELEPHONE (OUTPATIENT)
Dept: NEUROLOGY | Age: 51
End: 2021-08-18

## 2021-08-18 VITALS
HEART RATE: 75 BPM | WEIGHT: 130.6 LBS | DIASTOLIC BLOOD PRESSURE: 70 MMHG | SYSTOLIC BLOOD PRESSURE: 96 MMHG | OXYGEN SATURATION: 100 % | BODY MASS INDEX: 22.42 KG/M2 | RESPIRATION RATE: 14 BRPM | TEMPERATURE: 96.9 F

## 2021-08-18 DIAGNOSIS — G50.0 TRIGEMINAL NEURALGIA OF RIGHT SIDE OF FACE: ICD-10-CM

## 2021-08-18 DIAGNOSIS — R51.9 RIGHT SIDED FACIAL PAIN: ICD-10-CM

## 2021-08-18 DIAGNOSIS — M26.629 TMJ SYNDROME: Primary | ICD-10-CM

## 2021-08-18 PROCEDURE — 99214 OFFICE O/P EST MOD 30 MIN: CPT | Performed by: FAMILY MEDICINE

## 2021-08-18 RX ORDER — MELOXICAM 7.5 MG/1
7.5 TABLET ORAL 2 TIMES DAILY PRN
Qty: 90 TABLET | Refills: 0 | Status: SHIPPED | OUTPATIENT
Start: 2021-08-18

## 2021-08-18 RX ORDER — GABAPENTIN 100 MG/1
100 CAPSULE ORAL 3 TIMES DAILY
Qty: 90 CAPSULE | Refills: 0 | Status: SHIPPED | OUTPATIENT
Start: 2021-08-18 | End: 2021-10-11

## 2021-08-18 SDOH — ECONOMIC STABILITY: FOOD INSECURITY: WITHIN THE PAST 12 MONTHS, YOU WORRIED THAT YOUR FOOD WOULD RUN OUT BEFORE YOU GOT MONEY TO BUY MORE.: NEVER TRUE

## 2021-08-18 SDOH — ECONOMIC STABILITY: FOOD INSECURITY: WITHIN THE PAST 12 MONTHS, THE FOOD YOU BOUGHT JUST DIDN'T LAST AND YOU DIDN'T HAVE MONEY TO GET MORE.: NEVER TRUE

## 2021-08-18 ASSESSMENT — SOCIAL DETERMINANTS OF HEALTH (SDOH): HOW HARD IS IT FOR YOU TO PAY FOR THE VERY BASICS LIKE FOOD, HOUSING, MEDICAL CARE, AND HEATING?: NOT HARD AT ALL

## 2021-08-18 NOTE — PROGRESS NOTES
Patient is here for follow up of facial pain. She saw Dr. Shravan Galicia 3 and then saw Neurologist, Dr. Dayday Talley, and then saw dentist and hafsa-dontist and endodontist .  A root canal was done by her dentist, 's cousin , on 7/13/21. She saw dentist's partner yesterday in follow up . The facial pain is right sided. No change in pain since route canal.  She saw Neurologist again on 8/16/21. She is to have MRI on Friday . She had prior CT and Xrays. She was placed on Lyrica 75 mg and it made her dizzy. She tried Baclofen and Carbamazepine . Baclofen helps some, but Carbamazepine did not help. She had some type of graft oral surgery procedure of the hard palate done back in December 2020 and then again in March 5, 2021. She started with right sided upper jaw and upper teeth pain within 1 week of the second surgery , 3/21. The periodontist moved out of the area shortly after 3/21 surgery. She has minimal pain on left side of face. It does extend to nose and right ear. She is taking Baclofen 10 mg bid . Sleeping difficulties at times. Using cold back on face and neck at times. Review of Systems    ROS: All other systems were reviewed and are negative . Patient's allergies and medications were reviewed. Patient's past medical, surgical, social , and family history were reviewed. OBJECTIVE:  BP 96/70   Pulse 75   Temp 96.9 °F (36.1 °C)   Resp 14   Wt 130 lb 9.6 oz (59.2 kg)   LMP  (LMP Unknown)   SpO2 100%   BMI 22.42 kg/m²     Physical Exam    General: NAD, cooperative, alert and oriented X 3. Mood / affect is good. good insight. well hydrated. HEENT: PERRLA, EOMI, TMs - clear. Nasopharynx clear. Tenderness to TMJ right > left. Tenderness to right cheek area extending to nasolabial fold. Neck : no lymphadenopathy, supple, FROM  CV: Regular rate and rhythm , no murmurs/ rub/ gallop. No edema.    Lungs : CTA bilaterally, breathing comfortably  Abdomen: positive bowel sounds, soft , non tender, non distended. No hepatosplenomegaly. No CVA tenderness. Skin: no rashes. Non tender. ASSESSMENT/  PLAN:  1. TMJ syndrome  - Restart Mobic 7.5 mg 1-2 times per day. - Soft diet. Use bite plate regularly. - Trial of Gabapentin 100 mg during day and 100-300 mg qhs. Medication discussed, including use , risks, side effects and benefits. Patient voiced understanding and agrees with use. Barriers to medication compliance addressed. All the patient's questions were addressed. - Gabapentin (NEURONTIN) 100 MG capsule; Take 1 capsule by mouth 3 times daily for 90 days. Intended supply: 30 days  Dispense: 90 capsule; Refill: 0  - meloxicam (MOBIC) 7.5 MG tablet; Take 1 tablet by mouth 2 times daily as needed for Pain To face/ jaw and mouth  Dispense: 90 tablet; Refill: 0    2. Trigeminal neuralgia of right side of face  - Restart Mobic 7.5 mg 1-2 times per day. - Trial of Gabapentin 100 mg during day and 100-300 mg qhs.    - gabapentin (NEURONTIN) 100 MG capsule; Take 1 capsule by mouth 3 times daily for 90 days. Intended supply: 30 days  Dispense: 90 capsule; Refill: 0  - meloxicam (MOBIC) 7.5 MG tablet; Take 1 tablet by mouth 2 times daily as needed for Pain To face/ jaw and mouth  Dispense: 90 tablet; Refill: 0    3. Right sided facial pain  - Likely #1 & #2 contributing.   - gabapentin (NEURONTIN) 100 MG capsule; Take 1 capsule by mouth 3 times daily for 90 days. Intended supply: 30 days  Dispense: 90 capsule; Refill: 0  - meloxicam (MOBIC) 7.5 MG tablet; Take 1 tablet by mouth 2 times daily as needed for Pain To face/ jaw and mouth  Dispense: 90 tablet; Refill: 0     Follow up 4  weeks/ prn.

## 2021-08-18 NOTE — TELEPHONE ENCOUNTER
PT is having an MRI done on Friday that Dr Blayne Gao has ordered. She would like to know if that includes her neck. Call and advise.

## 2021-08-18 NOTE — PATIENT INSTRUCTIONS
Patient Education        Temporomandibular Disorder: Care Instructions  Overview     Temporomandibular disorders (TMDs) are problems with the muscles and joints that connect your jaw to your skull. The disorders cause pain when you talk, chew, swallow, or yawn. You may feel this pain on one or both sides. TMDs are often caused by tight jaw muscles. The tightness can be caused by clenching or grinding your teeth. This may happen when you have a lot of stress in your life. If you lower your stress, you may be able to stop clenching or grinding your teeth. This will help relax your jaw and reduce your pain. Your doctor may suggest a dental splint. Splints can help reduce teeth grinding and clenching. You may also be able to do some things at home to feel better. But if none of this works, your doctor may prescribe medicine to help relax your muscles and control the pain. Follow-up care is a key part of your treatment and safety. Be sure to make and go to all appointments, and call your doctor if you are having problems. It's also a good idea to know your test results and keep a list of the medicines you take. How can you care for yourself at home? · Put either an ice pack or a warm, moist cloth on your jaw for 15 minutes several times a day. You can try switching back and forth between moist heat and cold. · Make eating easy on your jaw. Choose softer foods that are easy to chew like eggs, yogurt, or soup. Avoid hard foods that cause your jaws to work very hard. Try cutting your food into small pieces. And if your jaw gets too painful to chew, or if it locks, you may need to puree your food for a while. · To relax your jaw, repeat this exercise for a few minutes every morning and evening. Watch yourself in a mirror. Gently open and close your mouth. Move your jaw straight up and down. But don't do this if it makes your pain worse. · Manage stress.  You may be clenching or tightening your muscles when you are under stress. · Get at least 30 minutes of exercise on most days of the week to relieve stress. Walking is a good choice. · Ask your doctor if you can take an over-the-counter pain medicine, such as acetaminophen (Tylenol), ibuprofen (Advil, Motrin), or naproxen (Aleve). Be safe with medicines. Read and follow all instructions on the label. · Use good posture for sitting and standing. Slumping your shoulders disturbs the alignment of your facial bones and muscles. · Don't:  ? Hold a phone between your shoulder and your jaw. ? Open your mouth all the way, like when you sing loudly or yawn. ? Clench or grind your teeth, bite your lips, or chew your fingernails. ? Clench things such as pens, pipes, or cigars between your teeth. When should you call for help? Call your doctor now or seek immediate medical care if:    · Your jaw is locked open or shut or it is hard to move your jaw. Watch closely for changes in your health, and be sure to contact your doctor if:    · Your jaw pain gets worse.     · Your face is swollen.     · You do not get better as expected. Where can you learn more? Go to https://SlideBatch.Geosign. org and sign in to your [employer] account. Enter M071 in the [facility] box to learn more about \"Temporomandibular Disorder: Care Instructions. \"     If you do not have an account, please click on the \"Sign Up Now\" link. Current as of: October 27, 2020               Content Version: 12.9  © 2006-2021 Healthwise, Incorporated. Care instructions adapted under license by [facility] ([facility]). If you have questions about a medical condition or this instruction, always ask your healthcare professional. [place] [address] any warranty or liability for your use of this information. Patient Education        Trigeminal Neuralgia: Care Instructions  Your Care Instructions     Trigeminal neuralgia is a problem with the large nerve that brings feeling to your face. It causes a sudden, sharp pain on one side of your face. Just touching your cheek or talking can set off shooting pain toward the ear, eye, or nostril. Living with this pain can be very hard. Some people have long periods when they do not have pain, and then it comes back. Some people have periods of pain often. But medicine or other treatment often can make the pain go away. If you keep having pain, surgery may help. This problem is also called tic arceniox (say \"tik doo-mildred-DANNY\"). Follow-up care is a key part of your treatment and safety. Be sure to make and go to all appointments, and call your doctor if you are having problems. It's also a good idea to know your test results and keep a list of the medicines you take. How can you care for yourself at home? · Write down when you have pain and what you were doing when it started. Try to find what causes the pain. Being in a cold wind, yawning, or shaving are examples. Avoid or limit these triggers if you can. · Be safe with medicines. Take your medicines exactly as prescribed. ? Your doctor may have prescribed medicines used to treat depression and seizures. They can reduce your pain, help you sleep better, and improve your mood. ? Call your doctor if you think you are having a problem with your medicine. You will get more details on the specific medicines your doctor prescribes. ? If you are not taking a prescription pain medicine, take an over-the-counter medicine such as acetaminophen (Tylenol), ibuprofen (Advil, Motrin), or naproxen (Aleve). Read and follow all instructions on the label. ? Do not take two or more pain medicines at the same time unless the doctor told you to. Many pain medicines have acetaminophen, which is Tylenol. Too much acetaminophen (Tylenol) can be harmful. · Reduce stress in your life. Ask your doctor about ways to relax. These may include breathing exercises and massage.   · Think about joining a support group with other people who have this problem. These groups can give comfort and information about what to do to feel better. Your doctor can tell you how to find a support group. When should you call for help? Call your doctor now or seek immediate medical care if:    · You have severe pain that you can't control. Watch closely for changes in your health, and be sure to contact your doctor if:    · You are not able to sleep because of the pain.     · You do not get better as expected. Where can you learn more? Go to https://Youlicit.Connect HQ. org and sign in to your [employer] account. Enter R378 in the [employer] box to learn more about \"Trigeminal Neuralgia: Care Instructions. \"     If you do not have an account, please click on the \"Sign Up Now\" link. Current as of: October 19, 2020               Content Version: 12.9  © 1349-5278 Healthwise, Incorporated. Care instructions adapted under license by [facility] ([facility]). If you have questions about a medical condition or this instruction, always ask your healthcare professional. [address] any warranty or liability for your use of this information.

## 2021-08-20 ENCOUNTER — HOSPITAL ENCOUNTER (OUTPATIENT)
Dept: MRI IMAGING | Age: 51
Discharge: HOME | End: 2021-08-20
Payer: COMMERCIAL

## 2021-08-20 DIAGNOSIS — G50.1 ATYPICAL FACIAL PAIN: ICD-10-CM

## 2021-08-20 PROCEDURE — 6360000004 HC RX CONTRAST MEDICATION: Performed by: PSYCHIATRY & NEUROLOGY

## 2021-08-20 PROCEDURE — A9579 GAD-BASE MR CONTRAST NOS,1ML: HCPCS | Performed by: PSYCHIATRY & NEUROLOGY

## 2021-08-20 PROCEDURE — 70553 MRI BRAIN STEM W/O & W/DYE: CPT

## 2021-08-20 RX ADMIN — GADOTERIDOL 12 ML: 279.3 INJECTION, SOLUTION INTRAVENOUS at 08:53

## 2021-09-07 ENCOUNTER — OFFICE VISIT (OUTPATIENT)
Dept: NEUROLOGY | Age: 51
End: 2021-09-07
Payer: COMMERCIAL

## 2021-09-07 VITALS
BODY MASS INDEX: 22.2 KG/M2 | SYSTOLIC BLOOD PRESSURE: 113 MMHG | DIASTOLIC BLOOD PRESSURE: 71 MMHG | WEIGHT: 130 LBS | HEART RATE: 79 BPM | HEIGHT: 64 IN

## 2021-09-07 DIAGNOSIS — G50.1 ATYPICAL FACIAL PAIN: Primary | ICD-10-CM

## 2021-09-07 PROCEDURE — 99214 OFFICE O/P EST MOD 30 MIN: CPT | Performed by: PSYCHIATRY & NEUROLOGY

## 2021-09-07 RX ORDER — BACLOFEN 10 MG/1
10 TABLET ORAL 3 TIMES DAILY
Qty: 90 TABLET | Refills: 2 | Status: SHIPPED | OUTPATIENT
Start: 2021-09-07 | End: 2022-01-10

## 2021-09-07 NOTE — PROGRESS NOTES
Leisa Dempsey   Neurology followup    Subjective:   CC/HP  History was obtained from the patient. Patient continues to have facial pain. She was seen by her primary care physician who started her on meloxicam and the gabapentin 100 mg 3 times daily. Patient states that the baclofen that I had started at 10 mg twice daily seems to work the best.  She has been referred to her temporomandibular joint specialist but has to wait a few months to be able to get into see them. Patient had an MRI brain which was essentially normal.  Detailed history:  Patient was referred for evaluation of facial pain, bilateral but right worse than left. Patient states that she had some type of graft oral surgery procedure of the hard palate done back in December 2020 and then again in February 2021. Patient states that since that time patient has had some pain. She feels that the teeth on the right upper jaw hurt and it spreads to involve the right side of the face. Recently she has noticed similar pain on the left side as well but not on the teeth. The pain is rather constant but she has times when it is worse. No clear aggravating or relieving factors. Patient was seen by her family physician and then referred to ENT. CT of the sinuses were done which did not show any significant abnormalities. She was treated with various medications including carbamazepine without any improvement.     REVIEW OF SYSTEMS    Constitutional:  []   Chills   []  Fatigue   []  Fevers   []  Malaise   []  Weight loss     [x] Denies all of the above    Respiratory:   []  Cough    []  Shortness of breath         [x] Denies all of the above     Cardiovascular:   []  Chest pain    []  Exertional chest pressure/discomfort           [] Palpitations    []  Syncope     [x] Denies all of the above        Past Medical History:   Diagnosis Date    Allergic rhinitis     Atrophic vaginitis     DDD (degenerative disc disease), cervical     Dysmenorrhea     Dyspareunia in female     Esophagitis 05/2019    Fibrocystic breast     Gastritis 05/2019    HPV (human papilloma virus) infection 1992    Exposed back in college    Interstitial cystitis 10/23/2017    Iron deficiency anemia 2017    Menopause ovarian failure     Menorrhagia     Vitamin D deficiency 07/2019    Vocal cord nodule 2016     Family History   Problem Relation Age of Onset    Cancer Mother 76        melanoma    High Blood Pressure Father     Colon Cancer Maternal Grandfather 46    Other Paternal Grandmother         PE     Social History     Socioeconomic History    Marital status:      Spouse name: Not on file    Number of children: Not on file    Years of education: Not on file    Highest education level: Not on file   Occupational History    Not on file   Tobacco Use    Smoking status: Never Smoker    Smokeless tobacco: Never Used   Vaping Use    Vaping Use: Never used   Substance and Sexual Activity    Alcohol use: Yes     Alcohol/week: 0.0 standard drinks    Drug use: No    Sexual activity: Yes     Partners: Male   Other Topics Concern    Not on file   Social History Narrative    Not on file     Social Determinants of Health     Financial Resource Strain: Low Risk     Difficulty of Paying Living Expenses: Not hard at all   Food Insecurity: No Food Insecurity    Worried About Running Out of Food in the Last Year: Never true    [address] in the Last Year: Never true   Transportation Needs:     Lack of Transportation (Medical):      Lack of Transportation (Non-Medical):    Physical Activity:     Days of Exercise per Week:     Minutes of Exercise per Session:    Stress:     Feeling of Stress :    Social Connections:     Frequency of Communication with Friends and Family:     Frequency of Social Gatherings with Friends and Family:     Attends Denominational Services:     Active Member of Clubs or Organizations:     Attends Club or Organization Meetings:    Vega Morelos Marital Status:    Intimate Partner Violence:     Fear of Current or Ex-Partner:     Emotionally Abused:     Physically Abused:     Sexually Abused:         Objective:  Exam:  /71   Pulse 79   Ht 5' 4\" (1.626 m)   Wt 130 lb (59 kg)   LMP  (LMP Unknown)   BMI 22.31 kg/m²   This is a well-nourished patient in no acute distress  Patient is awake, alert and oriented x3. Speech is normal.  Pupils are equal round reacting to light. Extraocular movements intact. Face symmetrical. Tongue midline. Motor exam shows normal symmetrical strength. Deep tendon reflexes normal. Plantar reflexes downgoing. Sensory exam normal. Coordination normal. Gait normal. No carotid bruit. No neck stiffness. Data :  LABS:  General Labs:    CBC:   Lab Results   Component Value Date    WBC 3.6 07/21/2021    RBC 4.16 07/21/2021    HGB 13.1 07/21/2021    HCT 38.2 07/21/2021    MCV 91.8 07/21/2021    MCH 31.6 07/21/2021    MCHC 34.4 07/21/2021    RDW 13.7 07/21/2021     07/21/2021    MPV 7.6 07/21/2021     BMP:    Lab Results   Component Value Date     07/21/2021    K 4.1 07/21/2021     07/21/2021    CO2 27 07/21/2021    BUN 12 07/21/2021    LABALBU 4.5 07/21/2021    CREATININE 0.7 07/21/2021    CALCIUM 9.7 07/21/2021    GFRAA >60 07/21/2021    LABGLOM >60 07/21/2021    GLUCOSE 81 07/21/2021     RADIOLOGY REVIEW:  I have reviewed radiology report(s) of: MRI brain    Impression :  Atypical facial pain  involving both sides of the face even though the right side is worse than the left  History is not typical for classical trigeminal neuralgia  Patient has been on carbamazepine and Lyrica without any improvement.   CT scan of the sinuses did not show any abnormalities  Patient has had an ENT evaluation as well  Patient's MRI brain images were reviewed and were normal  Patient has been started on meloxicam and gabapentin by her primary physician    Plan :  Discussed at length with patient  Since patient feels that baclofen seems to work the best, I will increase the dose to baclofen 10 mg 3 times daily. Side effects were discussed. She will check with her primary care physician to decide if she needs to continue the meloxicam at the gabapentin. If we are not able to get adequate control of the pain with the baclofen, then it may be reasonable for her to see neurosurgery as well to consider if they would even feel that some of the surgical measures for trigeminal neuralgia may be reasonable in her case. Please note a portion of  this chart was generated using dragon dictation software. Although every effort was made to ensure the accuracy of this automated transcription, some errors in transcription may have occurred.

## 2021-09-15 ENCOUNTER — OFFICE VISIT (OUTPATIENT)
Dept: FAMILY MEDICINE CLINIC | Age: 51
End: 2021-09-15
Payer: COMMERCIAL

## 2021-09-15 VITALS
HEART RATE: 85 BPM | RESPIRATION RATE: 14 BRPM | SYSTOLIC BLOOD PRESSURE: 100 MMHG | DIASTOLIC BLOOD PRESSURE: 70 MMHG | TEMPERATURE: 97.3 F | BODY MASS INDEX: 22.69 KG/M2 | OXYGEN SATURATION: 99 % | WEIGHT: 132.2 LBS

## 2021-09-15 DIAGNOSIS — G50.0 TRIGEMINAL NEURALGIA OF RIGHT SIDE OF FACE: Primary | ICD-10-CM

## 2021-09-15 DIAGNOSIS — M26.629 TMJ SYNDROME: ICD-10-CM

## 2021-09-15 PROCEDURE — 99214 OFFICE O/P EST MOD 30 MIN: CPT | Performed by: FAMILY MEDICINE

## 2021-09-15 NOTE — PROGRESS NOTES
Patient is here for follow up of right sided facial pain. She had root canal done in 7/21 to right front tooth by Dr. Lester Maria [phone] ('s cousin). He did a steroid injection to above front tooth at 11:30 am today. He offered to open up root canal and put steroid deeper into the root. No crown . He preferred to hold on medication, but discussed Tegretol option. She is currently taking Gabapentin 100 - 200 mg at night only . Gabapentin makes her tired. Sleeping okay with the Gabapentin 100 -200  mg . She has Baclofen and takes tid usually. Her pain is right side only and 6/10. She is to go back on Monday for another steroid shot. She was also taking Mobic. Dr. Jose Baker prescribed Tegretol 100 mg bid on 5/4/21 and tapered up to 300 mg bid in 6/2021. She may have increased to 400 mg bid. She did not recall side effects , but she does not recall it working either. Unsure if Tegretol helped less with sleep versus Gabapentin. TMJ is more right sided. She now has a bite guard and using it. Review of Systems    ROS: All other systems were reviewed and are negative . Patient's allergies and medications were reviewed. Patient's past medical, surgical, social , and family history were reviewed. OBJECTIVE:  /70   Pulse 85   Temp 97.3 °F (36.3 °C)   Resp 14   Wt 132 lb 3.2 oz (60 kg)   LMP  (LMP Unknown)   SpO2 99%   BMI 22.69 kg/m²     Physical Exam    General: NAD, cooperative, alert and oriented X 3. Mood / affect is good. good insight. well hydrated. HEENT: PERRLA, EOMI, TMs - clear. Nasopharynx clear. TMJ tenderness on right. Tenderness to above right front tooth , including above lip. No swelling or erythema. Neck : no lymphadenopathy, supple, FROM  CV: Regular rate and rhythm , no murmurs/ rub/ gallop. No edema. Lungs : CTA bilaterally, breathing comfortably  Abdomen: positive bowel sounds, soft , non tender, non distended. No hepatosplenomegaly.  No CVA tenderness. Skin: no rashes. Non tender. ASSESSMENT/  PLAN:  1. Trigeminal neuralgia of right side of face  - Increase Gabapentin to 100 tid or 100 mg in am and 100-200 mg qhs.  - Discussed retrying Tegretol , but will hold. - She is to follow up with endodontist for injections. 2. TMJ syndrome  - Continue using bite plate regularly. - Soft diet recommended. - Use Mobic 7.5 mg bid prn   - Baclofen prn. Follow up 4 weeks/ prn.

## 2021-09-27 ENCOUNTER — PATIENT MESSAGE (OUTPATIENT)
Dept: NEUROLOGY | Age: 51
End: 2021-09-27

## 2021-09-27 DIAGNOSIS — G50.1 ATYPICAL FACIAL PAIN: Primary | ICD-10-CM

## 2021-10-11 DIAGNOSIS — G50.0 TRIGEMINAL NEURALGIA OF RIGHT SIDE OF FACE: ICD-10-CM

## 2021-10-11 DIAGNOSIS — R51.9 RIGHT SIDED FACIAL PAIN: ICD-10-CM

## 2021-10-11 DIAGNOSIS — M26.629 TMJ SYNDROME: ICD-10-CM

## 2021-10-11 RX ORDER — GABAPENTIN 100 MG/1
CAPSULE ORAL
Qty: 90 CAPSULE | Refills: 0 | Status: SHIPPED | OUTPATIENT
Start: 2021-10-11 | End: 2021-11-18

## 2021-10-11 RX ORDER — GABAPENTIN 100 MG/1
100 CAPSULE ORAL 3 TIMES DAILY
Qty: 90 CAPSULE | Refills: 0 | Status: CANCELLED | OUTPATIENT
Start: 2021-10-11 | End: 2022-01-09

## 2021-10-11 NOTE — TELEPHONE ENCOUNTER
Requested Prescriptions     Pending Prescriptions Disp Refills    gabapentin (NEURONTIN) 100 MG capsule 90 capsule 0     Sig: Take 1 capsule by mouth 3 times daily for 90 days.  Intended supply: 30 days       Last ov 9/15/2021  Last labs 7/21/2021

## 2021-10-11 NOTE — TELEPHONE ENCOUNTER
Requested Prescriptions     Pending Prescriptions Disp Refills    gabapentin (NEURONTIN) 100 MG capsule [Pharmacy Med Name: GABAPENTIN 100MG CAPS] 90 capsule 0     Sig: TAKE 1 CAPSULE BY MOUTH 3 TIMES A DAY       Last ov 9/15/2021  Last labs 7/21/21

## 2021-10-15 ENCOUNTER — PATIENT MESSAGE (OUTPATIENT)
Dept: DERMATOLOGY | Age: 51
End: 2021-10-15

## 2021-10-15 NOTE — TELEPHONE ENCOUNTER
From: Goldie Glilespie  To: Meenakshi Avila MD  Sent: 10/15/2021 12:27 PM EDT  Subject: Non-Urgent Medical Question    Hi Dr. Kevin Waterman,   Unfortunately , my ring finger (left ) wart is coming back again :( Can I please schedule a quick visit to get it frozen ?      Thank you,  Magda Cook

## 2021-10-18 ENCOUNTER — PATIENT MESSAGE (OUTPATIENT)
Dept: DERMATOLOGY | Age: 51
End: 2021-10-18

## 2021-10-19 ENCOUNTER — OFFICE VISIT (OUTPATIENT)
Dept: DERMATOLOGY | Age: 51
End: 2021-10-19
Payer: COMMERCIAL

## 2021-10-19 VITALS — TEMPERATURE: 97.9 F

## 2021-10-19 DIAGNOSIS — B07.8 OTHER VIRAL WARTS: Primary | ICD-10-CM

## 2021-10-19 DIAGNOSIS — R20.9 DISTURBANCE OF SKIN SENSATION: ICD-10-CM

## 2021-10-19 PROCEDURE — 17110 DESTRUCTION B9 LES UP TO 14: CPT | Performed by: DERMATOLOGY

## 2021-10-19 NOTE — TELEPHONE ENCOUNTER
From: Alejandrina Cisse  To: Racheal Floyd MD  Sent: 10/18/2021 4:36 PM EDT  Subject: Non-Urgent Medical Question    Hi Dr. Danette Snow,   Just wanted to touch base : Wondering if you got my last message about my left ring finger that is starting to get its wart back again. If possible , can I please come in for a quick visit to get it frozened.      Thank you,   Janet Moody

## 2021-10-19 NOTE — PROGRESS NOTES
[facility]  MD [address]  1970    46 y.o. female     Date of Visit: 10/19/2021    Chief Complaint: wart    History of Present Illness:    1. She complains of a wart on the left 4th finger - cleared with LN2 but has started to recur. Review of Systems:  Gen: Feels well, good sense of health. Skin: No new or changing moles, no history of keloids or hypertrophic scars. Heme: No abnormal bruising or bleeding. Past Medical History, Family History, Surgical History, Medications and Allergies reviewed.     Past Medical History:   Diagnosis Date    Allergic rhinitis     Atrophic vaginitis     DDD (degenerative disc disease), cervical     Dysmenorrhea     Dyspareunia in female     Esophagitis 05/2019    Fibrocystic breast     Gastritis 05/2019    HPV (human papilloma virus) infection 1992    [facility]posed [facility] in [facility]    Interstitial cystitis 10/23/2017    Iron deficiency anemia 2017    Menopause ovarian failure     Menorrhagia     Vitamin D deficiency 07/2019    Vocal cord nodule 2016     Past Surgical History:   Procedure Laterality Date    APPENDECTOMY  1996    CERVICAL FUSION  2013    C4-6   [facility]    bilateral, right    HYSTERECTOMY  2009    LARYNGOSCOPY  2016    PARTIAL HYSTERECTOMY  2009    DUB -     UPPER GASTROINTESTINAL ENDOSCOPY  05/2019       No Known Allergies  Outpatient Medications Marked as Taking for the 10/19/21 encounter (Office Visit) with Esmer Layne MD   Medication Sig Dispense Refill    gabapentin (NEURONTIN) 100 MG capsule TAKE 1 CAPSULE BY MOUTH 3 TIMES A DAY 90 capsule 0    baclofen (LIORESAL) 10 MG tablet Take 1 tablet by mouth 3 times daily 90 tablet 2    meloxicam (MOBIC) 7.5 MG tablet Take 1 tablet by mouth 2 times daily as needed for Pain To face/ jaw and mouth (Patient taking differently: Take 7.5 mg by mouth 2 times daily as needed for Pain To face/ jaw and mouth-PRN) 90 tablet 0    estradiol (MINIVELLE) 0.05 MG/24HR Place 1 patch onto the skin Twice a Week 24 patch 3    estradiol (ESTRACE VAGINAL) 0.1 MG/GM vaginal cream Place 1 g vaginally Twice a Week 1 Tube 3    linaclotide (LINZESS) 145 MCG capsule Take 1 capsule by mouth every morning (before breakfast) 90 capsule 3    hydrocortisone (ANUSOL-HC) 25 MG suppository Place 1 suppository rectally every 12 hours prn 30 suppository 3    hydrOXYzine (ATARAX) 10 MG tablet Take 25 mg by mouth 3 times daily as needed for Itching       Probiotic Product (PROBIOTIC-10 PO) Take by mouth      cetirizine (ZYRTEC) 10 MG tablet Take 10 mg by mouth daily      butalbital-acetaminophen-caffeine (FIORICET, ESGIC) -40 MG per tablet Take 1 tablet by mouth every 6 hours as needed for Headaches 25 tablet 1    VASCEPA 1 g CAPS capsule Take 2 capsules by mouth 2 times daily 120 capsule 3    Cholecalciferol (VITAMIN D3) 1000 units TABS Take 1 tablet by mouth daily 90 tablet 3         Physical Examination       Well appearing. 1.  Left 4th fingertip - 1 small verrucous pink papule. Assessment and Plan     1. Recurrent painful wart on the left 4th finger -     2 cycles of liquid nitrogen applied to 1 wart on the right 4th finger after soaking in tap water. Patient was educated regarding the potential risks of blister formation and discomfort. Wound care was discussed.           --Susan Gimenez MD

## 2021-11-18 ENCOUNTER — PATIENT MESSAGE (OUTPATIENT)
Dept: FAMILY MEDICINE CLINIC | Age: 51
End: 2021-11-18

## 2021-11-18 DIAGNOSIS — G50.0 TRIGEMINAL NEURALGIA OF RIGHT SIDE OF FACE: ICD-10-CM

## 2021-11-18 DIAGNOSIS — R51.9 RIGHT SIDED FACIAL PAIN: ICD-10-CM

## 2021-11-18 DIAGNOSIS — M26.629 TMJ SYNDROME: ICD-10-CM

## 2021-11-18 RX ORDER — GABAPENTIN 100 MG/1
CAPSULE ORAL
Qty: 90 CAPSULE | Refills: 0 | Status: SHIPPED | OUTPATIENT
Start: 2021-11-18 | End: 2022-01-17

## 2021-11-18 NOTE — TELEPHONE ENCOUNTER
Requested Prescriptions     Pending Prescriptions Disp Refills    gabapentin (NEURONTIN) 100 MG capsule [Pharmacy Med Name: GABAPENTIN 100MG CAPS] 90 capsule 0     Sig: TAKE 1 CAPSULE BY MOUTH 3 TIMES A DAY     9/15/2021  Last ov 7/21/2021

## 2021-12-20 ENCOUNTER — NURSE TRIAGE (OUTPATIENT)
Dept: OTHER | Facility: CLINIC | Age: 51
End: 2021-12-20

## 2021-12-20 ENCOUNTER — HOSPITAL ENCOUNTER (EMERGENCY)
Age: 51
Discharge: HOME | End: 2021-12-21
Attending: EMERGENCY MEDICINE
Payer: COMMERCIAL

## 2021-12-20 VITALS
OXYGEN SATURATION: 97 % | TEMPERATURE: 98.7 F | HEIGHT: 64 IN | WEIGHT: 125 LBS | SYSTOLIC BLOOD PRESSURE: 106 MMHG | DIASTOLIC BLOOD PRESSURE: 87 MMHG | RESPIRATION RATE: 16 BRPM | HEART RATE: 80 BPM | BODY MASS INDEX: 21.34 KG/M2

## 2021-12-20 DIAGNOSIS — K62.5 RECTAL BLEEDING: Primary | ICD-10-CM

## 2021-12-20 DIAGNOSIS — K64.4 BLEEDING EXTERNAL HEMORRHOIDS: Primary | ICD-10-CM

## 2021-12-20 LAB
ANION GAP SERPL CALCULATED.3IONS-SCNC: 9 MMOL/L (ref 3–16)
BASOPHILS ABSOLUTE: 0 K/UL (ref 0–0.2)
BASOPHILS RELATIVE PERCENT: 0.5 %
BUN BLDV-MCNC: 15 MG/DL (ref 7–20)
CALCIUM SERPL-MCNC: 9.3 MG/DL (ref 8.3–10.6)
CHLORIDE BLD-SCNC: 102 MMOL/L (ref 99–110)
CO2: 28 MMOL/L (ref 21–32)
CREAT SERPL-MCNC: 0.7 MG/DL (ref 0.6–1.1)
EOSINOPHILS ABSOLUTE: 0.1 K/UL (ref 0–0.6)
EOSINOPHILS RELATIVE PERCENT: 1 %
GFR AFRICAN AMERICAN: >60
GFR NON-AFRICAN AMERICAN: >60
GLUCOSE BLD-MCNC: 95 MG/DL (ref 70–99)
HCT VFR BLD CALC: 37.6 % (ref 36–48)
HEMOGLOBIN: 12.7 G/DL (ref 12–16)
LYMPHOCYTES ABSOLUTE: 1.4 K/UL (ref 1–5.1)
LYMPHOCYTES RELATIVE PERCENT: 20.8 %
MCH RBC QN AUTO: 31.1 PG (ref 26–34)
MCHC RBC AUTO-ENTMCNC: 33.8 G/DL (ref 31–36)
MCV RBC AUTO: 91.9 FL (ref 80–100)
MONOCYTES ABSOLUTE: 0.4 K/UL (ref 0–1.3)
MONOCYTES RELATIVE PERCENT: 6.4 %
NEUTROPHILS ABSOLUTE: 4.7 K/UL (ref 1.7–7.7)
NEUTROPHILS RELATIVE PERCENT: 71.3 %
PDW BLD-RTO: 13 % (ref 12.4–15.4)
PLATELET # BLD: 264 K/UL (ref 135–450)
PMV BLD AUTO: 7.2 FL (ref 5–10.5)
POTASSIUM SERPL-SCNC: 4.2 MMOL/L (ref 3.5–5.1)
RBC # BLD: 4.1 M/UL (ref 4–5.2)
SODIUM BLD-SCNC: 139 MMOL/L (ref 136–145)
WBC # BLD: 6.6 K/UL (ref 4–11)

## 2021-12-20 PROCEDURE — 85025 COMPLETE CBC W/AUTO DIFF WBC: CPT

## 2021-12-20 PROCEDURE — 80048 BASIC METABOLIC PNL TOTAL CA: CPT

## 2021-12-20 PROCEDURE — 99284 EMERGENCY DEPT VISIT MOD MDM: CPT

## 2021-12-20 PROCEDURE — 36415 COLL VENOUS BLD VENIPUNCTURE: CPT

## 2021-12-20 RX ORDER — CIPROFLOXACIN 500 MG/1
500 TABLET, FILM COATED ORAL 2 TIMES DAILY
Qty: 14 TABLET | Refills: 0 | Status: SHIPPED | OUTPATIENT
Start: 2021-12-20 | End: 2021-12-27

## 2021-12-20 RX ORDER — DOCUSATE SODIUM 100 MG/1
100 CAPSULE, LIQUID FILLED ORAL 2 TIMES DAILY
Qty: 14 CAPSULE | Refills: 0 | Status: SHIPPED | OUTPATIENT
Start: 2021-12-20 | End: 2021-12-27

## 2021-12-20 RX ORDER — METRONIDAZOLE 500 MG/1
500 TABLET ORAL 3 TIMES DAILY
Qty: 21 TABLET | Refills: 0 | Status: SHIPPED | OUTPATIENT
Start: 2021-12-20 | End: 2021-12-27

## 2021-12-20 ASSESSMENT — PAIN - FUNCTIONAL ASSESSMENT: PAIN_FUNCTIONAL_ASSESSMENT: ACTIVITIES ARE NOT PREVENTED

## 2021-12-20 ASSESSMENT — ENCOUNTER SYMPTOMS
BLOOD IN STOOL: 1
EYE PAIN: 0
PHOTOPHOBIA: 0
SHORTNESS OF BREATH: 0
VOMITING: 0
DIARRHEA: 0
ABDOMINAL PAIN: 0
BACK PAIN: 0
NAUSEA: 0
COUGH: 0

## 2021-12-20 ASSESSMENT — PAIN SCALES - GENERAL: PAINLEVEL_OUTOF10: 5

## 2021-12-20 ASSESSMENT — PAIN DESCRIPTION - PAIN TYPE: TYPE: ACUTE PAIN

## 2021-12-20 ASSESSMENT — PAIN DESCRIPTION - LOCATION: LOCATION: RECTUM

## 2021-12-20 ASSESSMENT — PAIN DESCRIPTION - DESCRIPTORS: DESCRIPTORS: DISCOMFORT

## 2021-12-21 NOTE — ED PROVIDER NOTES
[address] [facility] [address] ENCOUNTER          PHYSICIAN ASSISTANT NOTE       Date of evaluation: 12/20/2021    Chief Complaint     Rectal Bleeding (x2days) and Hemorrhoids      History of Present Illness     Paulo Jackson is a 46 y.o. female who presents to the emergency department rectal bleeding. Patient states over the last 2 days she has had a significant rectal bleeding when having a bowel movement. She states that when she has a bowel movement the toilet bowl was filled with bright red blood. She does endorse a mild discomfort when having a bowel movement, however no pain in between episodes. Denies abdominal pain, nausea, vomiting, or fevers. She does endorse some mild lower back pain, but states it is minimal.  Denies trauma or injury. She does state that she has had hemorrhoids removed in the past, however it was greater than 20 years ago. She states over the last several days she has been having to strain more with bowel movements. She began taking MiraLAX and is able to have a normal bowel movement. She is not taking any stool softeners. She was given a prescription for Anusol by her OB/GYN yesterday and has been using it daily since. She states she does have an appoint with a general surgeon on 12/22/2021. She presented to the emergency room today because she was concerned that the blood was becoming more stringy in nature. She denies lightheadedness, shortness of breath, numbness, tingling, weakness, or other concerning symptoms. Review of Systems     Review of Systems   Constitutional: Negative for chills and fever. HENT: Negative for congestion. Eyes: Negative for photophobia and pain. Respiratory: Negative for cough and shortness of breath. Cardiovascular: Negative for chest pain and palpitations. Gastrointestinal: Positive for blood in stool. Negative for abdominal pain, diarrhea, nausea and vomiting.    Genitourinary: Negative for difficulty urinating and hematuria. Musculoskeletal: Negative for back pain and neck pain. Neurological: Negative for dizziness and headaches. Psychiatric/Behavioral: Negative for suicidal ideas. Past Medical, Surgical, Family, and Social History     She has a past medical history of Allergic rhinitis, Atrophic vaginitis, DDD (degenerative disc disease), cervical, Dysmenorrhea, Dyspareunia in female, Esophagitis, Fibrocystic breast, Gastritis, HPV (human papilloma virus) infection, Interstitial cystitis, Iron deficiency anemia, Menopause ovarian failure, Menorrhagia, Vitamin D deficiency, and Vocal cord nodule. She has a past surgical history that includes partial hysterectomy (cervix not removed) (2009); hernia repair (1970, 1999); Appendectomy (1996); cervical fusion (2013); laryngoscopy (2016); Upper gastrointestinal endoscopy (05/2019); and Hysterectomy (2009). Her family history includes Cancer (age of onset: 76) in her mother; Colon Cancer (age of onset: 46) in her maternal grandfather; High Blood Pressure in her father; Other in her paternal grandmother. She reports that she has never smoked. She has never used smokeless tobacco. She reports current alcohol use. She reports that she does not use drugs.     Medications     Previous Medications    BACLOFEN (LIORESAL) 10 MG TABLET    Take 1 tablet by mouth 3 times daily    BUTALBITAL-ACETAMINOPHEN-CAFFEINE (FIORICET, ESGIC) -40 MG PER TABLET    Take 1 tablet by mouth every 6 hours as needed for Headaches    CETIRIZINE (ZYRTEC) 10 MG TABLET    Take 10 mg by mouth daily    CHOLECALCIFEROL (VITAMIN D3) 1000 UNITS TABS    Take 1 tablet by mouth daily    ESTRADIOL (ESTRACE VAGINAL) 0.1 MG/GM VAGINAL CREAM    Place 1 g vaginally Twice a Week    ESTRADIOL (MINIVELLE) 0.05 MG/24HR    Place 1 patch onto the skin Twice a Week    GABAPENTIN (NEURONTIN) 100 MG CAPSULE    TAKE 1 CAPSULE BY MOUTH 3 TIMES A DAY    HYDROCORTISONE (ANUSOL-HC) 25 MG SUPPOSITORY    Place 1 suppository rectally every 12 hours prn    HYDROXYZINE (ATARAX) 10 MG TABLET    Take 25 mg by mouth 3 times daily as needed for Itching     LINACLOTIDE (LINZESS) 145 MCG CAPSULE    Take 1 capsule by mouth every morning (before breakfast)    MELOXICAM (MOBIC) 7.5 MG TABLET    Take 1 tablet by mouth 2 times daily as needed for Pain To face/ jaw and mouth    PROBIOTIC PRODUCT (PROBIOTIC-10 PO)    Take by mouth    VASCEPA 1 G CAPS CAPSULE    Take 2 capsules by mouth 2 times daily       Allergies     She has No Known Allergies. Physical Exam     INITIAL VITALS: BP: 121/82, Temp: 98.7 °F (37.1 °C), Pulse: 80, Resp: 16, SpO2: 96 %  Physical Exam  Constitutional:       General: She is not in acute distress. Appearance: She is well-developed. HENT:      Head: Normocephalic and atraumatic. Eyes:      Pupils: Pupils are equal, round, and reactive to light. Cardiovascular:      Rate and Rhythm: Normal rate and regular rhythm. Heart sounds: No murmur heard. No friction rub. No gallop. Pulmonary:      Effort: Pulmonary effort is normal. No respiratory distress. Breath sounds: Normal breath sounds. Abdominal:      Palpations: Abdomen is soft. Tenderness: There is no abdominal tenderness. Genitourinary:     Comments: Rectal exam performed with nursing staff as a chaperone. Patient did have 2 hemorrhoids on the right side. They were soft and nontender to palpation. No concern for thrombosed hemorrhoid. On digital rectal exam, patient did have mild pain on the right side, however no fullness to palpation. Musculoskeletal:         General: Normal range of motion. Cervical back: Normal range of motion and neck supple. Skin:     General: Skin is warm and dry. Neurological:      Mental Status: She is alert and oriented to person, place, and time.          Diagnostic Results     RADIOLOGY:  No orders to display       LABS:   Results for orders placed or performed during the hospital encounter of 12/20/21   CBC auto differential   Result Value Ref Range    WBC 6.6 4.0 - 11.0 K/uL    RBC 4.10 4.00 - 5.20 M/uL    Hemoglobin 12.7 12.0 - 16.0 g/dL    Hematocrit 37.6 36.0 - 48.0 %    MCV 91.9 80.0 - 100.0 fL    MCH 31.1 26.0 - 34.0 pg    MCHC 33.8 31.0 - 36.0 g/dL    RDW 13.0 12.4 - 15.4 %    Platelets 950 500 - 485 K/uL    MPV 7.2 5.0 - 10.5 fL    Neutrophils % 71.3 %    Lymphocytes % 20.8 %    Monocytes % 6.4 %    Eosinophils % 1.0 %    Basophils % 0.5 %    Neutrophils Absolute 4.7 1.7 - 7.7 K/uL    Lymphocytes Absolute 1.4 1.0 - 5.1 K/uL    Monocytes Absolute 0.4 0.0 - 1.3 K/uL    Eosinophils Absolute 0.1 0.0 - 0.6 K/uL    Basophils Absolute 0.0 0.0 - 0.2 K/uL   Basic Metabolic Panel (EP - 1)   Result Value Ref Range    Sodium 139 136 - 145 mmol/L    Potassium 4.2 3.5 - 5.1 mmol/L    Chloride 102 99 - 110 mmol/L    CO2 28 21 - 32 mmol/L    Anion Gap 9 3 - 16    Glucose 95 70 - 99 mg/dL    BUN 15 7 - 20 mg/dL    CREATININE 0.7 0.6 - 1.1 mg/dL    GFR Non-African American >60 >60    GFR African American >60 >60    Calcium 9.3 8.3 - 10.6 mg/dL     RECENT VITALS:  BP: 106/87, Temp: 98.7 °F (37.1 °C), Pulse: 80, Resp: 16, SpO2: 97 %     ED Course     Nursing Notes, Past Medical Hx,Past Surgical Hx, Social Hx, Allergies, and Family Hx were reviewed. The patient was given the following medications:  Orders Placed This Encounter   Medications    ciprofloxacin (CIPRO) 500 MG tablet     Sig: Take 1 tablet by mouth 2 times daily for 7 days     Dispense:  14 tablet     Refill:  0    metroNIDAZOLE (FLAGYL) 500 MG tablet     Sig: Take 1 tablet by mouth 3 times daily for 7 days     Dispense:  21 tablet     Refill:  0    docusate sodium (COLACE) 100 MG capsule     Sig: Take 1 capsule by mouth 2 times daily for 7 days     Dispense:  14 capsule     Refill:  0       CONSULTS:  None    MEDICAL DECISION MAKING / Ross Points / Susana Lynne is a 46 y.o. female who presents the emergency room with blood in stool.   Vital signs stable on presentation remained stable throughout her stay. Thorough history and physical exam performed in detail above. Patient presents the emergency room blood in stool for the last 2 days. She states anytime she has a bowel movement it feels the toilet bowl with bright red blood. She does endorse some mild discomfort when having a bowel movement, however no pain in between episodes. Denies abdominal pain, nausea, vomiting. She has been having some mild lower back pain since this began. Denies any trauma or injury. Denies radiation of pain. On physical exam heart rate and rhythm regular. Lungs clear to auscultation bilaterally. Abdomen soft and nontender in all quadrants. Rectal exam was performed with nursing staff as a chaperone. Patient does have 2 external hemorrhoids which are soft and nonthrombosed. Digital rectal exam was performed with mild amount of discomfort, however no fullness or palpable internal hemorrhoids. CBC showed no signs of leukocytosis or anemia. BMP was unremarkable. At this time, I do not believe patient needs CT scan of her abdomen given benign examination, lack of leukocytosis, reassuring vitals. I do believe that is reasonable to cover the patient with Cipro and Flagyl due to concern for diverticulitis given her low back pain and blood in stool. She also be given a stool softener to help with her bowel movements. She does have follow-up with her general surgeon in 2 days. She was also given contact information for GI to follow-up as an outpatient. I have low suspicion for appendicitis, cholecystitis, choledocholithiasis, or other acute intra-abdominal abnormality given benign exam findings. I believe she is stable to be discharged with instructions to follow-up as an outpatient. This plan was thoroughly discussed with the patient is agreeable. She was given strict return precautions prior to discharge.     This patient was also evaluated by the attending physician. All care plans were discussed and agreed upon. Clinical Impression     1.  Rectal bleeding        Disposition     PATIENT REFERRED TO:  Michael Mary MD  [address]  [phone]    Schedule an appointment as soon as possible for a visit       [address] [facility],  [address]  [facility]    Schedule an appointment as soon as possible for a visit         DISCHARGE MEDICATIONS:  New Prescriptions    CIPROFLOXACIN (CIPRO) 500 MG TABLET    Take 1 tablet by mouth 2 times daily for 7 days    DOCUSATE SODIUM (COLACE) 100 MG CAPSULE    Take 1 capsule by mouth 2 times daily for 7 days    METRONIDAZOLE (FLAGYL) 500 MG TABLET    Take 1 tablet by mouth 3 times daily for 7 days       DISPOSITION Decision To Discharge 12/20/2021 11:46:16 PM        Sergei Berry PA-C  12/21/21 0010

## 2021-12-21 NOTE — ED TRIAGE NOTES
Patient has a history of hemorrhoids and stated that she has some bright red blood for past x2 days along with pain when having a bowel movement.

## 2021-12-21 NOTE — TELEPHONE ENCOUNTER
Patient is calling because she is going to the ED per her surgeons referral. Patient is having rectal bleeding and she called Dr Ronaldo Lopez office today and they told her if she has any stringy bleeding to go to the ED. She has started having stringing bleeding from rectum. No triage necessary as patient is on her way to ED per instruction from general surgeon. Reason for Disposition  [employer] Caller has already spoken with another triager and has no further questions.     Protocols used: NO CONTACT OR DUPLICATE CONTACT CALL-ADULT-

## 2021-12-28 NOTE — ED PROVIDER NOTES
ED Attending Attestation Note     Date of evaluation: 12/20/2021    This patient was seen by the advance practice provider. I have seen and examined the patient, agree with the workup, evaluation, management and diagnosis. The care plan has been discussed. My assessment reveals a well appearing middle age female, she has some mild LLQ abdominal ttp without rebound or guarding, no palpable hernia or organomegaly.      Aidee Boyd MD  12/28/21 1205

## 2022-01-05 ENCOUNTER — HOSPITAL ENCOUNTER (OUTPATIENT)
Age: 52
Discharge: HOME | End: 2022-01-05
Payer: COMMERCIAL

## 2022-01-05 ENCOUNTER — INITIAL CONSULT (OUTPATIENT)
Dept: GASTROENTEROLOGY | Age: 52
End: 2022-01-05
Payer: COMMERCIAL

## 2022-01-05 VITALS
TEMPERATURE: 97.9 F | DIASTOLIC BLOOD PRESSURE: 71 MMHG | HEIGHT: 64 IN | BODY MASS INDEX: 23.01 KG/M2 | HEART RATE: 85 BPM | WEIGHT: 134.8 LBS | SYSTOLIC BLOOD PRESSURE: 104 MMHG

## 2022-01-05 DIAGNOSIS — K62.5 RECTAL BLEEDING: Primary | ICD-10-CM

## 2022-01-05 DIAGNOSIS — K59.04 CHRONIC IDIOPATHIC CONSTIPATION: ICD-10-CM

## 2022-01-05 PROCEDURE — U0003 INFECTIOUS AGENT DETECTION BY NUCLEIC ACID (DNA OR RNA); SEVERE ACUTE RESPIRATORY SYNDROME CORONAVIRUS 2 (SARS-COV-2) (CORONAVIRUS DISEASE [COVID-19]), AMPLIFIED PROBE TECHNIQUE, MAKING USE OF HIGH THROUGHPUT TECHNOLOGIES AS DESCRIBED BY CMS-2020-01-R: HCPCS

## 2022-01-05 PROCEDURE — U0005 INFEC AGEN DETEC AMPLI PROBE: HCPCS

## 2022-01-05 PROCEDURE — 99204 OFFICE O/P NEW MOD 45 MIN: CPT | Performed by: INTERNAL MEDICINE

## 2022-01-05 RX ORDER — POLYETHYLENE GLYCOL 3350 17 G/17G
238 POWDER ORAL DAILY
Qty: 255 G | Refills: 0 | Status: SHIPPED | OUTPATIENT
Start: 2022-01-05

## 2022-01-05 NOTE — PROGRESS NOTES
[facility]    [address] ,  [address]  [place], [facility]  Phone: [phone]   V:[phone]    CHIEF COMPLAINT     Chief Complaint   Patient presents with   [address] [facility] Patient     ED f/u for rectal bleeding two weeks ago - Pt having continued rectal pain       HPI     Thank you Peace eMza MD for asking me to see Pippa Smith in consultation. Pippa Smith is a 46 y.o. female  [2] White (non-) [1] with medical history of  hemorrhoidectomy 1995, chronic constipation on linzess 145 mcg daily seen independently with referral for rectal bleeding of 1 month duration. Associated with rectal pain and spasm; constipation with small volume stools and straining. Denies abdominal pain, nausea, vomiting, fever, chills, unintentional weight loss, diarrhea, hematochezia or melenic stools. Patient was seen at [facility] ED on 12/20/21 for rectal bleeding. Labs noted unremarkable BMP and CBC with hemoglobin 12.7 g/dL and hematocrit 37.6%. Discharged on Anusol suppository twice daily. Last Encounter Reviewed: none  Pertinent PMH, FH, SH is reviewed below. Last EGD: None  Last Colonoscopy 12/2018: normal per patient     Review of available records reveals:   Wt Readings from Last 50 Encounters:   01/05/22 134 lb 12.8 oz (61.1 kg)   12/20/21 125 lb (56.7 kg)   09/15/21 132 lb 3.2 oz (60 kg)   09/07/21 130 lb (59 kg)   08/18/21 130 lb 9.6 oz (59.2 kg)   08/16/21 125 lb (56.7 kg)   08/02/21 125 lb (56.7 kg)   07/19/21 131 lb 9.6 oz (59.7 kg)   06/22/21 129 lb (58.5 kg)   06/11/21 130 lb (59 kg)   06/07/21 130 lb (59 kg)   06/02/21 135 lb (61.2 kg)   05/11/21 130 lb (59 kg)   05/04/21 130 lb (59 kg)   04/23/21 129 lb 6.4 oz (58.7 kg)   04/06/21 131 lb (59.4 kg)   03/09/21 131 lb (59.4 kg)   02/22/21 131 lb 3.2 oz (59.5 kg)   02/02/21 125 lb (56.7 kg)   01/27/21 130 lb 6.4 oz (59.1 kg)   09/22/20 126 lb (57.2 kg)   06/17/20 124 lb 6 oz (56.4 kg)   06/03/20 122 lb 2 oz (55.4 kg)   02/10/20 124 lb 9.6 oz (56.5 kg)   11/18/19 120 lb 12.8 oz (54.8 kg)   05/06/19 125 lb (56.7 kg)   06/13/18 114 lb (51.7 kg)   04/13/18 120 lb 9.6 oz (54.7 kg)   03/14/18 120 lb 3.2 oz (54.5 kg)   02/26/18 115 lb 1.3 oz (52.2 kg)   02/19/18 115 lb (52.2 kg)   01/26/18 117 lb (53.1 kg)   01/12/18 117 lb 12.8 oz (53.4 kg)   10/16/17 116 lb 6.4 oz (52.8 kg)       No components found for: HGBA1C  BP Readings from Last 3 Encounters:   01/05/22 104/71   12/20/21 106/87   09/15/21 100/70     Health Maintenance   Topic Date Due    Depression Screen  06/07/2022    Breast cancer screen  08/02/2023    Lipid screen  07/21/2026    Colon cancer screen colonoscopy  12/06/2026    DTaP/Tdap/Td vaccine (2 - Td or Tdap) 05/15/2029    Flu vaccine  Completed    Shingles Vaccine  Completed    COVID-19 Vaccine  Completed    Hepatitis C screen  Completed    HIV screen  Completed    Hepatitis A vaccine  Aged Out    Hepatitis B vaccine  Aged Out    Hib vaccine  Aged Out    Meningococcal (ACWY) vaccine  Aged Out    Pneumococcal 0-64 years Vaccine  Aged Out       No components found for: [employer]     PAST MEDICAL HISTORY     Past Medical History:   Diagnosis Date    Allergic rhinitis     Atrophic vaginitis     DDD (degenerative disc disease), cervical     Dysmenorrhea     Dyspareunia in female     Esophagitis 05/2019    Fibrocystic breast     Gastritis 05/2019    HPV (human papilloma virus) infection 1992    Exposed back in college    Interstitial cystitis 10/23/2017    Iron deficiency anemia 2017    Menopause ovarian failure     Menorrhagia     Vitamin D deficiency 07/2019    Vocal cord nodule 2016     FAMILY HISTORY     Family History   Problem Relation Age of Onset    Cancer Mother 76        melanoma    High Blood Pressure Father     Colon Cancer Maternal Grandfather 46    Other Paternal Grandmother         PE     SOCIAL HISTORY     Social History     Socioeconomic History    Marital status:      Spouse name: Not on file    Number of children: Not on file    Years of education: Not on file    Highest education level: Not on file   Occupational History    Not on file   Tobacco Use    Smoking status: Never Smoker    Smokeless tobacco: Never Used   Vaping Use    Vaping Use: Never used   Substance and Sexual Activity    Alcohol use: Yes     Alcohol/week: 0.0 standard drinks    Drug use: No    Sexual activity: Yes     Partners: Male   Other Topics Concern    Not on file   Social History Narrative    Not on file     Social Determinants of Health     Financial Resource Strain: Low Risk     Difficulty of Paying Living Expenses: Not hard at all   Food Insecurity: No Food Insecurity    Worried About Running Out of Food in the Last Year: Never true    [address] in the Last Year: Never true   Transportation Needs:     Lack of Transportation (Medical): Not on file    Lack of Transportation (Non-Medical):  Not on file   Physical Activity:     Days of Exercise per Week: Not on file    Minutes of Exercise per Session: Not on file   Stress:     Feeling of Stress : Not on file   Social Connections:     Frequency of Communication with Friends and Family: Not on file    Frequency of Social Gatherings with Friends and Family: Not on file    Attends Confucianist Services: Not on file    Active Member of [address] [place] [address] [employer] or Organizations: Not on file    Attends Club or Organization Meetings: Not on file    Marital Status: Not on file   Intimate Partner Violence:     Fear of Current or Ex-Partner: Not on file    Emotionally Abused: Not on file    Physically Abused: Not on file    Sexually Abused: Not on file   Housing Stability:     Unable to Pay for Housing in the Last Year: Not on file    Number of [place] in the Last Year: Not on file    Unstable Housing in the Last Year: Not on file     SURGICAL HISTORY     Past Surgical History:   Procedure Laterality Date   [address]  2013 C4[address]6   Michael Aguilar 70    bilateral, right    HYSTERECTOMY  2009    LARYNGOSCOPY  2016    PARTIAL HYSTERECTOMY  2009    DUB -     UPPER GASTROINTESTINAL ENDOSCOPY  05/2019     CURRENT MEDICATIONS   (This list may include medications prescribed during this encounter as epic can not insert only the list prior to this encounter.)  Current Outpatient Rx   Medication Sig Dispense Refill    bisacodyl (DULCOLAX) 5 MG EC tablet Take 4 tablets by mouth once for 1 dose Take as directed for colonoscopy.  4 tablet 0    polyethylene glycol (MIRALAX) 17 GM/SCOOP POWD powder Take 238 g by mouth daily Take as directed for colonoscopy 255 g 0    linaclotide (LINZESS) 290 MCG CAPS capsule Take 1 capsule by mouth every morning (before breakfast) 90 capsule 3    baclofen (LIORESAL) 10 MG tablet Take 1 tablet by mouth 3 times daily 90 tablet 2    meloxicam (MOBIC) 7.5 MG tablet Take 1 tablet by mouth 2 times daily as needed for Pain To face/ jaw and mouth (Patient taking differently: Take 7.5 mg by mouth 2 times daily as needed for Pain To face/ jaw and mouth-PRN) 90 tablet 0    estradiol (MINIVELLE) 0.05 MG/24HR Place 1 patch onto the skin Twice a Week 24 patch 3    estradiol (ESTRACE VAGINAL) 0.1 MG/GM vaginal cream Place 1 g vaginally Twice a Week 1 Tube 3    hydrocortisone (ANUSOL-HC) 25 MG suppository Place 1 suppository rectally every 12 hours prn 30 suppository 3    hydrOXYzine (ATARAX) 10 MG tablet Take 25 mg by mouth 3 times daily as needed for Itching       Probiotic Product (PROBIOTIC-10 PO) Take by mouth      cetirizine (ZYRTEC) 10 MG tablet Take 10 mg by mouth daily      butalbital-acetaminophen-caffeine (FIORICET, ESGIC) -40 MG per tablet Take 1 tablet by mouth every 6 hours as needed for Headaches 25 tablet 1    VASCEPA 1 g CAPS capsule Take 2 capsules by mouth 2 times daily 120 capsule 3    Cholecalciferol (VITAMIN D3) 1000 units TABS Take 1 tablet by mouth daily 90 tablet 3    gabapentin (NEURONTIN) 100 MG capsule TAKE 1 CAPSULE BY MOUTH 3 TIMES A DAY 90 capsule 0     ALLERGIES   No Known Allergies  IMMUNIZATIONS     Immunization History   Administered Date(s) Administered    COVID-19, Pfizer, PF, 30mcg/0.3mL 03/12/2021, 04/02/2021, 12/20/2021    Hepatitis A Adult (Havrix, Vaqta) 11/18/2019    Hepatitis A Adult (Vaqta) 05/14/2019    Hepatitis B Ped/Adol (Engerix-B, Recombivax HB) 05/14/2019, 06/19/2019, 11/18/2019    Influenza, Quadv, IM, PF (6 mo and older Fluzone, Flulaval, Fluarix, and 3 yrs and older Afluria) 10/16/2017, 10/16/2019    Tdap (Boostrix, Adacel) 05/15/2019    Zoster Recombinant (Shingrix) 07/23/2020, 10/12/2020     REVIEW OF SYSTEMS   See HPI for further details and pertinent postiives. Negative for the following:  Constitutional: Negative for weight change. Negative for appetite change and fatigue. HENT: Negative for nosebleeds, sore throat, mouth sores, and voice change. Respiratory: Negative for cough, choking and chest tightness. Cardiovascular: Negative for chest pain   Gastrointestinal: See HPI  Musculoskeletal: Negative for arthralgias. Skin: Negative for pallor. Neurological: Negative for weakness and light-headedness. Hematological: Negative for adenopathy. Does not bruise/bleed easily. Psychiatric/Behavioral: Negative for suicidal ideas. PHYSICAL EXAM   VITAL SIGNS: /71 (Site: Right Upper Arm, Position: Sitting, Cuff Size: Medium Adult)   Pulse 85   Temp 97.9 °F (36.6 °C)   Ht 5' 4.02\" (1.626 m)   Wt 134 lb 12.8 oz (61.1 kg)   LMP  (LMP Unknown)   BMI 23.13 kg/m²   Wt Readings from Last 3 Encounters:   01/05/22 134 lb 12.8 oz (61.1 kg)   12/20/21 125 lb (56.7 kg)   09/15/21 132 lb 3.2 oz (60 kg)     Constitutional: Well developed, Well nourished, No acute distress, Non-toxic appearance.    HENT: Normocephalic, Atraumatic, Bilateral external ears normal, Oropharynx moist, No oral exudates, Nose normal.   Eyes: Conjunctiva normal, No discharge. Neck: Normal range of motion, No tenderness, Supple  Cardiovascular: Normal heart rate, Normal rhythm, No murmurs, No rubs, No gallops. Thorax & Lungs: Normal breath sounds, No respiratory distress, No wheezing  Abdomen: normal bowel sounds, soft, non tender, non distended,  no hernias,     Rectal:  Deferred. Skin: Warm, Dry, No erythema, No rash. No bruising. Lower Extremities: Intact distal pulses, No edema, No tenderness, No cyanosis, No clubbing. Neurologic: Alert & oriented x 3, Normal motor function, Normal sensory function, No focal deficits noted. RADIOLOGY/PROCEDURES   No results found. FINAL IMPRESSION   Davidkristen Ewa was seen today for new patient. Diagnoses and all orders for this visit:    Rectal bleeding and anal pain likely due to anal fissure vs. Hemorrhoidal bleed. -     COLONOSCOPY W/ OR W/O BIOPSY  -     linaclotide (LINZESS) 290 MCG CAPS capsule; Take 1 capsule by mouth every morning (before breakfast)  -     Sitz-baths   -     High fiber diet    Chronic idiopathic constipation  -     COLONOSCOPY W/ OR W/O BIOPSY  -     bisacodyl (DULCOLAX) 5 MG EC tablet; Take 4 tablets by mouth once for 1 dose Take as directed for colonoscopy. -     polyethylene glycol (MIRALAX) 17 GM/SCOOP POWD powder; Take 238 g by mouth daily Take as directed for colonoscopy  -     linaclotide (LINZESS) 290 MCG CAPS capsule; Take 1 capsule by mouth every morning (before breakfast)        Orders Placed This Encounter   Procedures    COLONOSCOPY W/ OR W/O BIOPSY     Scheduling Instructions:      Schedule with anesthesia provided diprivan. Please provide prep of choice instructions and prescription. General guidelines for holding blood thinners/anticoagulants around endoscopic procedure are but patients are encouraged to check with their prescribing physician.             The patient may hold Plavix, Effient, Brilinta 5 days prior to the procedure unless:       A drug eluting technique,       cystogastrostomy,and treatment of varices. ORDERED FUTURE/PENDING TESTS     Lab Frequency Next Occurrence   Covid-19, Antibody, Total Once 01/28/2021       FOLLOWUP   No follow-ups on file.           Alexandria Campbell MD 1/5/22 9:34 AM EST    CC:  Mikey Renae MD

## 2022-01-05 NOTE — PATIENT INSTRUCTIONS
Patient Education        Colonoscopy: Before Your Procedure  What is a colonoscopy? A colonoscopy is a test that lets a doctor look inside your colon. The doctor uses a thin, lighted tube called a colonoscope to look for problems. These include small growths called polyps, cancer, or bleeding. During the test, the doctor can take samples of tissue that can be checked for cancer or other problems. This is called a biopsy. The doctor can also take out polyps. Before the test, you will need to stop eating solid foods. You also will be given instructions on how to clean out your colon. This helps your doctor be able to see inside your colon during the test.  How do you prepare for the procedure? Procedures can be stressful. This information will help you understand what you can expect. And it will help you safely prepare for your procedure. Preparing for the procedure    · Be sure you have someone to take you home. Anesthesia and pain medicine will make it unsafe for you to drive or get home on your own. · Understand exactly what procedure is planned, along with the risks, benefits, and other options.     · Tell your doctor ALL the medicines, vitamins, supplements, and herbal remedies you take. Some may increase the risk of problems during your procedure. Your doctor will tell you if you should stop taking any of them before the procedure and how soon to do it.     · If you take aspirin or some other blood thinner, ask your doctor if you should stop taking it before your procedure. Make sure that you understand exactly what your doctor wants you to do. These medicines increase the risk of bleeding.     · Make sure your doctor and the hospital have a copy of your advance directive. If you don't have one, you may want to prepare one. It lets others know your health care wishes. It's a good thing to have before any type of surgery or procedure.    Before the procedure    · Follow your doctor's directions about when to stop eating solid foods and drink only clear liquids. You can drink water, clear juices, clear broths, flavored ice pops, and gelatin (such as Jell-O). Do not eat or drink anything red or purple. This includes grape juice and grape-flavored ice pops. It also includes fruit punch and cherry gelatin.     · Drink the \"colon prep\" liquid as your doctor tells you. You will want to stay home, because the liquid will make you go to the bathroom a lot. Your stools will be loose and watery. It's very important to drink all of the liquid. If you have problems drinking it, call your doctor.     · Do not eat any solid foods after you drink the colon prep.     · Stop drinking clear liquids for a few hours before the test. Your doctor will tell you how many hours this will be. What happens on the day of the procedure? · Follow the instructions exactly about when to stop eating and drinking. If you don't, your procedure may be canceled. If your doctor told you to take your medicines on the day of the procedure, take them with only a sip of water.     · Take a bath or shower before you come in for your procedure. Do not apply lotions, perfumes, deodorants, or nail polish.     · Take off all jewelry and piercings. And take out contact lenses, if you wear them. At the doctor's office or hospital   · Bring a picture ID.     · You will be kept comfortable and safe by your anesthesia provider. The anesthesia may make you sleep.     · You will lie on your back or your side with your knees drawn up toward your belly. The doctor will gently put a gloved finger into your anus. Then the doctor puts the scope in and moves it into your colon. The scope goes in easily because it is lubricated.     · The doctor may also use small tools to take tissue samples for a biopsy or to remove polyps. This does not hurt.     · The test usually takes 30 to 45 minutes. But it may take longer. It depends on what is found and what is done. When should you call your doctor? · You have questions or concerns.     · You don't understand how to prepare for your procedure.     · You are having trouble with the bowel prep.     · You become ill before the procedure (such as fever, flu, or a cold).     · You need to reschedule or have changed your mind about having the procedure. Where can you learn more? Go to [email or web address] and sign in to your [employer] account. Enter C315 in the [employer] box to learn more about \"Colonoscopy: Before Your Procedure. \"     If you do not have an account, please click on the \"Sign Up Now\" link. Current as of: September 8, 2021               Content Version: 13.1  © 2006-2021 Healthwise, Incorporated. Care instructions adapted under license by [facility] ([facility]). If you have questions about a medical condition or this instruction, always ask your healthcare professional. [place] [address] any warranty or liability for your use of this information.

## 2022-01-06 LAB — SARS-COV-2: NOT DETECTED

## 2022-01-07 ENCOUNTER — ANESTHESIA EVENT (OUTPATIENT)
Dept: ENDOSCOPY | Age: 52
End: 2022-01-07
Payer: COMMERCIAL

## 2022-01-10 NOTE — H&P
Interval H&P    I have reviewed the history on 1/5/22 and examined the patient. I find no relevant changes. I have reviewed with the patient the colonoscopy procedure    Colonoscopy with alternatives, rationale, benefits and risks, not limited to bleeding, infection, perforation, need of surgery, prolong hospital stay and anesthesia side effects were explained. Patient verbalized understanding and agrees to proceed with colonoscopy.

## 2022-01-11 ENCOUNTER — HOSPITAL ENCOUNTER (OUTPATIENT)
Age: 52
Setting detail: OUTPATIENT SURGERY
Discharge: HOME | End: 2022-01-11
Attending: INTERNAL MEDICINE | Admitting: INTERNAL MEDICINE
Payer: COMMERCIAL

## 2022-01-11 ENCOUNTER — ANESTHESIA (OUTPATIENT)
Dept: ENDOSCOPY | Age: 52
End: 2022-01-11
Payer: COMMERCIAL

## 2022-01-11 VITALS
RESPIRATION RATE: 16 BRPM | TEMPERATURE: 97.4 F | WEIGHT: 130 LBS | DIASTOLIC BLOOD PRESSURE: 70 MMHG | HEART RATE: 66 BPM | OXYGEN SATURATION: 100 % | HEIGHT: 65 IN | SYSTOLIC BLOOD PRESSURE: 114 MMHG | BODY MASS INDEX: 21.66 KG/M2

## 2022-01-11 VITALS — OXYGEN SATURATION: 100 % | SYSTOLIC BLOOD PRESSURE: 112 MMHG | DIASTOLIC BLOOD PRESSURE: 75 MMHG

## 2022-01-11 PROCEDURE — 3700000000 HC ANESTHESIA ATTENDED CARE: Performed by: INTERNAL MEDICINE

## 2022-01-11 PROCEDURE — 2709999900 HC NON-CHARGEABLE SUPPLY: Performed by: INTERNAL MEDICINE

## 2022-01-11 PROCEDURE — 3700000001 HC ADD 15 MINUTES (ANESTHESIA): Performed by: INTERNAL MEDICINE

## 2022-01-11 PROCEDURE — 7100000010 HC PHASE II RECOVERY - FIRST 15 MIN: Performed by: INTERNAL MEDICINE

## 2022-01-11 PROCEDURE — 6360000002 HC RX W HCPCS: Performed by: NURSE ANESTHETIST, CERTIFIED REGISTERED

## 2022-01-11 PROCEDURE — 7100000011 HC PHASE II RECOVERY - ADDTL 15 MIN: Performed by: INTERNAL MEDICINE

## 2022-01-11 PROCEDURE — 3609027000 HC COLONOSCOPY: Performed by: INTERNAL MEDICINE

## 2022-01-11 PROCEDURE — 45378 DIAGNOSTIC COLONOSCOPY: CPT | Performed by: INTERNAL MEDICINE

## 2022-01-11 PROCEDURE — 2500000003 HC RX 250 WO HCPCS: Performed by: NURSE ANESTHETIST, CERTIFIED REGISTERED

## 2022-01-11 PROCEDURE — 2580000003 HC RX 258: Performed by: INTERNAL MEDICINE

## 2022-01-11 RX ORDER — MORPHINE SULFATE 2 MG/ML
2 INJECTION, SOLUTION INTRAMUSCULAR; INTRAVENOUS EVERY 5 MIN PRN
Status: DISCONTINUED | OUTPATIENT
Start: 2022-01-11 | End: 2022-01-11 | Stop reason: HOSPADM

## 2022-01-11 RX ORDER — SODIUM CHLORIDE, SODIUM LACTATE, POTASSIUM CHLORIDE, CALCIUM CHLORIDE 600; 310; 30; 20 MG/100ML; MG/100ML; MG/100ML; MG/100ML
INJECTION, SOLUTION INTRAVENOUS CONTINUOUS
Status: DISCONTINUED | OUTPATIENT
Start: 2022-01-11 | End: 2022-01-11 | Stop reason: HOSPADM

## 2022-01-11 RX ORDER — ONDANSETRON 2 MG/ML
4 INJECTION INTRAMUSCULAR; INTRAVENOUS PRN
Status: DISCONTINUED | OUTPATIENT
Start: 2022-01-11 | End: 2022-01-11 | Stop reason: HOSPADM

## 2022-01-11 RX ORDER — OXYCODONE HYDROCHLORIDE AND ACETAMINOPHEN 5; 325 MG/1; MG/1
1 TABLET ORAL PRN
Status: DISCONTINUED | OUTPATIENT
Start: 2022-01-11 | End: 2022-01-11 | Stop reason: HOSPADM

## 2022-01-11 RX ORDER — LIDOCAINE HYDROCHLORIDE 20 MG/ML
INJECTION, SOLUTION INFILTRATION; PERINEURAL PRN
Status: DISCONTINUED | OUTPATIENT
Start: 2022-01-11 | End: 2022-01-11 | Stop reason: SDUPTHER

## 2022-01-11 RX ORDER — DIPHENHYDRAMINE HYDROCHLORIDE 50 MG/ML
12.5 INJECTION INTRAMUSCULAR; INTRAVENOUS
Status: DISCONTINUED | OUTPATIENT
Start: 2022-01-11 | End: 2022-01-11 | Stop reason: HOSPADM

## 2022-01-11 RX ORDER — PROMETHAZINE HYDROCHLORIDE 25 MG/ML
6.25 INJECTION, SOLUTION INTRAMUSCULAR; INTRAVENOUS
Status: DISCONTINUED | OUTPATIENT
Start: 2022-01-11 | End: 2022-01-11 | Stop reason: HOSPADM

## 2022-01-11 RX ORDER — LIDOCAINE HYDROCHLORIDE 10 MG/ML
1 INJECTION, SOLUTION EPIDURAL; INFILTRATION; INTRACAUDAL; PERINEURAL
Status: DISCONTINUED | OUTPATIENT
Start: 2022-01-11 | End: 2022-01-11 | Stop reason: HOSPADM

## 2022-01-11 RX ORDER — SODIUM CHLORIDE 0.9 % (FLUSH) 0.9 %
10 SYRINGE (ML) INJECTION PRN
Status: DISCONTINUED | OUTPATIENT
Start: 2022-01-11 | End: 2022-01-11 | Stop reason: HOSPADM

## 2022-01-11 RX ORDER — OXYCODONE HYDROCHLORIDE AND ACETAMINOPHEN 5; 325 MG/1; MG/1
2 TABLET ORAL PRN
Status: DISCONTINUED | OUTPATIENT
Start: 2022-01-11 | End: 2022-01-11 | Stop reason: HOSPADM

## 2022-01-11 RX ORDER — PROPOFOL 10 MG/ML
INJECTION, EMULSION INTRAVENOUS PRN
Status: DISCONTINUED | OUTPATIENT
Start: 2022-01-11 | End: 2022-01-11 | Stop reason: SDUPTHER

## 2022-01-11 RX ORDER — MEPERIDINE HYDROCHLORIDE 50 MG/ML
12.5 INJECTION INTRAMUSCULAR; INTRAVENOUS; SUBCUTANEOUS EVERY 5 MIN PRN
Status: DISCONTINUED | OUTPATIENT
Start: 2022-01-11 | End: 2022-01-11 | Stop reason: HOSPADM

## 2022-01-11 RX ORDER — SODIUM CHLORIDE 9 MG/ML
25 INJECTION, SOLUTION INTRAVENOUS PRN
Status: DISCONTINUED | OUTPATIENT
Start: 2022-01-11 | End: 2022-01-11 | Stop reason: HOSPADM

## 2022-01-11 RX ORDER — SODIUM CHLORIDE 0.9 % (FLUSH) 0.9 %
10 SYRINGE (ML) INJECTION EVERY 12 HOURS SCHEDULED
Status: DISCONTINUED | OUTPATIENT
Start: 2022-01-11 | End: 2022-01-11 | Stop reason: HOSPADM

## 2022-01-11 RX ORDER — LABETALOL HYDROCHLORIDE 5 MG/ML
5 INJECTION, SOLUTION INTRAVENOUS EVERY 10 MIN PRN
Status: DISCONTINUED | OUTPATIENT
Start: 2022-01-11 | End: 2022-01-11 | Stop reason: HOSPADM

## 2022-01-11 RX ORDER — HYDRALAZINE HYDROCHLORIDE 20 MG/ML
5 INJECTION INTRAMUSCULAR; INTRAVENOUS EVERY 10 MIN PRN
Status: DISCONTINUED | OUTPATIENT
Start: 2022-01-11 | End: 2022-01-11 | Stop reason: HOSPADM

## 2022-01-11 RX ORDER — MORPHINE SULFATE 2 MG/ML
1 INJECTION, SOLUTION INTRAMUSCULAR; INTRAVENOUS EVERY 5 MIN PRN
Status: DISCONTINUED | OUTPATIENT
Start: 2022-01-11 | End: 2022-01-11 | Stop reason: HOSPADM

## 2022-01-11 RX ADMIN — PROPOFOL 50 MG: 10 INJECTION, EMULSION INTRAVENOUS at 11:17

## 2022-01-11 RX ADMIN — SODIUM CHLORIDE, POTASSIUM CHLORIDE, SODIUM LACTATE AND CALCIUM CHLORIDE: 600; 310; 30; 20 INJECTION, SOLUTION INTRAVENOUS at 10:02

## 2022-01-11 RX ADMIN — PROPOFOL 100 MG: 10 INJECTION, EMULSION INTRAVENOUS at 11:22

## 2022-01-11 RX ADMIN — PROPOFOL 100 MG: 10 INJECTION, EMULSION INTRAVENOUS at 11:09

## 2022-01-11 RX ADMIN — PROPOFOL 300 MG: 10 INJECTION, EMULSION INTRAVENOUS at 10:59

## 2022-01-11 RX ADMIN — PROPOFOL 100 MG: 10 INJECTION, EMULSION INTRAVENOUS at 11:33

## 2022-01-11 RX ADMIN — LIDOCAINE HYDROCHLORIDE 60 MG: 20 INJECTION, SOLUTION INFILTRATION; PERINEURAL at 10:59

## 2022-01-11 ASSESSMENT — PAIN SCALES - GENERAL
PAINLEVEL_OUTOF10: 0

## 2022-01-11 NOTE — OP NOTE
[address]  [facility]  Phone: [phone]  [facility],  [facility], [address] [place] [address]  Phone: [phone]   CVD:[phone]    Colonoscopy Procedure Note    Patient: Radha Donnelly  : 1970    Procedure: Colonoscopy --diagnostic    Date:  2022     Endoscopist:  Arlene Yadav MD    Referring Physician: Lc Ramirez MD    Preoperative Diagnosis:  Rectal bleeding [K62.5]  Chronic idiopathic constipation [K59.04]    Postoperative Diagnosis:  See impression    Anesthesia: Anesthesia: MAC  Sedation: Propofol per anesthesia  Start Time: 110  Stop Time:   ASA Class: 3  Mallampati: II (soft palate, uvula, fauces visible)    Indications: This is a 46y.o. year old female with medical history of  hemorrhoidectomy , chronic constipation on linzess 145 mcg daily seen independently with referral for rectal bleeding of 1 month duration. Procedure Details  Informed consent was obtained for the procedure, including sedation. Risks of perforation, hemorrhage, adverse drug reaction and aspiration were discussed. The patient was placed in the left lateral decubitus position. Based on the pre-procedure assessment, including review of the patient's medical history, medications, allergies, and review of systems, she had been deemed to be an appropriate candidate for sedation; she was therefore sedated with the medications listed below. The patient was monitored continuously with ECG tracing, pulse oximetry, blood pressure monitoring, and direct observations. rectal examination was performed. There were medium size external hemorrhoids but no fissures or skin tags. The colonoscope was inserted into the rectum and advanced under direct vision to the cecum, which was identified by the ileocecal valve and appendiceal orifice.   The right colon was examined twice as this increases polyp detection especially if other right colon polyps, older age, male, or hutton syndrome. When segments could not be distended with CO2, it was filled/distended with water. The quality of the colonic preparation was excellent. A careful inspection was made as the colonoscope was withdrawn, including a retroflexed view of the rectum; findings and interventions are described below. Appropriate photodocumentation Was Obtained: appendiceal orifice and ileocecal valve. Findings:   Tortuous and redundant colon requiring change from pediatric colonoscope (PCF) to standard colonoscope (CF)   Small sized non bleeding diverticulum in the ascending colon. Medium sized non bleeding but erythematous internal hemorrhoids. Medium size non bleeding external hemorrhoids  Otherwise normal appearing colon mucosa. - PREP: MiraLAX and Dulcolax   - Overall difficulty: moderate in degree  - Abdominal pressure: yes - left lower quadrant  - Change in position: yes - supine  - Anesthesia issues: no  - Endocoff/Amplieye use: no    Specimens: Was Not Obtained    Complications:   None; patient tolerated the procedure well. Disposition:   PACU - hemodynamically stable. Estimated Blood loss:  none    Withdrawal Time:  7 minutes    Impression:   Tortuous and redundant colon requiring change to standard colonoscope (CF)   Small sized non bleeding diverticulum in the ascending colon. Medium sized non bleeding but erythematous internal hemorrhoids likely etiology of rectal bleed  Medium size non bleeding external hemorrhoids    Recommendations:  -Repeat colonoscopy in 10 years. ,   -High fiber diet. ,   -Increase to Linzess 290 mcg daily  -Sitz bath   -To consider hemorrhoidectomy if persistent rectal bleeding and not improving with medical therapy  -Follow up with me as needed.          Jame Murrieta MD 1/11/22 11:39 AM EST

## 2022-01-11 NOTE — ANESTHESIA PRE PROCEDURE
Department of Anesthesiology  Preprocedure Note       Name:  Corrinne Ours   Age:  46 y.o.  :  1970                                          MRN:  [id number]         Date:  2022      Surgeon: Dirk Olguin):  Silas Posadas MD    Procedure: Procedure(s):  COLONOSCOPY, POSSIBLE POLYPECTOMY    Medications prior to admission:   Prior to Admission medications    Medication Sig Start Date End Date Taking?  Authorizing Provider   polyethylene glycol (MIRALAX) 17 GM/SCOOP POWD powder Take 238 g by mouth daily Take as directed for colonoscopy 22  Yes Silas Posadas MD   linaclotide Raynald Screen) 290 MCG CAPS capsule Take 1 capsule by mouth every morning (before breakfast) 22 Yes Silas Posadas MD   meloxicam (MOBIC) 7.5 MG tablet Take 1 tablet by mouth 2 times daily as needed for Pain To face/ jaw and mouth  Patient taking differently: Take 7.5 mg by mouth 2 times daily as needed for Pain To face/ jaw and mouth-PRN 21  Yes Ana Ventura MD   estradiol (MINIVELLE) 0.05 MG/24HR Place 1 patch onto the skin Twice a Week 21  Yes Ashly Hinkle MD   estradiol (ESTRACE VAGINAL) 0.1 MG/GM vaginal cream Place 1 g vaginally Twice a Week 21  Yes Ashly Hinkle MD   hydrocortisone (ANUSOL-HC) 25 MG suppository Place 1 suppository rectally every 12 hours prn 21  Yes Ashly Hinkle MD   hydrOXYzine (ATARAX) 10 MG tablet Take 25 mg by mouth 3 times daily as needed for Itching    Yes Historical Provider, MD   Probiotic Product (PROBIOTIC-10 PO) Take by mouth   Yes Historical Provider, MD   cetirizine (ZYRTEC) 10 MG tablet Take 10 mg by mouth daily   Yes Historical Provider, MD   butalbital-acetaminophen-caffeine (FIORICET, ESGIC) -40 MG per tablet Take 1 tablet by mouth every 6 hours as needed for Headaches 3/17/20  Yes Ana Ventura MD   VASCEPA 1 g CAPS capsule Take 2 capsules by mouth 2 times daily 2/10/20  Yes Ana Ventura MD   Cholecalciferol (VITAMIN D3) 1000 units TABS Take 1 tablet by mouth daily 19  Yes Enoc Mo MD   gabapentin (NEURONTIN) 100 MG capsule TAKE 1 CAPSULE BY MOUTH 3 TIMES A DAY 21  Enoc Mo MD       Current medications:    Current Facility-Administered Medications   Medication Dose Route Frequency Provider Last Rate Last Admin    lactated ringers infusion   IntraVENous Continuous Rito Vargas MD        sodium chloride flush 0.9 % injection 10 mL  10 mL IntraVENous 2 times per day Rito Vargas MD        sodium chloride flush 0.9 % injection 10 mL  10 mL IntraVENous PRN Rito Vargas MD        0.9 % sodium chloride infusion  25 mL IntraVENous PRN Rito Vargas MD        lidocaine PF 1 % injection 1 mL  1 mL IntraDERmal Once PRN Rito Vargas MD        lactated ringers infusion   IntraVENous Continuous Jitendra Dumont MD           Allergies:  No Known Allergies    Problem List:    Patient Active Problem List   Diagnosis Code    Allergic rhinitis J30.9    Interstitial cystitis N30.10    DDD (degenerative disc disease), cervical M50.30    Ganglion cyst of finger of left hand M67.442    Atrophic vaginitis N95.2    Dyspareunia in female N94.10    Esophagitis K20.90    Gastritis K29.70    Vitamin D deficiency E55.9    Trigger finger, acquired M65.30    Sore throat (viral) J02.8, B97.89    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.8       Past Medical History:        Diagnosis Date    Acid reflux     Allergic rhinitis     Anxiety     Atrophic vaginitis     DDD (degenerative disc disease), cervical     Dysmenorrhea     Dyspareunia in female     Esophagitis 2019    Fibrocystic breast     Gastritis 2019    HPV (human papilloma virus) infection 1992    Exposed back in college    Hyperlipidemia     Interstitial cystitis 10/23/2017    Iron deficiency anemia 2017    Menopause ovarian failure     Menorrhagia     PONV (postoperative nausea and vomiting)     Vitamin D deficiency 2019    Vocal cord PREGTESTUR, PREGSERUM, HCG, HCGQUANT     ABGs: No results found for: PHART, PO2ART, MRG6JYQ, OVN1NCM, BEART, U5AJKIUN     Type & Screen (If Applicable):  No results found for: LABABO, LABRH    Drug/Infectious Status (If Applicable):  No results found for: HIV, HEPCAB    COVID-19 Screening (If Applicable):   Lab Results   Component Value Date    COVID19 Not Detected 01/05/2022    COVID19 NOT DETECTED 01/14/2021           Anesthesia Evaluation  Patient summary reviewed   history of anesthetic complications: PONV. Airway: Mallampati: II  TM distance: >3 FB   Neck ROM: full  Mouth opening: > = 3 FB Dental: normal exam         Pulmonary:Negative Pulmonary ROS and normal exam  breath sounds clear to auscultation                             Cardiovascular:Negative CV ROS  Exercise tolerance: good (>4 METS),           Rhythm: regular  Rate: normal                    Neuro/Psych:   Negative Neuro/Psych ROS              GI/Hepatic/Renal: Neg GI/Hepatic/Renal ROS  (+) GERD: well controlled,           Endo/Other: Negative Endo/Other ROS                    Abdominal:             Vascular: negative vascular ROS. Other Findings:          Pre-Operative Diagnosis: Rectal bleeding [K62.5]; Chronic idiopathic constipation [K59.04]    46 y.o.   BMI:  Body mass index is 21.63 kg/m².      Vitals:    01/10/22 0906 01/11/22 1000   BP:  97/64   Pulse:  73   Resp:  16   Temp:  97.4 °F (36.3 °C)   SpO2:  100%   Weight: 130 lb (59 kg)    Height: 5' 5\" (1.651 m)        No Known Allergies    Social History     Tobacco Use    Smoking status: Never Smoker    Smokeless tobacco: Never Used   Substance Use Topics    Alcohol use: Yes     Comment: 1x 2 months       LABS:    CBC  Lab Results   Component Value Date/Time    WBC 6.6 12/20/2021 09:26 PM    HGB 12.7 12/20/2021 09:26 PM    HCT 37.6 12/20/2021 09:26 PM     12/20/2021 09:26 PM     RENAL  Lab Results   Component Value Date/Time     12/20/2021 09:26 PM    K 4.2 12/20/2021 09:26 PM     12/20/2021 09:26 PM    CO2 28 12/20/2021 09:26 PM    BUN 15 12/20/2021 09:26 PM    CREATININE 0.7 12/20/2021 09:26 PM    GLUCOSE 95 12/20/2021 09:26 PM     COAGS  No results found for: PROTIME, INR, APTT          Anesthesia Plan      general     ASA 2     (I discussed with the patient the risks and benefits of PIV, anesthesia, IV Narcotics, PACU. All questions were answered the patient agrees with the plan and wishes to proceed)  Induction: intravenous.                           Bud Morillo MD   1/11/2022

## 2022-01-11 NOTE — ANESTHESIA POSTPROCEDURE EVALUATION
Department of Anesthesiology  Postprocedure Note    Patient: Pillo Arora  MRN: [id number]  YOB: 1970  Date of evaluation: 1/11/2022  Time:  2:22 PM     Procedure Summary     Date: 01/11/22 Room / Location: [address]    Anesthesia Start: 1054 Anesthesia Stop: 4374    Procedure: COLONOSCOPY, POSSIBLE POLYPECTOMY (N/A ) Diagnosis:       Rectal bleeding      Chronic idiopathic constipation      (Rectal bleeding,Chronic constipation)    Surgeons: Alexandria Campbell MD Responsible Provider: Zuhair Su MD    Anesthesia Type: MAC ASA Status: 2          Anesthesia Type: MAC    Lilia Phase I: Lilia Score: 10    Lilia Phase II: Lilia Score: 9    Last vitals: Reviewed and per EMR flowsheets.        Anesthesia Post Evaluation    Comments: Postoperative Anesthesia Note    Name:    Pillo Arora  MRN:      [id number]    Patient Vitals in the past 12 hrs:  01/11/22 1210, Pulse:66, Resp:16, SpO2:100 %  01/11/22 1200, BP:114/70, Pulse:66, Resp:16, SpO2:100 %  01/11/22 1153, BP:94/60, Pulse:74, Resp:16, SpO2:100 %  01/11/22 1148, BP:98/65, Pulse:68, Resp:16, SpO2:100 %  01/11/22 1143, BP:100/67, Pulse:71, Resp:16, SpO2:100 %  01/11/22 1000, BP:97/64, Temp:97.4 °F (36.3 °C), Pulse:73, Resp:16, SpO2:100 %     LABS:    CBC  Lab Results       Component                Value               Date/Time                  WBC                      6.6                 12/20/2021 09:26 PM        HGB                      12.7                12/20/2021 09:26 PM        HCT                      37.6                12/20/2021 09:26 PM        PLT                      264                 12/20/2021 09:26 PM   RENAL  Lab Results       Component                Value               Date/Time                  NA                       139                 12/20/2021 09:26 PM        K                        4.2                 12/20/2021 09:26 PM        CL                       102                 12/20/2021 09:26 PM        CO2                      28                  12/20/2021 09:26 PM        BUN                      15                  12/20/2021 09:26 PM        CREATININE               0.7                 12/20/2021 09:26 PM        GLUCOSE                  95                  12/20/2021 09:26 PM   COAGS  No results found for: PROTIME, INR, APTT    Intake & Output:  @24HRIO@    Nausea & Vomiting:  No    Level of Consciousness:  Awake    Pain Assessment:  Adequate analgesia    Anesthesia Complications:  No apparent anesthetic complications    SUMMARY      Vital signs stable  OK to discharge from Stage I post anesthesia care.   Care transferred from Anesthesiology department on discharge from perioperative area

## 2022-01-12 ENCOUNTER — OFFICE VISIT (OUTPATIENT)
Dept: ORTHOPEDIC SURGERY | Age: 52
End: 2022-01-12
Payer: COMMERCIAL

## 2022-01-12 VITALS — WEIGHT: 130 LBS | HEIGHT: 65 IN | BODY MASS INDEX: 21.66 KG/M2

## 2022-01-12 DIAGNOSIS — M54.50 LUMBAR PAIN: ICD-10-CM

## 2022-01-12 DIAGNOSIS — M51.34 DDD (DEGENERATIVE DISC DISEASE), THORACIC: Primary | ICD-10-CM

## 2022-01-12 DIAGNOSIS — M41.9 SCOLIOSIS OF THORACOLUMBAR SPINE, UNSPECIFIED SCOLIOSIS TYPE: ICD-10-CM

## 2022-01-12 PROCEDURE — 99203 OFFICE O/P NEW LOW 30 MIN: CPT | Performed by: PHYSICIAN ASSISTANT

## 2022-01-12 PROCEDURE — L0642 LO SAG RI AN/POS PNL PRE OTS: HCPCS | Performed by: PHYSICIAN ASSISTANT

## 2022-01-12 NOTE — PROGRESS NOTES
New Patient: SPINE    1/12/2022     CHIEF COMPLAINT:    Chief Complaint   Patient presents with    New Patient     NP LUMBAR       HISTORY OF PRESENT ILLNESS:              The patient is a 46 y.o. female history of remote cervical fusion (FL) here for atraumatic low back pain. She reports constant aching thoracolumbar pain which began without trauma in mid December. She reports a history of underlying scoliosis. Her pain is constant but increased with sitting, standing or transition. She reports some relief with walking or resting. She also reports some improvement while \"pressing\" on her lower back. Conservative care includes Aleve, ice, heat. She denies any significant improvement overall at this current time. She reports chronic right foot numbness which she states was present prior to her cervical fusion. She denies any new or progressive numbness tingling or progressive extremity weakness. She denies any recent bowel or bladder incontinence or saddle anesthesia. She did recently undergo colonoscopy for rectal bleeding and was diagnosed with internal hemorrhoid. She denies any recent fevers chills or infections. The pain assessment was noted & reviewed in the medical record today.      Current/Past Treatment:   · Physical Therapy:   · Chiropractic:     · Injection:  Prior cervical TPIs  Medications:            NSAIDS: Aleve            Muscle relaxer:              Steriods:              Neuropathic medications:              Opioids:            Other:   · Surgery/Consult: Remote cervical fusion in [place] years prior    Past Medical History: Medical history form was reviewed today & scanned into the media tab  Past Medical History:   Diagnosis Date    Acid reflux     Allergic rhinitis     Anxiety     Atrophic vaginitis     DDD (degenerative disc disease), cervical     Dysmenorrhea     Dyspareunia in female     Esophagitis 05/2019    Fibrocystic breast     Gastritis 05/2019    HPV (human papilloma virus) infection 1992    Exposed back in college    Hyperlipidemia     Internal hemorrhoids 01/2022    per colonoscopy - Dr. Dewey Hardwick Interstitial cystitis 10/23/2017    Iron deficiency anemia 2017    Menopause ovarian failure     Menorrhagia     PONV (postoperative nausea and vomiting)     Vitamin D deficiency 07/2019    Vocal cord nodule 2016      Past Surgical History:     Past Surgical History:   Procedure Laterality Date    APPENDECTOMY  1996    CERVICAL FUSION  2013    C4-6    COLONOSCOPY N/A 1/11/2022    COLONOSCOPY, POSSIBLE POLYPECTOMY performed by Axel Libman, MD at [place] [address]    bilateral, right    HYSTERECTOMY  2009    LARYNGOSCOPY  2016    PARTIAL HYSTERECTOMY  2009    DUB -     UPPER GASTROINTESTINAL ENDOSCOPY  05/2019     Current Medications:     Current Outpatient Medications:     polyethylene glycol (MIRALAX) 17 GM/SCOOP POWD powder, Take 238 g by mouth daily Take as directed for colonoscopy, Disp: 255 g, Rfl: 0    linaclotide (LINZESS) 290 MCG CAPS capsule, Take 1 capsule by mouth every morning (before breakfast), Disp: 90 capsule, Rfl: 3    gabapentin (NEURONTIN) 100 MG capsule, TAKE 1 CAPSULE BY MOUTH 3 TIMES A DAY, Disp: 90 capsule, Rfl: 0    meloxicam (MOBIC) 7.5 MG tablet, Take 1 tablet by mouth 2 times daily as needed for Pain To face/ jaw and mouth (Patient taking differently: Take 7.5 mg by mouth 2 times daily as needed for Pain To face/ jaw and mouth-PRN), Disp: 90 tablet, Rfl: 0    estradiol (MINIVELLE) 0.05 MG/24HR, Place 1 patch onto the skin Twice a Week, Disp: 24 patch, Rfl: 3    estradiol (ESTRACE VAGINAL) 0.1 MG/GM vaginal cream, Place 1 g vaginally Twice a Week, Disp: 1 Tube, Rfl: 3    hydrocortisone (ANUSOL-HC) 25 MG suppository, Place 1 suppository rectally every 12 hours prn, Disp: 30 suppository, Rfl: 3    hydrOXYzine (ATARAX) 10 MG tablet, Take 25 mg by mouth 3 times daily as needed for Itching , Disp: , Rfl:     Probiotic Product (PROBIOTIC-10 PO), Take by mouth, Disp: , Rfl:     cetirizine (ZYRTEC) 10 MG tablet, Take 10 mg by mouth daily, Disp: , Rfl:     butalbital-acetaminophen-caffeine (FIORICET, ESGIC) -40 MG per tablet, Take 1 tablet by mouth every 6 hours as needed for Headaches, Disp: 25 tablet, Rfl: 1    VASCEPA 1 g CAPS capsule, Take 2 capsules by mouth 2 times daily, Disp: 120 capsule, Rfl: 3    Cholecalciferol (VITAMIN D3) 1000 units TABS, Take 1 tablet by mouth daily, Disp: 90 tablet, Rfl: 3  Allergies:  Patient has no known allergies. Social History:    reports that she has never smoked. She has never used smokeless tobacco. She reports current alcohol use. She reports that she does not use drugs. Family History:   Family History   Problem Relation Age of Onset    Cancer Mother 76        melanoma    High Blood Pressure Father     Colon Cancer Maternal Grandfather 46    Other Paternal Grandmother         PE       REVIEW OF SYSTEMS: Full ROS reviewed & scanned into chart  CONSTITUTIONAL: Denies unexplained weight loss, fevers   SKIN: Denies active skin conditions   ENT: Denies dizziness, nosebleeds  RESPIRATORY: Denies current dyspnea, difficulty breathing  CARDIOVASCULAR: Denies chest pain   NEUROLOGICAL: Denies stroke, unsteady gait or progressive weakness  PSYCHOLOGICAL: Denies anxiety, depression   HEMATOLOGIC: Denies blood disorders, cancer  ENDOCRINE: Denies excessive thirst, urination, heat/cold  GI: Denies ulcer, nausea, vomiting, diarrhea. Admits rectal bleeding and hemorrhoids    : Denies bowel or bladder incontinence       PHYSICAL EXAM:    Vitals: Height 5' 5\" (1.651 m), weight 130 lb (59 kg), not currently breastfeeding. Pain score 3/10    GENERAL EXAM:  · General Apparence: Patient is adequately groomed with no evidence of malnutrition. · Orientation: The patient is oriented to time, place and person. · Mood & Affect: The patient's mood and affect are appropriate   · Lymphatic: The lymphatic examination bilaterally reveals all areas to be without enlargement or induration  · Sensation: Sensation is intact without deficit  · Coordination/Balance: Good coordination     LUMBAR/SACRAL EXAMINATION:  · Inspection: Local inspection shows no step-off or bruising. Mild scoliosis  · Palpation:   No evidence of tenderness at the midline. She does have some thoracolumbar paraspinal tenderness to palpation  · Range of Motion: Full flexion and extension   · Strength:   Strength testing is 5/5 in all muscle groups tested. · Special Tests:   Straight leg raise and crossed SLR negative. 0 out of 5 Renée's signs. · Skin: There are no rashes, ulcerations or lesions noted   · Reflexes: Reflexes are symmetrically 2+ at the patellar and ankle tendons. · Gait & station: normal unassisted    · Additional Examinations:   · RIGHT LOWER EXTREMITY: Inspection/examination of the right lower extremity does not show any tenderness, deformity or injury. Range of motion is full. There is no gross instability. There are no rashes, ulcerations or lesions. Strength and tone are normal  · LEFT LOWER EXTREMITY:  Inspection/examination of the left lower extremity does not show any tenderness, deformity or injury. Range of motion is full. There is no gross instability. There are no rashes, ulcerations or lesions. Strength and tone are normal.    Diagnostic Testin views thoracolumbar spine 2022 shows scoliosis, mild T11-12 DDD    DEXA scan 2018 was normal    Labs reviewed 2021 normal BUN and creatinine        Impression:  1) Acute thoracolumbar pain  2) Scoliosis, mild T11-12 DDD  3) H/o remote cervical fusion, FL      Plan:   1) 2 views lumbar spine (to include lower thoracic) were obtained today and discussed in detail.  We also reviewed her prior CT Abd/Pel for comparison    2) PT for above, ergonomics discussed    3) Mobic 7.5mg I po BID PRN--she has on hand if needed; d/c other NSAIDS, take with food    4) Quick draw brace PRN    5) She will call if no improvement for lumbar MRI WO & f/u to review --direct line provided            Procedures    Breg Quick Draw Lumbar Brace     Patient was prescribed a Breg Quick Draw Lumbar Brace. The lumbar spine will require stabilization / immobilization from this semi-rigid / rigid orthosis to improve their function. The orthosis will assist in protecting the affected area, provide functional support and facilitate healing. This orthosis is required for the following reasons:    Reduce pain by restricting mobility of the trunk  Facilitate healing following an injury to the spine or related soft tissues  Support weak spinal muscles  Address spinal deformity    The patient was educated and fit by a healthcare professional with expert knowledge and specialization in brace application while under the direct supervision of the physician. Verbal and written instructions for the use of and application of this item were provided. They were instructed to contact the office immediately should the brace result in increased pain, decreased sensation, increased swelling or worsening of the condition.            Kwasi Gilman PA-C, MPAS  Board Certified by the [facility]

## 2022-01-17 ENCOUNTER — TELEPHONE (OUTPATIENT)
Dept: GASTROENTEROLOGY | Age: 52
End: 2022-01-17

## 2022-01-17 DIAGNOSIS — G50.0 TRIGEMINAL NEURALGIA OF RIGHT SIDE OF FACE: ICD-10-CM

## 2022-01-17 DIAGNOSIS — R51.9 RIGHT SIDED FACIAL PAIN: ICD-10-CM

## 2022-01-17 DIAGNOSIS — M26.629 TMJ SYNDROME: ICD-10-CM

## 2022-01-17 RX ORDER — GABAPENTIN 100 MG/1
CAPSULE ORAL
Qty: 90 CAPSULE | Refills: 0 | Status: SHIPPED | OUTPATIENT
Start: 2022-01-17 | End: 2022-03-08

## 2022-01-17 NOTE — TELEPHONE ENCOUNTER
Patient called the office stating that she hasn't had a bm since last week, 1/12/22. She has tried Miralax and a stool softener, no relief. Currently on a high fiber diet and increase water intake. No abdominal pain or nausea. Patient also takes Linzess 290 mcg, 2 tabs daily. Please advise, thanks.

## 2022-01-17 NOTE — TELEPHONE ENCOUNTER
Requested Prescriptions     Pending Prescriptions Disp Refills    gabapentin (NEURONTIN) 100 MG capsule [Pharmacy Med Name: GABAPENTIN 100MG CAPS] 90 capsule 0     Sig: TAKE 1 CAPSULE BY MOUTH 3 TIMES A DAY       LOV 9/15/2021  No f/u  Labs 12/20/21

## 2022-01-18 NOTE — TELEPHONE ENCOUNTER
Spoke with patient. States she had a bowel movement last night. Discussed with patient recent bowel prep likely effected bowel habits. OK to take miralax BID in addition to Linzess until regular again.

## 2022-02-03 ENCOUNTER — TELEPHONE (OUTPATIENT)
Dept: SURGERY | Age: 52
End: 2022-02-03

## 2022-02-04 ENCOUNTER — PATIENT MESSAGE (OUTPATIENT)
Dept: FAMILY MEDICINE CLINIC | Age: 52
End: 2022-02-04

## 2022-02-04 RX ORDER — HYDROXYZINE HYDROCHLORIDE 10 MG/1
25 TABLET, FILM COATED ORAL 3 TIMES DAILY PRN
Qty: 40 TABLET | Refills: 1 | Status: SHIPPED | OUTPATIENT
Start: 2022-02-04 | End: 2022-06-20 | Stop reason: SDUPTHER

## 2022-02-04 NOTE — TELEPHONE ENCOUNTER
From: Columba Brandt  To: Dr. Mily Graham: 2/4/2022 2:57 PM EST  Subject: Atarax / hydroxyzine 25 mg     Hi Dr. Melanie Hernandez  I realized I do not have any refills for my Atarax . I only have 3 pills left . I take as needed . Thank you , Kenisha Phillips   Please send to [facility]. thank you.

## 2022-02-04 NOTE — TELEPHONE ENCOUNTER
Hi Dr. Jovany Fox  I realized I do not have any refills for my Atarax . I only have 3 pills left . I take as needed . Thank you , [employer] [facility]   Please send to [address] you.      Requested Prescriptions     Pending Prescriptions Disp Refills    hydrOXYzine (ATARAX) 10 MG tablet       Sig: Take 2.5 tablets by mouth 3 times daily as needed for Itching     Last ov 9/15/21  Last lab 12/20/21

## 2022-02-07 RX ORDER — HYDROXYZINE HYDROCHLORIDE 10 MG/1
25 TABLET, FILM COATED ORAL 3 TIMES DAILY PRN
Qty: 40 TABLET | Refills: 1 | OUTPATIENT
Start: 2022-02-07

## 2022-02-16 ENCOUNTER — OFFICE VISIT (OUTPATIENT)
Dept: ORTHOPEDIC SURGERY | Age: 52
End: 2022-02-16
Payer: COMMERCIAL

## 2022-02-16 VITALS — WEIGHT: 130 LBS | BODY MASS INDEX: 22.2 KG/M2 | HEIGHT: 64 IN

## 2022-02-16 DIAGNOSIS — M65.30 TRIGGER FINGER, ACQUIRED: Primary | ICD-10-CM

## 2022-02-16 PROCEDURE — 99213 OFFICE O/P EST LOW 20 MIN: CPT | Performed by: ORTHOPAEDIC SURGERY

## 2022-02-16 PROCEDURE — 20550 NJX 1 TENDON SHEATH/LIGAMENT: CPT | Performed by: ORTHOPAEDIC SURGERY

## 2022-02-16 RX ORDER — TRIAMCINOLONE ACETONIDE 40 MG/ML
20 INJECTION, SUSPENSION INTRA-ARTICULAR; INTRAMUSCULAR ONCE
Status: COMPLETED | OUTPATIENT
Start: 2022-02-16 | End: 2022-02-16

## 2022-02-16 RX ORDER — LIDOCAINE HYDROCHLORIDE 10 MG/ML
0.5 INJECTION, SOLUTION INFILTRATION; PERINEURAL ONCE
Status: COMPLETED | OUTPATIENT
Start: 2022-02-16 | End: 2022-02-16

## 2022-02-16 RX ADMIN — TRIAMCINOLONE ACETONIDE 20 MG: 40 INJECTION, SUSPENSION INTRA-ARTICULAR; INTRAMUSCULAR at 14:22

## 2022-02-16 RX ADMIN — LIDOCAINE HYDROCHLORIDE 0.5 ML: 10 INJECTION, SOLUTION INFILTRATION; PERINEURAL at 14:22

## 2022-02-16 NOTE — PROGRESS NOTES
I last examined this patient 1 year ago at which time I injected the right middle finger for treatment of trigger finger. She obtained prompt and complete relief of all symptoms. Unfortunately, the condition has recurred and the patient returns to the office requesting additional treatment. She complains of pain, swelling, catching and stiffness of the digit for the past 2 months. Symptoms have become worse recently. The patient's social history, past medical history, family history, medications, allergies and review of systems have been reviewed, dated 2/16/22 and are recorded in the chart. I have personally examined and reviewed all previous imaging studies, laboratory tests and medical encounters pertinent to this patient's visit today. On examination there is mild soft tissue swelling of the digit. There is no deformity. There is tenderness, thickening and nodularity at the base of the flexor tendon sheath. Range of motion is slightly limited in flexion and extension. The digit sticks in flexion and pops into extension, accompanied by some pain. Skin is intact without lesions. Distal circulation and sensation are intact. Muscle strength and coordination are normal.  Reflexes and present bilaterally. Joints are stable. There are no subcutaneous nodules or enlarged epitrochlear lymph nodes. Impression: Recurrent right middle finger trigger digit. The nature of this medical problem is fully discussed with the patient, including all treatment options. All questions are answered. The right  hand is prepared with Betadine and alcohol and the flexor tendon sheath of the right middle finger is injected with 1/2 milliliter of 1% lidocaine and 20 mg.of triamcinalone, with good filling. The patient is advised regarding the expected response and possible reactions from the injection. The patient is asked to call me if full, painless function has not returned within 4 weeks.   The possibility of recurrence of the problem is discussed.

## 2022-03-08 DIAGNOSIS — G50.0 TRIGEMINAL NEURALGIA OF RIGHT SIDE OF FACE: ICD-10-CM

## 2022-03-08 DIAGNOSIS — M26.629 TMJ SYNDROME: ICD-10-CM

## 2022-03-08 DIAGNOSIS — R51.9 RIGHT SIDED FACIAL PAIN: ICD-10-CM

## 2022-03-08 RX ORDER — GABAPENTIN 100 MG/1
CAPSULE ORAL
Qty: 90 CAPSULE | Refills: 2 | Status: SHIPPED | OUTPATIENT
Start: 2022-03-08 | End: 2022-05-20 | Stop reason: SDUPTHER

## 2022-03-08 RX ORDER — GABAPENTIN 100 MG/1
100 CAPSULE ORAL 3 TIMES DAILY
Qty: 90 CAPSULE | Refills: 0 | OUTPATIENT
Start: 2022-03-08 | End: 2022-04-07

## 2022-03-08 NOTE — TELEPHONE ENCOUNTER
Requested Prescriptions     Pending Prescriptions Disp Refills    gabapentin (NEURONTIN) 100 MG capsule 90 capsule 0     LOV-9/15/21  LABS- 12/20/21  NFOV

## 2022-03-08 NOTE — TELEPHONE ENCOUNTER
Requested Prescriptions     Pending Prescriptions Disp Refills    gabapentin (NEURONTIN) 100 MG capsule [Pharmacy Med Name: GABAPENTIN 100MG CAPS] 90 capsule 0     Sig: TAKE 1 CAPSULE BY MOUTH 3 TIMES A DAY     Last ov 9/15/2021  Last labs 12/20/21

## 2022-03-11 ENCOUNTER — TELEPHONE (OUTPATIENT)
Dept: GASTROENTEROLOGY | Age: 52
End: 2022-03-11

## 2022-03-11 ENCOUNTER — TELEPHONE (OUTPATIENT)
Dept: SURGERY | Age: 52
End: 2022-03-11

## 2022-03-11 NOTE — TELEPHONE ENCOUNTER
Pt called and said that Dr. Alida Gonsales prescribed her 290mg of Linzess. She said it makes her not feel very well. She wants to know if he will lower the dosage for her? If so, please call it in to [address] [place] [address] # [phone] and please call her and let her know. Thank you!

## 2022-03-11 NOTE — TELEPHONE ENCOUNTER
Patient states she was doing well with Linzess 145 and MiraLax with at least 1 bowel movement daily. She would prefer to stay on 145 rather than 290. New Rx sent to pharmacy.

## 2022-03-23 ENCOUNTER — PATIENT MESSAGE (OUTPATIENT)
Dept: DERMATOLOGY | Age: 52
End: 2022-03-23

## 2022-03-25 NOTE — TELEPHONE ENCOUNTER
Called and left message for patient to call back office. Can offer cancellation for 9am this morning. If not we will keep an eye out for another cancellation.

## 2022-03-25 NOTE — TELEPHONE ENCOUNTER
From: Simon Montemayor  To: Dr. Lisa Tan  Sent: 3/23/2022 11:38 PM EDT  Subject: Return of wart on right hand     Hi Dr. Chicho Wilkinson ,  Unfortunately, the stubborn wart on my right hand has returned again. Please let me know when you can fit me in for a follow up appointment . Thank you so much .      Claudio Kern

## 2022-03-30 ENCOUNTER — OFFICE VISIT (OUTPATIENT)
Dept: DERMATOLOGY | Age: 52
End: 2022-03-30
Payer: COMMERCIAL

## 2022-03-30 VITALS — TEMPERATURE: 98.2 F

## 2022-03-30 DIAGNOSIS — B07.8 OTHER VIRAL WARTS: Primary | ICD-10-CM

## 2022-03-30 DIAGNOSIS — L70.9 ADULT ACNE: ICD-10-CM

## 2022-03-30 PROCEDURE — 17110 DESTRUCTION B9 LES UP TO 14: CPT | Performed by: DERMATOLOGY

## 2022-03-30 PROCEDURE — 99212 OFFICE O/P EST SF 10 MIN: CPT | Performed by: DERMATOLOGY

## 2022-03-30 NOTE — PROGRESS NOTES
[address] [place] [address] Dermatology  MD Jose Fernandez [address]  1970    46 y.o. female     Date of Visit: 3/30/2022    Chief Complaint: warts, acne    History of Present Illness:    1. She presents today for 2 new warts on the dorsum of the right hand. 2.  She also complains of a few day history of red lesions on the left medial cheek. Review of Systems:  Gen: Feels well, good sense of health. Past Medical History, Family History, Surgical History, Medications and Allergies reviewed.     Past Medical History:   Diagnosis Date    Acid reflux     Allergic rhinitis     Anxiety     Atrophic vaginitis     DDD (degenerative disc disease), cervical     Dysmenorrhea     Dyspareunia in female     Esophagitis 05/2019    Fibrocystic breast     Gastritis 05/2019    HPV (human papilloma virus) infection 1992    Exposed back in college    Hyperlipidemia     Internal hemorrhoids 01/2022    per colonoscopy - Dr. Michelle Baez Interstitial cystitis 10/23/2017    Iron deficiency anemia 2017    Menopause ovarian failure     Menorrhagia     PONV (postoperative nausea and vomiting)     Vitamin D deficiency 07/2019    Vocal cord nodule 2016     Past Surgical History:   Procedure Laterality Date    APPENDECTOMY  1996    CERVICAL FUSION  2013    C4-6    COLONOSCOPY N/A 1/11/2022    COLONOSCOPY, POSSIBLE POLYPECTOMY performed by Prince Dasha MD at [place] [address]    bilateral, right    HYSTERECTOMY  2009    LARYNGOSCOPY  2016   [employer] PARTIAL HYSTERECTOMY  2009    DUB -     UPPER GASTROINTESTINAL ENDOSCOPY  05/2019       No Known Allergies  Outpatient Medications Marked as Taking for the 3/30/22 encounter (Office Visit) with Sal Carrera MD   Medication Sig Dispense Refill    linaclotide (LINZESS) 145 MCG capsule Take 1 capsule by mouth every morning (before breakfast) 90 capsule 3    gabapentin (NEURONTIN) 100 MG capsule TAKE 1 CAPSULE BY MOUTH 3 TIMES A DAY 90 capsule 2    hydrOXYzine (ATARAX) 10 MG tablet Take 2.5 tablets by mouth 3 times daily as needed for Itching 40 tablet 1    polyethylene glycol (MIRALAX) 17 GM/SCOOP POWD powder Take 238 g by mouth daily Take as directed for colonoscopy 255 g 0    meloxicam (MOBIC) 7.5 MG tablet Take 1 tablet by mouth 2 times daily as needed for Pain To face/ jaw and mouth (Patient taking differently: Take 7.5 mg by mouth 2 times daily as needed for Pain To face/ jaw and mouth-PRN) 90 tablet 0    estradiol (MINIVELLE) 0.05 MG/24HR Place 1 patch onto the skin Twice a Week 24 patch 3    estradiol (ESTRACE VAGINAL) 0.1 MG/GM vaginal cream Place 1 g vaginally Twice a Week 1 Tube 3    hydrocortisone (ANUSOL-HC) 25 MG suppository Place 1 suppository rectally every 12 hours prn 30 suppository 3    Probiotic Product (PROBIOTIC-10 PO) Take by mouth      cetirizine (ZYRTEC) 10 MG tablet Take 10 mg by mouth daily      butalbital-acetaminophen-caffeine (FIORICET, ESGIC) -40 MG per tablet Take 1 tablet by mouth every 6 hours as needed for Headaches 25 tablet 1    VASCEPA 1 g CAPS capsule Take 2 capsules by mouth 2 times daily 120 capsule 3    Cholecalciferol (VITAMIN D3) 1000 units TABS Take 1 tablet by mouth daily 90 tablet 3         Physical Examination       Well appearing. 1.  Dorsum of the right hand - 2 small verrucous skin colored papules. 2.  Left medial cheek - 2 erythematous papules. Assessment and Plan     1. Other viral warts     2 cycles of liquid nitrogen applied to 2 warts on the right hand. Patient was educated regarding the potential risks of blister formation and discomfort. Wound care was discussed. 2. Adult acne - mild    OTC BP gel daily as needed for spot treatment.           --Curt Salazar MD

## 2022-05-01 ENCOUNTER — PATIENT MESSAGE (OUTPATIENT)
Dept: DERMATOLOGY | Age: 52
End: 2022-05-01

## 2022-05-02 ENCOUNTER — TELEPHONE (OUTPATIENT)
Dept: DERMATOLOGY | Age: 52
End: 2022-05-02

## 2022-05-02 ENCOUNTER — TELEPHONE (OUTPATIENT)
Dept: GYNECOLOGY | Age: 52
End: 2022-05-02

## 2022-05-02 ENCOUNTER — OFFICE VISIT (OUTPATIENT)
Dept: FAMILY MEDICINE CLINIC | Age: 52
End: 2022-05-02
Payer: COMMERCIAL

## 2022-05-02 VITALS
TEMPERATURE: 97.1 F | HEART RATE: 75 BPM | RESPIRATION RATE: 18 BRPM | OXYGEN SATURATION: 98 % | BODY MASS INDEX: 22.35 KG/M2 | SYSTOLIC BLOOD PRESSURE: 90 MMHG | WEIGHT: 130.2 LBS | DIASTOLIC BLOOD PRESSURE: 60 MMHG

## 2022-05-02 DIAGNOSIS — R10.31 RIGHT GROIN PAIN: Primary | ICD-10-CM

## 2022-05-02 DIAGNOSIS — Z87.19 HISTORY OF RIGHT INGUINAL HERNIA REPAIR: ICD-10-CM

## 2022-05-02 DIAGNOSIS — Z13.220 SCREENING CHOLESTEROL LEVEL: ICD-10-CM

## 2022-05-02 DIAGNOSIS — R21 RASH: ICD-10-CM

## 2022-05-02 DIAGNOSIS — Z98.890 HISTORY OF RIGHT INGUINAL HERNIA REPAIR: ICD-10-CM

## 2022-05-02 DIAGNOSIS — R00.2 PALPITATIONS: ICD-10-CM

## 2022-05-02 DIAGNOSIS — G51.8 PAIN DUE TO NEUROPATHY OF FACIAL NERVE: ICD-10-CM

## 2022-05-02 PROBLEM — G50.0 TRIGEMINAL NEURALGIA: Status: ACTIVE | Noted: 2022-05-02

## 2022-05-02 PROCEDURE — 99214 OFFICE O/P EST MOD 30 MIN: CPT | Performed by: FAMILY MEDICINE

## 2022-05-02 PROCEDURE — 93000 ELECTROCARDIOGRAM COMPLETE: CPT | Performed by: FAMILY MEDICINE

## 2022-05-02 ASSESSMENT — PATIENT HEALTH QUESTIONNAIRE - PHQ9
SUM OF ALL RESPONSES TO PHQ QUESTIONS 1-9: 0
SUM OF ALL RESPONSES TO PHQ QUESTIONS 1-9: 0
1. LITTLE INTEREST OR PLEASURE IN DOING THINGS: 0
2. FEELING DOWN, DEPRESSED OR HOPELESS: 0
SUM OF ALL RESPONSES TO PHQ QUESTIONS 1-9: 0
SUM OF ALL RESPONSES TO PHQ QUESTIONS 1-9: 0
SUM OF ALL RESPONSES TO PHQ9 QUESTIONS 1 & 2: 0

## 2022-05-02 NOTE — TELEPHONE ENCOUNTER
Pharmacy is calling regarding a medication request that hasn't been responded to.       Estradiol 0.05 MG/24HR PLACE 1 Jannie Saab, [address], [id number] [facility]

## 2022-05-02 NOTE — TELEPHONE ENCOUNTER
From: Nnacy Sierra  To: Dr. Luis De La O  Sent: 5/1/2022 8:45 AM EDT  Subject: Jc Giordano on finger     Hi Dr. Cherl Frankel ,   A wart has reappeared on my finger . Please let me know when you have an opening or a cancellation .    Thank you ,  Chan Andrews

## 2022-05-02 NOTE — PROGRESS NOTES
Patient is here for rash and right groin pain and palpitations. Rash to upper back has been present X 2-3 weeks . Some itching. Using Hydrocortisone helps . Scaly . Last colored her hair 3-4 weeks ago. No new soaps , lotions, detergents, hair products, etc.  No new clothes recalled. Uses a dry shampoo hair spray . Right groin pain daily , intermittent X 1-2 months. At times catches when getting out of bed and lifting . H/o right hernia repair in 1970 and 1999. Normal bowel movements once daily . No black stools or rectal bleeding. At times pressure seems to help relieve the pain. Some palpitations , which she notices more at night . 5-6 nights per week. X 1 month. Notices more when lying on left side and not on back or right side. Lasts until falls asleep or change positions - 15 minutes . Sleeping okay . 1-2 coffee/ day in the am , but does not notice palpitations very often during the day. Top stressors : daughter Teresa [place] , but she is doing well overall. Worries about son in [place] in pharmacy school and . Daughter lives by herself currently , but will have a room mate next year - but has been ups and downs with friendship. Dizziness at times during the day and unsure if at times occurs with palpitations. No shortness of breath . Dizziness is < 15 minutes as well. Taking Gabapentin 100 mg tid usually for right facial pain . She has been seeing Neurologist and Dr. Cherelle Morales for pain . He also gave her a topicall medication, but does not like the way numbness feels. She takes Mobic prn and Neurontin daily. She had a root canal and seemed to worsen the pain - done about 1 year ago or so . Review of Systems    ROS: All other systems were reviewed and are negative . Patient's allergies and medications were reviewed. Patient's past medical, surgical, social , and family history were reviewed.     BP Readings from Last 3 Encounters:   05/02/22 90/60   01/11/22 112/75   01/11/22 114/70     Pulse Readings from Last 3 Encounters:   05/02/22 75   01/11/22 66   01/05/22 85       OBJECTIVE:  BP 90/60   Pulse 75   Temp 97.1 °F (36.2 °C)   Resp 18   Wt 130 lb 3.2 oz (59.1 kg)   LMP  (LMP Unknown)   SpO2 98%   BMI 22.35 kg/m²     Physical Exam    General: NAD, cooperative, alert and oriented X 3. Mood / affect is good. good insight. well hydrated. HEENT: PERRLA, EOMI, TMs - clear. Nasopharynx clear. Right facial tenderness. Neck : no lymphadenopathy, supple, FROM  CV: Regular rate and rhythm , no murmurs/ rub/ gallop. No edema. Lungs : CTA bilaterally, breathing comfortably  Abdomen: positive bowel sounds, soft , non tender, non distended. No hepatosplenomegaly. No CVA tenderness. : questionable right inguinal hernia. Minimal tenderness. Skin:faint raised, erythematous , papular rash to upper back area. Non tender. ASSESSMENT/  PLAN:  1. Right groin pain/ 2. History of right inguinal hernia repair  - Avoid lifting / pushing /pulling > 20 lbs. - US ABDOMEN COMPLETE; Future and follow up after completed/ prn.     3. Palpitations  - EKG 12 Lead  - Comprehensive Metabolic Panel; Future  - TSH; Future  - CBC; Future  - if negative labs, check Cardiac event monitor; Future and follow up after completed/ prn.     4. Rash  - Likely allergic component. - ? Hair products. - Zyrtec qd prn. Hydrocortisone cream 1% bid prn.     5. 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 Nicole Gonzalez and dentist.   - Continue Gabapentin 100 mg bid or tid prn.    6. Screening cholesterol level  - Lipid Panel;  Future

## 2022-05-03 RX ORDER — ESTRADIOL 0.05 MG/D
1 FILM, EXTENDED RELEASE TRANSDERMAL
Qty: 24 PATCH | Refills: 3 | Status: SHIPPED | OUTPATIENT
Start: 2022-05-05 | End: 2022-07-12 | Stop reason: SDUPTHER

## 2022-05-04 ENCOUNTER — OFFICE VISIT (OUTPATIENT)
Dept: DERMATOLOGY | Age: 52
End: 2022-05-04
Payer: COMMERCIAL

## 2022-05-04 VITALS — TEMPERATURE: 97.6 F

## 2022-05-04 DIAGNOSIS — B07.8 OTHER VIRAL WARTS: Primary | ICD-10-CM

## 2022-05-04 PROCEDURE — 17110 DESTRUCTION B9 LES UP TO 14: CPT | Performed by: DERMATOLOGY

## 2022-05-04 NOTE — PROGRESS NOTES
[facility]  MD Jose Ped[address]ra[address] 46  1970    46 y.o. female     Date of Visit: 5/4/2022    Chief Complaint: wart    History of Present Illness:    She presents today for a newly noted wart on the left middle finger. Review of Systems:  Gen: Feels well, good sense of health. Past Medical History, Family History, Surgical History, Medications and Allergies reviewed.     Past Medical History:   Diagnosis Date    Acid reflux     Allergic rhinitis     Anxiety     Atrophic vaginitis     DDD (degenerative disc disease), cervical     Dysmenorrhea     Dyspareunia in female     Esophagitis 05/2019    Fibrocystic breast     Gastritis 05/2019    HPV (human papilloma virus) infection 1992    Exposed back in college    Hyperlipidemia     Internal hemorrhoids 01/2022    per colonoscopy - Dr. Waqas Moran Interstitial cystitis 10/23/2017    Iron deficiency anemia 2017    Menopause ovarian failure     Menorrhagia     PONV (postoperative nausea and vomiting)     Trigeminal neuralgia     Vitamin D deficiency 07/2019    Vocal cord nodule 2016     Past Surgical History:   Procedure Laterality Date    APPENDECTOMY  1996    CERVICAL FUSION  2013    C4-6    COLONOSCOPY N/A 1/11/2022    COLONOSCOPY, POSSIBLE POLYPECTOMY performed by Alejandrina Dempsey MD at [place] [address]    bilateral, right    HYSTERECTOMY  2009    LARYNGOSCOPY  2016   [facility] PARTIAL HYSTERECTOMY  2009    DUB -     UPPER GASTROINTESTINAL ENDOSCOPY  05/2019       No Known Allergies  Outpatient Medications Marked as Taking for the 5/4/22 encounter (Office Visit) with Chandler Alfaro MD   Medication Sig Dispense Refill    [START ON 5/5/2022] estradiol (MINIVELLE) 0.05 MG/24HR Place 1 patch onto the skin Twice a Week 24 patch 3    linaclotide (LINZESS) 145 MCG capsule Take 1 capsule by mouth every morning (before breakfast) 90 capsule 3  hydrOXYzine (ATARAX) 10 MG tablet Take 2.5 tablets by mouth 3 times daily as needed for Itching 40 tablet 1    polyethylene glycol (MIRALAX) 17 GM/SCOOP POWD powder Take 238 g by mouth daily Take as directed for colonoscopy 255 g 0    meloxicam (MOBIC) 7.5 MG tablet Take 1 tablet by mouth 2 times daily as needed for Pain To face/ jaw and mouth (Patient taking differently: Take 7.5 mg by mouth 2 times daily as needed for Pain To face/ jaw and mouth-PRN) 90 tablet 0    estradiol (ESTRACE VAGINAL) 0.1 MG/GM vaginal cream Place 1 g vaginally Twice a Week 1 Tube 3    hydrocortisone (ANUSOL-HC) 25 MG suppository Place 1 suppository rectally every 12 hours prn 30 suppository 3    Probiotic Product (PROBIOTIC-10 PO) Take by mouth      cetirizine (ZYRTEC) 10 MG tablet Take 10 mg by mouth daily      butalbital-acetaminophen-caffeine (FIORICET, ESGIC) -40 MG per tablet Take 1 tablet by mouth every 6 hours as needed for Headaches 25 tablet 1    VASCEPA 1 g CAPS capsule Take 2 capsules by mouth 2 times daily 120 capsule 3    Cholecalciferol (VITAMIN D3) 1000 units TABS Take 1 tablet by mouth daily 90 tablet 3         Physical Examination       Well appearing. 1.  Dorsum of the left middle DIP joint - small verrucous pink papule. Assessment and Plan     1. Other viral wart - new    2 cycles of liquid nitrogen applied to 1 wart on the left middle finger. Patient was educated regarding the potential risks of blister formation and discomfort. Wound care was discussed.          --Mihir Tarango MD

## 2022-05-05 DIAGNOSIS — Z13.220 SCREENING CHOLESTEROL LEVEL: ICD-10-CM

## 2022-05-05 DIAGNOSIS — R00.2 PALPITATIONS: ICD-10-CM

## 2022-05-05 LAB
A/G RATIO: 1.8 (ref 1.1–2.2)
ALBUMIN SERPL-MCNC: 4.2 G/DL (ref 3.4–5)
ALP BLD-CCNC: 65 U/L (ref 40–129)
ALT SERPL-CCNC: 16 U/L (ref 10–40)
ANION GAP SERPL CALCULATED.3IONS-SCNC: 12 MMOL/L (ref 3–16)
AST SERPL-CCNC: 21 U/L (ref 15–37)
BILIRUB SERPL-MCNC: 0.3 MG/DL (ref 0–1)
BUN BLDV-MCNC: 16 MG/DL (ref 7–20)
CALCIUM SERPL-MCNC: 9.3 MG/DL (ref 8.3–10.6)
CHLORIDE BLD-SCNC: 105 MMOL/L (ref 99–110)
CHOLESTEROL, TOTAL: 227 MG/DL (ref 0–199)
CO2: 26 MMOL/L (ref 21–32)
CREAT SERPL-MCNC: 0.6 MG/DL (ref 0.6–1.1)
GFR AFRICAN AMERICAN: >60
GFR NON-AFRICAN AMERICAN: >60
GLUCOSE BLD-MCNC: 88 MG/DL (ref 70–99)
HCT VFR BLD CALC: 38.7 % (ref 36–48)
HDLC SERPL-MCNC: 92 MG/DL (ref 40–60)
HEMOGLOBIN: 12.9 G/DL (ref 12–16)
LDL CHOLESTEROL CALCULATED: 126 MG/DL
MCH RBC QN AUTO: 30.7 PG (ref 26–34)
MCHC RBC AUTO-ENTMCNC: 33.3 G/DL (ref 31–36)
MCV RBC AUTO: 92.3 FL (ref 80–100)
PDW BLD-RTO: 13.6 % (ref 12.4–15.4)
PLATELET # BLD: 215 K/UL (ref 135–450)
PMV BLD AUTO: 7.6 FL (ref 5–10.5)
POTASSIUM SERPL-SCNC: 4.1 MMOL/L (ref 3.5–5.1)
RBC # BLD: 4.2 M/UL (ref 4–5.2)
SODIUM BLD-SCNC: 143 MMOL/L (ref 136–145)
TOTAL PROTEIN: 6.6 G/DL (ref 6.4–8.2)
TRIGL SERPL-MCNC: 45 MG/DL (ref 0–150)
TSH SERPL DL<=0.05 MIU/L-ACNC: 2.05 UIU/ML (ref 0.27–4.2)
VLDLC SERPL CALC-MCNC: 9 MG/DL
WBC # BLD: 3.2 K/UL (ref 4–11)

## 2022-05-12 ENCOUNTER — TELEPHONE (OUTPATIENT)
Dept: FAMILY MEDICINE CLINIC | Age: 52
End: 2022-05-12

## 2022-05-12 ENCOUNTER — HOSPITAL ENCOUNTER (OUTPATIENT)
Dept: ULTRASOUND IMAGING | Age: 52
Discharge: HOME | End: 2022-05-12
Payer: COMMERCIAL

## 2022-05-12 DIAGNOSIS — R10.31 RIGHT GROIN PAIN: ICD-10-CM

## 2022-05-12 DIAGNOSIS — Z98.890 HISTORY OF RIGHT INGUINAL HERNIA REPAIR: ICD-10-CM

## 2022-05-12 DIAGNOSIS — Z87.19 HISTORY OF RIGHT INGUINAL HERNIA REPAIR: ICD-10-CM

## 2022-05-12 PROCEDURE — 76705 ECHO EXAM OF ABDOMEN: CPT

## 2022-05-12 NOTE — TELEPHONE ENCOUNTER
Dr. Mera Mauro called due to seeing patient for second visit and diagnosed with Neuropathic pain and not Trigeminal neuralgia. He would like to push dose of Gabapentin as tolerated to mas of 800 mg tid. Currently she is taking 100 mg tid. He also discussed adding or changing to Nortriptyline or Amitriptyline . Advised to hold on changing / adding either just yet to see how she responds to upward titration in Gabapentin.

## 2022-05-23 ENCOUNTER — TELEPHONE (OUTPATIENT)
Dept: CARDIOLOGY CLINIC | Age: 52
End: 2022-05-23

## 2022-05-23 PROCEDURE — 93225 XTRNL ECG REC<48 HRS REC: CPT | Performed by: INTERNAL MEDICINE

## 2022-05-23 NOTE — TELEPHONE ENCOUNTER
Pt had 2-week CAM placed today. She will be flying on airplane on 6/4/22. Should she return monitor to office a little early? She would like call back about this.

## 2022-05-23 NOTE — TELEPHONE ENCOUNTER
Placed 14 day cam monitor on patient per Dr. Kishor Staples for palpations explained return instructions pt voiced understanding will contact office with additional questions CAM ID #[id number]

## 2022-05-23 NOTE — TELEPHONE ENCOUNTER
Advised pt that 13 days of monitoring is fine so she can remove the monitor prior to her trip. Pt will return the monitor when she gets back next week.

## 2022-05-24 ENCOUNTER — TELEPHONE (OUTPATIENT)
Dept: CARDIOLOGY CLINIC | Age: 52
End: 2022-05-24

## 2022-05-24 PROCEDURE — 93242 EXT ECG>48HR<7D RECORDING: CPT | Performed by: INTERNAL MEDICINE

## 2022-05-24 NOTE — TELEPHONE ENCOUNTER
Patient had a second CAM placed after wearing her monitor for 1 day and is having an itching reaction to this monitor and or the tagaderm placed    CAM placed at 4:40 pm  [id number]    Patient will wear this for 13 days, as she is leaving on a plane and will return this next monday

## 2022-05-26 ENCOUNTER — OFFICE VISIT (OUTPATIENT)
Dept: BARIATRICS/WEIGHT MGMT | Age: 52
End: 2022-05-26
Payer: COMMERCIAL

## 2022-05-26 VITALS
DIASTOLIC BLOOD PRESSURE: 68 MMHG | HEIGHT: 64 IN | SYSTOLIC BLOOD PRESSURE: 95 MMHG | HEART RATE: 89 BPM | BODY MASS INDEX: 22.53 KG/M2 | WEIGHT: 132 LBS

## 2022-05-26 DIAGNOSIS — R10.31 RIGHT GROIN PAIN: Primary | ICD-10-CM

## 2022-05-26 PROCEDURE — 99204 OFFICE O/P NEW MOD 45 MIN: CPT | Performed by: SURGERY

## 2022-05-31 ENCOUNTER — OFFICE VISIT (OUTPATIENT)
Dept: DERMATOLOGY | Age: 52
End: 2022-05-31
Payer: COMMERCIAL

## 2022-05-31 DIAGNOSIS — B07.8 OTHER VIRAL WARTS: Primary | ICD-10-CM

## 2022-05-31 PROCEDURE — 99212 OFFICE O/P EST SF 10 MIN: CPT | Performed by: DERMATOLOGY

## 2022-05-31 NOTE — PROGRESS NOTES
[facility]  MD Jose Pateltra[address] 46  1970    46 y.o. female     Date of Visit: 5/31/2022    Chief Complaint: wart    History of Present Illness:    She returns today to follow-up for a wart on the left middle finger. Had good response to LN2. Review of Systems:  Gen: Feels well, good sense of health. Past Medical History, Family History, Surgical History, Medications and Allergies reviewed.     Past Medical History:   Diagnosis Date    Acid reflux     Allergic rhinitis     Anxiety     Atrophic vaginitis     DDD (degenerative disc disease), cervical     Dysmenorrhea     Dyspareunia in female     Esophagitis 05/2019    Fibrocystic breast     Gastritis 05/2019    HPV (human papilloma virus) infection 1992    Exposed back in college    Hyperlipidemia     Internal hemorrhoids 01/2022    per colonoscopy - Dr. Diego Ro Interstitial cystitis 10/23/2017    Iron deficiency anemia 2017    Menopause ovarian failure     Menorrhagia     PONV (postoperative nausea and vomiting)     Trigeminal neuralgia     Vitamin D deficiency 07/2019    Vocal cord nodule 2016     Past Surgical History:   Procedure Laterality Date    APPENDECTOMY  1996    CERVICAL FUSION  2013    C4-6    COLONOSCOPY N/A 1/11/2022    COLONOSCOPY, POSSIBLE POLYPECTOMY performed by Fawn Alcantara MD at [place] [address]    bilateral, right    HYSTERECTOMY  2009    LARYNGOSCOPY  2016   [facility] PARTIAL HYSTERECTOMY  2009    DUB -     UPPER GASTROINTESTINAL ENDOSCOPY  05/2019       No Known Allergies  Outpatient Medications Marked as Taking for the 5/31/22 encounter (Office Visit) with Joceline Monet MD   Medication Sig Dispense Refill    gabapentin (NEURONTIN) 100 MG capsule TAKE 1-2 CAPSULES BY MOUTH 3 TIMES A  capsule 1    estradiol (MINIVELLE) 0.05 MG/24HR Place 1 patch onto the skin Twice a Week 24 patch 3    linaclotide (LINZESS) 145 MCG capsule Take 1 capsule by mouth every morning (before breakfast) 90 capsule 3    hydrOXYzine (ATARAX) 10 MG tablet Take 2.5 tablets by mouth 3 times daily as needed for Itching 40 tablet 1    polyethylene glycol (MIRALAX) 17 GM/SCOOP POWD powder Take 238 g by mouth daily Take as directed for colonoscopy 255 g 0    meloxicam (MOBIC) 7.5 MG tablet Take 1 tablet by mouth 2 times daily as needed for Pain To face/ jaw and mouth (Patient taking differently: Take 7.5 mg by mouth 2 times daily as needed for Pain To face/ jaw and mouth-PRN) 90 tablet 0    estradiol (ESTRACE VAGINAL) 0.1 MG/GM vaginal cream Place 1 g vaginally Twice a Week 1 Tube 3    hydrocortisone (ANUSOL-HC) 25 MG suppository Place 1 suppository rectally every 12 hours prn 30 suppository 3    Probiotic Product (PROBIOTIC-10 PO) Take by mouth      cetirizine (ZYRTEC) 10 MG tablet Take 10 mg by mouth daily      butalbital-acetaminophen-caffeine (FIORICET, ESGIC) -40 MG per tablet Take 1 tablet by mouth every 6 hours as needed for Headaches 25 tablet 1    VASCEPA 1 g CAPS capsule Take 2 capsules by mouth 2 times daily 120 capsule 3    Cholecalciferol (VITAMIN D3) 1000 units TABS Take 1 tablet by mouth daily 90 tablet 3         Physical Examination       Well-appearing. 1.  Dorsal distal surface of the left middle finger with faint erythema but no apparent wart. Assessment and Plan     1. Other viral wart - clear after cryotherapy    Observe.            --Zach Glover MD

## 2022-06-01 ENCOUNTER — HOSPITAL ENCOUNTER (OUTPATIENT)
Dept: CT IMAGING | Age: 52
Discharge: HOME | End: 2022-06-01
Payer: COMMERCIAL

## 2022-06-01 DIAGNOSIS — R10.31 RIGHT GROIN PAIN: ICD-10-CM

## 2022-06-01 PROCEDURE — 74176 CT ABD & PELVIS W/O CONTRAST: CPT

## 2022-06-06 PROCEDURE — 93227 XTRNL ECG REC<48 HR R&I: CPT | Performed by: INTERNAL MEDICINE

## 2022-06-07 DIAGNOSIS — R00.2 PALPITATION: ICD-10-CM

## 2022-06-07 PROCEDURE — 93244 EXT ECG>48HR<7D REV&INTERPJ: CPT | Performed by: INTERNAL MEDICINE

## 2022-06-17 DIAGNOSIS — R00.2 PALPITATIONS: ICD-10-CM

## 2022-06-20 DIAGNOSIS — R51.9 RIGHT SIDED FACIAL PAIN: ICD-10-CM

## 2022-06-20 DIAGNOSIS — M26.629 TMJ SYNDROME: ICD-10-CM

## 2022-06-20 RX ORDER — HYDROXYZINE HYDROCHLORIDE 10 MG/1
25 TABLET, FILM COATED ORAL 3 TIMES DAILY PRN
Qty: 40 TABLET | Refills: 1 | Status: SHIPPED | OUTPATIENT
Start: 2022-06-20

## 2022-06-20 RX ORDER — GABAPENTIN 100 MG/1
CAPSULE ORAL
Qty: 180 CAPSULE | Refills: 1 | Status: SHIPPED | OUTPATIENT
Start: 2022-06-20 | End: 2022-07-26

## 2022-06-20 NOTE — PROGRESS NOTES
[facility]) Physicians   General & Laparoscopic Surgery  [employer]       HPI:    Destiny Mina is very pleasant 46 y.o. female who is referred by Dr. Rebecca Liao   for right groin discomfort    History of bilateral inguinal hernia repair with mesh    Has had pain in right groin for long period of time    No palpable bulge     No change in bowel function    Past Medical History:   Diagnosis Date    Acid reflux     Allergic rhinitis     Anxiety     Atrophic vaginitis     DDD (degenerative disc disease), cervical     Dysmenorrhea     Dyspareunia in female     Esophagitis 05/2019    Fibrocystic breast     Gastritis 05/2019    HPV (human papilloma virus) infection 1992    Exposed back in college    Hyperlipidemia     Internal hemorrhoids 01/2022    per colonoscopy - Dr. Sofia Durant Interstitial cystitis 10/23/2017    Iron deficiency anemia 2017    Menopause ovarian failure     Menorrhagia     PONV (postoperative nausea and vomiting)     Trigeminal neuralgia     Vitamin D deficiency 07/2019    Vocal cord nodule 2016     Past Surgical History:   Procedure Laterality Date    APPENDECTOMY  1996    CERVICAL FUSION  2013    C4-6    COLONOSCOPY N/A 1/11/2022    COLONOSCOPY, POSSIBLE POLYPECTOMY performed by Amarilis Avelar MD at [place] [address]    bilateral, right    HYSTERECTOMY  2009    LARYNGOSCOPY  2016    PARTIAL HYSTERECTOMY  2009    DUB -     UPPER GASTROINTESTINAL ENDOSCOPY  05/2019     Family History   Problem Relation Age of Onset    Cancer Mother 76        melanoma    High Blood Pressure Father     Colon Cancer Maternal Grandfather 46    Other Paternal Grandmother         PE     Social History     Tobacco Use    Smoking status: Never Smoker    Smokeless tobacco: Never Used   Substance Use Topics    Alcohol use: Yes     Comment: 1x 2 months     I counseled the patient on the importance of not smoking and risks of ETOH. No Known Allergies  Vitals:    05/26/22 1350   BP: 95/68   Pulse: 89   Weight: 132 lb (59.9 kg)   Height: 5' 4\" (1.626 m)       Body mass index is 22.66 kg/m².       Current Outpatient Medications:     gabapentin (NEURONTIN) 100 MG capsule, TAKE 1-2 CAPSULES BY MOUTH 3 TIMES A DAY, Disp: 180 capsule, Rfl: 1    estradiol (MINIVELLE) 0.05 MG/24HR, Place 1 patch onto the skin Twice a Week, Disp: 24 patch, Rfl: 3    linaclotide (LINZESS) 145 MCG capsule, Take 1 capsule by mouth every morning (before breakfast), Disp: 90 capsule, Rfl: 3    hydrOXYzine (ATARAX) 10 MG tablet, Take 2.5 tablets by mouth 3 times daily as needed for Itching, Disp: 40 tablet, Rfl: 1    polyethylene glycol (MIRALAX) 17 GM/SCOOP POWD powder, Take 238 g by mouth daily Take as directed for colonoscopy, Disp: 255 g, Rfl: 0    meloxicam (MOBIC) 7.5 MG tablet, Take 1 tablet by mouth 2 times daily as needed for Pain To face/ jaw and mouth (Patient taking differently: Take 7.5 mg by mouth 2 times daily as needed for Pain To face/ jaw and mouth-PRN), Disp: 90 tablet, Rfl: 0    estradiol (ESTRACE VAGINAL) 0.1 MG/GM vaginal cream, Place 1 g vaginally Twice a Week, Disp: 1 Tube, Rfl: 3    hydrocortisone (ANUSOL-HC) 25 MG suppository, Place 1 suppository rectally every 12 hours prn, Disp: 30 suppository, Rfl: 3    Probiotic Product (PROBIOTIC-10 PO), Take by mouth, Disp: , Rfl:     cetirizine (ZYRTEC) 10 MG tablet, Take 10 mg by mouth daily, Disp: , Rfl:     butalbital-acetaminophen-caffeine (FIORICET, ESGIC) -40 MG per tablet, Take 1 tablet by mouth every 6 hours as needed for Headaches, Disp: 25 tablet, Rfl: 1    VASCEPA 1 g CAPS capsule, Take 2 capsules by mouth 2 times daily, Disp: 120 capsule, Rfl: 3    Cholecalciferol (VITAMIN D3) 1000 units TABS, Take 1 tablet by mouth daily, Disp: 90 tablet, Rfl: 3      Review of Systems - History obtained from the patient  General ROS: negative  Psychological ROS: negative  Ophthalmic ROS: negative  Neurological ROS: negative  ENT ROS: negative  Allergy and Immunology ROS: negative  Hematological and Lymphatic ROS: negative  Endocrine ROS: negative  Respiratory ROS: negative  Cardiovascular ROS: negative  Gastrointestinal ROS: negative  Genito-Urinary ROS: negative  Musculoskeletal ROS: right groin pain  Skin ROS: negative    Physical Exam   Constitutional: Patient is oriented to person, place, and time. Vital signs are normal. Patient  appears well-developed and well-nourished. Patient  is active and cooperative. Non-toxic appearance. No distress. HENT:   Head: Normocephalic and atraumatic. Head is without laceration. Right Ear: External ear normal. No lacerations. No drainage, swelling or tenderness. Left Ear: External ear normal. No lacerations. No drainage, swelling or tenderness. Nose: Nose normal. No nose lacerations or nasal deformity. Mouth/Throat: Uvula is midline, oropharynx is clear and moist and mucous membranes are normal. No oropharyngeal exudate. Eyes: Conjunctivae, EOM and lids are normal. Pupils are equal, round, and reactive to light. Right eye exhibits no discharge. No foreign body present in the right eye. Left eye exhibits no discharge. No foreign body present in the left eye. No scleral icterus. Neck: Trachea normal and normal range of motion. Neck supple. No JVD present. No tracheal tenderness present. Carotid bruit is not present. No rigidity. No tracheal deviation and no edema present. No thyromegaly present. Cardiovascular: Normal rate, regular rhythm, normal heart sounds, intact distal pulses and normal pulses. Pulmonary/Chest: Effort normal and breath sounds normal. No stridor. No respiratory distress. Patient  has no wheezes. Patient has no rales. Patient exhibits no tenderness and no crepitus. Abdominal: Soft. Normal appearance and bowel sounds are normal. Patient exhibits no distension, no abdominal bruit, no ascites and no mass.  There is no hepatosplenomegaly. There is no tenderness. There is no rigidity, no rebound, no guarding and no CVA tenderness. Hernia confirmed negative in the ventral area. Hernia confirmed negative in right or left groin area. Musculoskeletal: Normal range of motion. Patient exhibits no edema or tenderness. Lymphadenopathy:        Head (right side): No submental, no submandibular, no preauricular, no posterior auricular and no occipital adenopathy present. Head (left side): No submental, no submandibular, no preauricular, no posterior auricular and no occipital adenopathy present. Patient  has no cervical adenopathy. Right: No supraclavicular adenopathy present. Left: No supraclavicular adenopathy present. Neurological: Patient is alert and oriented to person, place, and time. Patient has normal strength. Coordination and gait normal. GCS eye subscore is 4. GCS verbal subscore is 5. GCS motor subscore is 6. Skin: Skin is warm and dry. No abrasion and no rash noted. Patient  is not diaphoretic. No cyanosis or erythema. Psychiatric: Patient has a normal mood and affect. speech is normal and behavior is normal. Cognition and memory are normal.         Data  CT scan personally reviewed and discussed with patient         A/P    1- Right groin pain  2- No physical or radiologic evidence of hernia  3- Will refer to pain management    Levi Vidal    Total encounter time:  45 min, including any number of the following: review of labs, imaging, provider notes, outside hospital records; performing examination/evaluation; counseling patient and family; ordering medications/tests; placing referrals and communication with referring physicians; coordination of care, and documentation in the EHR.

## 2022-06-22 ENCOUNTER — TELEPHONE (OUTPATIENT)
Dept: GYNECOLOGY | Age: 52
End: 2022-06-22

## 2022-06-22 RX ORDER — METRONIDAZOLE 500 MG/1
500 TABLET ORAL 2 TIMES DAILY
Qty: 14 TABLET | Refills: 0 | Status: SHIPPED | OUTPATIENT
Start: 2022-06-22 | End: 2022-06-29

## 2022-06-22 NOTE — TELEPHONE ENCOUNTER
[facility] need clarification on directions for gabapentin (NEURONTIN) 100 MG capsule .  Please call pharmacy at [phone]

## 2022-06-22 NOTE — TELEPHONE ENCOUNTER
Patient called into office stating that she has been having vaginal odor , discharge with no color, along with itching on the outside of her vagina for 1 week. Patient stated she had took some Diflucan that she had it  didn't help. Patient has is aware that Dr Nelta Hammans is out of the office and a message will be sent to covering physician Dr Barbara Lopez.   Patient has an upcoming appointment with Dr Nelta Hammans for 7/25/2022    Patient would like script sent to [facility] [id number] [place] [id number] [phone] - F [phone]

## 2022-06-22 NOTE — TELEPHONE ENCOUNTER
FROM PT:   \"Sorry to bother you . I am having trouble getting my gabapentin filled . Says it is too soon but I take more than my prescription is prescribed . I take 1 in morning , 1 in afternoon , 5-6 at night right now  . I am going through my capsules very quickly with my original script . My pain is somewhat manageable with this amount but I know it can waver too . Dr. Santiago Ogden mentioned I could increase 100mg every 4 days . Thank you for your help . [facility] pharmacy may be calling too to help me get this figured out\" .

## 2022-07-05 ENCOUNTER — TELEPHONE (OUTPATIENT)
Dept: GYNECOLOGY | Age: 52
End: 2022-07-05

## 2022-07-05 NOTE — TELEPHONE ENCOUNTER
Patient called back she accepted the appointment on 7/12/22 but wanted to be informed in another patient opened up so that she could still go with her daughter to her appointment.

## 2022-07-05 NOTE — TELEPHONE ENCOUNTER
Patient called in to see if her appt could be moved up because she was having some discomfort. She was scheduled for her Annual on 7/25/22. I let her know that we needed to reschedule that appointment anyway because Dr Imelda Nugent will be out of the office. Although patient isn't due for her annal until 7/19/22 Patient stated that she has new insurance and could possibly come sooner. I offered her 7/12/22 at 2pm and 7/18/22 at 9 am. Both days and times conflicted with appointments that she has to attend with her daughter. Patient would like to have another appointment offered that fits her schedule. Please advise.        Patient can be reached at [phone]

## 2022-07-06 ENCOUNTER — OFFICE VISIT (OUTPATIENT)
Dept: FAMILY MEDICINE CLINIC | Age: 52
End: 2022-07-06
Payer: COMMERCIAL

## 2022-07-06 VITALS
TEMPERATURE: 97.7 F | DIASTOLIC BLOOD PRESSURE: 62 MMHG | SYSTOLIC BLOOD PRESSURE: 94 MMHG | OXYGEN SATURATION: 98 % | RESPIRATION RATE: 18 BRPM | WEIGHT: 133.2 LBS | HEART RATE: 80 BPM | BODY MASS INDEX: 22.86 KG/M2

## 2022-07-06 DIAGNOSIS — G51.8 PAIN DUE TO NEUROPATHY OF FACIAL NERVE: ICD-10-CM

## 2022-07-06 DIAGNOSIS — M25.561 RIGHT KNEE PAIN, UNSPECIFIED CHRONICITY: Primary | ICD-10-CM

## 2022-07-06 DIAGNOSIS — M76.61 RIGHT ACHILLES TENDINITIS: ICD-10-CM

## 2022-07-06 DIAGNOSIS — I95.9 HYPOTENSION, UNSPECIFIED HYPOTENSION TYPE: ICD-10-CM

## 2022-07-06 PROCEDURE — 99214 OFFICE O/P EST MOD 30 MIN: CPT | Performed by: FAMILY MEDICINE

## 2022-07-06 RX ORDER — GABAPENTIN 400 MG/1
CAPSULE ORAL
Qty: 90 CAPSULE | Refills: 1 | Status: SHIPPED | OUTPATIENT
Start: 2022-07-06 | End: 2022-08-06

## 2022-07-06 NOTE — PATIENT INSTRUCTIONS
Patient Education        Achilles Tendon: Exercises  Introduction  Here are some examples of exercises for you to try. The exercises may be suggested for a condition or for rehabilitation. Start each exercise slowly. Ease off the exercises if you start to have pain. You will be told when to start these exercises and which ones will work bestfor you. How to do the exercises  Toe stretch    1. Sit in a chair, and extend your affected leg so that your heel is on the floor. 2. With your hand, reach down and pull your big toe up and back. Pull toward your ankle and away from the floor. 3. Hold the position for at least 15 to 30 seconds. 4. Repeat 2 to 4 times a session, several times a day. Calf-plantar fascia stretch    1. Sit with your legs extended and knees straight. 2. Place a towel around your foot just under the toes. 3. Hold each end of the towel in each hand, with your hands above your knees. 4. Pull back with the towel so that your foot stretches toward you. 5. Hold the position for at least 15 to 30 seconds. 6. Repeat 2 to 4 times a session, up to 5 sessions a day. Floor stretch    1. Stand about 2 feet from a wall, and place your hands on the wall at about shoulder height. Or you can stand behind a chair, placing your hands on the back of it for balance. 2. Step back with the leg you want to stretch. Keep the leg straight, and press your heel into the floor with your toe turned slightly in.  3. Lean forward, and bend your other leg slightly. Feel the stretch in the Achilles tendon of your back leg. Hold for at least 15 to 30 seconds. 4. Repeat 2 to 4 times a session, up to 5 sessions a day. Stair stretch    1. Stand with the balls of both feet on the edge of a step or curb (or a medium-sized phone book). With at least one hand, hold onto something solid for balance, such as a banister or handrail.   2. Keeping your affected leg straight, slowly let that heel hang down off of the step or curb until you feel a stretch in the back of your calf and/or Achilles area. Some of your weight should still be on the other leg. 3. Hold this position for at least 15 to 30 seconds. 4. Repeat 2 to 4 times a session, up to 5 times a day or whenever your Achilles tendon starts to feel tight. This stretch can also be done with your knee slightly bent. Strength exercise    1. This exercise will get you started on building strength after an Achilles tendon injury. Your doctor or physical therapist can help you move on to more challenging exercises as you heal and get stronger. 2. Stand on a step with your heel off the edge of the step. Hold on to a handrail or wall for balance. 3. Push up on your toes, then slowly count to 10 as you lower yourself back down until your heel is below the step. If it hurts to push up on your toes, try putting most of your weight on your other foot as you push up, or try using your arms to help you. If you can't do this exercise without causing pain, stop the exercise and talk to your doctor. 4. Repeat the exercise 8 to 12 times, half with the knee straight and half with the knee bent. Follow-up care is a key part of your treatment and safety. Be sure to make and go to all appointments, and call your doctor if you are having problems. It's also a good idea to know your test results and keep alist of the medicines you take. Where can you learn more? Go to https://[employer]. org and sign in to your EZ[employer] account. Enter G972 in the [facility] box to learn more about \"Achilles Tendon: Exercises. \"     If you do not have an account, please click on the \"Sign Up Now\" link. Current as of: March 9, 2022               Content Version: 13.3  © 2006-2022 Healthwise, Incorporated. Care instructions adapted under license by [facility] ([facility]).  If you have questions about a medical condition or this instruction, always ask your healthcare professional. [employer] disclaims any warranty or liability for your use of this information. Patient Education        Patellar Tracking Disorder: Exercises  Introduction  Here are some examples of exercises for you to try. The exercises may be suggested for a condition or for rehabilitation. Start each exercise slowly. Ease off the exercises if you start to have pain. You will be told when to start these exercises and which ones will work bestfor you. How to do the exercises  Quad sets    1. Sit with your leg straight and supported on the floor or a firm bed. (If you feel discomfort in the front or back of your knee, place a small towel roll under your knee.)  2. Tighten the muscles on top of your thigh by pressing the back of your knee flat down to the floor. (If you feel discomfort under your kneecap, place a small towel roll under your knee.)  3. Hold for about 6 seconds, then rest up to 10 seconds. 4. Do this for 8 to 12 repetitions several times a day. Mini squat    1. Stand with your feet about hip-width apart and 12 inches from a wall. 2. Lean against the wall and slide down until your knees are bent about 20 to 30 degrees. 3. Place a ball about the size of a soccer ball between your knees and squeeze your knees against the ball for about 6 seconds at a time. 4. Rest a few seconds, then squeeze again. 5. Repeat 8 to 12 times, at least 3 times a day. Straight-leg raises to the front    For straight-leg raise exercises, your physical therapist may have you addlight ankle weights as you become stronger. 1. Lie on your back with your good knee bent so that your foot rests flat on the floor. Your injured leg should be straight. Make sure that your low back has a normal curve. You should be able to slip your flat hand in between the floor and the small of your back, with your palm touching the floor and your back touching the back of your hand.   2. Tighten the thigh muscles in the injured leg by pressing the back of your knee flat down to the floor. Hold your knee straight. 3. Tighten the quadriceps muscles of your straight leg and lift the leg 12 to 18 inches off the floor. Hold for about 6 seconds, then slowly lower the leg back down and rest a few seconds. 4. Do 8 to 12 repetitions, 3 times a day. Straight-leg raises to the inside    1. Lie on your side with the leg you are going to exercise on the bottom and your other foot up on a chair. 2. Tighten your thigh muscles, and then lift your leg straight up away from the floor. 3. Hold for about 6 seconds, slowly lower the leg back down, and rest a few seconds. 4. Do 8 to 12 repetitions, 3 times a day. Straight-leg raises to the outside    1. Lie on your side with the leg you are going to exercise on top. 2. Tighten your thigh muscles, and then lift your leg straight up away from the floor. 3. Keep your hip and your leg straight in line with the rest of your body, and keep your knee pointing forward. Do not drop your hip back. 4. Hold for about 6 seconds, slowly lower the leg back down, and rest a few seconds. 5. Do 8 to 12 repetitions, 3 times a day. Straight-leg raises to the back    1. Lie on your belly. 2. Tighten your thigh muscles, and then lift your leg straight up away from the floor. 3. Hold for about 6 seconds, slowly lower the leg back down, and rest a few seconds. 4. Do 8 to 12 repetitions, 3 times a day. Shallow standing knee bends    1. Stand with your hands lightly resting on a counter or chair in front of you. Place your feet shoulder-width apart. 2. Slowly bend your knees so that you squat down like you are going to sit in a chair. Make sure your knees do not go in front of your toes. 3. Lower yourself about 6 inches. Your heels should remain on the floor at all times. 4. Rise slowly to a standing position. 5. Do 8 to 12 repetitions, 3 times a day.   6. Note for shallow knee bend on one leg: Remember to limit the bend of your knee to a 30-degree angle at first. When your knee is bent past this point, your kneecap will have more contact with the thighbone, causing more pressure, pain, and possible cartilage damage. Shallow knee bend on one leg    1. Stand on a step, on the leg you want to exercise. Let your other leg hang down off the step. 2. Keeping your head up and your back straight, lean slightly forward. Hold on to a banister if you feel unsteady. 3. Slowly bend your knee so the foot hanging down moves down toward the floor, then slowly straighten your knee again. Your heel should stay on the step, and your knee should not go any farther forward than your toe. As you bend and straighten your leg, try to keep your knee moving in a straight line with your middle toe. 4. Do 8 to 12 repetitions. Standing quadriceps stretch    1. If you are steady on your feet, stand holding a chair, counter, or wall. You can also lie on your stomach or your side to do this exercise. 2. Bend the knee of the leg you want to stretch, and grab the front of your foot with the hand on the same side. For example, if you are stretching your right leg, use your right hand. 3. Keeping your knees next to each other, pull your foot toward your buttock until you feel a gentle stretch across the front of your hip and down the front of your thigh. Your knee should be pointed directly to the ground, and not out to the side. 4. Hold the stretch for at least 15 to 30 seconds. Repeat 2 to 4 times. Hamstring stretch in doorway    1. Lie on the floor near a doorway, with your buttocks close to the wall. 2. Let the leg you are not stretching extend through the doorway. 3. Put the leg you want to stretch up on the wall, and straighten your knee to feel a gentle stretch at the back of your leg. 4. Hold the stretch for at least 15 to 30 seconds. Repeat 2 to 4 times. Hip rotator stretch    1. Lie on your back with both knees bent and your feet on the floor.   2. Put the ankle of the leg you are going to stretch on your opposite thigh near your knee. 3. Push gently on the knee of the leg you are stretching until you feel a gentle stretch around your hip. 4. Hold the stretch for at least 15 to 30 seconds. Repeat 2 to 4 times. Iliotibial band and buttock stretch    1. Sit on the floor with your legs out in front of you. 2. Bend the knee of the leg you want to stretch, and put that foot on the floor on the outside of the opposite leg. (Your legs will be crossed.)  3. Twist your shoulders toward your bent leg, and put your opposite elbow on that knee. 4. Push your arm against your knee to feel a gentle stretch at the back of your buttock and around your hip. 5. Hold the stretch for at least 15 to 30 seconds. Repeat 2 to 4 times. Calf stretch    1. Stand facing a wall with your hands on the wall at about eye level. 2. Put the leg you want to stretch about a step behind your other leg. 3. Keeping your back heel on the floor, bend your front knee until you feel a stretch in the back leg. 4. Hold the stretch for at least 15 to 30 seconds. Repeat 2 to 4 times. Follow-up care is a key part of your treatment and safety. Be sure to make and go to all appointments, and call your doctor if you are having problems. It's also a good idea to know your test results and keep alist of the medicines you take. Where can you learn more? Go to [email or web address]://[email or web address]ber.[email or web address]. org and sign in to your Patience account. Enter [phone] in the [facility] box to learn more about \"Patellar Tracking Disorder: Exercises. \"     If you do not have an account, please click on the \"Sign Up Now\" link. Current as of: March 9, 2022               Content Version: 13.3  © 2893-4342 Healthwise, Incorporated. Care instructions adapted under license by [facility] ([facility]).  If you have questions about a medical condition or this instruction, always ask your healthcare professional. Ruel Vaughn disclaims any warranty or liability for your use of this information.

## 2022-07-06 NOTE — PROGRESS NOTES
Patient is here for right knee pain X 1.5- 2 years. She noticed it while following her daughter on a bike years ago . No swelling to knee . Throbs in the am .  Off and on during the day . Pain is at 4/10 in the am.  Not taking anything . Sleeping okay . It is painful with walking in the am , but not during the day . She is able to walk with her  2 miles at 7 am and then again another 2 miles in the evening after dinner. Right achilles pain started 2 weeks ago after playing pickle ball. No acute injury . She does pickle ball on Mondays with neighborhood . She noticed it the second Monday after playing about 2 days later or so. She has an ankle brace and takes Ibuprofen 400 mg in the afternoon. She is taking Gabapentin 100 mg in am and noon and 500 mg qhs . This seems to control pain. Review of Systems    ROS: All other systems were reviewed and are negative . Patient's allergies and medications were reviewed. Patient's past medical, surgical, social , and family history were reviewed. OBJECTIVE:  BP 94/62   Pulse 80   Temp 97.7 °F (36.5 °C)   Resp 18   Wt 133 lb 3.2 oz (60.4 kg)   LMP  (LMP Unknown)   SpO2 98%   BMI 22.86 kg/m²     Physical Exam    General: NAD, cooperative, alert and oriented X 3. Mood / affect is good. good insight. well hydrated. Neck : no lymphadenopathy, supple, FROM  CV: Regular rate and rhythm , no murmurs/ rub/ gallop. No edema. Lungs : CTA bilaterally, breathing comfortably  Abdomen: positive bowel sounds, soft , non tender, non distended. No hepatosplenomegaly. No CVA tenderness. Knees: no edema or erythema. FROM . Negative anterior/ posterior drawer. Negative Sweetie's / Lachman's bilaterally. Strength 5/5. DTRs 2+ bilateral. Non tender. Able to squat walk. Right ankle: tenderness to posterior ankle/achilles area. No edema or erythema. FROM. Strength 5/5. Able to tiptoe and heel walk. Skin: no rashes. Non tender.      ASSESSMENT/ PLAN:  1. Right knee pain, unspecified chronicity  - Moist heat . ROM exercises- handout given. Modify activities. - XR KNEE RIGHT (3 VIEWS); Future  - Referral - [facility] [place] [facility])- [place]    2. Right Achilles tendinitis  - Moist heat . ROM exercises-handout given. Modify activities. - Referral - [facility] [place] ([facility])- [place]    3. Hypotension, unspecified hypotension type  - Encouraged to push fluids - water . Eat 3 meals well balanced tid with protein and protein snack and Gatorade between meals prn particularly if hot weather. 4. 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 Continue Gabapentin 100 mg q am and noon and 500 mg qhs.  - gabapentin (NEURONTIN) 400 MG capsule; Take 1 pill with 100 mg qhs ( 500 mg qhs) and 100 mg in am and 1 at noon. Dispense: 90 capsule; Refill: 1       Follow up if no improvement in 6 weeks/ as needed for increased symptoms.

## 2022-07-07 ENCOUNTER — TELEPHONE (OUTPATIENT)
Dept: FAMILY MEDICINE CLINIC | Age: 52
End: 2022-07-07

## 2022-07-07 NOTE — TELEPHONE ENCOUNTER
called wants get clarification on medication:    Not sure of instrions - needs more information    gabapentin (NEURONTIN) 400 MG capsule [[id number]]     Order Details  Dose, Route, Frequency: As Directed   Dispense Quantity: 90 capsule Refills: 1          Sig: Take 1 pill with 100 mg qhs ( 500 mg qhs) and 100 mg in am and 1 at noon.         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 Jonas Stone [phone] - F [phone]   [address] [facility] [address]   Phone:  [phone]  Fax:  [phone]     Thank you

## 2022-07-12 ENCOUNTER — OFFICE VISIT (OUTPATIENT)
Dept: GYNECOLOGY | Age: 52
End: 2022-07-12
Payer: COMMERCIAL

## 2022-07-12 VITALS
HEART RATE: 84 BPM | SYSTOLIC BLOOD PRESSURE: 98 MMHG | OXYGEN SATURATION: 98 % | HEIGHT: 64 IN | BODY MASS INDEX: 23.05 KG/M2 | DIASTOLIC BLOOD PRESSURE: 68 MMHG | RESPIRATION RATE: 17 BRPM | WEIGHT: 135 LBS

## 2022-07-12 DIAGNOSIS — R31.9 HEMATURIA, UNSPECIFIED TYPE: ICD-10-CM

## 2022-07-12 DIAGNOSIS — R19.00 PELVIC FULLNESS: ICD-10-CM

## 2022-07-12 DIAGNOSIS — Z01.419 WELL WOMAN EXAM WITH ROUTINE GYNECOLOGICAL EXAM: Primary | ICD-10-CM

## 2022-07-12 DIAGNOSIS — N95.1 VAGINAL DRYNESS, MENOPAUSAL: ICD-10-CM

## 2022-07-12 LAB
BACTERIA: NORMAL /HPF
BILIRUBIN URINE: NEGATIVE
BLOOD, URINE: NEGATIVE
CLARITY: CLEAR
COLOR: YELLOW
EPITHELIAL CELLS, UA: 0 /HPF (ref 0–5)
GLUCOSE URINE: NEGATIVE MG/DL
HYALINE CASTS: 1 /LPF (ref 0–8)
KETONES, URINE: NEGATIVE MG/DL
LEUKOCYTE ESTERASE, URINE: NEGATIVE
MICROSCOPIC EXAMINATION: NORMAL
NITRITE, URINE: NEGATIVE
PH UA: 6.5 (ref 5–8)
PROTEIN UA: NEGATIVE MG/DL
RBC UA: 0 /HPF (ref 0–4)
SPECIFIC GRAVITY UA: 1.01 (ref 1–1.03)
URINE TYPE: NORMAL
UROBILINOGEN, URINE: 0.2 E.U./DL
WBC UA: 0 /HPF (ref 0–5)

## 2022-07-12 PROCEDURE — 99396 PREV VISIT EST AGE 40-64: CPT | Performed by: OBSTETRICS & GYNECOLOGY

## 2022-07-12 RX ORDER — ESTRADIOL 0.1 MG/G
1 CREAM VAGINAL
Qty: 1 EACH | Refills: 3 | Status: SHIPPED | OUTPATIENT
Start: 2022-07-14

## 2022-07-12 RX ORDER — ESTRADIOL 0.05 MG/D
1 FILM, EXTENDED RELEASE TRANSDERMAL
Qty: 24 PATCH | Refills: 3 | Status: SHIPPED | OUTPATIENT
Start: 2022-07-14

## 2022-07-12 RX ORDER — CLOBETASOL PROPIONATE 0.5 MG/G
OINTMENT TOPICAL NIGHTLY
Qty: 1 EACH | Refills: 3 | Status: SHIPPED | OUTPATIENT
Start: 2022-07-12 | End: 2022-08-22

## 2022-07-13 LAB — URINE CULTURE, ROUTINE: NORMAL

## 2022-07-17 ASSESSMENT — ENCOUNTER SYMPTOMS
GASTROINTESTINAL NEGATIVE: 1
ALLERGIC/IMMUNOLOGIC NEGATIVE: 1
EYES NEGATIVE: 1
RESPIRATORY NEGATIVE: 1

## 2022-07-18 ENCOUNTER — HOSPITAL ENCOUNTER (OUTPATIENT)
Dept: PHYSICAL THERAPY | Age: 52
Setting detail: THERAPIES SERIES
Discharge: HOME | End: 2022-07-18
Payer: COMMERCIAL

## 2022-07-18 PROCEDURE — 97161 PT EVAL LOW COMPLEX 20 MIN: CPT | Performed by: PHYSICAL THERAPIST

## 2022-07-18 PROCEDURE — 97110 THERAPEUTIC EXERCISES: CPT | Performed by: PHYSICAL THERAPIST

## 2022-07-18 NOTE — PLAN OF CARE
[facility] and [address],  Sports Performance and Rehabilitation, [facility] [address]99 [address]  793 [facility],5Th Floor   [place]  Phone: [phone]  Fax: [phone]       Ether Or    Dear  Dr Slade Saha,    We had the pleasure of evaluating the following patient for physical therapy services at [address] [place] [address]. A summary of our findings can be found in the initial assessment below. This includes our plan of care. If you have any questions or concerns regarding these findings, please do not hesitate to contact me at the office phone number checked above. Thank you for the referral.       Physician Signature:_______________________________Date:__________________  By signing above (or electronic signature), therapists plan is approved by physician      Patient: Jessica Speaker   : 1970   MRN: [id number]  Referring Physician:        Evaluation Date: 2022      Medical Diagnosis Information:  Diagnosis: M25.561 (ICD-10-CM) - Right knee pain, unspecified chronicity  M76.61 (ICD-10-CM) - Right Achilles tendinitis   Treatment Diagnosis: increased pain; decreased strength; decreased flexibility/ROM                                         Insurance information: PT Insurance Information: Blue [employer]     Precautions/ Contra-indications:     C-SSRS Triggered by Intake questionnaire (Past 2 wk assessment):   [x] No, Questionnaire did not trigger screening.   [] Yes, Patient intake triggered further evaluation      [] C-SSRS Screening completed  [] PCP notified via Plan of Care  [] Emergency services notified     Latex Allergy:  [x]NO      []YES  Preferred Language for Healthcare:   [x]English       []other:    SUBJECTIVE: Patient stated complaint: Patient has right knee pain and right achilles tendon pain. Achilles tendon pain x 1 month. Constant pain especially in the morning.  She is walking for exercise with good athletic shoes. She uses ice and bracing. Pt c/o's weakness and stiffness. No mechanical symptoms. Pt takes Aleve daily. Relevant Medical History: OA, anxiety  Functional Disability Index: FOTO= 65    Pain Scale: 5/10  Easing factors: ice, brace, meds  Provocative factors: walking, pickleball     Type: [x]Constant   []Intermittent  []Radiating []Localized []other:     Numbness/Tingling:     Occupation/School:    Living Status/Prior Level of Function: Independent with ADLs and IADLs    OBJECTIVE:      Flexibility L R Comment   Hamstring  moderate    Gastroc  moderate    ITB  WNL    Quad  mild            ROM PROM AROM Overpressure Comment    L R L R L R    Flexion          Extension                                  Strength L R Comment   Quad  Fair-    Hamstring      Gastroc      Hip  flexion      Hip abd  4/5                    Special Test Results/Comment   Meniscal Click    Crepitus    Flexion Test    Valgus Laxity    Varus Laxity    Lachmans    Drop Back    Homans            Girth L R   Mid Patella     Suprapatellar     5cm above     15cm above       Reflexes/Sensation:    []Dermatomes/Myotomes intact    []Reflexes equal and normal bilaterally   []Other:    Joint mobility:     [x]Normal    []Hypo   []Hyper    Palpation: achilles tendon TTP at distal musculotendinous junction    Functional Mobility/Transfers: WFL    Posture:     Bandages/Dressings/Incisions: NA    Gait: Pt ambulates with pain    Orthopedic Special Tests:                        [x] Patient history, allergies, meds reviewed. Medical chart reviewed. See intake form. Review Of Systems (ROS):  [x]Performed Review of systems (Integumentary, CardioPulmonary, Neurological) by intake and observation. Intake form has been scanned into medical record. Patient has been instructed to contact their primary care physician regarding ROS issues if not already being addressed at this time.       Co-morbidities/Complexities (which will affect course of rehabilitation):   []None           Arthritic conditions   []Rheumatoid arthritis (M05.9)  [x]Osteoarthritis (M19.91)   Cardiovascular conditions   []Hypertension (I10)  []Hyperlipidemia (E78.5)  []Angina pectoris (I20)  []Atherosclerosis (I70)   Musculoskeletal conditions   []Disc pathology   []Congenital spine pathologies   []Prior surgical intervention  []Osteoporosis (M81.8)  []Osteopenia (M85.8)   Endocrine conditions   []Hypothyroid (E03.9)  []Hyperthyroid Gastrointestinal conditions   []Constipation (J30.67)   Metabolic conditions   []Morbid obesity (E66.01)  []Diabetes type 1(E10.65) or 2 (E11.65)   []Neuropathy (G60.9)     Pulmonary conditions   []Asthma (J45)  []Coughing   []COPD (J44.9)   Psychological Disorders  [x]Anxiety (F41.9)  []Depression (F32.9)   []Other:   [x]Other:     LBP     Barriers to/and or personal factors that will affect rehab potential:              []Age  []Sex              []Motivation/Lack of Motivation                        []Co-Morbidities              []Cognitive Function, education/learning barriers              []Environmental, home barriers              []profession/work barriers  []past PT/medical experience  []other:  Justification: Pt has a good rehab potential.     Falls Risk Assessment (30 days):   [x] Falls Risk assessed and no intervention required.   [] Falls Risk assessed and Patient requires intervention due to being higher risk   TUG score (>12s at risk):     [] Falls education provided, including      ASSESSMENT:   Functional Impairments:     []Noted lumbar/proximal hip/LE hypomobility   [x]Decreased LE functional ROM   [x]Decreased core/proximal hip strength and neuromuscular control   [x]Decreased LE functional strength   []Reduced balance/proprioceptive control   []other:      Functional Activity Limitations (from functional questionnaire and intake)   []Reduced ability to tolerate prolonged functional positions   []Reduced ability or difficulty with changes of positions or transfers between positions   []Reduced ability to maintain good posture and demonstrate good body mechanics with sitting, bending, and lifting   []Reduced ability to sleep   [] Reduced ability or tolerance with driving and/or computer work   []Reduced ability to perform lifting, carrying tasks   []Reduced ability to squat   []Reduced ability to forward bend   [x]Reduced ability to ambulate prolonged functional periods/distances/surfaces   []Reduced ability to ascend/descend stairs   [x]Reduced ability to run, hop or jump   []other:     Participation Restrictions   []Reduced participation in self care activities   []Reduced participation in home management activities   []Reduced participation in work activities   [x]Reduced participation in social activities. [x]Reduced participation in sport activities. Classification :    []Signs/symptoms consistent with post-surgical status including decreased ROM, strength and function.    []Signs/symptoms consistent with joint sprain/strain   []Signs/symptoms consistent with patella-femoral syndrome   []Signs/symptoms consistent with knee OA/hip OA   []Signs/symptoms consistent with internal derangement of knee/Hip   []Signs/symptoms consistent with functional hip weakness/NMR control      [x]Signs/symptoms consistent with tendinitis/tendinosis    []signs/symptoms consistent with pathology which may benefit from Dry needling      [x]other:  Knee pain    Prognosis/Rehab Potential:      []Excellent   [x]Good    []Fair   []Poor    Tolerance of evaluation/treatment:    []Excellent   [x]Good    []Fair   []Poor    Physical Therapy Evaluation Complexity Justification  [x] A history of present problem with:  [x] no personal factors and/or comorbidities that impact the plan of care;  []1-2 personal factors and/or comorbidities that impact the plan of care  []3 personal factors and/or comorbidities that impact the plan of care  [x] An examination of body systems using standardized tests and measures addressing any of the following: body structures and functions (impairments), activity limitations, and/or participation restrictions;:  [] a total of 1-2 or more elements   [x] a total of 3 or more elements   [] a total of 4 or more elements   [x] A clinical presentation with:  [x] stable and/or uncomplicated characteristics   [] evolving clinical presentation with changing characteristics  [] unstable and unpredictable characteristics;   [x] Clinical decision making of [x] low, [] moderate, [] high complexity using standardized patient assessment instrument and/or measurable assessment of functional outcome. [x] EVAL (LOW) 29294 (typically 20 minutes face-to-face)  [] EVAL (MOD) 20883 (typically 30 minutes face-to-face)  [] EVAL (HIGH) 64345 (typically 45 minutes face-to-face)  [] RE-EVAL       PLAN  Frequency/Duration:  2 days per week for 8 Weeks:  Interventions:  [x]  Therapeutic exercise including: strength training, ROM, for Lower extremity and core   [x]  NMR activation and proprioception for LE, Glutes and Core   [x]  Manual therapy as indicated for LE, Hip and spine to include: Dry Needling/IASTM, STM, PROM, Gr I-IV mobilizations, manipulation. [x] Modalities as needed that may include: thermal agents, E-stim, Biofeedback, US, iontophoresis as indicated  [x] Patient education on joint protection, postural re-education, activity modification, progression of HEP. HEP instruction: Pt received a written HEP today. GOALS:   Patient stated goal: pain management. Full recovery without surgery    [] Progressing: [] Met: [] Not Met: [] Adjusted    Therapist goals for Patient:   Short Term Goals: To be achieved in: 2 weeks  1. Independent in HEP and progression per patient tolerance, in order to prevent re-injury. [] Progressing: [] Met: [] Not Met: [] Adjusted   2.  Patient will have a decrease in pain to facilitate improvement in movement, function, and ADLs as

## 2022-07-18 NOTE — PROGRESS NOTES
Subjective:      Patient ID: Lilliana Dykes is a 46 y.o. female. Patient is here for annual. Patient with some blood in her urine. Patient     Gynecologic Exam      Review of Systems   Constitutional: Negative. HENT: Negative. Eyes: Negative. Respiratory: Negative. Cardiovascular: Negative. Gastrointestinal: Negative. Endocrine: Negative. Genitourinary:  Positive for hematuria. Negative for difficulty urinating. Musculoskeletal: Negative. Skin: Negative. Allergic/Immunologic: Negative. Neurological: Negative. Hematological: Negative. Psychiatric/Behavioral: Negative.        Date of Birth 1970  Past Medical History:   Diagnosis Date    Acid reflux     Allergic rhinitis     Anxiety     Atrophic vaginitis     DDD (degenerative disc disease), cervical     Dysmenorrhea     Dyspareunia in female     Esophagitis 2019    Fibrocystic breast     Gastritis 2019    HPV (human papilloma virus) infection 1992    Exposed back in college    Hyperlipidemia     Internal hemorrhoids 2022    per colonoscopy - Dr. Shira Rivas    Interstitial cystitis 10/23/2017    Iron deficiency anemia 2017    Menopause ovarian failure     Menorrhagia     PONV (postoperative nausea and vomiting)     Trigeminal neuralgia     Vitamin D deficiency 2019    Vocal cord nodule 2016     Past Surgical History:   Procedure Laterality Date    APPENDECTOMY  1996    CERVICAL FUSION      C4-6    COLONOSCOPY N/A 2022    COLONOSCOPY, POSSIBLE POLYPECTOMY performed by Mago Escobar MD at [address] [place] [address]     bilateral, right    HYSTERECTOMY ([address])      LARYNGOSCOPY      PARTIAL HYSTERECTOMY (CERVIX NOT REMOVED)      DUB -     UPPER GASTROINTESTINAL ENDOSCOPY  2019     OB History    Para Term  AB Living   3 3 3     3   SAB IAB Ectopic Molar Multiple Live Births             3      # Outcome Date GA Lbr Ray/2nd Weight Sex Delivery Anes PTL Lv   3 Term      Vag-Spont   JULIO   2 Term      Vag-Spont   JULIO   1 Term      Vag-Spont   JULIO     Social History     Socioeconomic History    Marital status:      Spouse name: Not on file    Number of children: Not on file    Years of education: Not on file    Highest education level: Not on file   Occupational History    Not on file   Tobacco Use    Smoking status: Never    Smokeless tobacco: Never   Vaping Use    Vaping Use: Never used   Substance and Sexual Activity    Alcohol use: Yes     Comment: 1x 2 months    Drug use: No    Sexual activity: Yes     Partners: Male   Other Topics Concern    Not on file   Social History Narrative    Not on file     Social Determinants of Health     Financial Resource Strain: Low Risk     Difficulty of Paying Living Expenses: Not hard at all   Food Insecurity: No Food Insecurity    Worried About Running Out of Food in the Last Year: Never true    Ran Out of Food in the Last Year: Never true   Transportation Needs: Not on file   Physical Activity: Not on file   Stress: Not on file   Social Connections: Not on file   Intimate Partner Violence: Not on file   Housing Stability: Not on file     No Known Allergies  Outpatient Medications Marked as Taking for the 7/12/22 encounter (Office Visit) with Nazario Mills MD   Medication Sig Dispense Refill    estradiol (MINIVELLE) 0.05 MG/24HR Place 1 patch onto the skin Twice a Week 24 patch 3    clobetasol (TEMOVATE) 0.05 % ointment Apply topically nightly 1 each 3    estradiol (ESTRACE VAGINAL) 0.1 MG/GM vaginal cream Place 1 g vaginally Twice a Week 1 each 3    gabapentin (NEURONTIN) 400 MG capsule Take 1 pill with 100 mg qhs ( 500 mg qhs) and 100 mg in am and 1 at noon.  90 capsule 1    hydrOXYzine HCl (ATARAX) 10 MG tablet Take 2.5 tablets by mouth 3 times daily as needed for Itching 40 tablet 1    gabapentin (NEURONTIN) 100 MG capsule TAKE 1-2 CAPSULES BY MOUTH 3 TIMES A  capsule 1 Chest:      Chest wall: No tenderness. Breasts:     Right: No swelling, bleeding, inverted nipple, mass, nipple discharge, skin change or tenderness. Left: No swelling, bleeding, inverted nipple, mass, nipple discharge, skin change or tenderness. Abdominal:      General: Abdomen is flat. There is no distension. Palpations: Abdomen is soft. There is no hepatomegaly or mass. Tenderness: There is no abdominal tenderness. There is no guarding or rebound. Hernia: No hernia is present. There is no hernia in the left inguinal area or right inguinal area. Genitourinary:     Exam position: Lithotomy position. Labia:         Right: No rash, tenderness, lesion or injury. Left: No rash, tenderness, lesion or injury. Urethra: No prolapse, urethral pain, urethral swelling or urethral lesion. Vagina: Normal. No signs of injury and foreign body. No vaginal discharge, erythema, tenderness, bleeding or lesions. Adnexa: Right adnexa normal and left adnexa normal.        Right: No mass, tenderness or fullness. Left: No mass, tenderness or fullness. Rectum: Normal. Guaiac result negative. No mass, tenderness, anal fissure, external hemorrhoid or internal hemorrhoid. Normal anal tone. Comments: Normal urethral meatus, nl urethra, nl bladder. Slight pelvic fullness  Musculoskeletal:         General: No tenderness. Normal range of motion. Cervical back: Normal range of motion and neck supple. No rigidity. Lymphadenopathy:      Cervical: No cervical adenopathy. Skin:     General: Skin is warm and dry. Neurological:      General: No focal deficit present. Mental Status: She is alert and oriented to person, place, and time. Mental status is at baseline. Deep Tendon Reflexes: Reflexes are normal and symmetric. Psychiatric:         Mood and Affect: Mood normal.         Behavior: Behavior normal.         Thought Content:  Thought content

## 2022-07-18 NOTE — FLOWSHEET NOTE
[facility] and [address],  Sports Performance and Rehabilitation, [facility] [address]199 [address]  793 [facility],5Th Floor   [place]  Phone: [phone]  Fax: [phone]      Physical Therapy Daily Treatment Note  Date:  2022    Patient Name:  Nico Quiñones    :  1970  MRN: [id number]  Restrictions/Precautions:    Medical/Treatment Diagnosis Information:  Diagnosis: M25.561 (ICD-10-CM) - Right knee pain, unspecified chronicity  M76.61 (ICD-10-CM) - Right Achilles tendinitis  Treatment Diagnosis: increased pain; decreased strength; decreased flexibility/ROM  Insurance/Certification information:  PT Insurance Information: Blue Cross/[employer]  Physician Information:   Radha Godwin MD  Has the plan of care been signed (Y/N):        []  Yes  [x]  No     Date of Patient follow up with Physician:       Is this a Progress Report:     []  Yes  [x]  No        If Yes:  Date Range for reporting period:  Beginning 22  Ending    Progress report will be due (10 Rx or 30 days whichever is less):        Recertification will be due (POC Duration  / 90 days whichever is less): 10/18/22         Visit # Insurance Allowable Auth Required   1  []  Yes []  No        Functional Scale: FOTO=     Date assessed:  22       Latex Allergy:  [x]NO      []YES  Preferred Language for Healthcare:   [x]English       []other:    Pain level:  5/10     SUBJECTIVE:  See eval    OBJECTIVE: See eval  Observation:   Test measurements:      RESTRICTIONS/PRECAUTIONS:     Exercises/Interventions:   Exercise/Equipment Resistance/Repetitions Other comments   Stretching     Hamstring [id number]    Towel Pull [id number]    Inclined Calf [id number]    Hip Flexion     ITB     Groin     Quad     Calf I 5x17wbh    Calf II 5f91chr    Plantar fascia Bottle roll x 5 min    SLR     Supine 3x10 VCs; discomfort   Abduction     Adduction     Prone     SLR+     Ankle DF  Ankle INV  Ankle EV 3x10  3x10  3x10 Isometrics     Quad sets [id number] discomfort        Patellar Glides     Medial     Superior     Inferior          ROM     Sheet Pulls     Hang Weights     Passive     Active     Weight Shift     Ankle Pumps                    CKC     Calf raises 3x10 painfree    Wall sits     Step ups     1 leg stand     Squatting     CC TKE     Balance     bridges          PRE     Extension  RANGE:   Flexion  RANGE:        Quantum machines     Leg press      Leg extension     Leg curl          Manual interventions                 Patient Education:  Access Code: [email or web address]  URL: [email or web address]  Date: 07/18/2022  Prepared by: Tati Lindsey    Exercises  Gastroc Stretch on Wall (Mirrored) - 1 x daily - 7 x weekly - 3 reps - 30 sec hold  Soleus Stretch on Wall (Mirrored) - 1 x daily - 7 x weekly - 3 reps - 30 sec hold  Supine Calf Stretch with Strap - 1 x daily - 7 x weekly - 3 reps - 30 sec hold  Seated Table Hamstring Stretch (Mirrored) - 1 x daily - 7 x weekly - 5 reps - 30 sec hold  Seated Plantar Fascia Mobilization with Small Ball - 1 x daily - 7 x weekly  Ankle Dorsiflexion with Resistance - 1 x daily - 7 x weekly - 3 sets - 10 reps  Ankle Eversion with Resistance - 1 x daily - 7 x weekly - 3 sets - 10 reps  Ankle Inversion with Resistance (Mirrored) - 1 x daily - 7 x weekly - 3 sets - 10 reps  Heel Raises with Counter Support - 1 x daily - 7 x weekly - 3 sets - 10 reps  Supine Quad Set - 1 x daily - 7 x weekly - 1 sets - 10 reps - 10 sec hold  Active Straight Leg Raise with Quad Set - 1 x daily - 7 x weekly - 3 sets - 10 reps      Therapeutic Exercise and NMR EXR  [x] (35498) Provided verbal/tactile cueing for activities related to strengthening, flexibility, endurance, ROM for improvements in LE, proximal hip, and core control with self care, mobility, lifting, ambulation.  [] (93709) Provided verbal/tactile cueing for activities related to improving balance, coordination, kinesthetic sense, posture, motor 2  [] IONTO  [] NMR (30931) x     [] VASO  [] Manual (92105) x      [] Other:  [] TA x      [] Mech Traction (18269)  [] ES(attended) (28056)      [] ES (un) (44201):       GOALS:  Patient stated goal: pain management. Full recovery without surgery    [] Progressing: [] Met: [] Not Met: [] Adjusted    Therapist goals for Patient:  Short Term Goals: To be achieved in: 2 weeks  1. Independent in HEP and progression per patient tolerance, in order to prevent re-injury. [] Progressing: [] Met: [] Not Met: [] Adjusted  2. Patient will have a decrease in pain to facilitate improvement in movement, function, and ADLs as indicated by Functional Deficits. [] Progressing: [] Met: [] Not Met: [] Adjusted     Long Term Goals: To be achieved in: 8 weeks  1. FOTO= 75 to assist with reaching prior level of function. [] Progressing: [] Met: [] Not Met: [] Adjusted  2. Patient will demonstrate increased AROM to WNL to allow for proper joint functioning as indicated by patients Functional Deficits. [] Progressing: [] Met: [] Not Met: [] Adjusted  3. Patient will demonstrate an increase in Strength to good proximal hip strength and control, within 5lb HHD in LE to allow for proper functional mobility as indicated by patients Functional Deficits. [] Progressing: [] Met: [] Not Met: [] Adjusted  4. Patient will return to ADL and household functional activities without increased symptoms or restriction. [] Progressing: [] Met: [] Not Met: [] Adjusted  5. Pt will return to walking and recreational activities with less pain. [] Progressing: [] Met: [] Not Met: [] Adjusted      Overall Progression Towards Functional goals/ Treatment Progress Update:  [] Patient is progressing as expected towards functional goals listed. [] Progression is slowed due to complexities/Impairments listed. [] Progression has been slowed due to co-morbidities.   [x] Plan just implemented, too soon to assess goals progression <30days   [] Goals require adjustment due to lack of progress  [] Patient is not progressing as expected and requires additional follow up with physician  [] Other    Prognosis for POC: [x] Good [] Fair  [] Poor      Patient requires continued skilled intervention: [x] Yes  [] No    Treatment/Activity Tolerance:  [x] Patient able to complete treatment  [x] Patient limited by fatigue  [x] Patient limited by pain     [] Patient limited by other medical complications  [] Other:         PLAN: See eval  [] Continue per plan of care [] Alter current plan (see comments above)  [x] Plan of care initiated [] Hold pending MD visit [] Discharge      Electronically signed by:  Jasbir Rolon PT    Note: If patient does not return for scheduled/ recommended follow up visits, this note will serve as a discharge from care along with most recent update on progress.

## 2022-07-25 ENCOUNTER — HOSPITAL ENCOUNTER (OUTPATIENT)
Dept: PHYSICAL THERAPY | Age: 52
Setting detail: THERAPIES SERIES
Discharge: HOME | End: 2022-07-25
Payer: COMMERCIAL

## 2022-07-25 PROCEDURE — 97112 NEUROMUSCULAR REEDUCATION: CPT | Performed by: PHYSICAL THERAPIST

## 2022-07-25 PROCEDURE — 97110 THERAPEUTIC EXERCISES: CPT | Performed by: PHYSICAL THERAPIST

## 2022-07-25 NOTE — FLOWSHEET NOTE
[facility] and [address],  Sports Performance and Rehabilitation, [facility] 6199 1246 [address]  [facility],[address]   [place]  Phone: [phone]  Fax: [phone]      Physical Therapy Daily Treatment Note  Date:  2022    Patient Name:  Nico Quiñones    :  1970  MRN: [id number]  Restrictions/Precautions:    Medical/Treatment Diagnosis Information:  Diagnosis: M25.561 (ICD-10-CM) - Right knee pain, unspecified chronicity  M76.61 (ICD-10-CM) - Right Achilles tendinitis  Treatment Diagnosis: increased pain; decreased strength; decreased flexibility/ROM  Insurance/Certification information:  PT Insurance Information: [employer]  Physician Information:   Radha Godwin MD  Has the plan of care been signed (Y/N):        []  Yes  [x]  No     Date of Patient follow up with Physician:       Is this a Progress Report:     []  Yes  [x]  No        If Yes:  Date Range for reporting period:  Beginning 22  Ending    Progress report will be due (10 Rx or 30 days whichever is less):        Recertification will be due (POC Duration  / 90 days whichever is less): 10/18/22         Visit # Insurance Allowable Auth Required   2  []  Yes []  No        Functional Scale: FOTO=     Date assessed:  22       Latex Allergy:  [x]NO      []YES  Preferred Language for Healthcare:   [x]English       []other:    Pain level:  5/10     SUBJECTIVE:  Pain at anterior knee with QS/towel roll. Foot pain and achilles pain continues. Pain at the 1st met.      OBJECTIVE:   Observation:   Test measurements:    Knee has not been evaluated by an ortho MD  JIHAN shoes are new, pt typically wears NB    RESTRICTIONS/PRECAUTIONS:     Exercises/Interventions:   Exercise/Equipment Resistance/Repetitions Other comments   Stretching     Hamstring HEP   Towel Pull [id number]    Inclined Calf [id number]    Hip Flexion     ITB     Groin     Quad     Calf I HEP   Calf II [id number]    Plantar fascia HEP   SLR Supine Abduction Clam shell next visit    Adduction 3x10    Prone 3x10    SLR+     Ankle DF  Ankle INV  Ankle EV HEP for all   Isometrics Quad sets      Patellar Glides     Medial     Superior     Inferior          ROM     Sheet Pulls     Hang Weights     Passive     Active     Weight Shift     Ankle Pumps                    CKC     Calf raises    Wall sits Next visit if pain free    Step ups     1 leg stand [id number]    Squatting     CC TKE Next visit if pain free    Balance     bridges Next visit    Sit to stand Chair and 1 mat 1x10 VCs for technique   PRE     Extension  RANGE:   Flexion  RANGE:        Quantum machines     Leg press  55# 3x10 VCs   Leg extension     Leg curl          Manual interventions     STM Achilles tendon x 5 min           Patient Education:  Access Code: [email or web address]  URL: [email or web address]  Date: 07/18/2022  Prepared by: Dru Plaster    Exercises  Gastroc Stretch on Wall (Mirrored) - 1 x daily - 7 x weekly - 3 reps - 30 sec hold  Soleus Stretch on Wall (Mirrored) - 1 x daily - 7 x weekly - 3 reps - 30 sec hold  Supine Calf Stretch with Strap - 1 x daily - 7 x weekly - 3 reps - 30 sec hold  Seated Table Hamstring Stretch (Mirrored) - 1 x daily - 7 x weekly - 5 reps - 30 sec hold  Seated Plantar Fascia Mobilization with Small Ball - 1 x daily - 7 x weekly  Ankle Dorsiflexion with Resistance - 1 x daily - 7 x weekly - 3 sets - 10 reps  Ankle Eversion with Resistance - 1 x daily - 7 x weekly - 3 sets - 10 reps  Ankle Inversion with Resistance (Mirrored) - 1 x daily - 7 x weekly - 3 sets - 10 reps  Heel Raises with Counter Support - 1 x daily - 7 x weekly - 3 sets - 10 reps  Supine Quad Set - 1 x daily - 7 x weekly - 1 sets - 10 reps - 10 sec hold  Active Straight Leg Raise with Quad Set - 1 x daily - 7 x weekly - 3 sets - 10 reps      Therapeutic Exercise and NMR EXR  [x] (46262) Provided verbal/tactile cueing for activities related to strengthening, flexibility, endurance, ROM for improvements in LE, proximal hip, and core control with self care, mobility, lifting, ambulation. [x] (79400) Provided verbal/tactile cueing for activities related to improving balance, coordination, kinesthetic sense, posture, motor skill, proprioception  to assist with LE, proximal hip, and core control in self care, mobility, lifting, ambulation and eccentric single leg control. NMR and Therapeutic Activities:    [] (36996 or 39960) Provided verbal/tactile cueing for activities related to improving balance, coordination, kinesthetic sense, posture, motor skill, proprioception and motor activation to allow for proper function of core, proximal hip and LE with self care and ADLs  [] (33455) Gait Re-education- Provided training and instruction to the patient for proper LE, core and proximal hip recruitment and positioning and eccentric body weight control with ambulation re-education including up and down stairs     Home Exercise Program:    [x] (01143) Reviewed/Progressed HEP activities related to strengthening, flexibility, endurance, ROM of core, proximal hip and LE for functional self-care, mobility, lifting and ambulation/stair navigation   [] (43663)Reviewed/Progressed HEP activities related to improving balance, coordination, kinesthetic sense, posture, motor skill, proprioception of core, proximal hip and LE for self care, mobility, lifting, and ambulation/stair navigation      Manual Treatments:  PROM / STM / Oscillations-Mobs:  G-I, II, III, IV (PA's, Inf., Post.)  [] (42500) Provided manual therapy to mobilize LE, proximal hip and/or LS spine soft tissue/joints for the purpose of modulating pain, promoting relaxation,  increasing ROM, reducing/eliminating soft tissue swelling/inflammation/restriction, improving soft tissue extensibility and allowing for proper ROM for normal function with self care, mobility, lifting and ambulation.      Modalities:      Charges:  Timed Code Treatment Minutes: 45   Total Treatment Minutes: 45       [] EVAL (LOW) 47544 (typically 20 minutes face-to-face)  [] EVAL (MOD) 52329 (typically 30 minutes face-to-face)  [] EVAL (HIGH) 54467 (typically 45 minutes face-to-face)  [] RE-EVAL   [x] SG(64499) x  2  [] IONTO  [x] NMR (25662) x  1   [] VASO  [] Manual (98117) x      [] Other:  [] TA x      [] Mech Traction (25958)  [] ES(attended) (25766)      [] ES (un) (43076):       GOALS:  Patient stated goal: pain management. Full recovery without surgery    [] Progressing: [] Met: [] Not Met: [] Adjusted    Therapist goals for Patient:  Short Term Goals: To be achieved in: 2 weeks  1. Independent in HEP and progression per patient tolerance, in order to prevent re-injury. [] Progressing: [] Met: [] Not Met: [] Adjusted  2. Patient will have a decrease in pain to facilitate improvement in movement, function, and ADLs as indicated by Functional Deficits. [] Progressing: [] Met: [] Not Met: [] Adjusted     Long Term Goals: To be achieved in: 8 weeks  1. FOTO= 75 to assist with reaching prior level of function. [] Progressing: [] Met: [] Not Met: [] Adjusted  2. Patient will demonstrate increased AROM to WNL to allow for proper joint functioning as indicated by patients Functional Deficits. [] Progressing: [] Met: [] Not Met: [] Adjusted  3. Patient will demonstrate an increase in Strength to good proximal hip strength and control, within 5lb HHD in LE to allow for proper functional mobility as indicated by patients Functional Deficits. [] Progressing: [] Met: [] Not Met: [] Adjusted  4. Patient will return to ADL and household functional activities without increased symptoms or restriction. [] Progressing: [] Met: [] Not Met: [] Adjusted  5. Pt will return to walking and recreational activities with less pain.     [] Progressing: [] Met: [] Not Met: [] Adjusted      Overall Progression Towards Functional goals/ Treatment Progress Update:  [] Patient is progressing as expected towards functional goals listed. [] Progression is slowed due to complexities/Impairments listed. [] Progression has been slowed due to co-morbidities. [x] Plan just implemented, too soon to assess goals progression <30days   [] Goals require adjustment due to lack of progress  [] Patient is not progressing as expected and requires additional follow up with physician  [] Other    Prognosis for POC: [x] Good [] Fair  [] Poor      Patient requires continued skilled intervention: [x] Yes  [] No    Treatment/Activity Tolerance:  [] Patient able to complete treatment  [x] Patient limited by fatigue  [x] Patient limited by pain     [] Patient limited by other medical complications  [x] Other: Pt has a right knee condition in addition to Achilles pain on the same side. I feel they are affecting each other. In addition, the new [employer] shoes may be affecting the 1st met and possibly the achilles tendon. Pt will benefit from a continued therapy program for both conditions. Pt has knee pain with QS and leg raises. Orthotic inserts may be helpful in the future. PLAN: Knee and achilles rehab. [x] Continue per plan of care [] Alter current plan (see comments above)  [] Plan of care initiated [] Hold pending MD visit [] Discharge      Electronically signed by:  Harrison Rivas PT    Note: If patient does not return for scheduled/ recommended follow up visits, this note will serve as a discharge from care along with most recent update on progress.

## 2022-07-26 ENCOUNTER — OFFICE VISIT (OUTPATIENT)
Dept: DERMATOLOGY | Age: 52
End: 2022-07-26
Payer: COMMERCIAL

## 2022-07-26 DIAGNOSIS — D22.9 MULTIPLE NEVI: Primary | ICD-10-CM

## 2022-07-26 DIAGNOSIS — L81.4 SOLAR LENTIGO: ICD-10-CM

## 2022-07-26 PROCEDURE — 99213 OFFICE O/P EST LOW 20 MIN: CPT | Performed by: DERMATOLOGY

## 2022-07-26 RX ORDER — LINACLOTIDE 145 UG/1
CAPSULE, GELATIN COATED ORAL
Qty: 90 CAPSULE | Refills: 0 | Status: SHIPPED | OUTPATIENT
Start: 2022-07-26 | End: 2022-10-26 | Stop reason: SDUPTHER

## 2022-07-26 NOTE — PROGRESS NOTES
[facility]  MD Jose Hunter 46  1970    46 y.o. female     Date of Visit: 7/26/2022    Chief Complaint: skin moles    History of Present Illness:    She has multiple nevi - not aware of any changes in size, color or shape. 2.  She has stable freckling on the lower extremities. She has a family history of melanoma: Mom with hx of melanoma. Review of Systems:  Gen: Feels well, good sense of health. Past Medical History, Family History, Surgical History, Medications and Allergies reviewed.     Past Medical History:   Diagnosis Date    Acid reflux     Allergic rhinitis     Anxiety     Atrophic vaginitis     DDD (degenerative disc disease), cervical     Dysmenorrhea     Dyspareunia in female     Esophagitis 05/2019    Fibrocystic breast     Gastritis 05/2019    HPV (human papilloma virus) infection 1992    Exposed back in college    Hyperlipidemia     Internal hemorrhoids 01/2022    per colonoscopy - Dr. Luis Botello    Interstitial cystitis 10/23/2017    Iron deficiency anemia 2017    Menopause ovarian failure     Menorrhagia     PONV (postoperative nausea and vomiting)     Trigeminal neuralgia     Vitamin D deficiency 07/2019    Vocal cord nodule 2016     Past Surgical History:   Procedure Laterality Date    APPENDECTOMY  1996    CERVICAL FUSION  2013    C4-6    COLONOSCOPY N/A 1/11/2022    COLONOSCOPY, POSSIBLE POLYPECTOMY performed by Chente Lehman MD at [address] [place] [address]     bilateral, right    HYSTERECTOMY ([address])  2009    LARYNGOSCOPY  2016    PARTIAL HYSTERECTOMY (CERVIX NOT REMOVED)  2009    DUB -     UPPER GASTROINTESTINAL ENDOSCOPY  05/2019       No Known Allergies  Outpatient Medications Marked as Taking for the 7/26/22 encounter (Office Visit) with Lizy Linton MD   Medication Sig Dispense Refill    estradiol (MINIVELLE) 0.05 MG/24HR Place 1 patch onto the skin Twice a Week 24 patch 3    clobetasol (TEMOVATE) 0.05 % ointment Apply topically nightly 1 each 3    estradiol (ESTRACE VAGINAL) 0.1 MG/GM vaginal cream Place 1 g vaginally Twice a Week 1 each 3    gabapentin (NEURONTIN) 400 MG capsule Take 1 pill with 100 mg qhs ( 500 mg qhs) and 100 mg in am and 1 at noon. 90 capsule 1    hydrOXYzine HCl (ATARAX) 10 MG tablet Take 2.5 tablets by mouth 3 times daily as needed for Itching 40 tablet 1    gabapentin (NEURONTIN) 100 MG capsule TAKE 1-2 CAPSULES BY MOUTH 3 TIMES A  capsule 1    linaclotide (LINZESS) 145 MCG capsule Take 1 capsule by mouth every morning (before breakfast) 90 capsule 3    polyethylene glycol (MIRALAX) 17 GM/SCOOP POWD powder Take 238 g by mouth daily Take as directed for colonoscopy 255 g 0    meloxicam (MOBIC) 7.5 MG tablet Take 1 tablet by mouth 2 times daily as needed for Pain To face/ jaw and mouth 90 tablet 0    hydrocortisone (ANUSOL-HC) 25 MG suppository Place 1 suppository rectally every 12 hours prn 30 suppository 3    Probiotic Product (PROBIOTIC-10 PO) Take by mouth      cetirizine (ZYRTEC) 10 MG tablet Take 10 mg by mouth daily      butalbital-acetaminophen-caffeine (FIORICET, ESGIC) -40 MG per tablet Take 1 tablet by mouth every 6 hours as needed for Headaches 25 tablet 1    VASCEPA 1 g CAPS capsule Take 2 capsules by mouth 2 times daily 120 capsule 3    Cholecalciferol (VITAMIN D3) 1000 units TABS Take 1 tablet by mouth daily 90 tablet 3       Physical Examination       The following were examined and determined to be normal: Psych/Neuro, Scalp/hair, Head/face, Conjunctivae/eyelids, Gums/teeth/lips, Neck, Breast/axilla/chest, Abdomen, Back, RUE, LUE, RLE, LLE, and Nails/digits. The following were examined and determined to be abnormal: None. Well appearing. Trunk and extremities with several well defined round to oval smooth brown macules and papules. 2.  Lower shins with several round smooth tan macules. Assessment and Plan     1. Multiple nevi - benign appearing    Sun protective behaviors, including use of at least SPF 30 sunscreen, and self skin examinations were encouraged. Call for any new or concerning lesions. 2. Solar lentigines    Monitor for change. Sun protection as above. Return in about 1 year (around 7/26/2023).     --Nikolay Pimentel MD

## 2022-08-03 ENCOUNTER — PATIENT MESSAGE (OUTPATIENT)
Dept: FAMILY MEDICINE CLINIC | Age: 52
End: 2022-08-03

## 2022-08-03 NOTE — TELEPHONE ENCOUNTER
From: Luciano Mayes  To: Dr. Annie Bonilla: 8/3/2022 7:30 AM EDT  Subject: Hilary Wright,    I have had [place] since last Sunday. My other symptoms are improving but my sore throat pain keeps lingering . Is this normal or did I develop a secondary infection ? Thank you for your insight .     Meagan Inman

## 2022-08-08 ENCOUNTER — OFFICE VISIT (OUTPATIENT)
Dept: ORTHOPEDIC SURGERY | Age: 52
End: 2022-08-08
Payer: COMMERCIAL

## 2022-08-08 ENCOUNTER — APPOINTMENT (OUTPATIENT)
Dept: PHYSICAL THERAPY | Age: 52
End: 2022-08-08
Payer: COMMERCIAL

## 2022-08-08 VITALS — BODY MASS INDEX: 22.2 KG/M2 | WEIGHT: 130 LBS | HEIGHT: 64 IN

## 2022-08-08 DIAGNOSIS — M25.571 RIGHT ANKLE PAIN, UNSPECIFIED CHRONICITY: Primary | ICD-10-CM

## 2022-08-08 DIAGNOSIS — M76.61 RIGHT ACHILLES TENDINITIS: ICD-10-CM

## 2022-08-08 DIAGNOSIS — M25.561 RIGHT KNEE PAIN, UNSPECIFIED CHRONICITY: ICD-10-CM

## 2022-08-08 PROCEDURE — 99214 OFFICE O/P EST MOD 30 MIN: CPT | Performed by: ORTHOPAEDIC SURGERY

## 2022-08-08 NOTE — PROGRESS NOTES
Date:  2022    Name:  Carlota Running  Address:  [address] [facility] [place]    :  1970      Age:   46 y.o.    SSN:        Medical Record Number:  [id number]    Reason for Visit:    Chief Complaint    Ankle Pain (New patient right ankle / right knee)      DOS:2022     HPI: Ar Lambert is a 46 y.o. female here today for evaluation of right knee pain that has been ongoing for 2 years. She developed pain after biking but does not recall a specific injury. She complains of lateral and posterior right knee pain and tightness exacerbated with activity. Denies swelling, locking or catching. No instability. She states she feels better with stretching but has had no formal treatment for this issue. She has tried anti-inflammatories but they cause GI issues. She also presents today complaining of right achilles pain ongoing for 4-6 weeks. Pain developed when she started playing in a weekly pickle ball league but denies any specific injury. She complains of right mid achilles pain, tightness and swelling, at its worst in the morning. Pain is relieved with stretching but she does have pain with walking. She has attended 3 therapy sessions of formal physical therapy but complains of increased pain with band exercises. Pain Assessment  Location of Pain: Ankle  Location Modifiers: Right  Severity of Pain: 3  Quality of Pain: Sharp  Duration of Pain: A few minutes  Frequency of Pain: Intermittent  Aggravating Factors: Walking  Limiting Behavior: Yes  Relieving Factors: Rest  Result of Injury: No  Work-Related Injury: No  Are there other pain locations you wish to document?: No  ROS: Review of systems reviewed from Patient History Form completed today and available in the patient's chart under the Media tab.      Past Medical History:   Diagnosis Date    Acid reflux     Allergic rhinitis     Anxiety     Atrophic vaginitis     DDD (degenerative disc disease), cervical     Dysmenorrhea Refill    LINZESS 145 MCG capsule TAKE ONE CAPSULE BY MOUTH EVERY MORNING BEFORE BREAKFAST 90 capsule 0    estradiol (MINIVELLE) 0.05 MG/24HR Place 1 patch onto the skin Twice a Week 24 patch 3    clobetasol (TEMOVATE) 0.05 % ointment Apply topically nightly 1 each 3    estradiol (ESTRACE VAGINAL) 0.1 MG/GM vaginal cream Place 1 g vaginally Twice a Week 1 each 3    gabapentin (NEURONTIN) 400 MG capsule Take 1 pill with 100 mg qhs ( 500 mg qhs) and 100 mg in am and 1 at noon. 90 capsule 1    hydrOXYzine HCl (ATARAX) 10 MG tablet Take 2.5 tablets by mouth 3 times daily as needed for Itching 40 tablet 1    gabapentin (NEURONTIN) 100 MG capsule TAKE 1-2 CAPSULES BY MOUTH 3 TIMES A  capsule 1    polyethylene glycol (MIRALAX) 17 GM/SCOOP POWD powder Take 238 g by mouth daily Take as directed for colonoscopy 255 g 0    meloxicam (MOBIC) 7.5 MG tablet Take 1 tablet by mouth 2 times daily as needed for Pain To face/ jaw and mouth 90 tablet 0    hydrocortisone (ANUSOL-HC) 25 MG suppository Place 1 suppository rectally every 12 hours prn 30 suppository 3    Probiotic Product (PROBIOTIC-10 PO) Take by mouth      cetirizine (ZYRTEC) 10 MG tablet Take 10 mg by mouth daily      butalbital-acetaminophen-caffeine (FIORICET, ESGIC) -40 MG per tablet Take 1 tablet by mouth every 6 hours as needed for Headaches 25 tablet 1    VASCEPA 1 g CAPS capsule Take 2 capsules by mouth 2 times daily 120 capsule 3    Cholecalciferol (VITAMIN D3) 1000 units TABS Take 1 tablet by mouth daily 90 tablet 3     No current facility-administered medications for this visit. No Known Allergies    Vital signs:  Ht 5' 4\" (1.626 m)   Wt 130 lb (59 kg)   LMP  (LMP Unknown)   BMI 22.31 kg/m²            RIGHT Knee Exam:    Gait: No use of assistive devices. No antalgic gait. Alignment: Normal alignment. Inspection/skin: Skin is intact without erythema or ecchymosis. No gross deformity. Palpation: No crepitus.  Lateral joint line tenderness present. Range of Motion: There is full range of motion. Strength: Normal quadriceps development. Effusion: No effusion or swelling present. Ligamentous stability: No cruciate or collateral ligament instability. Neurologic and vascular: Skin is warm and well-perfused. Sensation is intact to light-touch. Special tests: Negative Sweetie sign. RIGHT Ankle Exam:  Overall alignment is appreciated. Within normal limits. Gait: No use of assistive devices. Inspection/Skin: Skin is intact without erythema or ecchymosis. No swelling. No deformity. Palpation: Nontender to palpation about the medial and lateral malleolus, base of the 5th metatarsal, proximal and distal medial metatarsals, tibial diaphysis. Tenderness over mid Achilles tendon. Range of Motion: Achilles tightness. Strength: 5/5 strength of the tibialis anterior, EHL, gastrocsoleus complex. Ligamentous Stability: Stable anterior drawer test in plantar and dorsiflexion. Negative external rotation test.  Midfoot is stable. Negative squeeze test     Neurologic and vascular: Intact sensation to light touch in all 5 digits. 2+ palpable pedal pulses. Comparison LEFT Ankle Exam:   Overall alignment is appreciated. Within normal limits. Gait: No use of assistive devices. Inspection/Skin: Skin is intact without erythema or ecchymosis. No swelling. No deformity. Palpation: No tenderness over the medial and lateral ligamentous complexes. No bony tenderness of the medial/lateral malleoli, midfoot, and forefoot. Calf compartments are soft and non-tender. No tenderness over the proximal fibula. No signs of DVT.       Range of Motion: normal plantar/dorsiflexion, eversion and inversion     Strength: 5/5 strength of the tibialis anterior, EHL, gastrocsoleus complex, and peroneals with mild subluxation of the peroneal tendons      Ligamentous Stability: stable anterior drawer test in plantar and dorsiflexion. Negative external rotation test.  Midfoot is stable. Negative squeeze test     Neurologic and vascular: Intact sensation to light touch in all 5 digits. 2+ palpable pedal pulses. Diagnostics:  Radiology:     Pertinent imaging was interpreted and reviewed with the patient. Radiographs were obtained, interpreted, and reviewed with the patient in the office; 4 views: bilateral PA, bilateral El, bilateral Merchants AND right lateral    Impression: No acute bony abnormalities appreciated on radiographic examination. RIGHT ankle x-ray:    AP, lateral and mortise views were obtained  and reviewed of the RIGHT ankle. Impression: No acute bony abnormalities appreciated on radiographic examination. Assessment: 46year old female with achilles tendinitis of right ankle and right knee pain with possible lateral meniscus tear    Impression:  Encounter Diagnoses   Name Primary? Right ankle pain, unspecified chronicity Yes    Right knee pain, unspecified chronicity        Plan: Pertinent imaging was reviewed. The etiology, natural history, and treatment options for the disorder were discussed. The roles of activity medication, antiinflammatories, injections, bracing, physical therapy, and surgical interventions were all described to the patient and questions were answered. Patient denies any specific injury to the right ankle and x-rays appear normal today. Her exam shows mid achilles tenderness as well as achilles tightness, no palpable defects. I believe she is suffering from achilles tendinitis and recommend continuing formal, supervised physical therapy with modalities and dry needling as indicated. Since she has GI issues with anti-inflammatories we will stay away from those but I do recommend using topical Voltaren gel for her pain and inflammation as needed.  We discussed using arch supports in her shoes and states she just got some from a running store but they put increased pressure on the balls of her foot. In regards to her right knee, she did not have a specific injury but she has been dealing with this discomfort for 2 years with minimal improvement. I believe she is a candidate for an MRI of the right knee to evaluate for lateral meniscus tear. She wishes to proceed. I will see her back following the MRI to review results, sooner if needed. Art Frey is in agreement with this plan. All questions were answered to patient's satisfaction and was encouraged to call with any further questions. Orders Placed This Encounter   Procedures    XR ANKLE RIGHT (MIN 3 VIEWS)     Standing Status:   Future     Number of Occurrences:   1     Standing Expiration Date:   8/8/2023     Order Specific Question:   Reason for exam:     Answer:   pain    XR KNEE RIGHT (MIN 4 VIEWS)     [id number]     Standing Status:   Future     Number of Occurrences:   1     Standing Expiration Date:   8/8/2023     Order Specific Question:   Reason for exam:     Answer:   Pain    XR KNEE LEFT (3 VIEWS)     Standing Status:   Future     Number of Occurrences:   1     Standing Expiration Date:   8/8/2023     Order Specific Question:   Reason for exam:     Answer:   pain    MRI KNEE RIGHT WO CONTRAST     Standing Status:   Future     Standing Expiration Date:   8/8/2023     Order Specific Question:   Reason for exam:     Answer:   MRI R KNEE W/3D EVAL MENISCUS TEAR     Order Specific Question:   Reason for exam:     Answer:   [employer]  PUSH TO [facility] PACS     Order Specific Question:   What is the sedation requirement? Answer:   None         Total time spent for evaluation, education and development of treatment plan: 50 minutes      IAndressa ATC, am scribing for and in the presence of Dr. Quincy Hines.    08/08/22 12:42 PM Andressa Rees ATC    I attest that I met personally with the patient, performed the described exam, reviewed the radiographic studies and medical records associated with this patient and supervised the services that are described above.      Syed Lux MD

## 2022-08-10 ENCOUNTER — HOSPITAL ENCOUNTER (OUTPATIENT)
Dept: PHYSICAL THERAPY | Age: 52
Setting detail: THERAPIES SERIES
Discharge: HOME | End: 2022-08-10
Payer: COMMERCIAL

## 2022-08-10 PROCEDURE — 97110 THERAPEUTIC EXERCISES: CPT | Performed by: PHYSICAL THERAPIST

## 2022-08-10 PROCEDURE — 97140 MANUAL THERAPY 1/> REGIONS: CPT | Performed by: PHYSICAL THERAPIST

## 2022-08-10 PROCEDURE — 97161 PT EVAL LOW COMPLEX 20 MIN: CPT | Performed by: PHYSICAL THERAPIST

## 2022-08-10 NOTE — PLAN OF CARE
[facility] and [address],  Sports Performance and Rehabilitation, [facility] 9599 [address]86 [address]  793 [facility],5Th Floor   [address], [address] [place] [address]  Phone: [phone]  Fax: [phone]     Physical Therapy Certification    Dear Dr. Leonor Nj  ,    We had the pleasure of evaluating the following patient for physical therapy services at [address] [place] [address]. A summary of our findings can be found in the initial assessment below. This includes our plan of care. If you have any questions or concerns regarding these findings, please do not hesitate to contact me at the office phone number checked above. Thank you for the referral.       Physician Signature:_______________________________Date:__________________  By signing above (or electronic signature), therapists plan is approved by physician      Patient: Kusum Velásquez   : 1970   MRN: [id number]  Referring Physician:  Keisha Lombardi MD      Evaluation Date: 8/10/2022      Medical Diagnosis Information:  Diagnosis: M76.61 (ICD-10-CM) - Right Achilles tendinitis   Treatment Diagnosis: M25.571, M79.671, M25.671, R26.2, R53.1                                         Insurance information: PT Insurance Information: BSCS/30VPCY/3 used     Precautions/ Contra-indications:     C-SSRS Triggered by Intake questionnaire (Past 2 wk assessment):   [x] No, Questionnaire did not trigger screening.   [] Yes, Patient intake triggered further evaluation      [] C-SSRS Screening completed  [] PCP notified via Plan of Care  [] Emergency services notified     Latex Allergy:  [x]NO      []YES  Preferred Language for Healthcare:   [x]English       []other:    SUBJECTIVE: Patient stated complaint: Pt presents to PT with R ankle/foot pain. Pain started at the beginning of the summer when she began playing pickleball, stopped playing d/t pain. Pain is isolated to achilles tendon.  Pt also has some pain in the ball of her foot at big toe. Was doing some ankle band exercises but that increased pain in the Achilles. Pt was using ice and that was the only thing that was helping. She was taking 2 aleve a day but started to have some GI symptoms. Pt walks in the morning, she was avoiding hills d/t the pain. Pt has not been able to hike. Pt is able to complete all ADLs without pain. Pt denies prior injury to the R ankle. Relevant Medical History:see intake form  Functional Disability Index:     Pain Scale: 2-3/10 on average, 6/10 at worst  Easing factors: ice  Provocative factors: exercise     Type: [x]Constant   []Intermittent  []Radiating []Localized []other:     Numbness/Tingling: No    Occupation/School: Stay at home    Living Status/Prior Level of Function: Independent with ADLs and IADLs. OBJECTIVE:   ROM PROM AROM Comments    Left Right Left Right    Dorsiflexion  8 knee straight  5 knee bent 3 knee straight  8 knee bent 0 knee straight  0 knee bent    Plantarflexion   90 73    Inversion   50 40    Eversion   25 20      Strength Left Right Comments   Dorsiflexion 4/5 4-/5    Plantarflexion 4/5 4-/5    Inversion 4/5 4-/5    Eversion 4/5 4-/5      Reflexes/Sensation:    [x]Dermatomes/Myotomes intact    []Reflexes equal and normal bilaterally; not assessed   []Other:    Joint mobility:    []Normal    [x]Hypo TC   []Hyper    Palpation: TTP length of Achilles tendon, achilles origin, distal calf, EHL insertion    Functional Mobility/Transfers: Independent    Posture: WNL    Gait: (include devices/WB status) WNL; high arch                       [x] Patient history, allergies, meds reviewed. Medical chart reviewed. See intake form. Review Of Systems (ROS):  [x]Performed Review of systems (Integumentary, CardioPulmonary, Neurological) by intake and observation. Intake form has been scanned into medical record. Patient has been instructed to contact their primary care physician regarding ROS issues if not already being addressed at this time. Co-morbidities/Complexities (which will affect course of rehabilitation):   [x]None           Arthritic conditions   []Rheumatoid arthritis (M05.9)  []Osteoarthritis (M19.91)   Cardiovascular conditions   []Hypertension (I10)  []Hyperlipidemia (E78.5)  []Angina pectoris (I20)  []Atherosclerosis (I70)   Musculoskeletal conditions   []Disc pathology   []Congenital spine pathologies   []Prior surgical intervention  []Osteoporosis (M81.8)  []Osteopenia (M85.8)   Endocrine conditions   []Hypothyroid (E03.9)  []Hyperthyroid Gastrointestinal conditions   []Constipation (Y89.31)   Metabolic conditions   []Morbid obesity (E66.01)  []Diabetes type 1(E10.65) or 2 (E11.65)   []Neuropathy (G60.9)     Pulmonary conditions   []Asthma (J45)  []Coughing   []COPD (J44.9)   Psychological Disorders  []Anxiety (F41.9)  []Depression (F32.9)   []Other:   []Other:          Barriers to/and or personal factors that will affect rehab potential:              []Age  []Sex              [x]Motivation                    []Co-Morbidities              []Cognitive Function, education/learning barriers              []Environmental, home barriers              []profession/work barriers  []past PT/medical experience  [x]other:  Justification: Pt is motivated to get better; does have R knee pain that could impact tolerance    Falls Risk Assessment (30 days):   [x] Falls Risk assessed and no intervention required.   [] Falls Risk assessed and Patient requires intervention due to being higher risk   TUG score (>12s at risk):     [] Falls education provided, including     ASSESSMENT:    Functional Impairments:     [x]Decreased LE joint mobility   [x]Decreased LE functional ROM   [x]Decreased core/proximal hip strength and neuromuscular control   [x]Decreased LE functional strength  [x]Reduced balance/proprioceptive control    []Foot biomechanics dysfunction requiring correction:   []other:    Functional Activity Limitations (from functional questionnaire and intake)   [x]Reduced ability to tolerate prolonged functional positions   []Reduced ability or difficulty with changes of positions or transfers between positions   []Reduced ability to maintain good posture and demonstrate good body mechanics with sitting, bending, and lifting   []Reduced ability to sleep   [] Reduced ability or tolerance with driving    []Reduced ability to squat  Participation Restrictions   [x]Reduced participation in self care activities   [x]Reduced participation in home management activities   []Reduced participation in work activities   [x]Reduced participation in social activities. [x]Reduced participation in sport activities. Classification :    []Signs/symptoms consistent with post-surgical status including decreased ROM, strength and function.    []Signs/symptoms consistent with joint sprain/strain   []Signs/symptoms consistent with patella-femoral syndrome   []Signs/symptoms consistent with knee OA/hip OA   []Signs/symptoms consistent with internal derangement of knee/Hip/ankle   [x]Signs/symptoms consistent with functional hip/knee/ankle-foot weakness/NMR control      [x]Signs/symptoms consistent with tendinitis/tendinosis    []signs/symptoms consistent with pathology which may benefit from Dry needling      []other:      Prognosis/Rehab Potential:      [] Excellent   [x]Good    []Fair   []Poor    Tolerance of evaluation/treatment:    []Excellent   [x]Good    []Fair   []Poor    Physical Therapy Evaluation Complexity Justification  [x] A history of present problem with:  [] no personal factors and/or comorbidities that impact the plan of care;  [x]1-2 personal factors and/or comorbidities that impact the plan of care  []3 personal factors and/or comorbidities that impact the plan of care  [x] An examination of body systems using standardized tests and measures addressing any of the following: body structures and functions (impairments), activity limitations, and/or participation restrictions;:  [] a total of 1-2 or more elements   [] a total of 3 or more elements   [x] a total of 4 or more elements   [x] A clinical presentation with:  [x] stable and/or uncomplicated characteristics   [] evolving clinical presentation with changing characteristics  [] unstable and unpredictable characteristics;   [x] Clinical decision making of [x] low, [] moderate, [] high complexity using standardized patient assessment instrument and/or measurable assessment of functional outcome. [x] EVAL (LOW) 75437 (typically 20 minutes face-to-face)  [] EVAL (MOD) 04378 (typically 30 minutes face-to-face)  [] EVAL (HIGH) 38892 (typically 45 minutes face-to-face)  [] RE-EVAL       PLAN  Frequency/Duration:  1 days per week for 12 Weeks:  Interventions:  [x]  Therapeutic exercise including: strength training, ROM, for Lower extremity and core   [x]  NMR activation and proprioception for LE, Glutes and Core   [x]  Manual therapy as indicated for LE, Hip and spine to include: Dry Needling/IASTM, STM, PROM, Gr I-IV mobilizations, manipulation. [x] Modalities as needed that may include: thermal agents, E-stim, Biofeedback, US, iontophoresis as indicated  [x] Patient education on joint protection, postural re-education, activity modification, progression of HEP. HEP instruction:   Access Code: Divya Jamison: [email or web address]  Date: 08/10/2022  Prepared by: Nanci Mesa    Exercises  Half Kneel Ankle Dorsiflexion Self-Mobilization - 1 x daily - 7 x weekly - 1 sets - 8 reps  Standing Soleus Stretch on Step - 1 x daily - 7 x weekly - 1 sets - 3 reps - 30 second hold  Standing Gastroc Stretch on Step - 1 x daily - 7 x weekly - 1 sets - 3 reps - 30 second hold  Seated Toe Curl - 1 x daily - 7 x weekly - 3 sets - 10 reps  Seated Arch Lifts - 1 x daily - 7 x weekly - 3 sets - 10 reps  Clamshell - 1 x daily - 7 x weekly - 3 sets - 10 reps  Standing Eccentric Heel Raise - 1 x daily - 7 x weekly - 2 sets - 10 reps      GOALS:   Patient stated goal: pain management, full recovery    [] Progressing: [] Met: [] Not Met: [] Adjusted    Therapist goals for Patient:   Short Term Goals: To be achieved in: 4 weeks 9/7/22  1. Independent in HEP and progression per patient tolerance, in order to prevent re-injury. [] Progressing: [] Met: [] Not Met: [] Adjusted   2. Patient will have a decrease in pain to facilitate improvement in movement, function, and ADLs as indicated by Functional Deficits. [] Progressing: [] Met: [] Not Met: [] Adjusted    Long Term Goals: To be achieved in: 12 weeks 11/2/22  1. Disability index score WNL on FOTO to assist with reaching prior level of function. [] Progressing: [] Met: [] Not Met: [] Adjusted  2. Patient will demonstrate increased DF AROM to 10 or greater to allow for proper joint functioning as indicated by patients Functional Deficits. [] Progressing: [] Met: [] Not Met: [] Adjusted  3. Patient will demonstrate an increase in R ankle strength to 4+/5 to allow for proper functional mobility as indicated by patients Functional Deficits. [] Progressing: [] Met: [] Not Met: [] Adjusted  4. Patient will return to ADLs, IADLs and functional activities without increased symptoms or restriction. [] Progressing: [] Met: [] Not Met: [] Adjusted  5. Patient will be able to participate in morning walks and hiking with her family without increased pain or symptoms in Achilles. [] Progressing: [] Met: [] Not Met: [] Adjusted         Electronically signed by:  Danuta Lama, PT, DPT, OCS  Physical Therapist  [id number]  [email or web address]    *If patient does not return for further follow ups after this date. Please consider this as the patients discharge from physical therapy.

## 2022-08-10 NOTE — FLOWSHEET NOTE
[facility] and [address],  Sports Performance and Rehabilitation, [facility] 6199 [address] [place] [address]  793 [facility],5Th Floor   [place]  Phone: [phone]  Fax: [phone]      Physical Therapy Daily Treatment Note  Date:  8/10/2022    Patient Name:  Elisa Clements    :  1970  MRN: [id number]  Restrictions/Precautions:    Medical/Treatment Diagnosis Information:  Diagnosis: M76.61 (ICD-10-CM) - Right Achilles tendinitis  Treatment Diagnosis: M25.571, M79.671, M25.671, R26.2, R23.6  Insurance/Certification information:  PT Insurance Information: BSCS/30VPCY/3 used  Physician Information:   Dr. Irving Winslow  Has the plan of care been signed (Y/N):        []  Yes  [x]  No     Date of Patient follow up with Physician: not scheduled at this time      Is this a Progress Report:     []  Yes  [x]  No        If Yes:  Date Range for reporting period:  Beginning 8/10/22  Ending     Progress report will be due (10 Rx or 30 days whichever is less):        Recertification will be due (POC Duration  / 90 days whichever is less): 22         Visit # Insurance Allowable Auth Required   1 30 (used 2) []  Yes [x]  No        Functional Scale:     Date assessed:    FOTO NPV       Latex Allergy:  [x]NO      []YES  Preferred Language for Healthcare:   [x]English       []other:      Pain level:  2-3/10     SUBJECTIVE:  See eval    OBJECTIVE:             ROM PROM AROM Comments     Left Right Left Right     Dorsiflexion   8 knee straight  5 knee bent 3 knee straight  8 knee bent 0 knee straight  0 knee bent     Plantarflexion     90 73     Inversion     50 40     Eversion     25 20        Strength Left Right Comments   Dorsiflexion 4/5 4-/5     Plantarflexion 4/5 4-/5     Inversion 4/5 4-/5     Eversion 4/5 4-/5        Reflexes/Sensation:              [x]Dermatomes/Myotomes intact              []Reflexes equal and normal bilaterally; not assessed              []Other:     Joint mobility:              []Normal                      [x]Hypo TC              []Hyper     Palpation: TTP length of Achilles tendon, achilles origin, distal calf, EHL insertion     Functional Mobility/Transfers: Independent     Posture: WNL     Gait: (include devices/WB status) WNL; high arch      RESTRICTIONS/PRECAUTIONS:     Exercises/Interventions:     Exercise/Equipment Resistance/Repetitions Other comments   ROM     Self TC mob- half kneel X8- 3 way Over big toe, straight        Stretching     Incline board 3x30\" gastroc  3x30\" soleus                        Isometrics     Dorsiflexion     Plantarflexion     Inversion     Eversion          PRE's     Toe curls X30 into towel    Doming x20    Dorsiflexion     Plantarflexion     Inversion     Eversion     Heel walk     Toe walk     SLR     Calf Raises 2x10 eccentric, up DL, down R    Step Up     Clamshell 3x10         Knee Extension     Hamstring Curls     Leg Press          Balance:     Rocker Board     BOSU     SLS     Aeromat     Foam Roll     Plyoback     Tandem Stance     Biodex          Bike     Treadmill          Manual interventions     IASTM 8' Achilles tendon, distal calf HG9: brushing, strumming     Plan for next session: progress as tolerated    Therapeutic Exercise and NMR EXR  [x] (36725) Provided verbal/tactile cueing for activities related to strengthening, flexibility, endurance, ROM for improvements in LE, proximal hip, and core control with self care, mobility, lifting, ambulation.  [] (53595) Provided verbal/tactile cueing for activities related to improving balance, coordination, kinesthetic sense, posture, motor skill, proprioception  to assist with LE, proximal hip, and core control in self care, mobility, lifting, ambulation and eccentric single leg control.      NMR and Therapeutic Activities:    [] (05023 or 86823) Provided verbal/tactile cueing for activities related to improving balance, coordination, kinesthetic sense, posture, motor skill, proprioception and motor activation to allow for proper function of core, proximal hip and LE with self care and ADLs  [] (86002) Gait Re-education- Provided training and instruction to the patient for proper LE, core and proximal hip recruitment and positioning and eccentric body weight control with ambulation re-education including up and down stairs     Home Exercise Program:    Access Code: Dirk Ortiz  URL: [email or web address]  Date: 08/10/2022  Prepared by: Hector Hugo     Exercises  Half Kneel Ankle Dorsiflexion Self-Mobilization - 1 x daily - 7 x weekly - 1 sets - 8 reps  Standing Soleus Stretch on Step - 1 x daily - 7 x weekly - 1 sets - 3 reps - 30 second hold  Standing Gastroc Stretch on Step - 1 x daily - 7 x weekly - 1 sets - 3 reps - 30 second hold  Seated Toe Curl - 1 x daily - 7 x weekly - 3 sets - 10 reps  Seated Arch Lifts - 1 x daily - 7 x weekly - 3 sets - 10 reps  Clamshell - 1 x daily - 7 x weekly - 3 sets - 10 reps  Standing Eccentric Heel Raise - 1 x daily - 7 x weekly - 2 sets - 10 reps    [x] (82608) Reviewed/Progressed HEP activities related to strengthening, flexibility, endurance, ROM of core, proximal hip and LE for functional self-care, mobility, lifting and ambulation/stair navigation   [] (95363)Reviewed/Progressed HEP activities related to improving balance, coordination, kinesthetic sense, posture, motor skill, proprioception of core, proximal hip and LE for self care, mobility, lifting, and ambulation/stair navigation      Manual Treatments:    [x] (99981) Provided manual therapy to mobilize LE, proximal hip and/or LS spine soft tissue/joints for the purpose of modulating pain, promoting relaxation,  increasing ROM, reducing/eliminating soft tissue swelling/inflammation/restriction, improving soft tissue extensibility and allowing for proper ROM for normal function with self care, mobility, lifting and ambulation.      Modalities:  None    Charges:  Timed Code Treatment Minutes: 25   Total Treatment Minutes: 50     [x] EVAL (LOW) 45375 (typically 20 minutes face-to-face)  [] EVAL (MOD) 43627 (typically 30 minutes face-to-face)  [] EVAL (HIGH) 18292 (typically 45 minutes face-to-face)  [] RE-EVAL     [x] PT(97023) x     [] IONTO  [] NMR (89482) x     [] VASO  [x] Manual (25114) x      [] Other:  [] TA x      [] Mech Traction (58899)  [] ES(attended) (90030)      [] ES (un) (76468):     GOALS:   Patient stated goal: pain management, full recovery    [] Progressing: [] Met: [] Not Met: [] Adjusted     Therapist goals for Patient:  Short Term Goals: To be achieved in: 4 weeks 9/7/22  1. Independent in HEP and progression per patient tolerance, in order to prevent re-injury. [] Progressing: [] Met: [] Not Met: [] Adjusted  2. Patient will have a decrease in pain to facilitate improvement in movement, function, and ADLs as indicated by Functional Deficits. [] Progressing: [] Met: [] Not Met: [] Adjusted     Long Term Goals: To be achieved in: 12 weeks 11/2/22  1. Disability index score WNL on FOTO to assist with reaching prior level of function. [] Progressing: [] Met: [] Not Met: [] Adjusted  2. Patient will demonstrate increased DF AROM to 10 or greater to allow for proper joint functioning as indicated by patients Functional Deficits. [] Progressing: [] Met: [] Not Met: [] Adjusted  3. Patient will demonstrate an increase in R ankle strength to 4+/5 to allow for proper functional mobility as indicated by patients Functional Deficits. [] Progressing: [] Met: [] Not Met: [] Adjusted  4. Patient will return to ADLs, IADLs and functional activities without increased symptoms or restriction. [] Progressing: [] Met: [] Not Met: [] Adjusted  5. Patient will be able to participate in morning walks and hiking with her family without increased pain or symptoms in Achilles.   [] Progressing: [] Met: [] Not Met: [] Adjusted            Overall Progression Towards Functional goals/ Treatment Progress Update:  [] Patient is progressing as expected towards functional goals listed. [] Progression is slowed due to complexities/Impairments listed. [] Progression has been slowed due to co-morbidities. [x] Plan just implemented, too soon to assess goals progression <30days   [] Goals require adjustment due to lack of progress  [] Patient is not progressing as expected and requires additional follow up with physician  [] Other    Prognosis for POC: [x] Good [] Fair  [] Poor      Patient requires continued skilled intervention: [x] Yes  [] No    Treatment/Activity Tolerance:  [x] Patient able to complete treatment  [] Patient limited by fatigue  [] Patient limited by pain     [] Patient limited by other medical complications  [] Other:           PLAN: See eval  [] Continue per plan of care [] Alter current plan (see comments above)  [x] Plan of care initiated [] Hold pending MD visit [] Discharge      Electronically signed by:  Erwin Feng, PT, DPT, OCS  Physical Therapist  [id number]  [email or web address]      Note: If patient does not return for scheduled/ recommended follow up visits, this note will serve as a discharge from care along with most recent update on progress.

## 2022-08-18 ENCOUNTER — APPOINTMENT (OUTPATIENT)
Dept: PHYSICAL THERAPY | Age: 52
End: 2022-08-18
Payer: COMMERCIAL

## 2022-08-18 ENCOUNTER — HOSPITAL ENCOUNTER (OUTPATIENT)
Dept: MRI IMAGING | Age: 52
Discharge: HOME | End: 2022-08-18
Payer: COMMERCIAL

## 2022-08-18 DIAGNOSIS — M25.561 RIGHT KNEE PAIN, UNSPECIFIED CHRONICITY: ICD-10-CM

## 2022-08-18 PROCEDURE — 73721 MRI JNT OF LWR EXTRE W/O DYE: CPT

## 2022-08-19 ENCOUNTER — HOSPITAL ENCOUNTER (OUTPATIENT)
Dept: ULTRASOUND IMAGING | Age: 52
Discharge: HOME | End: 2022-08-19
Payer: COMMERCIAL

## 2022-08-19 ENCOUNTER — HOSPITAL ENCOUNTER (OUTPATIENT)
Dept: MAMMOGRAPHY | Age: 52
Discharge: HOME | End: 2022-08-19
Payer: COMMERCIAL

## 2022-08-19 ENCOUNTER — TELEPHONE (OUTPATIENT)
Dept: FAMILY MEDICINE CLINIC | Age: 52
End: 2022-08-19

## 2022-08-19 VITALS — HEIGHT: 64 IN | WEIGHT: 130 LBS | BODY MASS INDEX: 22.2 KG/M2

## 2022-08-19 DIAGNOSIS — Z12.31 VISIT FOR SCREENING MAMMOGRAM: ICD-10-CM

## 2022-08-19 DIAGNOSIS — R31.9 HEMATURIA, UNSPECIFIED TYPE: ICD-10-CM

## 2022-08-19 DIAGNOSIS — R19.00 PELVIC FULLNESS: ICD-10-CM

## 2022-08-19 DIAGNOSIS — Z01.419 WELL WOMAN EXAM WITH ROUTINE GYNECOLOGICAL EXAM: ICD-10-CM

## 2022-08-19 PROCEDURE — 77063 BREAST TOMOSYNTHESIS BI: CPT

## 2022-08-19 PROCEDURE — 76856 US EXAM PELVIC COMPLETE: CPT

## 2022-08-19 PROCEDURE — 76830 TRANSVAGINAL US NON-OB: CPT

## 2022-08-19 NOTE — TELEPHONE ENCOUNTER
Pt stated that pain does radiate down her neck but having no other sx. She is thinking it might be the root canal gone bad.       Pt has an appt scheduled with nataliia on Monday

## 2022-08-19 NOTE — TELEPHONE ENCOUNTER
Patient said her right lymph nodes are swollen. She has some concerns.  She would like a call back [phone]

## 2022-08-22 ENCOUNTER — OFFICE VISIT (OUTPATIENT)
Dept: FAMILY MEDICINE CLINIC | Age: 52
End: 2022-08-22
Payer: COMMERCIAL

## 2022-08-22 ENCOUNTER — HOSPITAL ENCOUNTER (OUTPATIENT)
Dept: PHYSICAL THERAPY | Age: 52
Setting detail: THERAPIES SERIES
Discharge: HOME | End: 2022-08-22
Payer: COMMERCIAL

## 2022-08-22 VITALS
WEIGHT: 132.4 LBS | RESPIRATION RATE: 12 BRPM | DIASTOLIC BLOOD PRESSURE: 68 MMHG | OXYGEN SATURATION: 97 % | BODY MASS INDEX: 22.73 KG/M2 | TEMPERATURE: 97.6 F | SYSTOLIC BLOOD PRESSURE: 110 MMHG | HEART RATE: 83 BPM

## 2022-08-22 DIAGNOSIS — K08.89 TOOTH PAIN: ICD-10-CM

## 2022-08-22 DIAGNOSIS — G44.209 TENSION HEADACHE: Primary | ICD-10-CM

## 2022-08-22 DIAGNOSIS — S16.1XXA STRAIN OF STERNOCLEIDOMASTOID MUSCLE, INITIAL ENCOUNTER: ICD-10-CM

## 2022-08-22 PROCEDURE — 97110 THERAPEUTIC EXERCISES: CPT | Performed by: PHYSICAL THERAPIST

## 2022-08-22 PROCEDURE — 97140 MANUAL THERAPY 1/> REGIONS: CPT | Performed by: PHYSICAL THERAPIST

## 2022-08-22 PROCEDURE — 99214 OFFICE O/P EST MOD 30 MIN: CPT | Performed by: FAMILY MEDICINE

## 2022-08-22 RX ORDER — CHLORZOXAZONE 500 MG/1
500 TABLET ORAL 4 TIMES DAILY PRN
Qty: 45 TABLET | Refills: 1 | Status: SHIPPED | OUTPATIENT
Start: 2022-08-22 | End: 2022-09-01

## 2022-08-22 SDOH — ECONOMIC STABILITY: FOOD INSECURITY: WITHIN THE PAST 12 MONTHS, THE FOOD YOU BOUGHT JUST DIDN'T LAST AND YOU DIDN'T HAVE MONEY TO GET MORE.: NEVER TRUE

## 2022-08-22 SDOH — ECONOMIC STABILITY: FOOD INSECURITY: WITHIN THE PAST 12 MONTHS, YOU WORRIED THAT YOUR FOOD WOULD RUN OUT BEFORE YOU GOT MONEY TO BUY MORE.: NEVER TRUE

## 2022-08-22 ASSESSMENT — PATIENT HEALTH QUESTIONNAIRE - PHQ9
SUM OF ALL RESPONSES TO PHQ QUESTIONS 1-9: 0
SUM OF ALL RESPONSES TO PHQ QUESTIONS 1-9: 0
2. FEELING DOWN, DEPRESSED OR HOPELESS: 0
SUM OF ALL RESPONSES TO PHQ9 QUESTIONS 1 & 2: 0
SUM OF ALL RESPONSES TO PHQ QUESTIONS 1-9: 0
SUM OF ALL RESPONSES TO PHQ QUESTIONS 1-9: 0
1. LITTLE INTEREST OR PLEASURE IN DOING THINGS: 0

## 2022-08-22 ASSESSMENT — SOCIAL DETERMINANTS OF HEALTH (SDOH): HOW HARD IS IT FOR YOU TO PAY FOR THE VERY BASICS LIKE FOOD, HOUSING, MEDICAL CARE, AND HEATING?: NOT HARD AT ALL

## 2022-08-22 NOTE — PROGRESS NOTES
Patient is here for right sided neck pain increased on Thursday  . No sore throat . No fever. Some right sided facial pain . No .nasal congestion or post nasal drip . Sleeping okay . She having right ear pain . No hearing difficulties. She had root canal in 7/21 with right front tooth per Dr. Anita Wall. She noticed right neck pain and swelling worse on Thursday and Friday and feeling a bit better today . She has a big kitchen project and has not been sleeping as much due to being busy with it and travelled to [place]. She is taking Gabapentin 600 mg qhs and 100 mg in am and noon = 800 mg per day total.   She is to see oral surgeon in end of 9/22 - [facility] , , recommended by Dr. Elian Marroquin. She takes Mobic 7.5 prn . Last took it yesterday . Review of Systems    ROS: All other systems were reviewed and are negative . Patient's allergies and medications were reviewed. Patient's past medical, surgical, social , and family history were reviewed. OBJECTIVE:  /68   Pulse 83   Temp 97.6 °F (36.4 °C) (Temporal)   Resp 12   Wt 132 lb 6.4 oz (60.1 kg)   LMP  (LMP Unknown)   SpO2 97%   BMI 22.73 kg/m²     Physical Exam    General: NAD, cooperative, alert and oriented X 3. Mood / affect is good. good insight. well hydrated. HEENT: PERRLA, EOMI, TMs - clear. Nasopharynx clear. Neck : no lymphadenopathy, supple, FROM. Tenderness to right sternocleidomastoid area. No edema or erythema. Trapezius tenderness. CV: Regular rate and rhythm , no murmurs/ rub/ gallop. No edema. Lungs : CTA bilaterally, breathing comfortably  Abdomen: positive bowel sounds, soft , non tender, non distended. No hepatosplenomegaly. No CVA tenderness. Skin: no rashes. Non tender. ASSESSMENT/  PLAN:  1. Tension headache  - Moist heat . ROM exercises. Modify activities. - chlorzoxazone (PARAFON FORTE DSC) 500 MG tablet;  Take 1 tablet by mouth 4 times daily as needed for Muscle spasms  Dispense: 45 tablet; Refill: 1  - Continue Mobic 7.5 mg bid prn .     2. Strain of sternocleidomastoid muscle, initial encounter  - Moist heat . ROM exercises. Modify activities. - Mobic 7.5 mg bid prn.   - chlorzoxazone (PARAFON FORTE DSC) 500 MG tablet; Take 1 tablet by mouth 4 times daily as needed for Muscle spasms  Dispense: 45 tablet; Refill: 1     3. Tooth pain  - Encouraged follow up with , - referred by Dr. Clent Kocher.  -  Gabapentin 600 mg qhs and 100 mg in am and noon = 800 mg per day total.    Follow up if no improvement in 2 weeks/ as needed for increased symptoms.

## 2022-08-22 NOTE — FLOWSHEET NOTE
The [facility] and [address],  Sports Performance and Rehabilitation, [facility] 6199 1246 [address]  793 [facility],5Th Floor   [place]  Phone: [phone]  Fax: [phone]      Physical Therapy Daily Treatment Note  Date:  2022    Patient Name:  Karthik Ortega    :  1970  MRN: [id number]  Restrictions/Precautions:    Medical/Treatment Diagnosis Information:  Diagnosis: M76.61 (ICD-10-CM) - Right Achilles tendinitis  Treatment Diagnosis: M25.571, M79.671, M25.671, R26.2, V16.0  Insurance/Certification information:  PT Insurance Information: BS/30VPCY/3 used  Physician Information:   Dr. Mikey Rogers  Has the plan of care been signed (Y/N):        []  Yes  [x]  No     Date of Patient follow up with Physician: not scheduled at this time      Is this a Progress Report:     []  Yes  [x]  No        If Yes:  Date Range for reporting period:  Beginning 8/10/22  Ending     Progress report will be due (10 Rx or 30 days whichever is less): 47       Recertification will be due (POC Duration  / 90 days whichever is less): 22         Visit # Insurance Allowable Auth Required   2 30 (used 2) []  Yes [x]  No        Functional Scale:     Date assessed:    FOTO 64%     22       Latex Allergy:  [x]NO      []YES  Preferred Language for Healthcare:   [x]English       []other:      Pain level:  2-3/10     SUBJECTIVE:  Pt states she feels like her foot/ankle is feeling better but calf raises are bothersome, really irritates the ball of her foot.     OBJECTIVE:             ROM PROM AROM Comments     Left Right Left Right     Dorsiflexion   8 knee straight  5 knee bent 3 knee straight  8 knee bent 0 knee straight  0 knee bent     Plantarflexion     90 73     Inversion     50 40     Eversion     25 20        Strength Left Right Comments   Dorsiflexion 4/5 4-/5     Plantarflexion 4/5 4-/5     Inversion 4/5 4-/5     Eversion 4/5 4-/5        Reflexes/Sensation: [x]Dermatomes/Myotomes intact              []Reflexes equal and normal bilaterally; not assessed              []Other:     Joint mobility:              []Normal                      [x]Hypo TC              []Hyper     Palpation: TTP length of Achilles tendon, achilles origin, distal calf, EHL insertion     Functional Mobility/Transfers: Independent     Posture:  WNL     Gait: (include devices/WB status) WNL; high arch      RESTRICTIONS/PRECAUTIONS:     Exercises/Interventions:     Exercise/Equipment Resistance/Repetitions Other comments   ROM     Self TC mob- half kneel X8- 3 way Over big toe, straight        Stretching     Incline board 3x30\" gastroc  3x30\" soleus                        Isometrics     Dorsiflexion     Plantarflexion     Inversion     Eversion          PRE's     Great toe ext 2x10 Alternate sets with lesser toe   Lesser toe ext 2x10    Toe spreading x20    Toe curls X30 into towel    Doming x20    Dorsiflexion     Plantarflexion 3x10 green band    Inversion     Eversion     Heel walk     Toe walk     SLR     Calf Raises 8/22 hold until TTP at met heads 1-4 resolves   Step Up     Clamshell 3x10         Knee Extension     Hamstring Curls     Leg Press          Balance:     Rocker Board     BOSU     SLS 3x30\"  Green TB around great toe to promote toe flex and intrinsic activation   Aeromat     Foam Roll     Plyoback     Tandem Stance     Biodex          Bike     Treadmill          Manual interventions     IASTM 8' Achilles tendon, distal calf HG9: brushing, strumming     Plan for next session: progress as tolerated    Therapeutic Exercise and NMR EXR  [x] (60969) Provided verbal/tactile cueing for activities related to strengthening, flexibility, endurance, ROM for improvements in LE, proximal hip, and core control with self care, mobility, lifting, ambulation.  [] (79247) Provided verbal/tactile cueing for activities related to improving balance, coordination, kinesthetic sense, posture, motor skill, proprioception  to assist with LE, proximal hip, and core control in self care, mobility, lifting, ambulation and eccentric single leg control. NMR and Therapeutic Activities:    [] (73947 or 94419) Provided verbal/tactile cueing for activities related to improving balance, coordination, kinesthetic sense, posture, motor skill, proprioception and motor activation to allow for proper function of core, proximal hip and LE with self care and ADLs  [] (58364) Gait Re-education- Provided training and instruction to the patient for proper LE, core and proximal hip recruitment and positioning and eccentric body weight control with ambulation re-education including up and down stairs     Home Exercise Program:    Access Code: Janice Cruz  URL: [email or web address]  Updated: 08/22/2022  Prepared by: Shanthi Noriega     Exercises  Half Kneel Ankle Dorsiflexion Self-Mobilization - 1 x daily - 7 x weekly - 1 sets - 8 reps  Standing Soleus Stretch on Step - 1 x daily - 7 x weekly - 1 sets - 3 reps - 30 second hold  Standing Gastroc Stretch on Step - 1 x daily - 7 x weekly - 1 sets - 3 reps - 30 second hold  Seated Toe Flexion Extension PROM - 1 x daily - 7 x weekly - 1 sets - 3 reps - 3 second hold  Seated Toe Curl - 1 x daily - 7 x weekly - 3 sets - 10 reps  Seated Arch Lifts - 1 x daily - 7 x weekly - 3 sets - 10 reps  Seated Great Toe Extension - 1 x daily - 7 x weekly - 2 sets - 10 reps  Seated Lesser Toes Extension - 1 x daily - 7 x weekly - 2 sets - 10 reps  Toe Spreading - 1 x daily - 7 x weekly - 2 sets - 10 reps  Clamshell - 1 x daily - 7 x weekly - 3 sets - 10 reps  Long Sitting Ankle Plantar Flexion with Resistance - 1 x daily - 7 x weekly - 3 sets - 10 reps  Single Leg Stance - 1 x daily - 7 x weekly - 1 sets - 3 reps - 30 seconds hold      [x] (62932) Reviewed/Progressed HEP activities related to strengthening, flexibility, endurance, ROM of core, proximal hip and LE for functional self-care, mobility, lifting and ambulation/stair navigation   [] (44807)Reviewed/Progressed HEP activities related to improving balance, coordination, kinesthetic sense, posture, motor skill, proprioception of core, proximal hip and LE for self care, mobility, lifting, and ambulation/stair navigation      Manual Treatments:    [x] (71877) Provided manual therapy to mobilize LE, proximal hip and/or LS spine soft tissue/joints for the purpose of modulating pain, promoting relaxation,  increasing ROM, reducing/eliminating soft tissue swelling/inflammation/restriction, improving soft tissue extensibility and allowing for proper ROM for normal function with self care, mobility, lifting and ambulation. Modalities:  None    Charges:  Timed Code Treatment Minutes: 45   Total Treatment Minutes: 45   Time in: 10:30  Time out: 11:22    [] EVAL (LOW) 19246 (typically 20 minutes face-to-face)  [] EVAL (MOD) 49128 (typically 30 minutes face-to-face)  [] EVAL (HIGH) 61461 (typically 45 minutes face-to-face)  [] RE-EVAL     [x] SZ(67224) x 2    [] IONTO  [] NMR (92967) x     [] VASO  [x] Manual (24528) x      [] Other:  [] TA x      [] Mech Traction (05228)  [] ES(attended) (57593)      [] ES (un) (37302):     GOALS:   Patient stated goal: pain management, full recovery    [] Progressing: [] Met: [] Not Met: [] Adjusted     Therapist goals for Patient:  Short Term Goals: To be achieved in: 4 weeks 9/7/22  1. Independent in HEP and progression per patient tolerance, in order to prevent re-injury. [] Progressing: [] Met: [] Not Met: [] Adjusted  2. Patient will have a decrease in pain to facilitate improvement in movement, function, and ADLs as indicated by Functional Deficits. [] Progressing: [] Met: [] Not Met: [] Adjusted     Long Term Goals: To be achieved in: 12 weeks 11/2/22  1. Disability index score WNL on FOTO to assist with reaching prior level of function. [] Progressing: [] Met: [] Not Met: [] Adjusted  2.  Patient will demonstrate increased DF AROM to 10 or greater to allow for proper joint functioning as indicated by patients Functional Deficits. [] Progressing: [] Met: [] Not Met: [] Adjusted  3. Patient will demonstrate an increase in R ankle strength to 4+/5 to allow for proper functional mobility as indicated by patients Functional Deficits. [] Progressing: [] Met: [] Not Met: [] Adjusted  4. Patient will return to ADLs, IADLs and functional activities without increased symptoms or restriction. [] Progressing: [] Met: [] Not Met: [] Adjusted  5. Patient will be able to participate in morning walks and hiking with her family without increased pain or symptoms in Achilles. [] Progressing: [] Met: [] Not Met: [] Adjusted            Overall Progression Towards Functional goals/ Treatment Progress Update:  [] Patient is progressing as expected towards functional goals listed. [] Progression is slowed due to complexities/Impairments listed. [] Progression has been slowed due to co-morbidities. [x] Plan just implemented, too soon to assess goals progression <30days   [] Goals require adjustment due to lack of progress  [] Patient is not progressing as expected and requires additional follow up with physician  [] Other    Prognosis for POC: [x] Good [] Fair  [] Poor      Patient requires continued skilled intervention: [x] Yes  [] No    Treatment/Activity Tolerance:  [x] Patient able to complete treatment  [] Patient limited by fatigue  [] Patient limited by pain     [] Patient limited by other medical complications  [] Other: Pt exhibits good mobility of mid and forefoot, no signs of hypomobility through metatarsals. Pt does exhibit limited toe flexion of all toes and reports stretch on top of foot and through ankle with toe flexion. Removed standing calf raises from program d/t exacerbation of symptoms, tolerated TB PF well and without met head pain. Pt challenged by toe extension disassociation and toe spreading.  Discussed OTC metpad cushion for shoes to help decrease pain; pt exhibits TTP digits 1-4, most tender at great toe and TTP progressively reduces toward 4th toe. Pt continues to have TTP at Achilles and distal calf, tolerated IASTM well and with mild erythremic response. Pt tolerated session well. Pt requires PT follow up to address flexibility, strength and functional mobility deficits. PLAN: See eval  [x] Continue per plan of care [] Alter current plan (see comments above)  [] Plan of care initiated [] Hold pending MD visit [] Discharge      Electronically signed by:  Vicki Quijano, PT, DPT, OCS  Physical Therapist  [id number]  [email or web address]      Note: If patient does not return for scheduled/ recommended follow up visits, this note will serve as a discharge from care along with most recent update on progress.

## 2022-09-01 ENCOUNTER — HOSPITAL ENCOUNTER (OUTPATIENT)
Dept: PHYSICAL THERAPY | Age: 52
Setting detail: THERAPIES SERIES
Discharge: HOME | End: 2022-09-01
Payer: COMMERCIAL

## 2022-09-01 PROCEDURE — 97112 NEUROMUSCULAR REEDUCATION: CPT | Performed by: PHYSICAL THERAPIST

## 2022-09-01 PROCEDURE — 97110 THERAPEUTIC EXERCISES: CPT | Performed by: PHYSICAL THERAPIST

## 2022-09-01 NOTE — FLOWSHEET NOTE
The [facility] and [address],  Sports Performance and Rehabilitation, [facility] 6199 1246 [address]  793 [facility],5[address] Floor   [place]  Phone: [phone]  Fax: [phone]      Physical Therapy Daily Treatment Note  Date:  2022    Patient Name:  Parish Arciniega    :  1970  MRN: [id number]  Restrictions/Precautions:    Medical/Treatment Diagnosis Information:  Diagnosis: M76.61 (ICD-10-CM) - Right Achilles tendinitis  Treatment Diagnosis: M25.571, M79.671, M25.671, R26.2, E43.9  Insurance/Certification information:  PT Insurance Information: BSCS/30VPCY/3 used  Physician Information:   Dr. Almaraz Frankie  Has the plan of care been signed (Y/N):        []  Yes  [x]  No     Date of Patient follow up with Physician: not scheduled at this time      Is this a Progress Report:     []  Yes  [x]  No        If Yes:  Date Range for reporting period:  Beginning 8/10/22  Ending     Progress report will be due (10 Rx or 30 days whichever is less):        Recertification will be due (POC Duration  / 90 days whichever is less): 22         Visit # Insurance Allowable Auth Required   3 30 (used 2) []  Yes [x]  No        Functional Scale:     Date assessed:    FOTO 64%     22       Latex Allergy:  [x]NO      []YES  Preferred Language for Healthcare:   [x]English       []other:      Pain level:  2-3/10     SUBJECTIVE:  Pt states that the ball of her foot is still bothering her, did make a podiatry appt () and has ordered met pads but they have not come yet. Pt unsure if toe exercises are bothering her toe or not. Ankle is feeling okay, bothers her occasionally but no is not consistent.     OBJECTIVE:             ROM PROM AROM Comments     Left Right Left Right     Dorsiflexion   8 knee straight  5 knee bent 3 knee straight  8 knee bent 0 knee straight  0 knee bent     Plantarflexion     90 73     Inversion     50 40     Eversion     25 20        Strength Left Right Comments   Dorsiflexion 4/5 4-/5     Plantarflexion 4/5 4-/5     Inversion 4/5 4-/5     Eversion 4/5 4-/5        Reflexes/Sensation:              [x]Dermatomes/Myotomes intact              []Reflexes equal and normal bilaterally; not assessed              []Other:     Joint mobility:              []Normal                      [x]Hypo TC              []Hyper     Palpation: TTP length of Achilles tendon, achilles origin, distal calf, EHL insertion     Functional Mobility/Transfers: Independent     Posture:  WNL     Gait: (include devices/WB status) WNL; high arch      RESTRICTIONS/PRECAUTIONS:     Exercises/Interventions:     Exercise/Equipment Resistance/Repetitions Other comments   ROM     Self TC mob- half kneel Over big toe, straight       Stretching    Incline board    Prone quad 2x30\" with strap Caused lateral knee pain   Standing hip flexor 3x30\"              Isometrics     Dorsiflexion     Plantarflexion     Inversion 10x10\" into ball    Eversion 10x10\" into ball         PRE's     Great toe ext 2x10 Alternate sets with lesser toe   Lesser toe ext 2x10    Toe spreading x20    Toe curls    Doming    Dorsiflexion     Plantarflexion 3x10 green band    Inversion     Eversion     Heel walk     Toe walk     SLR     Calf Raises 8/22 hold until TTP at met heads 1-4 resolves   Step Up     Clamshell        Knee Extension    Hamstring Curls    Leg Press        Balance:    Rocker Board    BOSU    SLS Green TB around great toe to promote toe flex and intrinsic activation   Aeromat     Foam Roll     Plyoback     Tandem Stance     Biodex          Bike     Treadmill          Manual interventions     Great toe mobilization 5' inferior    IASTM 10' Achilles tendon, distal calf, plantar fascia, ventral great toe HG9: brushing, strumming     Plan for next session: progress as tolerated    Therapeutic Exercise and NMR EXR  [x] (12841) Provided verbal/tactile cueing for activities related to strengthening, flexibility, endurance, ROM for improvements in LE, proximal hip, and core control with self care, mobility, lifting, ambulation.  [] (71759) Provided verbal/tactile cueing for activities related to improving balance, coordination, kinesthetic sense, posture, motor skill, proprioception  to assist with LE, proximal hip, and core control in self care, mobility, lifting, ambulation and eccentric single leg control. NMR and Therapeutic Activities:    [] (11046 or 07487) Provided verbal/tactile cueing for activities related to improving balance, coordination, kinesthetic sense, posture, motor skill, proprioception and motor activation to allow for proper function of core, proximal hip and LE with self care and ADLs  [] (03879) Gait Re-education- Provided training and instruction to the patient for proper LE, core and proximal hip recruitment and positioning and eccentric body weight control with ambulation re-education including up and down stairs     Home Exercise Program:    Access Code: Blu Noe  URL: [email or web address]  Updated: 09/01/2022  Prepared by: Zee Stiles     Exercises  Half Kneel Ankle Dorsiflexion Self-Mobilization - 1 x daily - 4-5 x weekly - 1 sets - 8 reps  Standing Soleus Stretch on Step - 1 x daily - 4-5 x weekly - 1 sets - 3 reps - 30 second hold  Standing Gastroc Stretch on Step - 1 x daily - 4-5 x weekly - 1 sets - 3 reps - 30 second hold  Standing Quadriceps Stretch - 1 x daily - 7 x weekly - 1 sets - 3 reps - 30 second hold  Standing Hip Flexor Stretch - 1 x daily - 7 x weekly - 1 sets - 3 reps - 30 second hold  Seated Toe Flexion Extension PROM - 1 x daily - 4-5 x weekly - 1 sets - 3 reps - 3 second hold  Seated Toe Curl - 1 x daily - 4-5 x weekly - 3 sets - 10 reps  Seated Arch Lifts - 1 x daily - 4-5 x weekly - 3 sets - 10 reps  Seated Great Toe Extension - 1 x daily - 4-5 x weekly - 2 sets - 10 reps  Seated Lesser Toes Extension - 1 x daily - 4-5 x weekly - 2 sets - 10 reps  Toe Spreading - 1 x daily - 4-5 x weekly - 2 sets - 10 reps  Clamshell - 1 x daily - 4-5 x weekly - 3 sets - 10 reps  Long Sitting Ankle Plantar Flexion with Resistance - 1 x daily - 4-5 x weekly - 3 sets - 10 reps  Isometric Ankle Inversion - 1 x daily - 4-5 x weekly - 3 sets - 10 reps  Isometric Ankle Eversion with Self Resistance - 1 x daily - 4-5 x weekly - 3 sets - 10 reps  Single Leg Stance - 1 x daily - 4-5 x weekly - 1 sets - 3 reps - 30 seconds hold    [x] (48011) Reviewed/Progressed HEP activities related to strengthening, flexibility, endurance, ROM of core, proximal hip and LE for functional self-care, mobility, lifting and ambulation/stair navigation   [] (39655)Reviewed/Progressed HEP activities related to improving balance, coordination, kinesthetic sense, posture, motor skill, proprioception of core, proximal hip and LE for self care, mobility, lifting, and ambulation/stair navigation      Manual Treatments:    [x] (55681) Provided manual therapy to mobilize LE, proximal hip and/or LS spine soft tissue/joints for the purpose of modulating pain, promoting relaxation,  increasing ROM, reducing/eliminating soft tissue swelling/inflammation/restriction, improving soft tissue extensibility and allowing for proper ROM for normal function with self care, mobility, lifting and ambulation.      Modalities:  None    Charges:  Timed Code Treatment Minutes: 45   Total Treatment Minutes: 45   Time in: 8:47  Time out: 9:40    [] EVAL (LOW) 72340 (typically 20 minutes face-to-face)  [] EVAL (MOD) 28510 (typically 30 minutes face-to-face)  [] EVAL (HIGH) 87839 (typically 45 minutes face-to-face)  [] RE-EVAL     [x] EN(23283) x 2    [] IONTO  [] NMR (20269) x     [] VASO  [x] Manual (77765) x      [] Other:  [] TA x      [] Mech Traction (44292)  [] ES(attended) (17808)      [] ES (un) (96123):     GOALS:   Patient stated goal: pain management, full recovery    [] Progressing: [] Met: [] Not Met: [] Adjusted     Therapist goals for Patient:  Short Term Goals: To be achieved in: 4 weeks 9/7/22  1. Independent in HEP and progression per patient tolerance, in order to prevent re-injury. [] Progressing: [] Met: [] Not Met: [] Adjusted  2. Patient will have a decrease in pain to facilitate improvement in movement, function, and ADLs as indicated by Functional Deficits. [] Progressing: [] Met: [] Not Met: [] Adjusted     Long Term Goals: To be achieved in: 12 weeks 11/2/22  1. Disability index score WNL on FOTO to assist with reaching prior level of function. [] Progressing: [] Met: [] Not Met: [] Adjusted  2. Patient will demonstrate increased DF AROM to 10 or greater to allow for proper joint functioning as indicated by patients Functional Deficits. [] Progressing: [] Met: [] Not Met: [] Adjusted  3. Patient will demonstrate an increase in R ankle strength to 4+/5 to allow for proper functional mobility as indicated by patients Functional Deficits. [] Progressing: [] Met: [] Not Met: [] Adjusted  4. Patient will return to ADLs, IADLs and functional activities without increased symptoms or restriction. [] Progressing: [] Met: [] Not Met: [] Adjusted  5. Patient will be able to participate in morning walks and hiking with her family without increased pain or symptoms in Achilles. [] Progressing: [] Met: [] Not Met: [] Adjusted            Overall Progression Towards Functional goals/ Treatment Progress Update:  [] Patient is progressing as expected towards functional goals listed. [] Progression is slowed due to complexities/Impairments listed. [] Progression has been slowed due to co-morbidities.   [x] Plan just implemented, too soon to assess goals progression <30days   [] Goals require adjustment due to lack of progress  [] Patient is not progressing as expected and requires additional follow up with physician  [] Other    Prognosis for POC: [x] Good [] Fair  [] Poor      Patient requires continued skilled intervention: [x] Yes  [] No    Treatment/Activity Tolerance:  [x] Patient able to complete treatment  [] Patient limited by fatigue  [] Patient limited by pain     [] Patient limited by other medical complications  [] Other: Mild erythremic response through calf during IASTM. Pt exhibits decreased great toe flexion and hypomobility, tolerated joint mobs well, did have TTP/soreness with PT hand placement over MTP. Pt educated on self mob for home. Pt notes lateral knee pain with prone quad, is able to perform in standing without knee pain but exhibits slight hip flexion, added standing hip flexion to address which pt tolerated well. Pt tolerated isometric EV/INV well, no pain. Pt requires PT follow up to address flexibility, strength and functional mobility deficits. PLAN: See eval  [x] Continue per plan of care [] Alter current plan (see comments above)  [] Plan of care initiated [] Hold pending MD visit [] Discharge      Electronically signed by:  Kasey Coker, PT, DPT, OCS  Physical Therapist  [id number]  [email or web address]      Note: If patient does not return for scheduled/ recommended follow up visits, this note will serve as a discharge from care along with most recent update on progress.

## 2022-09-12 ENCOUNTER — OFFICE VISIT (OUTPATIENT)
Dept: ORTHOPEDIC SURGERY | Age: 52
End: 2022-09-12
Payer: COMMERCIAL

## 2022-09-12 DIAGNOSIS — M17.11 PRIMARY OSTEOARTHRITIS OF RIGHT KNEE: Primary | ICD-10-CM

## 2022-09-12 PROCEDURE — 99214 OFFICE O/P EST MOD 30 MIN: CPT | Performed by: ORTHOPAEDIC SURGERY

## 2022-09-12 NOTE — PROGRESS NOTES
Chief Complaint  Knee Pain (F/u right knee)      History of Present Illness:  Liza Romero is a pleasant 46 y.o. female who presents today for follow up evaluation of right knee pain. She is here to review MRI results. She has right knee pain that has been ongoing for 2 years. She developed pain after biking but does not recall a specific injury. She complains of lateral and posterior right knee pain and tightness exacerbated with activity. Denies swelling, locking or catching. No instability. She states she feels better with stretching but has had no formal treatment for this issue. She has tried anti-inflammatories but they cause GI issues. Of note, she is also currently being treated for achilles tendinitis and a fractured sesamoid by a podiatrist, ambulating in a tall boot. Medical History:  Patient's medications, allergies, past medical, surgical, social and family histories were reviewed and updated as appropriate. Pertinent items are noted in HPI  Review of systems reviewed from Patient History Form completed today and available in the patient's chart under the Media tab.               Past Medical History:   Diagnosis Date    Acid reflux     Allergic rhinitis     Anxiety     Atrophic vaginitis     DDD (degenerative disc disease), cervical     Dysmenorrhea     Dyspareunia in female     Esophagitis 05/2019    Fibrocystic breast     Gastritis 05/2019    HPV (human papilloma virus) infection 1992    Exposed back in college    Hyperlipidemia     Internal hemorrhoids 01/2022    per colonoscopy - Dr. Ministerio Garcia    Interstitial cystitis 10/23/2017    Iron deficiency anemia 2017    Menopause ovarian failure     Menorrhagia     PONV (postoperative nausea and vomiting)     Trigeminal neuralgia     Vitamin D deficiency 07/2019    Vocal cord nodule 2016        Past Surgical History:   Procedure Laterality Date    APPENDECTOMY  1996    CERVICAL FUSION  2013    C4-6    COLONOSCOPY N/A 01/11/2022    COLONOSCOPY, POSSIBLE POLYPECTOMY performed by Mara Smith MD at [address] [place] [address]     bilateral, right    HYSTERECTOMY ([address])  2009    LARYNGOSCOPY  2016    PARTIAL HYSTERECTOMY (CERVIX NOT REMOVED)  2009    DUB -     UPPER GASTROINTESTINAL ENDOSCOPY  05/2019       Family History   Problem Relation Age of Onset    Cancer Mother 76        melanoma    High Blood Pressure Father     Colon Cancer Maternal Grandfather 46    Other Paternal Grandmother         PE       Social History     Socioeconomic History    Marital status:    Tobacco Use    Smoking status: Never    Smokeless tobacco: Never   Vaping Use    Vaping Use: Never used   Substance and Sexual Activity    Alcohol use: Yes     Comment: 1x 2 months    Drug use: No    Sexual activity: Yes     Partners: Male     Social Determinants of Health     Financial Resource Strain: Low Risk     Difficulty of Paying Living Expenses: Not hard at all   Food Insecurity: No Food Insecurity    Worried About Running Out of Food in the Last Year: Never true    Ran Out of Food in the Last Year: Never true       Current Outpatient Medications   Medication Sig Dispense Refill    LINZESS 145 MCG capsule TAKE ONE CAPSULE BY MOUTH EVERY MORNING BEFORE BREAKFAST 90 capsule 0    estradiol (MINIVELLE) 0.05 MG/24HR Place 1 patch onto the skin Twice a Week 24 patch 3    estradiol (ESTRACE VAGINAL) 0.1 MG/GM vaginal cream Place 1 g vaginally Twice a Week 1 each 3    gabapentin (NEURONTIN) 400 MG capsule Take 1 pill with 100 mg qhs ( 500 mg qhs) and 100 mg in am and 1 at noon.  90 capsule 1    hydrOXYzine HCl (ATARAX) 10 MG tablet Take 2.5 tablets by mouth 3 times daily as needed for Itching 40 tablet 1    gabapentin (NEURONTIN) 100 MG capsule TAKE 1-2 CAPSULES BY MOUTH 3 TIMES A  capsule 1    polyethylene glycol (MIRALAX) 17 GM/SCOOP POWD powder Take 238 g by mouth daily Take as directed for colonoscopy 255 g 0    meloxicam (MOBIC) 7.5 MG tablet Take 1 tablet by mouth 2 times daily as needed for Pain To face/ jaw and mouth 90 tablet 0    hydrocortisone (ANUSOL-HC) 25 MG suppository Place 1 suppository rectally every 12 hours prn 30 suppository 3    Probiotic Product (PROBIOTIC-10 PO) Take by mouth      cetirizine (ZYRTEC) 10 MG tablet Take 10 mg by mouth daily      butalbital-acetaminophen-caffeine (FIORICET, ESGIC) -40 MG per tablet Take 1 tablet by mouth every 6 hours as needed for Headaches 25 tablet 1    VASCEPA 1 g CAPS capsule Take 2 capsules by mouth 2 times daily 120 capsule 3    Cholecalciferol (VITAMIN D3) 1000 units TABS Take 1 tablet by mouth daily 90 tablet 3     No current facility-administered medications for this visit. No Known Allergies    Vital signs:  LMP  (LMP Unknown)              RIGHT Knee Exam:    Gait: No use of assistive devices. No antalgic gait. Alignment: normal alignment. Inspection/skin: Skin is intact without erythema or ecchymosis. No gross deformity. Palpation: mild crepitus. Lateral joint line tenderness present. Range of Motion: There is full range of motion. Strength: Normal quadriceps development. Effusion: No effusion or swelling present. Ligamentous stability: No cruciate or collateral ligament instability. Neurologic and vascular: Skin is warm and well-perfused. Sensation is intact to light-touch. Special tests: Negative Sweetie sign. LEFT Knee Exam:    Gait: No use of assistive devices. No antalgic gait. Alignment: normal alignment. Inspection/skin: Skin is intact without erythema or ecchymosis. No gross deformity. Palpation: no crepitus. no joint line tenderness present. Range of Motion: There is full range of motion. Strength: Normal quadriceps development. Effusion: No effusion or swelling present. Ligamentous stability: No cruciate or collateral ligament instability.      Neurologic and vascular: Skin is warm and well-perfused. Sensation is intact to light-touch. Special tests: Negative Sweetie sign. Radiology:     Pertinent imaging was interpreted and reviewed with the patient, both images and report. Impression       1. Mild degenerative changes above. Assessment :  46year old female with mild osteoarthritis of the right knee    Impression:  Encounter Diagnosis   Name Primary? Primary osteoarthritis of right knee Yes       Office Procedures:  No orders of the defined types were placed in this encounter. Plan: Pertinent imaging was reviewed. The etiology, natural history, and treatment options for the disorder were discussed. The roles of activity medication, antiinflammatories, injections, bracing, physical therapy, and surgical interventions were all described to the patient and questions were answered. MRI does not show an meniscal injury, but does show some mild degenerative changes. She seemed to have flared up her mild osteoarthritis with her bike ride. I believe she is a candidate for a home exercise program consisting of IT band and hamstring flexibility as well as quad strengthening exercises, handout provided. We also discussed the tall boot is likely increasing her right knee symptoms but replacing for a short boot would be her podiatrist's discretion. I will see her back if symptoms persist or worsen. Mary Maldonado is in agreement with this plan. All questions were answered to patient's satisfaction and was encouraged to call with any further questions. Total time spent for evaluation, education and development of treatment plan: 35 minutes        Fouzia LOU Ala, ATC, am scribing for and in the presence of Dr. Rahul Lane.    09/12/22 3:00 PM Fouzia Cook ATC  I attest that I met personally with the patient, performed the described exam, reviewed the radiographic studies and medical records associated with this patient and supervised the services that are

## 2022-09-16 ENCOUNTER — APPOINTMENT (OUTPATIENT)
Dept: PHYSICAL THERAPY | Age: 52
End: 2022-09-16
Payer: COMMERCIAL

## 2022-09-30 ENCOUNTER — TELEPHONE (OUTPATIENT)
Dept: GYNECOLOGY | Age: 52
End: 2022-09-30

## 2022-09-30 NOTE — TELEPHONE ENCOUNTER
Recived a call from [facility] [address] [facility] the office that Abby Medina has been receiving the Astrid estradiol patch instead of the [address] for \"some time\". The pharmacy did just notice that they had been making that substitute and was wondering if Dr Breanna Avles would like to keep Stuthuan Adam on the [place] (like she has been receiving) or if they should change her to the [address]. Please advise, thanks. Alert and oriented, no focal deficits, no motor or sensory deficits.

## 2022-09-30 NOTE — TELEPHONE ENCOUNTER
Called and spoke to pharmacist Lima Paytno informed her that Dr Trever Pabon said patient can stay on what she is currently taking and it can be generic

## 2022-10-26 ENCOUNTER — OFFICE VISIT (OUTPATIENT)
Dept: FAMILY MEDICINE CLINIC | Age: 52
End: 2022-10-26
Payer: COMMERCIAL

## 2022-10-26 VITALS
TEMPERATURE: 97.5 F | OXYGEN SATURATION: 98 % | HEART RATE: 79 BPM | DIASTOLIC BLOOD PRESSURE: 60 MMHG | WEIGHT: 133.8 LBS | BODY MASS INDEX: 22.97 KG/M2 | RESPIRATION RATE: 16 BRPM | SYSTOLIC BLOOD PRESSURE: 100 MMHG

## 2022-10-26 DIAGNOSIS — F41.9 ANXIETY: ICD-10-CM

## 2022-10-26 DIAGNOSIS — M79.673 PAIN OF FOOT, UNSPECIFIED LATERALITY: ICD-10-CM

## 2022-10-26 DIAGNOSIS — Z78.0 MENOPAUSE: ICD-10-CM

## 2022-10-26 DIAGNOSIS — R10.9 ABDOMINAL CRAMPING: ICD-10-CM

## 2022-10-26 DIAGNOSIS — G50.0 TRIGEMINAL NEURALGIA: ICD-10-CM

## 2022-10-26 DIAGNOSIS — K59.00 CONSTIPATION, UNSPECIFIED CONSTIPATION TYPE: Primary | ICD-10-CM

## 2022-10-26 PROCEDURE — 99214 OFFICE O/P EST MOD 30 MIN: CPT | Performed by: FAMILY MEDICINE

## 2022-10-26 RX ORDER — MELOXICAM 7.5 MG/1
7.5 TABLET ORAL DAILY
Qty: 90 TABLET | Refills: 1 | Status: SHIPPED | OUTPATIENT
Start: 2022-10-26

## 2022-10-26 RX ORDER — CHLORZOXAZONE 500 MG/1
500 TABLET ORAL 4 TIMES DAILY PRN
COMMUNITY
Start: 2022-08-22

## 2022-10-26 RX ORDER — MELOXICAM 15 MG/1
15 TABLET ORAL DAILY
COMMUNITY
Start: 2022-09-08

## 2022-10-26 NOTE — PROGRESS NOTES
Patient is here for multiple issues , including recent flare of abdominal issues and ongoing constipation. She is having some issues with abdominal cramping and diarrhea within 3 hours or so of eating. She had [place] - vegetable lo mein and moo cher zarate ribera . She had 2 episodes recently . One with chinese and the other was [place] - rice , shrimp, avocado , spicy . No alcohol. Both were places she has had food there and same foods there. The episodes were 2 weeks apart. No fatty foods . Does not eat chinese often. She is taking Linzess 145 mg qd for constipation. She had a fracture per podiatrist and was in a boot for 4 weeks. Using dancer pads and good footwear. Podiatrist gave her Mobic 15 mg but prefers to decrease back to 7.5 mg dose. She takes Chlorzoxazone 500 mg for muscle spasms. She had surgery per oral surgeon, Dr. Tamara Strauss per second referral from Dr. Venu Ceja on 10/3/22. It seems to have helped . She has been able to back off completely on the Gabapentin. She takes Hydroxyzine 10 mg prn anxiety . She takes it about once per day and seems to help. Hysterectomy was in 2009. Likely she started in perimenopause in 2017,   51 yo  per [facility] Gynecologist.  She is on Estrogen patch per Gynecologist.   She is doing okay overall currently with the regimen. Her daughter is doing well overall now at [address] [place] [address]. She is getting along with her room-mate who is high energy - takes 18 hours and is very active. Review of Systems    ROS: All other systems were reviewed and are negative . Patient's allergies and medications were reviewed. Patient's past medical, surgical, social , and family history were reviewed. OBJECTIVE:  /60   Pulse 79   Temp 97.5 °F (36.4 °C)   Resp 16   Wt 133 lb 12.8 oz (60.7 kg)   LMP  (LMP Unknown)   SpO2 98%   BMI 22.97 kg/m²     Physical Exam    General: NAD, cooperative, alert and oriented X 3. Mood / affect is good. good insight.  well hydrated. HEENT: PERRLA, EOMI, TMs - clear. Nasopharynx clear. Neck : no lymphadenopathy, supple, FROM  CV: Regular rate and rhythm , no murmurs/ rub/ gallop. No edema. Lungs : CTA bilaterally, breathing comfortably  Abdomen: positive bowel sounds, soft , non tender, non distended. No hepatosplenomegaly. No CVA tenderness. Skin: no rashes. Non tender. ASSESSMENT/  PLAN:  1. Constipation, unspecified constipation type  - Push fluids - water and daily dietary fiber .  - Add fiber supplement - Metamucil or Citrucel once per day. - Continue Linzess 145 mg qd. - linaclotide (LINZESS) 145 MCG capsule; TAKE ONE CAPSULE BY MOUTH EVERY MORNING BEFORE BREAKFAST. Dispense:90 capsule; Refill: 3    2. Abdominal cramping  - Push fluids - water and daily dietary fiber.   - Add fiber supplement - Metamucil or Citrucel once per day. - Consider Bentryl prn.  - Likely IBS . Consider holding MSG when eating chinese  - Recommended keeping dietary log of flares. 3. Trigeminal neuralgia  - S/p surgery. Off Gabapentin. - Mobic 7.5 mg qd prn.    - meloxicam (MOBIC) 7.5 MG tablet; Take 1 tablet by mouth daily  Dispense: 90 tablet; Refill: 1    4. Menopause  - Followed by Gynecology . Ist.   - Continue Estradiol patch and estrogen vaginal cream 2x/ week. 5. Anxiety  - Hydroxyzine prn .    6. Pain of foot, unspecified laterality  - Continue good supportive footwear and pad. Follow up 3 months/ prn.

## 2023-01-26 ENCOUNTER — OFFICE VISIT (OUTPATIENT)
Dept: FAMILY MEDICINE CLINIC | Age: 53
End: 2023-01-26
Payer: COMMERCIAL

## 2023-01-26 VITALS
RESPIRATION RATE: 12 BRPM | OXYGEN SATURATION: 97 % | TEMPERATURE: 97.6 F | BODY MASS INDEX: 23.48 KG/M2 | WEIGHT: 136.8 LBS | SYSTOLIC BLOOD PRESSURE: 110 MMHG | HEART RATE: 76 BPM | DIASTOLIC BLOOD PRESSURE: 60 MMHG

## 2023-01-26 DIAGNOSIS — G51.8 PAIN DUE TO NEUROPATHY OF FACIAL NERVE: ICD-10-CM

## 2023-01-26 DIAGNOSIS — M26.629 TMJ SYNDROME: ICD-10-CM

## 2023-01-26 DIAGNOSIS — R51.9 RIGHT SIDED FACIAL PAIN: Primary | ICD-10-CM

## 2023-01-26 PROCEDURE — 99214 OFFICE O/P EST MOD 30 MIN: CPT | Performed by: FAMILY MEDICINE

## 2023-01-26 RX ORDER — MELOXICAM 7.5 MG/1
TABLET ORAL
Qty: 90 TABLET | Refills: 1 | Status: SHIPPED | OUTPATIENT
Start: 2023-01-26

## 2023-01-26 ASSESSMENT — PATIENT HEALTH QUESTIONNAIRE - PHQ9
SUM OF ALL RESPONSES TO PHQ9 QUESTIONS 1 & 2: 0
1. LITTLE INTEREST OR PLEASURE IN DOING THINGS: 0
2. FEELING DOWN, DEPRESSED OR HOPELESS: 0
SUM OF ALL RESPONSES TO PHQ QUESTIONS 1-9: 0

## 2023-01-26 NOTE — PROGRESS NOTES
Patient is here for right sided nerve pain. She had a revision of root canal on 10/3/22 - Dr. Keyanna Vargas . She had root canal about 2 years ago with dentist- Dr. Lynn Cruz due to facial pain. Root canal seemed to make things worse. She is to see Pain management doctor, Dr. Sofia Guerrero , but needs a referral.   She has h/o TMJ. She wears a  regularly. Biting on right upper teeth at times flares the pain at times into the next day. She has seen neurologist in the past, who did not think it was trigeminal neuralgia. There is a thought a nerve got cut with grafting. Sleeping okay . She is wanting to consider Botox. She is taking Mobic 15 mg daily and seems to help . It does seem to contribute to constipation. She will usually take Metamucil qd and does not usually have to use Miralax. She is not taking Parafon Forte or Gabapentin. She is no longer able to take Lyrica due to side effect of dizziness when last tried by ENT. She has seen Dr. Bonita Tom at [address] [place] [address] in the past and referred her to Dr. Keyanna Vargas. Review of Systems    ROS: All other systems were reviewed and are negative . Patient's allergies and medications were reviewed. Patient's past medical, surgical, social , and family history were reviewed. OBJECTIVE:  /60   Pulse 76   Temp 97.6 °F (36.4 °C) (Temporal)   Resp 12   Wt 136 lb 12.8 oz (62.1 kg)   LMP  (LMP Unknown)   SpO2 97%   BMI 23.48 kg/m²     Physical Exam    General: NAD, cooperative, alert and oriented X 3. Mood / affect is good. good insight. well hydrated. HEENT: PERRLA, EOMI, TMs - clear. Nasopharynx clear. Right facial / cheek tenderness. Neck : no lymphadenopathy, supple, FROM  CV: Regular rate and rhythm , no murmurs/ rub/ gallop. No edema. Lungs : CTA bilaterally, breathing comfortably  Abdomen: positive bowel sounds, soft , non tender, non distended. No hepatosplenomegaly. No CVA tenderness. Skin: no rashes. Non tender. ASSESSMENT/  PLAN:  1. Right sided facial pain/ 2. 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 3. TMJ syndrome  - Continue  regularly   - meloxicam (MOBIC) 7.5 MG tablet; Take 1-2 po qd prn facial pain  Dispense: 90 tablet; Refill: 1     Follow up 3 months/ prn.

## 2023-01-27 ENCOUNTER — PATIENT MESSAGE (OUTPATIENT)
Dept: FAMILY MEDICINE CLINIC | Age: 53
End: 2023-01-27

## 2023-01-27 RX ORDER — VALACYCLOVIR HYDROCHLORIDE 1 G/1
1000 TABLET, FILM COATED ORAL 3 TIMES DAILY
Qty: 21 TABLET | Refills: 0 | Status: SHIPPED | OUTPATIENT
Start: 2023-01-27 | End: 2023-02-03

## 2023-01-27 NOTE — TELEPHONE ENCOUNTER
From: Dionisio Motley  To: Dr. Castañeda Ovens: 1/27/2023 7:40 AM EST  Subject: Sore finger     Hi Dr Anaid Fisher,   My right middle finger started throbbing on the inside (finger print area) this morning . I have had herpetic inderjit on this finger before about 10 years ago . I think it might be back . Can you please send me in an antiviral for it ? I dont want it to grow worse over the weekend . I was just there yesterday at your office but it did not hurt then. Thank you for your help.     Steve Bronson
Hi Dr. Antonio Jimenez,   If you are able to send in a script for my finger , can you please send it to [facility] on [address] .       Thank you ,   Ramu Echavarriaing
English

## 2023-01-27 NOTE — TELEPHONE ENCOUNTER
From: Andrea Flores  To: Dr. Arora Rota: 1/27/2023 8:36 AM EST  Subject: Sore finger -Pharmacy     Hi Dr. Christina Ferreira,   If you are able to send in a script for my finger , can you please send it to [facility] on [address] .      Thank you ,   Dariusz Hayes

## 2023-02-02 ENCOUNTER — TELEPHONE (OUTPATIENT)
Dept: FAMILY MEDICINE CLINIC | Age: 53
End: 2023-02-02

## 2023-02-03 ENCOUNTER — OFFICE VISIT (OUTPATIENT)
Dept: FAMILY MEDICINE CLINIC | Age: 53
End: 2023-02-03
Payer: COMMERCIAL

## 2023-02-03 VITALS
BODY MASS INDEX: 23.8 KG/M2 | HEART RATE: 75 BPM | OXYGEN SATURATION: 99 % | TEMPERATURE: 97.8 F | RESPIRATION RATE: 12 BRPM | HEIGHT: 64 IN | WEIGHT: 139.4 LBS | SYSTOLIC BLOOD PRESSURE: 108 MMHG | DIASTOLIC BLOOD PRESSURE: 70 MMHG

## 2023-02-03 DIAGNOSIS — G51.8 PAIN DUE TO NEUROPATHY OF FACIAL NERVE: ICD-10-CM

## 2023-02-03 DIAGNOSIS — F41.9 ANXIETY: ICD-10-CM

## 2023-02-03 DIAGNOSIS — N95.2 ATROPHIC VAGINITIS: ICD-10-CM

## 2023-02-03 DIAGNOSIS — E55.9 VITAMIN D DEFICIENCY: ICD-10-CM

## 2023-02-03 DIAGNOSIS — Z13.220 SCREENING CHOLESTEROL LEVEL: ICD-10-CM

## 2023-02-03 DIAGNOSIS — K57.30 DIVERTICULA OF COLON: ICD-10-CM

## 2023-02-03 DIAGNOSIS — K59.00 CONSTIPATION, UNSPECIFIED CONSTIPATION TYPE: ICD-10-CM

## 2023-02-03 DIAGNOSIS — M50.30 DDD (DEGENERATIVE DISC DISEASE), CERVICAL: ICD-10-CM

## 2023-02-03 DIAGNOSIS — B00.89 HERPETIC WHITLOW: ICD-10-CM

## 2023-02-03 DIAGNOSIS — Z00.00 ENCOUNTER FOR WELL ADULT EXAM WITHOUT ABNORMAL FINDINGS: Primary | ICD-10-CM

## 2023-02-03 DIAGNOSIS — G47.9 SLEEPING DIFFICULTIES: ICD-10-CM

## 2023-02-03 DIAGNOSIS — D72.819 LEUKOPENIA, UNSPECIFIED TYPE: ICD-10-CM

## 2023-02-03 PROCEDURE — 99396 PREV VISIT EST AGE 40-64: CPT | Performed by: FAMILY MEDICINE

## 2023-02-03 RX ORDER — GABAPENTIN 100 MG/1
100 CAPSULE ORAL 3 TIMES DAILY
Qty: 270 CAPSULE | Refills: 5
Start: 2023-02-03 | End: 2023-08-02

## 2023-02-03 RX ORDER — DOCUSATE SODIUM 100 MG/1
100 CAPSULE, LIQUID FILLED ORAL 2 TIMES DAILY
Qty: 90 CAPSULE | Refills: 3 | COMMUNITY
Start: 2023-02-03

## 2023-02-03 RX ORDER — HYDROXYZINE HYDROCHLORIDE 10 MG/1
25 TABLET, FILM COATED ORAL 3 TIMES DAILY PRN
Qty: 40 TABLET | Refills: 1 | Status: SHIPPED | OUTPATIENT
Start: 2023-02-03

## 2023-02-03 RX ORDER — MAGNESIUM OXIDE 500 MG
TABLET ORAL
Qty: 90 TABLET | Refills: 3 | Status: SHIPPED | OUTPATIENT
Start: 2023-02-03

## 2023-02-03 RX ORDER — PSYLLIUM SEED (WITH DEXTROSE)
POWDER (GRAM) ORAL
Qty: 822 G | Refills: 3 | COMMUNITY
Start: 2023-02-03

## 2023-02-03 SDOH — ECONOMIC STABILITY: INCOME INSECURITY: HOW HARD IS IT FOR YOU TO PAY FOR THE VERY BASICS LIKE FOOD, HOUSING, MEDICAL CARE, AND HEATING?: NOT HARD AT ALL

## 2023-02-03 SDOH — ECONOMIC STABILITY: HOUSING INSECURITY
IN THE LAST 12 MONTHS, WAS THERE A TIME WHEN YOU DID NOT HAVE A STEADY PLACE TO SLEEP OR SLEPT IN A SHELTER (INCLUDING NOW)?: NO

## 2023-02-03 SDOH — ECONOMIC STABILITY: FOOD INSECURITY: WITHIN THE PAST 12 MONTHS, YOU WORRIED THAT YOUR FOOD WOULD RUN OUT BEFORE YOU GOT MONEY TO BUY MORE.: NEVER TRUE

## 2023-02-03 SDOH — ECONOMIC STABILITY: FOOD INSECURITY: WITHIN THE PAST 12 MONTHS, THE FOOD YOU BOUGHT JUST DIDN'T LAST AND YOU DIDN'T HAVE MONEY TO GET MORE.: NEVER TRUE

## 2023-02-03 NOTE — PROGRESS NOTES
Patient is a 48y.o. year old female presenting for a complete physical today. Last colonoscopy : 1/22- Dr. Alphonse Sorensen- repeat in 1/27 -1/32; 12/18 - [address]; 7/21; 7/19; 7/18;   PAP: 7/22- Dr. Elliott Beth; 7/21;6/20   DEXA : 7/18 - nl   History of abnormal PAP: normal  Last eye exam: > 1 year ago  Current smoker: no   Self breast exam: rarely   Exercise: daily - walk & elliptical   Caffeine: 1 coffee/ day   Alcohol: 0-1/ month    Last flare was right middle finger with herpetic inderjit was likely in 2006. She will finish Valtrex tonight. Still with some tenderness. No itching or burning. Tender. Less tender than it was. No redness or lesion /vesicle or rash. Tenderness was for 1-2 days prior to sending message. She had COVID - summer 2022. Patient's allergies and medications were reviewed. Patient's past medical, surgical, social , and family history were reviewed.      Past Medical History:   Diagnosis Date    Acid reflux     Allergic rhinitis     Anxiety     Atrophic vaginitis     DDD (degenerative disc disease), cervical     Dysmenorrhea     Dyspareunia in female     Esophagitis 05/2019    Fibrocystic breast     Gastritis 05/2019    HPV (human papilloma virus) infection 1992    Exposed back in college    Hyperlipidemia     Internal hemorrhoids 01/2022    per colonoscopy - Dr. Alphonse Sorensen    Interstitial cystitis 10/23/2017    Iron deficiency anemia 2017    Menopause ovarian failure     Menorrhagia     PONV (postoperative nausea and vomiting)     Trigeminal neuralgia     Vitamin D deficiency 07/2019    Vocal cord nodule 2016        Review of patient's past surgical history indicates:     Past Surgical History:   Procedure Laterality Date    [address] [place] [address]  2013    C4-6    COLONOSCOPY N/A 01/11/2022    COLONOSCOPY, POSSIBLE POLYPECTOMY performed by Darling Marks MD at [address] [place] [address]     bilateral, right HYSTERECTOMY (CERVIX STATUS UNKNOWN)  2009    LARYNGOSCOPY  2016    PARTIAL HYSTERECTOMY (CERVIX NOT REMOVED)  2009    DUB -     UPPER GASTROINTESTINAL ENDOSCOPY  05/2019                                                   Current Outpatient Medications   Medication Sig Dispense Refill    linaclotide (LINZESS) 145 MCG capsule TAKE ONE CAPSULE BY MOUTH EVERY MORNING BEFORE BREAKFAST 90 capsule 3    estradiol (MINIVELLE) 0.05 MG/24HR Place 1 patch onto the skin Twice a Week 24 patch 3    estradiol (ESTRACE VAGINAL) 0.1 MG/GM vaginal cream Place 1 g vaginally Twice a Week 1 each 3    polyethylene glycol (MIRALAX) 17 GM/SCOOP POWD powder Take 238 g by mouth daily Take as directed for colonoscopy 255 g 0    meloxicam (MOBIC) 7.5 MG tablet Take 1 tablet by mouth 2 times daily as needed for Pain To face/ jaw and mouth 90 tablet 0    hydrocortisone (ANUSOL-HC) 25 MG suppository Place 1 suppository rectally every 12 hours prn 30 suppository 3    Probiotic Product (PROBIOTIC-10 PO) Take by mouth      cetirizine (ZYRTEC) 10 MG tablet Take 10 mg by mouth daily      VASCEPA 1 g CAPS capsule Take 2 capsules by mouth 2 times daily 120 capsule 3    Cholecalciferol (VITAMIN D3) 1000 units TABS Take 1 tablet by mouth daily 90 tablet 3    valACYclovir (VALTREX) 1 g tablet Take 1 tablet by mouth 3 times daily for 7 days (Patient not taking: Reported on 2/3/2023) 21 tablet 0    chlorzoxazone (PARAFON FORTE) 500 MG tablet Take 500 mg by mouth 4 times daily as needed      meloxicam (MOBIC) 15 MG tablet Take 15 mg by mouth daily (Patient not taking: Reported on 2/3/2023)      hydrOXYzine HCl (ATARAX) 10 MG tablet Take 2.5 tablets by mouth 3 times daily as needed for Itching (Patient not taking: No sig reported) 40 tablet 1    butalbital-acetaminophen-caffeine (FIORICET, ESGIC) -40 MG per tablet Take 1 tablet by mouth every 6 hours as needed for Headaches (Patient not taking: No sig reported) 25 tablet 1     No current facility-administered medications for this visit. No Known Allergies    Social History     Tobacco Use    Smoking status: Never    Smokeless tobacco: Never   Vaping Use    Vaping Use: Never used   Substance Use Topics    Alcohol use: Yes     Comment: 1x 2 months    Drug use: No        Family History   Problem Relation Age of Onset    Cancer Mother 76        melanoma    High Blood Pressure Father     Colon Cancer Maternal Grandfather 46    Other Paternal Grandmother         PE        ROS:  Feeling well. No dyspnea or chest pain on exertion. No abdominal pain, change in bowel habits, black or bloody stools. No urinary tract symptoms. No neurological complaints. See patient physical/  ROS questionnaire. OBJECTIVE:   LMP  (LMP Unknown)   There were no vitals filed for this visit. The patient appears well, alert, oriented x 3, in no distress. HEENT : normocephalic, atraumatic, PERRLA, EOMI, tympanic membranes and nasopharynx are normal.  Neck supple. No adenopathy or thyromegaly. Upper extremities : DTRs 2+ biceps/ triceps/ brachioradialis bilateral.  FROM. Strength 5/5. Lungs are clear, good air entry, no wheezes, rhonchi or rales. Breathing comfortably. Cardiovascular: regular rate and rhythm. S1 and S2 are normal, no murmurs,rubs, and gallops. No edema. Abdomen is soft without tenderness, guarding, mass or organomegaly. Normal bowel sounds. Back: non tender. FROM. negative straight leg-raise. Rectum normal, guaiac negative brown stool, sphincter tone normal.  Lower extremities : DTRs 2+ knees and ankles bilateral.  FROM. Strength 5/5. Negative straight leg-raise. No edema or erythema bilateral.  normal peripheral pulses. Neuro: Cranial nerves 2-12 are normal. Deep tendon reflexes are 2+ and equal to all extremities. No focal sensory, or motor deficit noted. Skin: no rashes or suspicious lesions. Right middle finger: minimal tenderness. No erythema or swelling. FROM.      ASSESSMENT: 1. Encounter for well adult exam without abnormal findings  - Reviewed prior labs. 2. Constipation, unspecified constipation type  - Stable. Continue to push fluids - water and daily, dietary fiber.   - docusate sodium (COLACE) 100 MG capsule; Take 1 capsule by mouth 2 times daily  Dispense: 90 capsule; Refill: 3  - Psyllium (METAMUCIL) 48.57 % POWD; In 8 oz of water daily. Dispense: 822 g; Refill: 3    3. Vitamin D deficiency  - Stable. Continue Vitamin D 1000 IU/ day. 4. DDD (degenerative disc disease), cervical  - Stable. Moist heat . ROM exercises. Modify activities. 5. 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 Continue Gabapentin .   - gabapentin (NEURONTIN) 100 MG capsule; Take 1 capsule by mouth 3 times daily for 180 days. Intended supply: 30 days  Dispense: 270 capsule; Refill: 5    6. Diverticula of colon  - Stable. Repeat colonoscopy in 1/27.    7. Atrophic vaginitis  - Stable. 8. Anxiety  - Stable. - hydrOXYzine HCl (ATARAX) 10 MG tablet; Take 2.5 tablets by mouth 3 times daily as needed for Anxiety (sleeping difficulties)  Dispense: 40 tablet; Refill: 1    9. Sleeping difficulties  - Continue sleep hygiene and Melatonin ER 4-5 mg qhs.   - Melatonin ER 5 MG TBCR; Take 1 po qhs Dispense: 90 tablet; Refill: 3    10. Leukopenia, unspecified type  - Stable. - CBC with Auto Differential; Future    11. Screening cholesterol level  - Lipid Panel; Future    12. Herpetic Whitow - right middle finger  - Stable. Improving. PLAN:   Follow a low fat, low cholesterol diet,  continue current healthy lifestyle patterns, including regular cardiovascular exercise >150 minutes per week,  and return for routine annual checkups  Repeat Colonoscopy screening recommended in 1/27 . Yearly mammogram recommended , as well as monthly self breast exam.   Calcium 1500 mg/ day , Vitamin D 800 IU/ day, and weight bearing exercise. No follow-ups on file. Well Adult Note  Name: Conrad Malgorzata Date: 2/3/2023   MRN: [id number] Sex: Female   Age: 48 y.o. Ethnicity: Non- / Non    : 1970 Race: White (non-)      Lorin Olguin is here for well adult exam.  History:   See above. Review of Systems    No Known Allergies      Prior to Visit Medications    Medication Sig Taking?  Authorizing Provider   linaclotide (LINZESS) 145 MCG capsule TAKE ONE CAPSULE BY MOUTH EVERY MORNING BEFORE BREAKFAST Yes Diana Kamara MD   estradiol (MINIVELLE) 0.05 MG/24HR Place 1 patch onto the skin Twice a Week Yes Elisa Bruner MD   estradiol (ESTRACE VAGINAL) 0.1 MG/GM vaginal cream Place 1 g vaginally Twice a Week Yes Elisa Bruner MD   polyethylene glycol (MIRALAX) 17 GM/SCOOP POWD powder Take 238 g by mouth daily Take as directed for colonoscopy Yes Chandrakant Lepe MD   meloxicam (MOBIC) 7.5 MG tablet Take 1 tablet by mouth 2 times daily as needed for Pain To face/ jaw and mouth Yes Diana Kamara MD   hydrocortisone (ANUSOL-HC) 25 MG suppository Place 1 suppository rectally every 12 hours prn Yes Elisa Bruner MD   Probiotic Product (PROBIOTIC-10 PO) Take by mouth Yes Historical Provider, MD   cetirizine (ZYRTEC) 10 MG tablet Take 10 mg by mouth daily Yes Historical Provider, MD   VASCEPA 1 g CAPS capsule Take 2 capsules by mouth 2 times daily Yes Diana Kamara MD   Cholecalciferol (VITAMIN D3) 1000 units TABS Take 1 tablet by mouth daily Yes Diana Kamara MD   valACYclovir (VALTREX) 1 g tablet Take 1 tablet by mouth 3 times daily for 7 days  Patient not taking: Reported on 2/3/2023  Diana Kamara MD   chlorzoxazone (PARAFON FORTE) 500 MG tablet Take 500 mg by mouth 4 times daily as needed  Historical Provider, MD   meloxicam (MOBIC) 15 MG tablet Take 15 mg by mouth daily  Patient not taking: Reported on 2/3/2023  Historical Provider, MD   hydrOXYzine HCl (ATARAX) 10 MG tablet Take 2.5 tablets by mouth 3 times daily as needed for Itching  Patient not taking: No sig reported  Sofie Antonio MD Bren   butalbital-acetaminophen-caffeine (FIORICET, [address]) -40 MG per tablet Take 1 tablet by mouth every 6 hours as needed for Headaches  Patient not taking: No sig reported  Yaw Carter MD         Past Medical History:   Diagnosis Date    Acid reflux     Allergic rhinitis     Anxiety     Atrophic vaginitis     DDD (degenerative disc disease), cervical     Dysmenorrhea     Dyspareunia in female     Esophagitis 05/2019    Fibrocystic breast     Gastritis 05/2019    HPV (human papilloma virus) infection 1992    Exposed back in college    Hyperlipidemia     Internal hemorrhoids 01/2022    per colonoscopy - Dr. Ingrid Menendez    Interstitial cystitis 10/23/2017    Iron deficiency anemia 2017    Menopause ovarian failure     Menorrhagia     PONV (postoperative nausea and vomiting)     Trigeminal neuralgia     Vitamin D deficiency 07/2019    Vocal cord nodule 2016       Past Surgical History:   Procedure Laterality Date    APPENDECTOMY  1996    CERVICAL FUSION  2013    C4-6    COLONOSCOPY N/A 01/11/2022    COLONOSCOPY, POSSIBLE POLYPECTOMY performed by Petra Torres MD at [address] [place] [address]     bilateral, right    HYSTERECTOMY ([facility])  2009    LARYNGOSCOPY  2016    PARTIAL HYSTERECTOMY (CERVIX NOT REMOVED)  2009    DUB -     UPPER GASTROINTESTINAL ENDOSCOPY  05/2019         Family History   Problem Relation Age of Onset    Cancer Mother 76        melanoma    High Blood Pressure Father     Colon Cancer Maternal Grandfather 46    Other Paternal Grandmother         PE       Social History     Tobacco Use    Smoking status: Never    Smokeless tobacco: Never   Vaping Use    Vaping Use: Never used   Substance Use Topics    Alcohol use: Yes     Comment: 1x 2 months    Drug use: No       Objective   LMP  (LMP Unknown)   Wt Readings from Last 3 Encounters:   01/26/23 136 lb 12.8 oz (62.1 kg)   10/26/22 133 lb 12.8 oz (60.7 kg)   08/22/22 132 lb 6.4 oz (60.1 kg)     There were no vitals filed for this visit. Physical Exam      Assessment   Plan   See above. Personalized Preventive Plan   Current Health Maintenance Status  Immunization History   Administered Date(s) Administered    COVID-19, PFIZER PURPLE top, DILUTE for use, (age 15 y+), 30mcg/0.3mL 03/12/2021, 04/02/2021, 12/20/2021    Hepatitis A Adult (Havrix, Vaqta) 11/18/2019    Hepatitis A Adult (Vaqta) 05/14/2019    Hepatitis B Ped/Adol (Engerix-B, Recombivax HB) 05/14/2019, 06/19/2019, 11/18/2019    Influenza, FLUARIX, FLULAVAL, FLUZONE (age 10 mo+) AND AFLURIA, (age 1 y+), PF, 0.5mL 10/16/2017, 10/16/2019    Influenza, FLUBLOK, (age 25 y+), PF, 0.5mL 09/06/2020, 10/26/2021, 10/24/2022    Tdap (Boostrix, Adacel) 05/15/2019    Zoster Recombinant (Shingrix) 07/23/2020, 10/12/2020        Health Maintenance   Topic Date Due    COVID-19 Vaccine (4 - Booster for Pfizer series) 02/14/2022    Depression Screen  01/26/2024    Breast cancer screen  08/19/2024    Lipids  05/05/2027    DTaP/Tdap/Td vaccine (2 - Td or Tdap) 05/15/2029    Colorectal Cancer Screen  01/11/2032    Flu vaccine  Completed    Shingles vaccine  Completed    Hepatitis C screen  Completed    HIV screen  Completed    Hepatitis A vaccine  Aged Out    Hib vaccine  Aged Out    Meningococcal (ACWY) vaccine  Aged Out    Pneumococcal 0-64 years Vaccine  Aged Out     Recommendations for [employer] Due: see orders and patient instructions/AVS.    Follow up 6 months/ prn.

## 2023-02-23 ENCOUNTER — OFFICE VISIT (OUTPATIENT)
Dept: ORTHOPEDIC SURGERY | Age: 53
End: 2023-02-23

## 2023-02-23 VITALS — HEIGHT: 64 IN | BODY MASS INDEX: 23.73 KG/M2 | WEIGHT: 139 LBS

## 2023-02-23 DIAGNOSIS — S93.521A TURF TOE OF RIGHT FOOT: Primary | ICD-10-CM

## 2023-02-23 DIAGNOSIS — M89.9 SESAMOID PAIN: ICD-10-CM

## 2023-02-23 NOTE — LETTER
[address], [address] [address]  Phone: [phone]  Fax: [phone]    Josseline Alanis MD    February 23, 2023     Jericho Penny MD  [facility] [address]    Patient: Chintan De Jesus   MR Number: [id number]   YOB: 1970   Date of Visit: 2/23/2023       Dear Jericho Penny: Thank you for referring Corry Tracy to me for evaluation/treatment. Below are the relevant portions of my assessment and plan of care. If you have questions, please do not hesitate to call me. I look forward to following Kerry Caldwell along with you.     Sincerely,      Josseline Alanis MD

## 2023-02-23 NOTE — PROGRESS NOTES
[facility]  Office Visit    Chief Complaint    No chief complaint on file.      History of Present Illness:  Bethany Merritt is a 53 y.o. female who presents for right base of first toe.  At the level of the sesamoids.  She states that she has had base of first toe pain since spring 2022.  She states that she was playing a lot of pickle ball at that time and thinks that this may have contributed.  She was seen by a podiatrist who placed her in a boot in the fall 2022 suspicious for a sesamoid fracture.  In addition she has been taking meloxicam for really both without significant relief.  She has also been using offloading pads in the shoes without significant relief.     Patient describes their pain as worse with extended walking and weightbearing. The patient denies any specific injury.  The patient denies any clicking, popping, or locking of the joint. The patient denies any numbness, paresthesias, or weakness.           Past Medical History:   Diagnosis Date    Acid reflux     Allergic rhinitis     Anxiety     Atrophic vaginitis     DDD (degenerative disc disease), cervical     Dysmenorrhea     Dyspareunia in female     Esophagitis 05/2019    Fibrocystic breast     Gastritis 05/2019    HPV (human papilloma virus) infection 1992    Exposed back in college    Hyperlipidemia     Internal hemorrhoids 01/2022    per colonoscopy - Dr. Ureña    Interstitial cystitis 10/23/2017    Iron deficiency anemia 2017    Menopause ovarian failure     Menorrhagia     PONV (postoperative nausea and vomiting)     Trigeminal neuralgia     Vitamin D deficiency 07/2019    Vocal cord nodule 2016        Past Surgical History:   Procedure Laterality Date    APPENDECTOMY  1996    CERVICAL FUSION  2013    C4-6    COLONOSCOPY N/A 01/11/2022    COLONOSCOPY, POSSIBLE POLYPECTOMY performed by Evelio Ureña MD at [facility]COPY    HEMORRHOID SURGERY      HERNIA REPAIR  1970, 1999    bilateral,  right    HYSTERECTOMY (CERVIX STATUS UNKNOWN)  2009    LARYNGOSCOPY  2016    PARTIAL HYSTERECTOMY (CERVIX NOT REMOVED)  2009    DUB -     UPPER GASTROINTESTINAL ENDOSCOPY  05/2019       Family History   Problem Relation Age of Onset    Cancer Mother 76        melanoma    High Blood Pressure Father     Colon Cancer Maternal Grandfather 46    Other Paternal Grandmother         PE       Social History     Socioeconomic History    Marital status:      Spouse name: None    Number of children: None    Years of education: None    Highest education level: None   Tobacco Use    Smoking status: Never    Smokeless tobacco: Never   Vaping Use    Vaping Use: Never used   Substance and Sexual Activity    Alcohol use: Yes     Comment: 1x 2 months    Drug use: No    Sexual activity: Yes     Partners: Male     Social Determinants of Health     Financial Resource Strain: Low Risk     Difficulty of Paying Living Expenses: Not hard at all   Food Insecurity: No Food Insecurity    Worried About Running Out of Food in the Last Year: Never true    Ran Out of Food in the Last Year: Never true   Transportation Needs: Unknown    Lack of Transportation (Non-Medical): No   Housing Stability: Unknown    Unstable Housing in the Last Year: No       Current Outpatient Medications   Medication Sig Dispense Refill    docusate sodium (COLACE) 100 MG capsule Take 1 capsule by mouth 2 times daily 90 capsule 3    Psyllium (METAMUCIL) 48.57 % POWD In 8 oz of water daily. 822 g 3    hydrOXYzine HCl (ATARAX) 10 MG tablet Take 2.5 tablets by mouth 3 times daily as needed for Anxiety (sleeping difficulties) 40 tablet 1    Melatonin ER 5 MG TBCR Take 1 po qhs 90 tablet 3    gabapentin (NEURONTIN) 100 MG capsule Take 1 capsule by mouth 3 times daily for 180 days.  Intended supply: 30 days 270 capsule 5    chlorzoxazone (PARAFON FORTE) 500 MG tablet Take 500 mg by mouth 4 times daily as needed      linaclotide (LINZESS) 145 MCG capsule TAKE ONE CAPSULE BY MOUTH EVERY MORNING BEFORE BREAKFAST 90 capsule 3    estradiol (MINIVELLE) 0.05 MG/24HR Place 1 patch onto the skin Twice a Week 24 patch 3    estradiol (ESTRACE VAGINAL) 0.1 MG/GM vaginal cream Place 1 g vaginally Twice a Week (Patient not taking: Reported on 2/3/2023) 1 each 3    polyethylene glycol (MIRALAX) 17 GM/SCOOP POWD powder Take 238 g by mouth daily Take as directed for colonoscopy (Patient not taking: Reported on 2/3/2023) 255 g 0    meloxicam (MOBIC) 7.5 MG tablet Take 1 tablet by mouth 2 times daily as needed for Pain To face/ jaw and mouth 90 tablet 0    Probiotic Product (PROBIOTIC-10 PO) Take by mouth      cetirizine (ZYRTEC) 10 MG tablet Take 10 mg by mouth daily      butalbital-acetaminophen-caffeine (FIORICET, ESGIC) -40 MG per tablet Take 1 tablet by mouth every 6 hours as needed for Headaches (Patient not taking: No sig reported) 25 tablet 1    VASCEPA 1 g CAPS capsule Take 2 capsules by mouth 2 times daily 120 capsule 3    Cholecalciferol (VITAMIN D3) 1000 units TABS Take 1 tablet by mouth daily 90 tablet 3     No current facility-administered medications for this visit. No Known Allergies      Review of Systems:  A 14 point review of systems available in the scanned medical record as documented by the patient. Today's review pertinent items are noted in HPI. Vital Signs:   Ht 5' 4\" (1.626 m)   Wt 139 lb (63 kg)   LMP  (LMP Unknown)   BMI 23.86 kg/m²     General Exam:    Neuro: Alert & Oriented x 3,  normal,  no focal deficits noted. Normal mood & affect. Eyes: Sclera clear  Ears: Normal external ear  Mouth:  No perioral lesions  Pulm: Respirations unlabored and regular   Skin: Warm, well perfused      Foot & Ankle exam:   Inspection: No evidence of swelling erythema or cellulitis around the ankle. No evidence of bruising    Range of motion: -10 to 45 degrees of plantarflexion. 45 degrees of inversion associated with no pain, 25 degrees of eversion.  Normal Silverskjold test with no evidence of gastroc-achilles contracture. Resisted strength testin out of 5 strength in dorsiflexion, plantarflexion, inversion, and eversion. Palpation: no Tender along the anterolateral joint line nonanterolateral ligamentous complex. Nontender around the Achilles nor deltoid ligament nor any bony prominences. Pain to palpation along the medial and lateral sesamoids. Pain with passive dorsiflexion of the first MTP joint    Stability test: Normal anterior drawer with solid endpoint. Neurovascular exam: Normal neuro vascular exam of the ankle and foot. Contralateral Ankle exam:   Inspection: No evidence of swelling erythema or cellulitis around the ankle. No evidence of bruising    Range of motion: -10 to 45 degrees of plantarflexion. 45 degrees of inversion associated with no pain, 25 degrees of eversion. Normal Silverskjold test with no evidence of gastroc-achilles contracture. Resisted strength testin out of 5 strength in dorsiflexion, plantarflexion, inversion, and eversion. Palpation: no Tender along the anterolateral joint line nonanterolateral ligamentous complex. Nontender around the Achilles nor deltoid ligament nor any bony prominences. Stability test: Normal anterior drawer with solid endpoint. Neurovascular exam: Normal neuro vascular exam of the ankle and foot. Radiology:     3 View X-rays (AP lateral and mortise) of both the right foot were obtained and reviewed in office. Impression: No obvious sesamoid fracture. Possible sesamoid ossicle of chronic appearing nature      Assessment: Patient is a 48 y.o. female with symptoms in keeping with possible chronic turf toe, plantar plate injury. Impression:  Visit Diagnoses         Codes    Sesamoid pain   (Uncertain Status)  -  Primary M89.9            Office Procedures:  No orders of the defined types were placed in this encounter.     Orders Placed This Encounter   Procedures    XR FOOT RIGHT (MIN 3 VIEWS)     Standing Status:   Future     Number of Occurrences:   1     Standing Expiration Date:   2/23/2024     Order Specific Question:   Reason for exam:     Answer:   R BIG TOE       Plan: We had a good discussion today with Ms. Jaycee Steiner. Given the chronic nature of her discomfort and lack of response to conservative therapies at this point we do feel she merits further work-up with advanced imaging in the form of MRI. We are clinically suspicious for the possibility of turf toe, plantar plate rupture, or chronic sesamoid fracture. In the interim we feel that she may benefit from a carbon fiber shoe insert to assist in offloading the plantar plate. We will plan to follow-up in clinic with her once the MRI is complete. All the patient's questions were answered while in the clinic. The patient is understanding of all instructions and agrees with the plan. Cayla Pratt MD [facility]    Orthopaedic Surgeon, Clinical Fellow  [facility]   On behalf of      Approximately 46 minutes was spent on patient education and coordinating care. Follow up in: No follow-ups on file. Sincerely,    Sirisha Chavez MD [address] [facility] [address] [place] [address], [id number]  Email: [email or web address]  Office: [phone]    02/23/23  2:51 PM      The encounter with Lorin Olguin was carried out by myself, Dr Ludin Priest, who personally examined the patient and reviewed the plan. This dictation was performed with a verbal recognition program (DRAGON) and it was checked for errors. It is possible that there are still dictated errors within this office note. If so, please bring any errors to my attention for an addendum. All efforts were made to ensure that this office note is accurate.

## 2023-02-27 ENCOUNTER — TELEPHONE (OUTPATIENT)
Dept: ORTHOPEDIC SURGERY | Age: 53
End: 2023-02-27

## 2023-02-27 NOTE — TELEPHONE ENCOUNTER
PATIENT CALLED, SHE ORDERED SOMETHING DR. Elizabeth Cooper SUGGESTED SHE ORDER BUT SHE DOESN'T LIKE THE WAY IT FEELS. WOULD LIKE A CALL BACK TO DISCUSS.

## 2023-02-28 NOTE — TELEPHONE ENCOUNTER
S/s Aviva Clarke  regarding MRI Right Foot approval and authorization being valid until 02/23/2024. Patient was instructed that their MRI needs to be scheduled at [facility]. The patient was instructed to contact the facility to schedule at [phone]. A follow up appointment will need to be scheduled to review the results and treatment plan. The patient has elected to contact the office at a later time to schedule a follow up appointment. Patient also notes she ordered the carbon foot plate but does not like the way it feels. She notes it was causing irritation to her other toe. She has returned that and purchased a different insert.

## 2023-03-07 ENCOUNTER — OFFICE VISIT (OUTPATIENT)
Dept: ENT CLINIC | Age: 53
End: 2023-03-07
Payer: COMMERCIAL

## 2023-03-07 VITALS
DIASTOLIC BLOOD PRESSURE: 73 MMHG | HEART RATE: 69 BPM | OXYGEN SATURATION: 98 % | HEIGHT: 64 IN | SYSTOLIC BLOOD PRESSURE: 113 MMHG | TEMPERATURE: 97.2 F | WEIGHT: 139 LBS | BODY MASS INDEX: 23.73 KG/M2

## 2023-03-07 DIAGNOSIS — J30.9 ALLERGIC RHINITIS, UNSPECIFIED SEASONALITY, UNSPECIFIED TRIGGER: ICD-10-CM

## 2023-03-07 DIAGNOSIS — J34.3 NASAL TURBINATE HYPERTROPHY: ICD-10-CM

## 2023-03-07 DIAGNOSIS — R49.0 DYSPHONIA: Primary | ICD-10-CM

## 2023-03-07 DIAGNOSIS — R49.0 MUSCLE TENSION DYSPHONIA: ICD-10-CM

## 2023-03-07 PROCEDURE — 31575 DIAGNOSTIC LARYNGOSCOPY: CPT | Performed by: OTOLARYNGOLOGY

## 2023-03-07 PROCEDURE — 99213 OFFICE O/P EST LOW 20 MIN: CPT | Performed by: OTOLARYNGOLOGY

## 2023-03-07 ASSESSMENT — ENCOUNTER SYMPTOMS
STRIDOR: 0
SINUS PAIN: 0
SHORTNESS OF BREATH: 0
VOICE CHANGE: 1
FACIAL SWELLING: 0
COLOR CHANGE: 0
CHOKING: 0
RHINORRHEA: 0
NAUSEA: 0
DIARRHEA: 0
EYE PAIN: 0
EYE REDNESS: 0
TROUBLE SWALLOWING: 0
COUGH: 0
PHOTOPHOBIA: 0
EYE ITCHING: 0
SORE THROAT: 0
SINUS PRESSURE: 0

## 2023-03-07 NOTE — PROGRESS NOTES
[facility]  [address], [address]  P: [phone]  F: [phone]       Patient     Mell Mena  1970    ChiefComplaint     Chief Complaint   Patient presents with    Oral Swelling     Patient is here today because she thinks that she has another nodule on her vocal cord, many years ago she had a nodule removed when she lived in [facility]       History of Present Illness     Mell Mena is a pleasant 48 y.o. female known to me with history of dysphonia and vocal fold nodules status postsurgical excision years ago. Was seen in 2020 for worsening hoarseness with frequent breaks and straining. She underwent speech therapy and had significant improvement of symptoms. She has not been very vigilant with her exercises recently and has noticed significantly worsened vocal fatigue especially in noisy environments. She is experiencing pain from her muscle tension. The pain will typically go away after a good nights rest.  She is concerned she may have nodules again.     Past Medical History     Past Medical History:   Diagnosis Date    Acid reflux     Allergic rhinitis     Anxiety     Atrophic vaginitis     DDD (degenerative disc disease), cervical     Dysmenorrhea     Dyspareunia in female     Esophagitis 05/2019    Fibrocystic breast     Gastritis 05/2019    HPV (human papilloma virus) infection 1992    Exposed back in college    Hyperlipidemia     Internal hemorrhoids 01/2022    per colonoscopy - Dr. Garrick Barker    Interstitial cystitis 10/23/2017    Iron deficiency anemia 2017    Menopause ovarian failure     Menorrhagia     PONV (postoperative nausea and vomiting)     Trigeminal neuralgia     Vitamin D deficiency 07/2019    Vocal cord nodule 2016       Past Surgical History     Past Surgical History:   Procedure Laterality Date    APPENDECTOMY  1996    CERVICAL FUSION  2013    C4-6    COLONOSCOPY N/A 01/11/2022    COLONOSCOPY, POSSIBLE POLYPECTOMY performed by Ana Maria Wadsworth MD at [address] [place] [address]     bilateral, right    HYSTERECTOMY ([address])  2009    LARYNGOSCOPY  2016    PARTIAL HYSTERECTOMY (CERVIX NOT REMOVED)  2009    DUB -     UPPER GASTROINTESTINAL ENDOSCOPY  05/2019       Family History     Family History   Problem Relation Age of Onset    Cancer Mother 76        melanoma    High Blood Pressure Father     Colon Cancer Maternal Grandfather 46    Other Paternal Grandmother         PE       Social History     Social History     Socioeconomic History    Marital status:      Spouse name: Not on file    Number of children: Not on file    Years of education: Not on file    Highest education level: Not on file   Occupational History    Not on file   Tobacco Use    Smoking status: Never    Smokeless tobacco: Never   Vaping Use    Vaping Use: Never used   Substance and Sexual Activity    Alcohol use: Yes     Comment: 1x 2 months    Drug use: No    Sexual activity: Yes     Partners: Male   Other Topics Concern    Not on file   Social History Narrative    Not on file     Social Determinants of Health     Financial Resource Strain: Low Risk     Difficulty of Paying Living Expenses: Not hard at all   Food Insecurity: No Food Insecurity    Worried About Running Out of Food in the Last Year: Never true    [address] in the Last Year: Never true   Transportation Needs: Unknown    Lack of Transportation (Medical): Not on file    Lack of Transportation (Non-Medical):  No   Physical Activity: Not on file   Stress: Not on file   Social Connections: Not on file   Intimate Partner Violence: Not on file   Housing Stability: Unknown    Unable to Pay for Housing in the Last Year: Not on file    Number of Places Lived in the Last Year: Not on file    Unstable Housing in the Last Year: No       Allergies     No Known Allergies    Medications     Current Outpatient Medications   Medication Sig Dispense Refill docusate sodium (COLACE) 100 MG capsule Take 1 capsule by mouth 2 times daily 90 capsule 3    Psyllium (METAMUCIL) 48.57 % POWD In 8 oz of water daily. 822 g 3    hydrOXYzine HCl (ATARAX) 10 MG tablet Take 2.5 tablets by mouth 3 times daily as needed for Anxiety (sleeping difficulties) 40 tablet 1    Melatonin ER 5 MG TBCR Take 1 po qhs 90 tablet 3    gabapentin (NEURONTIN) 100 MG capsule Take 1 capsule by mouth 3 times daily for 180 days. Intended supply: 30 days 270 capsule 5    chlorzoxazone (PARAFON FORTE) 500 MG tablet Take 500 mg by mouth 4 times daily as needed      linaclotide (LINZESS) 145 MCG capsule TAKE ONE CAPSULE BY MOUTH EVERY MORNING BEFORE BREAKFAST 90 capsule 3    estradiol (MINIVELLE) 0.05 MG/24HR Place 1 patch onto the skin Twice a Week 24 patch 3    estradiol (ESTRACE VAGINAL) 0.1 MG/GM vaginal cream Place 1 g vaginally Twice a Week 1 each 3    polyethylene glycol (MIRALAX) 17 GM/SCOOP POWD powder Take 238 g by mouth daily Take as directed for colonoscopy 255 g 0    meloxicam (MOBIC) 7.5 MG tablet Take 1 tablet by mouth 2 times daily as needed for Pain To face/ jaw and mouth 90 tablet 0    Probiotic Product (PROBIOTIC-10 PO) Take by mouth      cetirizine (ZYRTEC) 10 MG tablet Take 10 mg by mouth daily      butalbital-acetaminophen-caffeine (FIORICET, ESGIC) -40 MG per tablet Take 1 tablet by mouth every 6 hours as needed for Headaches 25 tablet 1    VASCEPA 1 g CAPS capsule Take 2 capsules by mouth 2 times daily 120 capsule 3    Cholecalciferol (VITAMIN D3) 1000 units TABS Take 1 tablet by mouth daily 90 tablet 3     No current facility-administered medications for this visit.       Review of Systems     Review of Systems   Constitutional:  Negative for chills, fatigue and fever.   HENT:  Positive for voice change. Negative for congestion, ear discharge, ear pain, facial swelling, hearing loss, nosebleeds, postnasal drip, rhinorrhea, sinus pressure, sinus pain, sneezing, sore throat,  tinnitus and trouble swallowing. Eyes:  Negative for photophobia, pain, redness, itching and visual disturbance. Respiratory:  Negative for cough, choking, shortness of breath and stridor. Gastrointestinal:  Negative for diarrhea and nausea. Musculoskeletal:  Negative for neck pain and neck stiffness. Skin:  Negative for color change and rash. Neurological:  Negative for dizziness, facial asymmetry and light-headedness. Hematological:  Negative for adenopathy. Psychiatric/Behavioral:  Negative for agitation and confusion. PhysicalExam     Vitals:    03/07/23 1553   BP: 113/73   Pulse: 69   Temp: 97.2 °F (36.2 °C)   SpO2: 98%       Physical Exam  Constitutional:       Appearance: She is well-developed. HENT:      Head: Normocephalic and atraumatic. Jaw: No trismus. Right Ear: Tympanic membrane, ear canal and external ear normal. No drainage. No middle ear effusion. Tympanic membrane is not perforated. Left Ear: Tympanic membrane, ear canal and external ear normal. No drainage. No middle ear effusion. Tympanic membrane is not perforated. Nose: No septal deviation, mucosal edema or rhinorrhea. Mouth/Throat:      Dentition: Normal dentition. Pharynx: Uvula midline. No oropharyngeal exudate. Eyes:      General: No scleral icterus. Right eye: No discharge. Left eye: No discharge. Pupils: Pupils are equal, round, and reactive to light. Neck:      Thyroid: No thyromegaly. Trachea: Phonation normal. No tracheal deviation. Pulmonary:      Effort: Pulmonary effort is normal. No respiratory distress. Breath sounds: No stridor. Musculoskeletal:      Cervical back: Neck supple. Lymphadenopathy:      Cervical: No cervical adenopathy. Skin:     General: Skin is warm and dry. Neurological:      Mental Status: She is alert and oriented to person, place, and time. Cranial Nerves: No cranial nerve deficit.    Psychiatric: Behavior: Behavior normal.         Procedure     Flexible Laryngoscopy    Pre op: Muscle tension dysphonia. Procedure : Flexible Laryngoscopy  Surgeon: Vinay Noble DO  Anesthesia: Afrin with 4% lidocaine  Indication: Laryngeal mirror examination was not tolerated due to gag reflex  Description:  The scope was passed along the floor of the right naris to the level of the larynx. There was no evidence of concerning masses or lesions of the base of tongue, vallecula, epiglottis, aryepiglottic folds, arytenoids, false vocal folds, true vocal folds, or pyriform sinuses. True vocal folds exhibited symmetric motion bilaterally without evidence of paralysis or paresis. The scope was removed. The patient tolerated the procedure without difficulty. There were no complications. Pertinent findings: No evidence of nodules           Assessment and Plan     1. Dysphonia  No evidence of nodules on laryngoscopy. Patient having consistent symptoms with muscle tension dysphonia similar to previous. She has not been diligent with her exercises. She would like to see SLP again to some questions and retrain her. - Fco Hogan, SLP - Speech and Language Pathology - [facility]    2. Muscle tension dysphonia    - Ирина Méndez, SLP - Speech and Language Pathology - [facility]    3. Nasal turbinate hypertrophy  Patient does have a history of allergic rhinitis and noted she had significant posterior turbinate hypertrophy on exam today. She is not experiencing any persistent nasal obstruction symptoms. She does not tolerate nasal steroid sprays very well. I recommend over-the-counter hypertonic nasal saline to see if that helps. 4. Allergic rhinitis, unspecified seasonality, unspecified trigger      Return for SLP.       [ ] Review/order radiology tests   [ ] Independent interpretation of diagnostic test by another provider  [ ] Discussed case with another provider  [ ] High risk of loss of major body function  [ ] Elective major surgery with risk factors    Portions of this note were dictated using Dragon.  There may be linguistic errors secondary to the use of this program.

## 2023-03-08 ENCOUNTER — HOSPITAL ENCOUNTER (OUTPATIENT)
Dept: MRI IMAGING | Age: 53
Discharge: HOME | End: 2023-03-08
Payer: COMMERCIAL

## 2023-03-08 DIAGNOSIS — D72.819 LEUKOPENIA, UNSPECIFIED TYPE: ICD-10-CM

## 2023-03-08 DIAGNOSIS — S93.521A TURF TOE OF RIGHT FOOT: ICD-10-CM

## 2023-03-08 DIAGNOSIS — Z13.220 SCREENING CHOLESTEROL LEVEL: ICD-10-CM

## 2023-03-08 DIAGNOSIS — M89.9 SESAMOID PAIN: ICD-10-CM

## 2023-03-08 LAB
BASOPHILS ABSOLUTE: 0 K/UL (ref 0–0.2)
BASOPHILS RELATIVE PERCENT: 1.1 %
CHOLESTEROL, TOTAL: 236 MG/DL (ref 0–199)
EOSINOPHILS ABSOLUTE: 0.2 K/UL (ref 0–0.6)
EOSINOPHILS RELATIVE PERCENT: 5.5 %
HCT VFR BLD CALC: 37.1 % (ref 36–48)
HDLC SERPL-MCNC: 99 MG/DL (ref 40–60)
HEMOGLOBIN: 12.7 G/DL (ref 12–16)
LDL CHOLESTEROL CALCULATED: 125 MG/DL
LYMPHOCYTES ABSOLUTE: 1.2 K/UL (ref 1–5.1)
LYMPHOCYTES RELATIVE PERCENT: 28.6 %
MCH RBC QN AUTO: 31 PG (ref 26–34)
MCHC RBC AUTO-ENTMCNC: 34.2 G/DL (ref 31–36)
MCV RBC AUTO: 90.5 FL (ref 80–100)
MONOCYTES ABSOLUTE: 0.3 K/UL (ref 0–1.3)
MONOCYTES RELATIVE PERCENT: 8 %
NEUTROPHILS ABSOLUTE: 2.5 K/UL (ref 1.7–7.7)
NEUTROPHILS RELATIVE PERCENT: 56.8 %
PDW BLD-RTO: 13.7 % (ref 12.4–15.4)
PLATELET # BLD: 233 K/UL (ref 135–450)
PMV BLD AUTO: 8.2 FL (ref 5–10.5)
RBC # BLD: 4.1 M/UL (ref 4–5.2)
TRIGL SERPL-MCNC: 59 MG/DL (ref 0–150)
VLDLC SERPL CALC-MCNC: 12 MG/DL
WBC # BLD: 4.3 K/UL (ref 4–11)

## 2023-03-08 PROCEDURE — 73718 MRI LOWER EXTREMITY W/O DYE: CPT

## 2023-03-16 ENCOUNTER — OFFICE VISIT (OUTPATIENT)
Dept: ORTHOPEDIC SURGERY | Age: 53
End: 2023-03-16

## 2023-03-16 VITALS — HEIGHT: 64 IN | WEIGHT: 139 LBS | BODY MASS INDEX: 23.73 KG/M2 | RESPIRATION RATE: 12 BRPM

## 2023-03-16 DIAGNOSIS — S93.521A TURF TOE OF RIGHT FOOT: ICD-10-CM

## 2023-03-16 DIAGNOSIS — M25.871 SESAMOIDITIS OF RIGHT FOOT: Primary | ICD-10-CM

## 2023-03-16 DIAGNOSIS — M20.21 HALLUX RIGIDUS OF RIGHT FOOT: ICD-10-CM

## 2023-03-16 NOTE — LETTER
March 16, 2023      Oren Krabbe, [address] [facility]      Patient: Pamela Alanis   MR Number: [id number]   YOB: 1970   Date of Visit: 3/16/2023       Dear Oren Krabbe: Thank you for referring Angelique Bruno to me for evaluation/treatment. Below are the relevant portions of my assessment and plan of care. If you have questions, please do not hesitate to call me. I look forward to following Demond Jones along with you.     Sincerely,        Evette Patel MD

## 2023-03-16 NOTE — PROGRESS NOTES
[address] [facility]  Office Visit    Chief Complaint    Foot Pain (TR MRI Right Foot )      History of Present Illness:  Haley Talamantes is a 48 y.o. female who presents for MRI follow up for right base of first toe pain. At the level of the sesamoids. She states that she has had base of first toe pain since spring 2022. She states that she was playing a lot of pickle ball at that time and thinks that this may have contributed. She was seen by a podiatrist who placed her in a boot in the fall 2022 suspicious for a sesamoid fracture. In addition she has been taking meloxicam for really both without significant relief. She has also been using offloading pads and carbon fiber stiff sole in the shoes without significant relief. Patient describes their pain as worse with extended walking and weightbearing. The patient denies any specific injury. The patient denies any clicking, popping, or locking of the joint. The patient denies any numbness, paresthesias, or weakness.       Pain Assessment  Location of Pain: Foot  Location Modifiers: Right  Severity of Pain: 3  Quality of Pain: Throbbing  Duration of Pain: Persistent  Frequency of Pain: Constant  Aggravating Factors: Walking, Standing  Limiting Behavior: Yes  Relieving Factors: Rest, Ice, Nsaids  Result of Injury: No  Work-Related Injury: No  Are there other pain locations you wish to document?: No    Past Medical History:   Diagnosis Date    Acid reflux     Allergic rhinitis     Anxiety     Atrophic vaginitis     DDD (degenerative disc disease), cervical     Dysmenorrhea     Dyspareunia in female     Esophagitis 05/2019    Fibrocystic breast     Gastritis 05/2019    HPV (human papilloma virus) infection 1992    Exposed back in college    Hyperlipidemia     Internal hemorrhoids 01/2022    per colonoscopy - Dr. Joshua Washington    Interstitial cystitis 10/23/2017    Iron deficiency anemia 2017    Menopause ovarian failure     Menorrhagia PONV (postoperative nausea and vomiting)     Trigeminal neuralgia     Vitamin D deficiency 07/2019    Vocal cord nodule 2016        Past Surgical History:   Procedure Laterality Date    [address] [place] [address]  2013    C4-6    COLONOSCOPY N/A 01/11/2022    COLONOSCOPY, POSSIBLE POLYPECTOMY performed by Jennifer Fernandez MD at [address] [place] [address]     bilateral, right    HYSTERECTOMY ([address])  2009    LARYNGOSCOPY  2016    PARTIAL HYSTERECTOMY (CERVIX NOT REMOVED)  2009    DUB -     UPPER GASTROINTESTINAL ENDOSCOPY  05/2019       Family History   Problem Relation Age of Onset    Cancer Mother 76        melanoma    High Blood Pressure Father     Colon Cancer Maternal Grandfather 46    Other Paternal Grandmother         PE       Social History     Socioeconomic History    Marital status:      Spouse name: None    Number of children: None    Years of education: None    Highest education level: None   Tobacco Use    Smoking status: Never    Smokeless tobacco: Never   Vaping Use    Vaping Use: Never used   Substance and Sexual Activity    Alcohol use: Yes     Comment: 1x 2 months    Drug use: No    Sexual activity: Yes     Partners: Male     Social Determinants of Health     Financial Resource Strain: Low Risk     Difficulty of Paying Living Expenses: Not hard at all   Food Insecurity: No Food Insecurity    Worried About Running Out of Food in the Last Year: Never true    Ran Out of Food in the Last Year: Never true   Transportation Needs: Unknown    Lack of Transportation (Non-Medical): No   Housing Stability: Unknown    Unstable Housing in the Last Year: No       Current Outpatient Medications   Medication Sig Dispense Refill    docusate sodium (COLACE) 100 MG capsule Take 1 capsule by mouth 2 times daily 90 capsule 3    Psyllium (METAMUCIL) 48.57 % POWD In 8 oz of water daily.  822 g 3    hydrOXYzine HCl (ATARAX) 10 MG tablet Take 2.5 tablets by mouth 3 times daily as needed for Anxiety (sleeping difficulties) 40 tablet 1    Melatonin ER 5 MG TBCR Take 1 po qhs 90 tablet 3    gabapentin (NEURONTIN) 100 MG capsule Take 1 capsule by mouth 3 times daily for 180 days. Intended supply: 30 days 270 capsule 5    chlorzoxazone (PARAFON FORTE) 500 MG tablet Take 500 mg by mouth 4 times daily as needed      linaclotide (LINZESS) 145 MCG capsule TAKE ONE CAPSULE BY MOUTH EVERY MORNING BEFORE BREAKFAST 90 capsule 3    estradiol (MINIVELLE) 0.05 MG/24HR Place 1 patch onto the skin Twice a Week 24 patch 3    estradiol (ESTRACE VAGINAL) 0.1 MG/GM vaginal cream Place 1 g vaginally Twice a Week 1 each 3    polyethylene glycol (MIRALAX) 17 GM/SCOOP POWD powder Take 238 g by mouth daily Take as directed for colonoscopy 255 g 0    meloxicam (MOBIC) 7.5 MG tablet Take 1 tablet by mouth 2 times daily as needed for Pain To face/ jaw and mouth 90 tablet 0    Probiotic Product (PROBIOTIC-10 PO) Take by mouth      cetirizine (ZYRTEC) 10 MG tablet Take 10 mg by mouth daily      butalbital-acetaminophen-caffeine (FIORICET, ESGIC) -40 MG per tablet Take 1 tablet by mouth every 6 hours as needed for Headaches 25 tablet 1    VASCEPA 1 g CAPS capsule Take 2 capsules by mouth 2 times daily 120 capsule 3    Cholecalciferol (VITAMIN D3) 1000 units TABS Take 1 tablet by mouth daily 90 tablet 3     No current facility-administered medications for this visit. No Known Allergies      Review of Systems:  A 14 point review of systems available in the scanned medical record as documented by the patient. Today's review pertinent items are noted in HPI. Vital Signs:   Resp 12   Ht 5' 4\" (1.626 m)   Wt 139 lb (63 kg)   LMP  (LMP Unknown)   BMI 23.86 kg/m²     General Exam:    Neuro: Alert & Oriented x 3,  normal,  no focal deficits noted. Normal mood & affect.   Eyes: Sclera clear  Ears: Normal external ear  Mouth:  No perioral lesions  Pulm: Respirations unlabored and regular   Skin: Warm, well perfused      Right Foot & Ankle exam:   Inspection: No evidence of swelling erythema or cellulitis around the ankle. No evidence of bruising    Range of motion: -10 to 45 degrees of plantarflexion. 45 degrees of inversion associated with no pain, 25 degrees of eversion. Normal Silverskjold test with no evidence of gastroc-achilles contracture. Resisted strength testin out of 5 strength in dorsiflexion, plantarflexion, inversion, and eversion. Palpation: no Tender along the anterolateral joint line nonanterolateral ligamentous complex. Nontender around the Achilles nor deltoid ligament nor any bony prominences. Pain to palpation along the medial more than  lateral sesamoids. Pain with passive dorsiflexion of the first MTP joint. Tender medial MTP joint    Stability test: Normal anterior drawer with solid endpoint. Neurovascular exam: Normal neuro vascular exam of the ankle and foot. Contralateral Ankle exam:   Inspection: No evidence of swelling erythema or cellulitis around the ankle. No evidence of bruising    Range of motion: -10 to 45 degrees of plantarflexion. 45 degrees of inversion associated with no pain, 25 degrees of eversion. Normal Silverskjold test with no evidence of gastroc-achilles contracture. Resisted strength testin out of 5 strength in dorsiflexion, plantarflexion, inversion, and eversion. Palpation: no Tender along the anterolateral joint line nonanterolateral ligamentous complex. Nontender around the Achilles nor deltoid ligament nor any bony prominences. Stability test: Normal anterior drawer with solid endpoint. Neurovascular exam: Normal neuro vascular exam of the ankle and foot. Radiology:     3 View X-rays (AP lateral and mortise) of both the right foot were obtained and reviewed in office. Impression: No obvious sesamoid fracture.   Possible sesamoid ossicle of chronic appearing nature      MRI right foot 3/8/2023 [facility])   1. Mild edema in the medial sesamoid bone which could reflect a mild sesamoiditis in the proper clinical setting. No fracture identified. 2. 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 Assessment: Patient is a 48 y.o. female with symptoms and MRI findings most consistent with right first MTP arthritis and medial sesamoiditis. Impression:  Visit Diagnoses         Codes    Sesamoiditis of right foot    -  Primary M25.871    Hallux rigidus of right foot     M20.21    Turf toe of right foot     S93.521A          Office Procedures:  No orders of the defined types were placed in this encounter. Orders Placed This Encounter   Procedures    Elijah Fernando MD, Orthopedic Surgery (Foot, Ankle), [facility]     Referral Priority:   Routine     Referral Type:   Eval and Treat     Referral Reason:   Specialty Services Required     Referred to Provider:   Nena Black MD     Requested Specialty:   Orthopedic Surgery     Number of Visits Requested:   1         Plan:  Pertinent imaging was reviewed. The etiology, natural history, and treatment options for the disorder were discussed. The roles of activity modification, antiinflammatory medicine, injections, bracing, physical therapy, and surgical interventions were all described to the patient and questions were answered. Considering she has exhausted all nonoperative treatment options, we will like to refer her to our foot and ankle specialist Dr. Faheem Serrano for a surgical consultation and further management recommendations. All the patient's questions were answered while in the clinic. The patient is understanding of all instructions and agrees with the plan. Approximately 35 minutes was spent on patient education and coordinating care. Follow up in: Return if symptoms worsen or fail to improve.     Jason Reza MD  [facility] Clinical fellow      During this examination, Cameron Farnsworth MD, functioned under the supervision and in a teaching capacity with Dr. Ruvalcaba.     Sincerely,    Tanesha Ruvalcaba MD [facility]  Orthopaedic Surgeon - Hip Preservation & Sports Medicine   [facility] [address], [address], [address]  Email: [email or web address]  Office: [phone]    03/16/23  4:40 PM    The encounter with Bethany Merritt was carried out by myself, Dr Ruvalcaba, who personally examined the patient and reviewed the plan.      This dictation was performed with a verbal recognition program (DRAGON) and it was checked for errors.  It is possible that there are still dictated errors within this office note.  If so, please bring any errors to my attention for an addendum.  All efforts were made to ensure that this office note is accurate.      independent

## 2023-03-22 ENCOUNTER — TELEPHONE (OUTPATIENT)
Dept: GYNECOLOGY | Age: 53
End: 2023-03-22

## 2023-03-22 DIAGNOSIS — N89.8 VAGINAL IRRITATION: Primary | ICD-10-CM

## 2023-03-22 RX ORDER — NYSTATIN 100000 [USP'U]/G
POWDER TOPICAL 2 TIMES DAILY
Qty: 60 G | Refills: 2 | Status: SHIPPED | OUTPATIENT
Start: 2023-03-22

## 2023-03-22 RX ORDER — CLINDAMYCIN PHOSPHATE 20 MG/G
1 CREAM VAGINAL NIGHTLY
Qty: 1 EACH | Refills: 1 | Status: SHIPPED | OUTPATIENT
Start: 2023-03-22 | End: 2023-03-29

## 2023-03-22 RX ORDER — FLUCONAZOLE 150 MG/1
150 TABLET ORAL ONCE
Qty: 1 TABLET | Refills: 1 | Status: SHIPPED | OUTPATIENT
Start: 2023-03-22 | End: 2023-03-22

## 2023-03-22 NOTE — TELEPHONE ENCOUNTER
Patient is having irritation. Says It's red and itching. There is also an odor.  No discharge    Patient requesting to be seen or medication      Patient can be reached at [address] [place] [address] [id number] [phone]

## 2023-03-22 NOTE — TELEPHONE ENCOUNTER
Call patient that I sent in a Diflucan one time pill to take with a refill. Cleocin vaginal cream to use nightly for 7 nights and nystatin powder to use on outside if she is having itching. If she is not better, then she needs appointment.

## 2023-03-23 ENCOUNTER — OFFICE VISIT (OUTPATIENT)
Dept: ORTHOPEDIC SURGERY | Age: 53
End: 2023-03-23

## 2023-03-23 VITALS — WEIGHT: 139 LBS | BODY MASS INDEX: 23.73 KG/M2 | HEIGHT: 64 IN

## 2023-03-23 DIAGNOSIS — M25.871 SESAMOIDITIS OF RIGHT FOOT: Primary | ICD-10-CM

## 2023-03-25 PROBLEM — M25.871 SESAMOIDITIS OF RIGHT FOOT: Status: ACTIVE | Noted: 2023-03-25

## 2023-03-26 NOTE — PROGRESS NOTES
po qhs 90 tablet 3    gabapentin (NEURONTIN) 100 MG capsule Take 1 capsule by mouth 3 times daily for 180 days. Intended supply: 30 days 270 capsule 5    chlorzoxazone (PARAFON FORTE) 500 MG tablet Take 500 mg by mouth 4 times daily as needed      linaclotide (LINZESS) 145 MCG capsule TAKE ONE CAPSULE BY MOUTH EVERY MORNING BEFORE BREAKFAST 90 capsule 3    estradiol (MINIVELLE) 0.05 MG/24HR Place 1 patch onto the skin Twice a Week 24 patch 3    estradiol (ESTRACE VAGINAL) 0.1 MG/GM vaginal cream Place 1 g vaginally Twice a Week 1 each 3    polyethylene glycol (MIRALAX) 17 GM/SCOOP POWD powder Take 238 g by mouth daily Take as directed for colonoscopy 255 g 0    meloxicam (MOBIC) 7.5 MG tablet Take 1 tablet by mouth 2 times daily as needed for Pain To face/ jaw and mouth 90 tablet 0    Probiotic Product (PROBIOTIC-10 PO) Take by mouth      cetirizine (ZYRTEC) 10 MG tablet Take 10 mg by mouth daily      butalbital-acetaminophen-caffeine (FIORICET, ESGIC) -40 MG per tablet Take 1 tablet by mouth every 6 hours as needed for Headaches 25 tablet 1    VASCEPA 1 g CAPS capsule Take 2 capsules by mouth 2 times daily 120 capsule 3    Cholecalciferol (VITAMIN D3) 1000 units TABS Take 1 tablet by mouth daily 90 tablet 3     No current facility-administered medications on file prior to visit. Pertinent items are noted in HPI  Review of systems reviewed from Patient History Form and available in the patient's chart under the Media tab. No change noted. PHYSICAL EXAMINATION:  Ms. Inze Cooper is a very pleasant 48 y.o.  female who presents today in no acute distress, awake, alert, and oriented. She is well dressed, nourished and  groomed. Patient with normal affect. Height is  5' 4\" (1.626 m), weight is 139 lb (63 kg), Body mass index is 23.86 kg/m². Resting respiratory rate is 16. Examination of the gait, showed that the patient walks heel-toe with a non-antalgic gait and no limp.   Examination of both

## 2023-03-29 ENCOUNTER — PROCEDURE VISIT (OUTPATIENT)
Dept: SPEECH THERAPY | Age: 53
End: 2023-03-29
Payer: COMMERCIAL

## 2023-03-29 DIAGNOSIS — R49.0 DYSPHONIA: Primary | ICD-10-CM

## 2023-03-29 DIAGNOSIS — K21.9 LARYNGOPHARYNGEAL REFLUX (LPR): Primary | ICD-10-CM

## 2023-03-29 DIAGNOSIS — R49.0 MUSCLE TENSION DYSPHONIA: ICD-10-CM

## 2023-03-29 PROCEDURE — 92507 TX SP LANG VOICE COMM INDIV: CPT | Performed by: SPEECH-LANGUAGE PATHOLOGIST

## 2023-03-29 PROCEDURE — 92524 BEHAVRAL QUALIT ANALYS VOICE: CPT | Performed by: SPEECH-LANGUAGE PATHOLOGIST

## 2023-03-29 PROCEDURE — 31579 LARYNGOSCOPY TELESCOPIC: CPT | Performed by: SPEECH-LANGUAGE PATHOLOGIST

## 2023-03-29 RX ORDER — PANTOPRAZOLE SODIUM 40 MG/1
40 TABLET, DELAYED RELEASE ORAL
Qty: 30 TABLET | Refills: 1 | Status: SHIPPED | OUTPATIENT
Start: 2023-03-29

## 2023-03-29 NOTE — Clinical Note
Interested in trial of reflux meds to see if this assists w/ irritation. Should do well with therapy. Thank you!

## 2023-03-29 NOTE — PROGRESS NOTES
acc given mod cues  Pt will perform resistive/abdominal breathing exercises at rest and during phonation w/ 80% acc given mod cues  Pt will complete laryngeal/general relaxation stretches/exercises w/ 80% acc given mod cues    PLAN OF CARE:   SLP recommended: Yes  Barriers to treatment: None  Potential benefits from rehab include: Improved vocal quality  Frequency: 4 sessions over 6-8/wk  Prognosis is: Good    RECOMMENDATIONS:   Dr. Raphael Dempsey was present and reviewed recorded evaluation to assist in diagnosis and provide assessment and plan for treatment. Follow good vocal hygiene behaviors and precautions, including increasing oral hydration. Follow dietary precautions and behavioral lifestyle changes regarding laryngopharyngeal reflux, including taking PPI as prescribed by physician. Voice therapy to focus on re-strengthening and balancing the laryngeal musculature, to promote to open oral front focus, to promote using adequate diaphragmatic breath support to sustain conversational speech. Pt is a good candidate for further medical evaluation/intervention at the discretion of the treating physician. Pt to follow up with ENT/Dr. Raphael Dempsey.       CPT Code Units Billed Time Billed Today Date of POC Start Re-Certification Date Referring Provider   13538, 69171, 33293 3 Unit Time in: 3716  Time out: 1220  Total time: 45 min 3/29/2023 60 days Dr. Raphael Dempsey       Thank you,    Tessa Points) [place], [place], [address] [facility]; [id number]  Voice Specialized Speech-Language Pathologist

## 2023-03-30 ENCOUNTER — TELEPHONE (OUTPATIENT)
Dept: ENT CLINIC | Age: 53
End: 2023-03-30

## 2023-03-30 NOTE — TELEPHONE ENCOUNTER
----- Message from Addison De La Fuente DO sent at 3/29/2023  5:10 PM EDT -----  Pantoprazole (reflux medication requested by SLP) sent to [facility]. Take before dinner.

## 2023-05-03 ENCOUNTER — PROCEDURE VISIT (OUTPATIENT)
Dept: SPEECH THERAPY | Age: 53
End: 2023-05-03
Payer: COMMERCIAL

## 2023-05-03 DIAGNOSIS — R49.0 MUSCLE TENSION DYSPHONIA: ICD-10-CM

## 2023-05-03 DIAGNOSIS — R49.0 DYSPHONIA: Primary | ICD-10-CM

## 2023-05-03 DIAGNOSIS — K21.9 LARYNGOPHARYNGEAL REFLUX (LPR): ICD-10-CM

## 2023-05-03 PROCEDURE — 92507 TX SP LANG VOICE COMM INDIV: CPT | Performed by: SPEECH-LANGUAGE PATHOLOGIST

## 2023-05-03 NOTE — PROGRESS NOTES
[facility] [place]) ENT  Voice Therapy      Pt Seen for Therapy - Session # 2     Diagnosis/Indication:   Primary: Dysphonia  Secondary: MTD  Tertiary: LPR    Background History:   Pt known to this SLP w/ completion of voice tx in 2020; previously seen d/t residual dysphonia post-removal of suspected nodules in 2017. Reported overall doing well until attended wedding in Dec '22; endorsed having to speak over loud music the entire evening, and felt a \"pop\" w/ discomfort. Since, feels voice has returned to previous state w/ roughness, vocal fatigue, and R-sided pain/tension. Returns to ensure no recurrence of nodules, and re-start therapeutic intervention for resolution of symptoms. No dyspnea or dysphagia. No reflux-related symptoms and has not been taking medication. Previous SLP Evaluations: 3/29/23  VLS revealed bilateral smooth & straight TVFs w/o presence of lesion/erythema/hemorrhage; functional movement of arytenoids. Glottic closure characterized as complete w/ grossly functional mucosal wave and periodicity. Mild supraglottic compression (AP>LM). SUBJECTIVE:   Reported doing well but continues to feel voice has increased roughness/fatigue. Unable to elevate volume to shout to spouse when in different rooms. OBJECTIVE:   Voice Therapy    Goal: Session 2 Session 3 Session 4   Pt will adhere to vocal hygiene protocol during daily life activities w/ 80% acc   Pt adhered to vocal hygiene protocol w/ 70% acc    Reported implementing recommendations and does feel improvement after completion of exercises. Ongoing discussion regarding importance of establishing new motor patterns given hx of surgical intervention (ACDF, nodule removal).         Pt will adhere to reflux management protocol during daily life activities w/ 80% acc   Pt adhered to reflux management protocol w/ 75% acc    Taking medication as recommended and focusing on dietary management (reducing caffeine)       Pt will perform SOVT techniques

## 2023-05-04 ENCOUNTER — OFFICE VISIT (OUTPATIENT)
Dept: ORTHOPEDIC SURGERY | Age: 53
End: 2023-05-04

## 2023-05-04 VITALS — HEIGHT: 64 IN | WEIGHT: 136 LBS | BODY MASS INDEX: 23.22 KG/M2

## 2023-05-04 DIAGNOSIS — M25.871 SESAMOIDITIS OF RIGHT FOOT: Primary | ICD-10-CM

## 2023-05-04 RX ORDER — DEXAMETHASONE SODIUM PHOSPHATE 4 MG/ML
INJECTION, SOLUTION INTRA-ARTICULAR; INTRALESIONAL; INTRAMUSCULAR; INTRAVENOUS; SOFT TISSUE
Qty: 30 ML | Refills: 0 | Status: SHIPPED | OUTPATIENT
Start: 2023-05-04

## 2023-05-04 NOTE — PROGRESS NOTES
strength in all four planes, including eversion, and has tenderness on deep palpation over the right great toe medial sesamoid. The ankles are stable to drawer test bilaterally, equally. Ankle reflex 1+ bilaterally. IMAGING: Wanda Mathews were reviewed, 3 views of the right foot taken in office 2/23/2023, and showed no acute fracture, medial sesamoid. MRI right foot dated 3/8/2023 was reviewed and showed;    1. Mild edema in the medial sesamoid bone which could reflect a mild sesamoiditis in the proper clinical setting. No fracture identified. 2. 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 IMPRESSION: Right great toe planter pain/medial sesamoiditis. PLAN: I discussed with the patient the treatment options including both surgical and non-surgical treatment. We recommended stretching exercises of the calf which was taught to the patient today. She will take NSAIDS as needed. Use soft inserts with an area cut out for the sesamoid. I discussed with the patient that I think that she would really benefit from a course of physical therapy for further strengthening and stretching. An Rx for physical therapy with iontophoresis was given to the patient. F/u in 2 months to see how she is progressing. She understands that this may need surgery if the pain did not to resolve.        Reta Dakins, MD

## 2023-05-15 ENCOUNTER — HOSPITAL ENCOUNTER (OUTPATIENT)
Dept: PHYSICAL THERAPY | Age: 53
Setting detail: THERAPIES SERIES
Discharge: HOME | End: 2023-05-15
Payer: COMMERCIAL

## 2023-05-15 PROCEDURE — 97110 THERAPEUTIC EXERCISES: CPT | Performed by: PHYSICAL THERAPIST

## 2023-05-15 PROCEDURE — 97035 APP MDLTY 1+ULTRASOUND EA 15: CPT | Performed by: PHYSICAL THERAPIST

## 2023-05-15 PROCEDURE — 97161 PT EVAL LOW COMPLEX 20 MIN: CPT | Performed by: PHYSICAL THERAPIST

## 2023-05-15 PROCEDURE — 97033 APP MDLTY 1+IONTPHRSIS EA 15: CPT | Performed by: PHYSICAL THERAPIST

## 2023-05-16 NOTE — FLOWSHEET NOTE
[facility] and [address] [place], [place] [address] [address], [address]  Phone: [phone]  Fax [phone]      Physical Therapy Daily Treatment Note  Date:  5/15/2023    Patient Name:  Laz Valencia    :  1970  MRN: [id number]  Restrictions/Precautions:    Medical/Treatment Diagnosis Information:  Diagnosis: M25.871 (ICD-10-CM) - Sesamoiditis of right foot  Treatment Diagnosis: increased pain; decreased ROM  Insurance/Certification information:  PT Insurance Information: BCBS/[place] BCBS  Physician Information:   Malcolm Fish MD  Has the plan of care been signed (Y/N):        []  Yes  [x]  No     Date of Patient follow up with Physician:        Is this a Progress Report:     []  Yes  [x]  No        If Yes:  Date Range for reporting period:  Beginning 5/15/23  Ending     Progress report will be due (10 Rx or 30 days whichever is less):        Recertification will be due (POC Duration  / 90 days whichever is less): 8/15/23         Visit # Insurance Allowable Auth Required   1 30 []  Yes []  No        Functional Scale: LEFS= 58%    Date assessed:  5/15/23       Latex Allergy:  [x]NO      []YES   Preferred Language for Healthcare:   [x]English       []other:       Pain level:  3/10     SUBJECTIVE:  See eval    OBJECTIVE: See eval  Observation:   Test measurements:      RESTRICTIONS/PRECAUTIONS: mild MTP OA; mild sesamoiditis    Exercises/Interventions:     Exercise/Equipment Resistance/Repetitions Other comments   ROM     ABC'S     BAPS     Bottle Roll     Inversion/Eversion     Ankle Pumps     Toe Curls     Rocks     Towels          Stretching     Circles     Toe Extension      Towel Pull 1     Towel Pull 2     ERMI     Incline Stretch [id number]    Pro-Stretch     Hamstring 4w37lft    Stair Stretch 7z33jrj    Calf 1 6d46aqc    Calf 2 2q71qko         Isometrics     Dorsiflexion     Plantarflexion     Inversion     Eversion          PRE's

## 2023-05-16 NOTE — THERAPY EVALUATION
[facility] and [address], [address]  Phone: [phone]  Fax [phone]       Physical Therapy Certification    Dear  Dr. Maria Gore,    We had the pleasure of evaluating the following patient for physical therapy services at [address] [place] [address]. A summary of our findings can be found in the initial assessment below. This includes our plan of care. If you have any questions or concerns regarding these findings, please do not hesitate to contact me at the office phone number checked above. Thank you for the referral.       Physician Signature:_______________________________Date:__________________  By signing above (or electronic signature), therapists plan is approved by physician      Patient: Lorni Olguin   : 1970   MRN: [id number]  Referring Physician:        Evaluation Date: 5/15/2023      Medical Diagnosis Information:  Diagnosis: M25.871 (ICD-10-CM) - Sesamoiditis of right foot   Treatment Diagnosis: increased pain; decreased ROM                                         Insurance information: PT Insurance Information: BCBS/[place] BCBS     Precautions/ Contra-indications:      C-SSRS Triggered by Intake questionnaire (Past 2 wk assessment):   [x] No, Questionnaire did not trigger screening.   [] Yes, Patient intake triggered further evaluation      [] C-SSRS Screening completed  [] PCP notified via Plan of Care  [] Emergency services notified     Latex Allergy:  [x]NO      []YES  Preferred Language for Healthcare:   [x]English       []other:    SUBJECTIVE: Patient stated complaint: Pt has been in a walking boot x 6 weeks for pain in her toe and foot. She has had PT in the past for her Achilles tendon and great toe. She has not played pickleball for a year due to pain. She has tried stretching, foot pads, NSAIDs.  Pt is very active and would like to continue recreational activity without

## 2023-05-17 ENCOUNTER — HOSPITAL ENCOUNTER (OUTPATIENT)
Dept: PHYSICAL THERAPY | Age: 53
Setting detail: THERAPIES SERIES
Discharge: HOME | End: 2023-05-17
Payer: COMMERCIAL

## 2023-05-17 PROCEDURE — 97035 APP MDLTY 1+ULTRASOUND EA 15: CPT | Performed by: PHYSICAL THERAPIST

## 2023-05-17 PROCEDURE — 97110 THERAPEUTIC EXERCISES: CPT | Performed by: PHYSICAL THERAPIST

## 2023-05-17 PROCEDURE — 97033 APP MDLTY 1+IONTPHRSIS EA 15: CPT | Performed by: PHYSICAL THERAPIST

## 2023-05-17 NOTE — FLOWSHEET NOTE
a decrease in pain to facilitate improvement in movement, function, and ADLs as indicated by Functional Deficits. [] Progressing: [] Met: [] Not Met: [] Adjusted     Long Term Goals: To be achieved in: 8 weeks  1. Disability index score of 25% or less for the LEFS to assist with reaching prior level of function. [] Progressing: [] Met: [] Not Met: [] Adjusted  2. Patient will demonstrate increased AROM to WNL to allow for proper joint functioning as indicated by patients Functional Deficits. [] Progressing: [] Met: [] Not Met: [] Adjusted  3. Patient will demonstrate an increase in Strength to good proximal hip strength and control, within 5lb HHD in LE to allow for proper functional mobility as indicated by patients Functional Deficits. [] Progressing: [] Met: [] Not Met: [] Adjusted  4. Patient will return to  functional activities without increased symptoms or restriction. [] Progressing: [] Met: [] Not Met: [] Adjusted  5. Pt will walk/hike with less pain. [] Progressing: [] Met: [] Not Met: [] Adjusted      Overall Progression Towards Functional goals/ Treatment Progress Update:  [] Patient is progressing as expected towards functional goals listed. [] Progression is slowed due to complexities/Impairments listed. [] Progression has been slowed due to co-morbidities.   [x] Plan just implemented, too soon to assess goals progression <30days   [] Goals require adjustment due to lack of progress  [] Patient is not progressing as expected and requires additional follow up with physician  [] Other    Prognosis for POC: [x] Good [] Fair  [] Poor      Patient requires continued skilled intervention: [x] Yes  [] No    Treatment/Activity Tolerance:  [x] Patient able to complete treatment  [] Patient limited by fatigue  [x] Patient limited by pain     [] Patient limited by other medical complications  [x] Other: decreased ROM;increased inflammation      PLAN: Stretching, strengthening, modalities, manual

## 2023-05-22 ENCOUNTER — HOSPITAL ENCOUNTER (OUTPATIENT)
Dept: PHYSICAL THERAPY | Age: 53
Setting detail: THERAPIES SERIES
Discharge: HOME | End: 2023-05-22
Payer: COMMERCIAL

## 2023-05-22 PROCEDURE — 97035 APP MDLTY 1+ULTRASOUND EA 15: CPT | Performed by: PHYSICAL THERAPIST

## 2023-05-22 PROCEDURE — 97110 THERAPEUTIC EXERCISES: CPT | Performed by: PHYSICAL THERAPIST

## 2023-05-22 PROCEDURE — 97033 APP MDLTY 1+IONTPHRSIS EA 15: CPT | Performed by: PHYSICAL THERAPIST

## 2023-05-24 ENCOUNTER — HOSPITAL ENCOUNTER (OUTPATIENT)
Dept: PHYSICAL THERAPY | Age: 53
Setting detail: THERAPIES SERIES
Discharge: HOME | End: 2023-05-24
Payer: COMMERCIAL

## 2023-05-24 PROCEDURE — 97033 APP MDLTY 1+IONTPHRSIS EA 15: CPT | Performed by: PHYSICAL THERAPIST

## 2023-05-24 PROCEDURE — 97110 THERAPEUTIC EXERCISES: CPT | Performed by: PHYSICAL THERAPIST

## 2023-05-24 PROCEDURE — 97035 APP MDLTY 1+ULTRASOUND EA 15: CPT | Performed by: PHYSICAL THERAPIST

## 2023-05-24 NOTE — FLOWSHEET NOTE
[facility] and [address] [place] [address] [address], [address]  Phone: [phone]  Fax [phone]      Physical Therapy Daily Treatment Note  Date:  2023    Patient Name:  Edith Frey    :  1970  MRN: [id number]  Restrictions/Precautions:    Medical/Treatment Diagnosis Information:  Diagnosis: M25.871 (ICD-10-CM) - Sesamoiditis of right foot  Treatment Diagnosis: increased pain; decreased ROM  Insurance/Certification information:  PT Insurance Information: BCBS/[place] BCBS  Physician Information:   Honey Alvarez MD  Has the plan of care been signed (Y/N):        []  Yes  [x]  No     Date of Patient follow up with Physician:  23      Is this a Progress Report:     []  Yes  [x]  No        If Yes:  Date Range for reporting period:  Beginning 5/15/23  Ending     Progress report will be due (10 Rx or 30 days whichever is less): 09       Recertification will be due (POC Duration  / 90 days whichever is less): 8/15/23         Visit # Insurance Allowable Auth Required   2 30 []  Yes []  No        Functional Scale: LEFS= 58%    Date assessed:  5/15/23       Latex Allergy:  [x]NO      []YES   Preferred Language for Healthcare:   [x]English       []other:       Pain level:  3/10     SUBJECTIVE:  See eval    OBJECTIVE: See eval  Observation:   Test measurements:      RESTRICTIONS/PRECAUTIONS: mild MTP OA; mild sesamoiditis    Exercises/Interventions:     Exercise/Equipment Resistance/Repetitions Other comments   ROM     ABC'S     BAPS     Bottle Roll     Inversion/Eversion     Ankle Pumps     Toe Curls     Rocks     Towels          Stretching     Circles     Toe Extension      Towel Pull 1     Towel Pull 2     ERMI     Incline Stretch [id number]    Pro-Stretch     Hamstring 1c50nru    Stair Stretch 9e09blg    Calf 1 9h39vgs    Calf 2 5g14kke         Isometrics     Dorsiflexion     Plantarflexion     Inversion     Eversion          PRE's

## 2023-05-30 RX ORDER — FLUCONAZOLE 150 MG/1
150 TABLET ORAL ONCE
Qty: 1 TABLET | Refills: 1 | Status: SHIPPED | OUTPATIENT
Start: 2023-05-30 | End: 2023-05-30

## 2023-05-31 ENCOUNTER — E-VISIT (OUTPATIENT)
Dept: GYNECOLOGY | Age: 53
End: 2023-05-31
Payer: COMMERCIAL

## 2023-05-31 ENCOUNTER — HOSPITAL ENCOUNTER (OUTPATIENT)
Dept: PHYSICAL THERAPY | Age: 53
Setting detail: THERAPIES SERIES
Discharge: HOME | End: 2023-05-31
Payer: COMMERCIAL

## 2023-05-31 DIAGNOSIS — N89.8 VAGINAL DISCHARGE: Primary | ICD-10-CM

## 2023-05-31 PROCEDURE — 97035 APP MDLTY 1+ULTRASOUND EA 15: CPT | Performed by: PHYSICAL THERAPIST

## 2023-05-31 PROCEDURE — 97033 APP MDLTY 1+IONTPHRSIS EA 15: CPT | Performed by: PHYSICAL THERAPIST

## 2023-05-31 PROCEDURE — 97110 THERAPEUTIC EXERCISES: CPT | Performed by: PHYSICAL THERAPIST

## 2023-05-31 PROCEDURE — 99421 OL DIG E/M SVC 5-10 MIN: CPT | Performed by: OBSTETRICS & GYNECOLOGY

## 2023-05-31 RX ORDER — ESTRADIOL 0.05 MG/D
PATCH, EXTENDED RELEASE TRANSDERMAL
Qty: 24 PATCH | Refills: 3 | Status: SHIPPED | OUTPATIENT
Start: 2023-05-31

## 2023-05-31 RX ORDER — FLUCONAZOLE 150 MG/1
150 TABLET ORAL ONCE
Qty: 1 TABLET | Refills: 1 | Status: SHIPPED | OUTPATIENT
Start: 2023-05-31 | End: 2023-05-31

## 2023-05-31 RX ORDER — METRONIDAZOLE 7.5 MG/G
1 GEL VAGINAL NIGHTLY
Qty: 1 EACH | Refills: 1 | Status: SHIPPED | OUTPATIENT
Start: 2023-05-31 | End: 2023-06-05

## 2023-05-31 NOTE — TELEPHONE ENCOUNTER
From: Dr. Carol Abad  To: Darryle Collard  Sent: 5/31/2023 12:37 PM EDT  Subject: vaginal discharge    Hello,     I called in:    Metrogel vaginal cream to use nightly for 5 nights. Diflucan 150 mg to take one time with one refill. Please let me know if you do not feel better. As far as your hemorrhoids-contact your pcp about this. Or you can try some Anusol over the counter ointment at night for this.      Thank you,    Mukesh Francis

## 2023-05-31 NOTE — PROGRESS NOTES
Patient is a 48year old F with vaginal discharge and itching/irritation.  Patient has used cream and yeast pill that has helped in the past.     Review of Systems: as above  General ROS: negative  Psychological ROS: negative  Ophthalmic ROS: negative  ENT ROS: negative  Allergy and Immunology ROS: negative  Hematological and Lymphatic ROS: negative  Endocrine ROS: negative  Breast ROS: negative  Respiratory ROS: negative  Cardiovascular ROS: negative  Gastrointestinal ROS: negative  Genito-Urinary ROS: as above  Musculoskeletal ROS: negative  Neurological ROS: negative  Dermatological ROS: negative     Date of Birth 1970  Past Medical History:   Diagnosis Date    Acid reflux     Allergic rhinitis     Anxiety     Atrophic vaginitis     DDD (degenerative disc disease), cervical     Dysmenorrhea     Dyspareunia in female     Esophagitis 2019    Fibrocystic breast     Gastritis 2019    HPV (human papilloma virus) infection 1992    Exposed back in college    Hyperlipidemia     Internal hemorrhoids 2022    per colonoscopy - Dr. Shanika Hernández    Interstitial cystitis 10/23/2017    Iron deficiency anemia 2017    Menopause ovarian failure     Menorrhagia     PONV (postoperative nausea and vomiting)     Trigeminal neuralgia     Vitamin D deficiency 2019    Vocal cord nodule 2016     Past Surgical History:   Procedure Laterality Date    APPENDECTOMY  1996    CERVICAL FUSION  2013    C4-6    COLONOSCOPY N/A 2022    COLONOSCOPY, POSSIBLE POLYPECTOMY performed by Mary Castro MD at [address] [place] [address]     bilateral, right    HYSTERECTOMY (CERVIX STATUS UNKNOWN)      LARYNGOSCOPY      PARTIAL HYSTERECTOMY (CERVIX NOT REMOVED)      DUB -     UPPER GASTROINTESTINAL ENDOSCOPY  2019     OB History    Para Term  AB Living   3 3 3     3   SAB IAB Ectopic Molar Multiple Live Births             3      # Outcome Date GA Lbr Ray/2nd Weight Sex

## 2023-05-31 NOTE — FLOWSHEET NOTE
physician  [] Other    Prognosis for POC: [x] Good [] Fair  [] Poor      Patient requires continued skilled intervention: [x] Yes  [] No    Treatment/Activity Tolerance:  [x] Patient able to complete treatment  [] Patient limited by fatigue  [x] Patient limited by pain     [] Patient limited by other medical complications  [x] Other: Pt has pain with walking. Pain is located at the 1st MTP. We has discussed pathology, foot posture and exercises. Impressions made today for bilateral foot orthotics. PLAN: Stretching, strengthening, modalities, manual therapy as needed. Consider custom orthotics. [x] Continue per plan of care [] Alter current plan (see comments above)  [] Plan of care initiated [] Hold pending MD visit [] Discharge      Electronically signed by:  Leanna Arroyo PT    Note: If patient does not return for scheduled/ recommended follow up visits, this note will serve as a discharge from care along with most recent update on progress.

## 2023-06-02 ENCOUNTER — OFFICE VISIT (OUTPATIENT)
Age: 53
End: 2023-06-02
Payer: COMMERCIAL

## 2023-06-02 DIAGNOSIS — K59.00 CONSTIPATION, UNSPECIFIED CONSTIPATION TYPE: ICD-10-CM

## 2023-06-02 DIAGNOSIS — M79.671 RIGHT FOOT PAIN: ICD-10-CM

## 2023-06-02 DIAGNOSIS — N89.8 VAGINAL IRRITATION: Primary | ICD-10-CM

## 2023-06-02 DIAGNOSIS — M79.644 PAIN OF RIGHT MIDDLE FINGER: ICD-10-CM

## 2023-06-02 DIAGNOSIS — L72.0 INCLUSION CYST: ICD-10-CM

## 2023-06-02 PROCEDURE — 99214 OFFICE O/P EST MOD 30 MIN: CPT | Performed by: FAMILY MEDICINE

## 2023-06-05 ENCOUNTER — HOSPITAL ENCOUNTER (OUTPATIENT)
Dept: PHYSICAL THERAPY | Age: 53
Setting detail: THERAPIES SERIES
Discharge: HOME | End: 2023-06-05
Payer: COMMERCIAL

## 2023-06-05 PROCEDURE — 97110 THERAPEUTIC EXERCISES: CPT | Performed by: PHYSICAL THERAPIST

## 2023-06-05 PROCEDURE — 97035 APP MDLTY 1+ULTRASOUND EA 15: CPT | Performed by: PHYSICAL THERAPIST

## 2023-06-05 PROCEDURE — 97033 APP MDLTY 1+IONTPHRSIS EA 15: CPT | Performed by: PHYSICAL THERAPIST

## 2023-06-05 NOTE — FLOWSHEET NOTE
The [address], [address]  Phone: [phone]  Fax [phone]      Physical Therapy Daily Treatment Note  Date:  2023    Patient Name:  Niya Cheung    :  1970  MRN: [id number]  Restrictions/Precautions:    Medical/Treatment Diagnosis Information:  Diagnosis: M25.871 (ICD-10-CM) - Sesamoiditis of right foot  Treatment Diagnosis: increased pain; decreased ROM  Insurance/Certification information:  PT Insurance Information: BCBS/[place] BCBS  Physician Information:   Georgina Yadav MD  Has the plan of care been signed (Y/N):        []  Yes  [x]  No     Date of Patient follow up with Physician:  23      Is this a Progress Report:     []  Yes  [x]  No        If Yes:  Date Range for reporting period:  Beginning 5/15/23  Ending     Progress report will be due (10 Rx or 30 days whichever is less):        Recertification will be due (POC Duration  / 90 days whichever is less): 8/15/23         Visit # Insurance Allowable Auth Required   6 30 []  Yes []  No        Functional Scale: LEFS= 58%    Date assessed:  5/15/23       Latex Allergy:  [x]NO      []YES   Preferred Language for Healthcare:   [x]English       []other:       Pain level:  3/10     SUBJECTIVE:  Pt has less pain since decreading toe exercises to 3x/week. She continues to do stretching daily. OBJECTIVE:   Observation: great toe valgus(toe spacer); high arch posture; consider met pad?   Test measurements:      RESTRICTIONS/PRECAUTIONS: mild MTP OA; mild sesamoiditis    Exercises/Interventions: right foot    Exercise/Equipment Resistance/Repetitions Other comments   ROM     ABC'S     BAPS     Bottle Roll     Inversion/Eversion     Ankle Pumps     Toe Curls     Rocks 2 rounds    Towels          Stretching     Circles     Toe Extension      Towel Pull 1     Towel Pull 2     ERMI     Incline Stretch 8y90lst    Pro-Stretch     Hamstring

## 2023-06-07 ENCOUNTER — TELEPHONE (OUTPATIENT)
Age: 53
End: 2023-06-07

## 2023-06-07 NOTE — TELEPHONE ENCOUNTER
Patient got note about labs being normal. What would be the next step for her finger swelling? It is the middle right finger that swells every morning. Please call patient to advise.      Thank you

## 2023-06-07 NOTE — TELEPHONE ENCOUNTER
Tried to call but had to leave a message. A [employer] message was sent to the patient as well.

## 2023-06-08 ENCOUNTER — HOSPITAL ENCOUNTER (OUTPATIENT)
Dept: PHYSICAL THERAPY | Age: 53
Setting detail: THERAPIES SERIES
Discharge: HOME | End: 2023-06-08
Payer: COMMERCIAL

## 2023-06-08 PROCEDURE — 97035 APP MDLTY 1+ULTRASOUND EA 15: CPT | Performed by: PHYSICAL THERAPIST

## 2023-06-08 PROCEDURE — 97033 APP MDLTY 1+IONTPHRSIS EA 15: CPT | Performed by: PHYSICAL THERAPIST

## 2023-06-08 PROCEDURE — 97110 THERAPEUTIC EXERCISES: CPT | Performed by: PHYSICAL THERAPIST

## 2023-06-08 NOTE — FLOWSHEET NOTE
progressing as expected towards functional goals listed. [] Progression is slowed due to complexities/Impairments listed. [] Progression has been slowed due to co-morbidities. [x] Plan just implemented, too soon to assess goals progression <30days   [] Goals require adjustment due to lack of progress  [] Patient is not progressing as expected and requires additional follow up with physician  [] Other    Prognosis for POC: [x] Good [] Fair  [] Poor      Patient requires continued skilled intervention: [x] Yes  [] No    Treatment/Activity Tolerance:  [x] Patient able to complete treatment  [] Patient limited by fatigue  [x] Patient limited by pain     [] Patient limited by other medical complications  [x] Other: Pt will continue stretching daily and strengthening 3x/week. Sesamoiditis appears to be improving. Less pain at the medial bunion area. Initiated additional strengthening exercises today. PLAN: Stretching, strengthening, modalities, manual therapy as needed. Consider custom orthotics. [x] Continue per plan of care [] Alter current plan (see comments above)  [] Plan of care initiated [] Hold pending MD visit [] Discharge      Electronically signed by:  Denis Enamorado PT    Note: If patient does not return for scheduled/ recommended follow up visits, this note will serve as a discharge from care along with most recent update on progress.

## 2023-06-12 ENCOUNTER — APPOINTMENT (OUTPATIENT)
Dept: PHYSICAL THERAPY | Age: 53
End: 2023-06-12
Payer: COMMERCIAL

## 2023-06-19 ENCOUNTER — TELEPHONE (OUTPATIENT)
Age: 53
End: 2023-06-19

## 2023-06-19 DIAGNOSIS — N89.8 VAGINAL DISCHARGE: ICD-10-CM

## 2023-06-19 DIAGNOSIS — N89.8 VAGINAL IRRITATION: ICD-10-CM

## 2023-06-19 RX ORDER — NYSTATIN 100000 [USP'U]/G
POWDER TOPICAL 2 TIMES DAILY
Qty: 60 G | Refills: 0 | Status: SHIPPED | OUTPATIENT
Start: 2023-06-19

## 2023-06-19 RX ORDER — FLUCONAZOLE 150 MG/1
150 TABLET ORAL ONCE
Qty: 1 TABLET | Refills: 1 | Status: CANCELLED | OUTPATIENT
Start: 2023-06-19 | End: 2023-06-19

## 2023-06-19 NOTE — TELEPHONE ENCOUNTER
MARNIEM to viktor back. [employer] message sent about rx. The patient was advised and counseled regarding advanced directives.   The patient was provided with an information packet

## 2023-06-19 NOTE — TELEPHONE ENCOUNTER
[place], she is experiencing vaginal irritation and OTC methods are not helping. Please call pt to advise.

## 2023-06-19 NOTE — TELEPHONE ENCOUNTER
Spoke w/ pt. [place]. Has taken one diflucan last night and started using vaginal cream last night. Does not know if it is bacterial or yeast related. Spoke w/ MD. One diflucan pill is normally prescribed , continue using cream, wait for 3 day to see if s/s worsen or improve. Pt informed about continuing cream, waiting on 3 days to pass for pill to determine effectiveness. Pt reports increase in frequency (every 30 minutes) not a lot each time. Urine having abnormal odor. General redness and irritation- similar to other flare ups. Has itching and discomfort. No ABD pain or back pain. No noticeable blood in urine. Pt asking for nystatin power to be called in. Pt will update in 3 days about condition.

## 2023-06-20 ENCOUNTER — TELEPHONE (OUTPATIENT)
Age: 53
End: 2023-06-20

## 2023-06-20 NOTE — TELEPHONE ENCOUNTER
MARNIEM to call back. If she calls can you get a good pharmacy for her. Possible UTI and we will call in abx for her. She is currently out of town and local one will not work.

## 2023-07-05 ENCOUNTER — OFFICE VISIT (OUTPATIENT)
Age: 53
End: 2023-07-05
Payer: COMMERCIAL

## 2023-07-05 ENCOUNTER — HOSPITAL ENCOUNTER (OUTPATIENT)
Dept: PHYSICAL THERAPY | Age: 53
Setting detail: THERAPIES SERIES
Discharge: HOME | End: 2023-07-05
Payer: COMMERCIAL

## 2023-07-05 ENCOUNTER — TELEPHONE (OUTPATIENT)
Age: 53
End: 2023-07-05

## 2023-07-05 ENCOUNTER — APPOINTMENT (OUTPATIENT)
Dept: PHYSICAL THERAPY | Age: 53
End: 2023-07-05
Payer: COMMERCIAL

## 2023-07-05 VITALS
DIASTOLIC BLOOD PRESSURE: 70 MMHG | BODY MASS INDEX: 22.91 KG/M2 | OXYGEN SATURATION: 99 % | HEART RATE: 72 BPM | SYSTOLIC BLOOD PRESSURE: 108 MMHG | RESPIRATION RATE: 14 BRPM | HEIGHT: 64 IN | WEIGHT: 134.2 LBS | TEMPERATURE: 97.6 F

## 2023-07-05 DIAGNOSIS — N89.8 VAGINAL IRRITATION: Primary | ICD-10-CM

## 2023-07-05 DIAGNOSIS — R30.0 DYSURIA: ICD-10-CM

## 2023-07-05 LAB
BILIRUBIN, POC: NORMAL
BLOOD URINE, POC: NORMAL
CLARITY, POC: CLEAR
COLOR, POC: COLORLESS
GLUCOSE URINE, POC: NORMAL
KETONES, POC: NORMAL
LEUKOCYTE EST, POC: NORMAL
NITRITE, POC: NORMAL
PH, POC: 6
PROTEIN, POC: NORMAL
SPECIFIC GRAVITY, POC: 1.01
UROBILINOGEN, POC: 0.2

## 2023-07-05 PROCEDURE — 97035 APP MDLTY 1+ULTRASOUND EA 15: CPT | Performed by: PHYSICAL THERAPIST

## 2023-07-05 PROCEDURE — 97110 THERAPEUTIC EXERCISES: CPT | Performed by: PHYSICAL THERAPIST

## 2023-07-05 PROCEDURE — 81002 URINALYSIS NONAUTO W/O SCOPE: CPT | Performed by: FAMILY MEDICINE

## 2023-07-05 PROCEDURE — 99214 OFFICE O/P EST MOD 30 MIN: CPT | Performed by: FAMILY MEDICINE

## 2023-07-05 PROCEDURE — 97033 APP MDLTY 1+IONTPHRSIS EA 15: CPT | Performed by: PHYSICAL THERAPIST

## 2023-07-05 NOTE — FLOWSHEET NOTE
cueing for activities related to improving balance, coordination, kinesthetic sense, posture, motor skill, proprioception  to assist with LE, proximal hip, and core control in self care, mobility, lifting, ambulation and eccentric single leg control. NMR and Therapeutic Activities:    [] (63051 or 08409) Provided verbal/tactile cueing for activities related to improving balance, coordination, kinesthetic sense, posture, motor skill, proprioception and motor activation to allow for proper function of core, proximal hip and LE with self care and ADLs  [] (65085) Gait Re-education- Provided training and instruction to the patient for proper LE, core and proximal hip recruitment and positioning and eccentric body weight control with ambulation re-education including up and down stairs     Home Exercise Program:    [x] (62990) Reviewed/Progressed HEP activities related to strengthening, flexibility, endurance, ROM of core, proximal hip and LE for functional self-care, mobility, lifting and ambulation/stair navigation   [] (05847)Reviewed/Progressed HEP activities related to improving balance, coordination, kinesthetic sense, posture, motor skill, proprioception of core, proximal hip and LE for self care, mobility, lifting, and ambulation/stair navigation      Manual Treatments:  PROM / STM / Oscillations-Mobs:  G-I, II, III, IV (PA's, Inf., Post.)  [] (45935) Provided manual therapy to mobilize LE, proximal hip and/or LS spine soft tissue/joints for the purpose of modulating pain, promoting relaxation,  increasing ROM, reducing/eliminating soft tissue swelling/inflammation/restriction, improving soft tissue extensibility and allowing for proper ROM for normal function with self care, mobility, lifting and ambulation. Modalities:  US x 8 min at 50%;  Ionto patch    Charges:  Timed Code Treatment Minutes: 43   Total Treatment Minutes: 43     [] EVAL (LOW) 29129 (typically 20 minutes face-to-face)  [] EVAL (MOD)

## 2023-07-05 NOTE — TELEPHONE ENCOUNTER
Pts urine has already been discarded. Unable to obtain collection for culture. Can have pt come back and give specimen for collection or have pt go to lab. Will advise pt.

## 2023-07-05 NOTE — TELEPHONE ENCOUNTER
Patient has been having vaginal redness, inflammation, and irritation for 3 weeks. She has tried Monistat and Nystatin with no relief. Do not see any openings for today. Ok to add on at noon? She could not make the appointments I offered tomorrow. Please call patient to advise.      Thank you

## 2023-07-06 ENCOUNTER — OFFICE VISIT (OUTPATIENT)
Dept: ORTHOPEDIC SURGERY | Age: 53
End: 2023-07-06

## 2023-07-06 VITALS — HEIGHT: 64 IN | BODY MASS INDEX: 22.88 KG/M2 | WEIGHT: 134 LBS

## 2023-07-06 DIAGNOSIS — M25.871 SESAMOIDITIS OF RIGHT FOOT: Primary | ICD-10-CM

## 2023-07-06 DIAGNOSIS — M20.21 HALLUX RIGIDUS OF RIGHT FOOT: ICD-10-CM

## 2023-07-06 LAB
C TRACH DNA SPEC QL NAA+PROBE: NOT DETECTED
CANDIDA RRNA VAG QL PROBE: NOT DETECTED
CANDIDA RRNA VAG QL PROBE: NOT DETECTED
CARBAPENEM RESISTANCE OXA-48 GENE BY PCR: NOT DETECTED
CEPHALOSPORIN RESISTANCE AMPC GENE: NOT DETECTED
ESBL RESISTANCE: NOT DETECTED
G VAGINALIS RRNA GENITAL QL PROBE: NOT DETECTED
M HOMINIS DNA BLD QL NAA+PROBE: NOT DETECTED
MACROLIDE RESISTANCE: NOT DETECTED
METHICILLIN RESISTANCE: NOT DETECTED
MYCOPLASMA DNA SPEC QL NAA+PROBE: NOT DETECTED
N GONORRHOEA DNA SPEC QL NAA+PROBE: NOT DETECTED
OTHER MICROORG DNA SPEC QL NAA+PROBE: NOT DETECTED
OTHER MICROORG DNA SPEC QL NAA+PROBE: NOT DETECTED
QUINOLONE AND FLUOROQUINOLONE RESISTANCE: NOT DETECTED
T VAGINALIS RRNA SPEC QL NAA+PROBE: NOT DETECTED
TETRACYCLINE RESISTANCE: NOT DETECTED
TRIMETHOPRIM/SULFONAMIDE RESISTANCE: NOT DETECTED

## 2023-07-06 NOTE — PROGRESS NOTES
dexamethasone (DECADRON) 4 MG/ML injection 1.55cc applied to pad per physical therapy visit 30 mL 0    gabapentin (NEURONTIN) 100 MG capsule TAKE 1 CAPSULE BY MOUTH 3 TIMES A DAY (Patient not taking: Reported on 7/5/2023) 270 capsule 3    pantoprazole (PROTONIX) 40 MG tablet Take 1 tablet by mouth Daily with supper (Patient taking differently: Take 1 tablet by mouth as needed) 30 tablet 1    docusate sodium (COLACE) 100 MG capsule Take 1 capsule by mouth 2 times daily 90 capsule 3    Psyllium (METAMUCIL) 48.57 % POWD In 8 oz of water daily. 822 g 3    hydrOXYzine HCl (ATARAX) 10 MG tablet Take 2.5 tablets by mouth 3 times daily as needed for Anxiety (sleeping difficulties) 40 tablet 1    Melatonin ER 5 MG TBCR Take 1 po qhs 90 tablet 3    linaclotide (LINZESS) 145 MCG capsule TAKE ONE CAPSULE BY MOUTH EVERY MORNING BEFORE BREAKFAST 90 capsule 3    estradiol (ESTRACE VAGINAL) 0.1 MG/GM vaginal cream Place 1 g vaginally Twice a Week 1 each 3    polyethylene glycol (MIRALAX) 17 GM/SCOOP POWD powder Take 238 g by mouth daily Take as directed for colonoscopy 255 g 0    meloxicam (MOBIC) 7.5 MG tablet Take 1 tablet by mouth 2 times daily as needed for Pain To face/ jaw and mouth 90 tablet 0    Probiotic Product (PROBIOTIC-10 PO) Take by mouth      cetirizine (ZYRTEC) 10 MG tablet Take 1 tablet by mouth daily      butalbital-acetaminophen-caffeine (FIORICET, ESGIC) -40 MG per tablet Take 1 tablet by mouth every 6 hours as needed for Headaches 25 tablet 1     No current facility-administered medications on file prior to visit. Pertinent items are noted in HPI  Review of systems reviewed from Patient History Form and available in the patient's chart under the Media tab. No change noted. PHYSICAL EXAMINATION:  Ms. Mary Ann Browne is a very pleasant 48 y.o.  female who presents today in no acute distress, awake, alert, and oriented. She is well dressed, nourished and  groomed. Patient with normal affect.

## 2023-07-07 ENCOUNTER — APPOINTMENT (OUTPATIENT)
Dept: PHYSICAL THERAPY | Age: 53
End: 2023-07-07
Payer: COMMERCIAL

## 2023-07-08 LAB — BACTERIA GENITAL AEROBE CULT: NORMAL

## 2023-07-10 ENCOUNTER — HOSPITAL ENCOUNTER (OUTPATIENT)
Dept: PHYSICAL THERAPY | Age: 53
Setting detail: THERAPIES SERIES
Discharge: HOME | End: 2023-07-10
Payer: COMMERCIAL

## 2023-07-10 ENCOUNTER — OFFICE VISIT (OUTPATIENT)
Age: 53
End: 2023-07-10
Payer: COMMERCIAL

## 2023-07-10 VITALS
OXYGEN SATURATION: 98 % | BODY MASS INDEX: 23.15 KG/M2 | HEIGHT: 64 IN | SYSTOLIC BLOOD PRESSURE: 118 MMHG | RESPIRATION RATE: 14 BRPM | WEIGHT: 135.6 LBS | DIASTOLIC BLOOD PRESSURE: 76 MMHG | HEART RATE: 78 BPM | TEMPERATURE: 97.4 F

## 2023-07-10 DIAGNOSIS — E55.9 VITAMIN D DEFICIENCY: ICD-10-CM

## 2023-07-10 DIAGNOSIS — Z12.31 ENCOUNTER FOR SCREENING MAMMOGRAM FOR MALIGNANT NEOPLASM OF BREAST: ICD-10-CM

## 2023-07-10 DIAGNOSIS — N30.10 INTERSTITIAL CYSTITIS: ICD-10-CM

## 2023-07-10 DIAGNOSIS — J30.9 ALLERGIC RHINITIS, UNSPECIFIED SEASONALITY, UNSPECIFIED TRIGGER: ICD-10-CM

## 2023-07-10 DIAGNOSIS — Z00.00 ROUTINE GENERAL MEDICAL EXAMINATION AT A HEALTH CARE FACILITY: Primary | ICD-10-CM

## 2023-07-10 DIAGNOSIS — K64.4 INTERNAL AND EXTERNAL HEMORRHOIDS WITHOUT COMPLICATION: ICD-10-CM

## 2023-07-10 DIAGNOSIS — M79.671 RIGHT FOOT PAIN: ICD-10-CM

## 2023-07-10 DIAGNOSIS — N95.1 VAGINAL DRYNESS, MENOPAUSAL: ICD-10-CM

## 2023-07-10 DIAGNOSIS — M50.30 DDD (DEGENERATIVE DISC DISEASE), CERVICAL: ICD-10-CM

## 2023-07-10 DIAGNOSIS — G50.0 TRIGEMINAL NEURALGIA: ICD-10-CM

## 2023-07-10 DIAGNOSIS — N95.2 ATROPHIC VAGINITIS: ICD-10-CM

## 2023-07-10 DIAGNOSIS — K64.8 INTERNAL AND EXTERNAL HEMORRHOIDS WITHOUT COMPLICATION: ICD-10-CM

## 2023-07-10 PROCEDURE — 99396 PREV VISIT EST AGE 40-64: CPT | Performed by: FAMILY MEDICINE

## 2023-07-10 PROCEDURE — 97110 THERAPEUTIC EXERCISES: CPT | Performed by: PHYSICAL THERAPIST

## 2023-07-10 PROCEDURE — 99213 OFFICE O/P EST LOW 20 MIN: CPT | Performed by: FAMILY MEDICINE

## 2023-07-10 PROCEDURE — 97033 APP MDLTY 1+IONTPHRSIS EA 15: CPT | Performed by: PHYSICAL THERAPIST

## 2023-07-10 PROCEDURE — 97035 APP MDLTY 1+ULTRASOUND EA 15: CPT | Performed by: PHYSICAL THERAPIST

## 2023-07-10 RX ORDER — ESTRADIOL 0.1 MG/G
1 CREAM VAGINAL
Qty: 1 EACH | Refills: 3 | Status: SHIPPED | OUTPATIENT
Start: 2023-07-10

## 2023-07-10 NOTE — PATIENT INSTRUCTIONS
Exercise goal is to increase cardiovascular exercise to = 150 minutes / week . Exercise goal is to incorporate strength training, including weights / resistance bands/ pilates/ yoga/ 40 minutes per week.      Increase fish intake -salmon  Increase nuts -walnuts & almonds

## 2023-07-10 NOTE — PROGRESS NOTES
Incline Stretch [id number]    Pro-Stretch     Hamstring [id number]    Stair Stretch 0u48fst    Calf 1 4t54alp    Calf 2 6i69qnh         Isometrics     Dorsiflexion     Plantarflexion     Inversion     Eversion          PRE's     Dorsiflexion     Plantarflexion    Inversion    Eversion    Heel walk    Toe walk    SLR    Calf Raises    Step Up     Knee Extension     Hamstring Curls     Leg Press          Balance:     Rocker Board     BOSU     SLS    Aeromat     Foam Roll     Plyoback     Tandem Stance     Biodex          Bike     Treadmill          Manual interventions     Orthotics        Patient Education:  Access Code: [email or web address]  URL: [employer]/  Date: 06/08/2023  Prepared by: Con White    Exercises  - Soleus Stretch on Wall (Mirrored)  - 1 x daily - 7 x weekly - 3 reps - 30 sec hold  - Standing Gastroc Stretch on Foam 1/2 Roll (Mirrored)  - 1 x daily - 7 x weekly - 3 reps - 30 sec hold  - Standing Bilateral Gastroc Stretch with Step  - 1 x daily - 7 x weekly - 3 reps - 30 sec hold  - Seated Table Hamstring Stretch (Mirrored)  - 1 x daily - 7 x weekly - 3 reps - 30sec hold  - Slant Board Gastrocnemius Stretch  - 1 x daily - 7 x weekly - 5 reps - 30 sec hold  - Gastroc Stretch on Wall (Mirrored)  - 1 x daily - 7 x weekly - 3-5 reps - 30 sec hold  - Single Leg Stance (Mirrored)  - 3 x weekly - 3 reps - 30 sec hold  - Standing Heel Raise  - 3 x weekly - 1 sets - 10 reps  - Seated Heel Raise  - 3 x weekly - 1 sets - 10 reps  - Towel Scrunches  - 3 x weekly - 3 sets - 10 reps  - Great Toe Flexion with Resistance (Mirrored)  - 3 x weekly - 2 sets - 10 reps  - Seated Great Toe Extension  - 3 x weekly - 1 sets - 10 reps  - Seated Lesser Toes Extension  - 3 x weekly - 1 sets - 10 reps    Therapeutic Exercise and NMR EXR  [x] (35257) Provided verbal/tactile cueing for activities related to strengthening, flexibility, endurance, ROM for improvements in LE, proximal hip, and core control with self care,

## 2023-07-11 ENCOUNTER — OFFICE VISIT (OUTPATIENT)
Dept: ORTHOPEDIC SURGERY | Age: 53
End: 2023-07-11

## 2023-07-11 VITALS — HEIGHT: 64 IN | RESPIRATION RATE: 15 BRPM | BODY MASS INDEX: 23.05 KG/M2 | WEIGHT: 135 LBS

## 2023-07-11 DIAGNOSIS — M65.30 TRIGGER FINGER, ACQUIRED: Primary | ICD-10-CM

## 2023-07-11 RX ORDER — LIDOCAINE HYDROCHLORIDE 10 MG/ML
0.5 INJECTION, SOLUTION INFILTRATION; PERINEURAL ONCE
Status: COMPLETED | OUTPATIENT
Start: 2023-07-11 | End: 2023-07-11

## 2023-07-11 RX ORDER — TRIAMCINOLONE ACETONIDE 40 MG/ML
20 INJECTION, SUSPENSION INTRA-ARTICULAR; INTRAMUSCULAR ONCE
Status: COMPLETED | OUTPATIENT
Start: 2023-07-11 | End: 2023-07-11

## 2023-07-11 RX ADMIN — TRIAMCINOLONE ACETONIDE 20 MG: 40 INJECTION, SUSPENSION INTRA-ARTICULAR; INTRAMUSCULAR at 12:02

## 2023-07-11 RX ADMIN — LIDOCAINE HYDROCHLORIDE 0.5 ML: 10 INJECTION, SOLUTION INFILTRATION; PERINEURAL at 12:03

## 2023-07-11 NOTE — PROGRESS NOTES
I last examined this patient 1 1/2 years ago at which time I injected the right middle finger for treatment of trigger finger. She obtained prompt and complete relief of all symptoms. Unfortunately, the condition has recurred and the patient returns to the office requesting additional treatment. She complains of pain, swelling, catching and stiffness of the digit for the past few weeks. Symptoms have become worse recently. The patient's social history, past medical history, family history, medications, allergies and review of systems have been reviewed, dated 2/23/23 and are recorded in the chart. I have personally examined and reviewed all previous imaging studies, laboratory tests(Urinalysis, CMP, Rheumatoid Factor, ESR, CYNDI, Uric Acid) and medical encounters pertinent to this patient's visit today. On examination there is mild soft tissue swelling of the digit. There is no deformity. There is tenderness, thickening and nodularity at the base of the flexor tendon sheath. Range of motion is slightly limited in flexion and extension. The digit sticks in flexion and pops into extension, accompanied by some pain. Skin is intact without lesions. Distal circulation and sensation are intact. Muscle strength and coordination are normal.  Reflexes and present bilaterally. Joints are stable. There are no subcutaneous nodules or enlarged epitrochlear lymph nodes. Impression:  Recurrent right middle finger trigger digit. The nature of this medical problem is fully discussed with the patient, including all treatment options, as well as the pertinent risks, complications, prognosis and post treatment care. All questions are answered. The right  hand is prepared with Betadine and alcohol and the flexor tendon sheath of the right middle finger is injected with 1/2 milliliter of 1% lidocaine and 20 mg.of triamcinalone, with good filling.   The patient is advised regarding the expected response and

## 2023-07-13 ENCOUNTER — HOSPITAL ENCOUNTER (OUTPATIENT)
Dept: PHYSICAL THERAPY | Age: 53
Setting detail: THERAPIES SERIES
Discharge: HOME | End: 2023-07-13
Payer: COMMERCIAL

## 2023-07-13 PROCEDURE — 97033 APP MDLTY 1+IONTPHRSIS EA 15: CPT | Performed by: PHYSICAL THERAPIST

## 2023-07-13 PROCEDURE — 97110 THERAPEUTIC EXERCISES: CPT | Performed by: PHYSICAL THERAPIST

## 2023-07-13 PROCEDURE — 97035 APP MDLTY 1+ULTRASOUND EA 15: CPT | Performed by: PHYSICAL THERAPIST

## 2023-07-14 NOTE — FLOWSHEET NOTE
[facility] [address] [facility]  [address], [address] [facility] [address], [address]  Phone: [phone]  Fax [phone]      Physical Therapy Daily Treatment Note  Date:  2023    Patient Name:  Elisa Medina    :  1970  MRN: [id number]  Restrictions/Precautions:    Medical/Treatment Diagnosis Information:  Diagnosis: M25.871 (ICD-10-CM) - Sesamoiditis of right foot  Treatment Diagnosis: increased pain; decreased ROM  Insurance/Certification information:  PT Insurance Information: BCBS/[place] BCBS  Physician Information:   Saundra Chen MD  Has the plan of care been signed (Y/N):        []  Yes  [x]  No     Date of Patient follow up with Physician:        Is this a Progress Report:     []  Yes  [x]  No        If Yes:  Date Range for reporting period:  Beginning   Ending     Progress report will be due (10 Rx or 30 days whichever is less):        Recertification will be due (POC Duration  / 90 days whichever is less): 8/15/23         Visit # Insurance Allowable Auth Required   12 30 []  Yes []  No        Functional Scale: LEFS= 58%    Date assessed:  5/15/23       Latex Allergy:  [x]NO      []YES   Preferred Language for Healthcare:   [x]English       []other:       Pain level:  3/10     SUBJECTIVE:  Pt has no new c/o's today. OBJECTIVE:   Observation: great toe valgus(toe spacer); high arch posture; consider met pad? Pain at medial great toe.    Test measurements:      RESTRICTIONS/PRECAUTIONS: mild MTP OA; mild sesamoiditis    Exercises/Interventions: right foot    Exercise/Equipment Resistance/Repetitions Other comments   ROM     ABC'S     BAPS     Bottle Roll     Inversion/Eversion     Ankle Pumps     Toe Curls     Rocks    Towels    Great toe extension    Stretching     Circles     Toe Extension      Towel Pull 1     Towel Pull 2     ERMI     Incline Stretch 2w94qia    Pro-Stretch     Hamstring 7e45wuw    Stair Stretch 8v71quy    Calf 1

## 2023-07-26 ENCOUNTER — OFFICE VISIT (OUTPATIENT)
Dept: DERMATOLOGY | Age: 53
End: 2023-07-26

## 2023-07-26 DIAGNOSIS — Z80.8 FAMILY HISTORY OF MELANOMA: ICD-10-CM

## 2023-07-26 DIAGNOSIS — L81.4 SOLAR LENTIGO: ICD-10-CM

## 2023-07-26 DIAGNOSIS — D22.9 MULTIPLE NEVI: Primary | ICD-10-CM

## 2023-07-26 NOTE — PROGRESS NOTES
vaginal cream Place 1 g vaginally Twice a Week 1 each 3    nystatin (MYCOSTATIN) 459323 UNIT/GM powder Apply topically 2 times daily 60 g 0    scopolamine (TRANSDERM-SCOP) transdermal patch Place 1 patch onto the skin every 72 hours Place behind ear and start 4 hours prior to travel. 4 patch 0    ЮЛИЯ 0.05 MG/24HR PLACE 1 PATCH ONTO THE SKIN TWICE WEEKLY 24 patch 3    dexamethasone (DECADRON) 4 MG/ML injection 1.55cc applied to pad per physical therapy visit 30 mL 0    pantoprazole (PROTONIX) 40 MG tablet Take 1 tablet by mouth Daily with supper (Patient taking differently: Take 1 tablet by mouth as needed) 30 tablet 1    docusate sodium (COLACE) 100 MG capsule Take 1 capsule by mouth 2 times daily 90 capsule 3    Psyllium (METAMUCIL) 48.57 % POWD In 8 oz of water daily. 822 g 3    hydrOXYzine HCl (ATARAX) 10 MG tablet Take 2.5 tablets by mouth 3 times daily as needed for Anxiety (sleeping difficulties) 40 tablet 1    Melatonin ER 5 MG TBCR Take 1 po qhs 90 tablet 3    linaclotide (LINZESS) 145 MCG capsule TAKE ONE CAPSULE BY MOUTH EVERY MORNING BEFORE BREAKFAST 90 capsule 3    polyethylene glycol (MIRALAX) 17 GM/SCOOP POWD powder Take 238 g by mouth daily Take as directed for colonoscopy 255 g 0    meloxicam (MOBIC) 7.5 MG tablet Take 1 tablet by mouth 2 times daily as needed for Pain To face/ jaw and mouth 90 tablet 0    Probiotic Product (PROBIOTIC-10 PO) Take by mouth      cetirizine (ZYRTEC) 10 MG tablet Take 1 tablet by mouth daily      butalbital-acetaminophen-caffeine (FIORICET, ESGIC) -40 MG per tablet Take 1 tablet by mouth every 6 hours as needed for Headaches 25 tablet 1       Physical Examination       The following were examined and determined to be normal: Psych/Neuro, Scalp/hair, Head/face, Conjunctivae/eyelids, Gums/teeth/lips, Neck, Breast/axilla/chest, Abdomen, Back, RUE, LUE, RLE, LLE, and Nails/digits. The following were examined and determined to be abnormal: None.      Well

## 2023-07-31 ENCOUNTER — HOSPITAL ENCOUNTER (OUTPATIENT)
Dept: PHYSICAL THERAPY | Age: 53
Setting detail: THERAPIES SERIES
Discharge: HOME | End: 2023-07-31
Payer: COMMERCIAL

## 2023-07-31 NOTE — FLOWSHEET NOTE
[facility] [address] [facility]  [address], [address] [facility] [address], [address]  Phone: [phone]  Fax [phone]    Physical Therapy  Cancellation/No-show Note  Patient Name:  Kesha Yang  :  1970   Date:  2023  Cancelled visits to date: 0  No-shows to date: 0    Patient status for today's appointment patient:  [x]  Cancelled  []  Rescheduled appointment  []  No-show     Reason given by patient:  []  Patient ill  [x]  Conflicting appointment  []  No transportation    []  Conflict with work  []  No reason given  []  Other:     Comments:      Electronically signed by:  Miladys Quigley PT

## 2023-08-03 ENCOUNTER — HOSPITAL ENCOUNTER (OUTPATIENT)
Dept: PHYSICAL THERAPY | Age: 53
Setting detail: THERAPIES SERIES
Discharge: HOME | End: 2023-08-03
Payer: COMMERCIAL

## 2023-08-03 PROCEDURE — 97110 THERAPEUTIC EXERCISES: CPT | Performed by: PHYSICAL THERAPIST

## 2023-08-03 PROCEDURE — 97035 APP MDLTY 1+ULTRASOUND EA 15: CPT | Performed by: PHYSICAL THERAPIST

## 2023-08-03 PROCEDURE — 97033 APP MDLTY 1+IONTPHRSIS EA 15: CPT | Performed by: PHYSICAL THERAPIST

## 2023-08-03 NOTE — FLOWSHEET NOTE
[facility] [address] [facility]  [address], [address] [facility] [address], [address]  Phone: [phone]  Fax [phone]      Physical Therapy Daily Treatment Note  Date:  8/3/2023    Patient Name:  Booker Tomlinson    :  1970  MRN: [id number]  Restrictions/Precautions:    Medical/Treatment Diagnosis Information:  Diagnosis: M25.871 (ICD-10-CM) - Sesamoiditis of right foot  Treatment Diagnosis: increased pain; decreased ROM  Insurance/Certification information:  PT Insurance Information: BCBS/[place] BCBS  Physician Information:   Lawence Plan MD  Has the plan of care been signed (Y/N):        []  Yes  [x]  No     Date of Patient follow up with Physician:        Is this a Progress Report:     []  Yes  [x]  No        If Yes:  Date Range for reporting period:  Beginning   Ending     Progress report will be due (10 Rx or 30 days whichever is less): 20       Recertification will be due (POC Duration  / 90 days whichever is less): 8/15/23         Visit # Insurance Allowable Auth Required   13 30 []  Yes []  No        Functional Scale: LEFS= 58%    Date assessed:  5/15/23       Latex Allergy:  [x]NO      []YES   Preferred Language for Healthcare:   [x]English       []other:       Pain level:  3/10     SUBJECTIVE:  Pt has a dull pain. OBJECTIVE:   Observation: great toe valgus(toe spacer); high arch posture; consider met pad? Pain at medial great toe.    Test measurements:  DF= 5 degrees    RESTRICTIONS/PRECAUTIONS: mild MTP OA; mild sesamoiditis    Exercises/Interventions: right foot    Exercise/Equipment Resistance/Repetitions Other comments   ROM     ABC'S     BAPS     Bottle Roll     Inversion/Eversion     Ankle Pumps     Toe Curls     Rocks    Towels    Great toe extension    Stretching     Circles     Toe Extension      Towel Pull 1     Towel Pull 2     ERMI     Incline Stretch 7u11cnc    Pro-Stretch     Hamstring 8e87lvs    Stair Stretch 2b73trc    Calf 1

## 2023-08-07 ENCOUNTER — HOSPITAL ENCOUNTER (OUTPATIENT)
Dept: PHYSICAL THERAPY | Age: 53
Setting detail: THERAPIES SERIES
Discharge: HOME | End: 2023-08-07
Payer: COMMERCIAL

## 2023-08-07 PROCEDURE — 97033 APP MDLTY 1+IONTPHRSIS EA 15: CPT | Performed by: PHYSICAL THERAPIST

## 2023-08-07 PROCEDURE — 97110 THERAPEUTIC EXERCISES: CPT | Performed by: PHYSICAL THERAPIST

## 2023-08-07 PROCEDURE — 97035 APP MDLTY 1+ULTRASOUND EA 15: CPT | Performed by: PHYSICAL THERAPIST

## 2023-08-14 ENCOUNTER — HOSPITAL ENCOUNTER (OUTPATIENT)
Dept: PHYSICAL THERAPY | Age: 53
Setting detail: THERAPIES SERIES
Discharge: HOME | End: 2023-08-14
Payer: COMMERCIAL

## 2023-08-14 NOTE — FLOWSHEET NOTE
[facility] [address] [facility]  [address], [address] [facility] [address], [address]  Phone: [phone]  Fax [phone]    Physical Therapy  Cancellation/No-show Note  Patient Name:  Kajal Diego  :  1970   Date:  2023  Cancelled visits to date: 1  No-shows to date: 0    Patient status for today's appointment patient:  [x]  Cancelled  [x]  Rescheduled appointment  []  No-show     Reason given by patient:  []  Patient ill  [x]  Conflicting appointment  []  No transportation    []  Conflict with work  []  No reason given  []  Other:     Comments:     Electronically signed by:  Chayo Doty, PT

## 2023-08-15 ENCOUNTER — HOSPITAL ENCOUNTER (OUTPATIENT)
Dept: PHYSICAL THERAPY | Age: 53
Setting detail: THERAPIES SERIES
Discharge: HOME | End: 2023-08-15
Payer: COMMERCIAL

## 2023-08-15 PROCEDURE — 97110 THERAPEUTIC EXERCISES: CPT | Performed by: PHYSICAL THERAPIST

## 2023-08-15 PROCEDURE — 97035 APP MDLTY 1+ULTRASOUND EA 15: CPT | Performed by: PHYSICAL THERAPIST

## 2023-08-15 PROCEDURE — 97033 APP MDLTY 1+IONTPHRSIS EA 15: CPT | Performed by: PHYSICAL THERAPIST

## 2023-08-15 NOTE — THERAPY RECERTIFICATION
Fair  [] Poor      Patient requires continued skilled intervention: [x] Yes  [] No    Treatment/Activity Tolerance:  [x] Patient able to complete treatment  [] Patient limited by fatigue  [x] Patient limited by pain     [] Patient limited by other medical complications  [x] Other: decreased ROM      PLAN: Stretching, strengthening, modalities, manual therapy as needed. Custom orthotics. [x] Continue per plan of care [] Alter current plan (see comments above)  [] Plan of care initiated [] Hold pending MD visit [] Discharge      Electronically signed by:  Denia Pelletier PT    Note: If patient does not return for scheduled/ recommended follow up visits, this note will serve as a discharge from care along with most recent update on progress.

## 2023-08-17 ENCOUNTER — OFFICE VISIT (OUTPATIENT)
Age: 53
End: 2023-08-17
Payer: COMMERCIAL

## 2023-08-17 VITALS
HEIGHT: 64 IN | OXYGEN SATURATION: 97 % | BODY MASS INDEX: 23.29 KG/M2 | DIASTOLIC BLOOD PRESSURE: 72 MMHG | TEMPERATURE: 97.3 F | RESPIRATION RATE: 14 BRPM | SYSTOLIC BLOOD PRESSURE: 108 MMHG | WEIGHT: 136.4 LBS | HEART RATE: 86 BPM

## 2023-08-17 DIAGNOSIS — R10.9 ABDOMINAL CRAMPING: ICD-10-CM

## 2023-08-17 DIAGNOSIS — R14.0 BLOATING: ICD-10-CM

## 2023-08-17 DIAGNOSIS — R14.0 BLOATING: Primary | ICD-10-CM

## 2023-08-17 PROCEDURE — 99214 OFFICE O/P EST MOD 30 MIN: CPT | Performed by: FAMILY MEDICINE

## 2023-08-17 RX ORDER — DICYCLOMINE HCL 20 MG
20 TABLET ORAL 4 TIMES DAILY PRN
Qty: 40 TABLET | Refills: 0 | Status: SHIPPED | OUTPATIENT
Start: 2023-08-17

## 2023-08-18 LAB
ALBUMIN SERPL-MCNC: 4.9 G/DL (ref 3.4–5)
ALBUMIN/GLOB SERPL: 2.3 {RATIO} (ref 1.1–2.2)
ALP SERPL-CCNC: 74 U/L (ref 40–129)
ALT SERPL-CCNC: 19 U/L (ref 10–40)
ANION GAP SERPL CALCULATED.3IONS-SCNC: 11 MMOL/L (ref 3–16)
AST SERPL-CCNC: 24 U/L (ref 15–37)
BASOPHILS # BLD: 0.1 K/UL (ref 0–0.2)
BASOPHILS NFR BLD: 0.9 %
BILIRUB SERPL-MCNC: <0.2 MG/DL (ref 0–1)
BUN SERPL-MCNC: 18 MG/DL (ref 7–20)
CALCIUM SERPL-MCNC: 10.1 MG/DL (ref 8.3–10.6)
CHLORIDE SERPL-SCNC: 100 MMOL/L (ref 99–110)
CO2 SERPL-SCNC: 31 MMOL/L (ref 21–32)
CREAT SERPL-MCNC: 0.7 MG/DL (ref 0.6–1.1)
CRP SERPL-MCNC: <3 MG/L (ref 0–5.1)
DEPRECATED RDW RBC AUTO: 13.2 % (ref 12.4–15.4)
EOSINOPHIL # BLD: 0.1 K/UL (ref 0–0.6)
EOSINOPHIL NFR BLD: 2.3 %
ERYTHROCYTE [SEDIMENTATION RATE] IN BLOOD BY WESTERGREN METHOD: 10 MM/HR (ref 0–30)
GFR SERPLBLD CREATININE-BSD FMLA CKD-EPI: >60 ML/MIN/{1.73_M2}
GLUCOSE SERPL-MCNC: 89 MG/DL (ref 70–99)
HCT VFR BLD AUTO: 38.5 % (ref 36–48)
HGB BLD-MCNC: 13.4 G/DL (ref 12–16)
IGA SERPL-MCNC: 214 MG/DL (ref 70–400)
LYMPHOCYTES # BLD: 1.4 K/UL (ref 1–5.1)
LYMPHOCYTES NFR BLD: 23.7 %
MCH RBC QN AUTO: 32.1 PG (ref 26–34)
MCHC RBC AUTO-ENTMCNC: 34.9 G/DL (ref 31–36)
MCV RBC AUTO: 92 FL (ref 80–100)
MONOCYTES # BLD: 0.6 K/UL (ref 0–1.3)
MONOCYTES NFR BLD: 9.9 %
NEUTROPHILS # BLD: 3.6 K/UL (ref 1.7–7.7)
NEUTROPHILS NFR BLD: 63.2 %
PLATELET # BLD AUTO: 258 K/UL (ref 135–450)
PMV BLD AUTO: 7.8 FL (ref 5–10.5)
POTASSIUM SERPL-SCNC: 4 MMOL/L (ref 3.5–5.1)
PROT SERPL-MCNC: 7 G/DL (ref 6.4–8.2)
RBC # BLD AUTO: 4.19 M/UL (ref 4–5.2)
SODIUM SERPL-SCNC: 142 MMOL/L (ref 136–145)
TISSUE TRANSGLUTAMINASE IGA: <0.5 U/ML (ref 0–14)
WBC # BLD AUTO: 5.7 K/UL (ref 4–11)

## 2023-08-25 ENCOUNTER — HOSPITAL ENCOUNTER (OUTPATIENT)
Dept: MAMMOGRAPHY | Age: 53
Discharge: HOME | End: 2023-08-25
Payer: COMMERCIAL

## 2023-08-25 VITALS — BODY MASS INDEX: 23.22 KG/M2 | HEIGHT: 64 IN | WEIGHT: 136 LBS

## 2023-08-25 DIAGNOSIS — Z12.31 ENCOUNTER FOR SCREENING MAMMOGRAM FOR MALIGNANT NEOPLASM OF BREAST: ICD-10-CM

## 2023-08-25 PROCEDURE — 77063 BREAST TOMOSYNTHESIS BI: CPT

## 2023-08-28 ENCOUNTER — HOSPITAL ENCOUNTER (OUTPATIENT)
Dept: PHYSICAL THERAPY | Age: 53
Setting detail: THERAPIES SERIES
Discharge: HOME | End: 2023-08-28
Payer: COMMERCIAL

## 2023-08-28 PROCEDURE — 97110 THERAPEUTIC EXERCISES: CPT | Performed by: PHYSICAL THERAPIST

## 2023-08-28 PROCEDURE — 97033 APP MDLTY 1+IONTPHRSIS EA 15: CPT | Performed by: PHYSICAL THERAPIST

## 2023-08-28 PROCEDURE — 97035 APP MDLTY 1+ULTRASOUND EA 15: CPT | Performed by: PHYSICAL THERAPIST

## 2023-09-05 ENCOUNTER — APPOINTMENT (OUTPATIENT)
Dept: PHYSICAL THERAPY | Age: 53
End: 2023-09-05
Payer: COMMERCIAL

## 2023-09-05 ENCOUNTER — OFFICE VISIT (OUTPATIENT)
Age: 53
End: 2023-09-05
Payer: COMMERCIAL

## 2023-09-05 VITALS
TEMPERATURE: 98.3 F | HEART RATE: 74 BPM | RESPIRATION RATE: 14 BRPM | WEIGHT: 134.6 LBS | HEIGHT: 64 IN | OXYGEN SATURATION: 98 % | SYSTOLIC BLOOD PRESSURE: 128 MMHG | DIASTOLIC BLOOD PRESSURE: 82 MMHG | BODY MASS INDEX: 22.98 KG/M2

## 2023-09-05 DIAGNOSIS — N89.8 VAGINAL DISCHARGE: ICD-10-CM

## 2023-09-05 DIAGNOSIS — N89.8 VAGINAL IRRITATION: Primary | ICD-10-CM

## 2023-09-05 PROCEDURE — 99214 OFFICE O/P EST MOD 30 MIN: CPT | Performed by: FAMILY MEDICINE

## 2023-09-05 RX ORDER — FLUCONAZOLE 150 MG/1
150 TABLET ORAL ONCE
Qty: 2 TABLET | Refills: 0 | Status: SHIPPED | OUTPATIENT
Start: 2023-09-05 | End: 2023-09-05 | Stop reason: SDUPTHER

## 2023-09-05 RX ORDER — FLUCONAZOLE 150 MG/1
150 TABLET ORAL ONCE
Qty: 2 TABLET | Refills: 0 | Status: SHIPPED | OUTPATIENT
Start: 2023-09-05 | End: 2023-09-05

## 2023-09-06 LAB
CANDIDA DNA VAG QL NAA+PROBE: ABNORMAL
G VAGINALIS DNA SPEC QL NAA+PROBE: ABNORMAL
T VAGINALIS DNA VAG QL NAA+PROBE: ABNORMAL

## 2023-09-07 ENCOUNTER — OFFICE VISIT (OUTPATIENT)
Dept: ORTHOPEDIC SURGERY | Age: 53
End: 2023-09-07
Payer: COMMERCIAL

## 2023-09-07 VITALS — BODY MASS INDEX: 22.88 KG/M2 | HEIGHT: 64 IN | WEIGHT: 134 LBS

## 2023-09-07 DIAGNOSIS — B96.89 BACTERIAL VAGINOSIS: Primary | ICD-10-CM

## 2023-09-07 DIAGNOSIS — S93.521A TURF TOE OF RIGHT FOOT: ICD-10-CM

## 2023-09-07 DIAGNOSIS — M20.21 HALLUX RIGIDUS OF RIGHT FOOT: ICD-10-CM

## 2023-09-07 DIAGNOSIS — M25.871 SESAMOIDITIS OF RIGHT FOOT: Primary | ICD-10-CM

## 2023-09-07 DIAGNOSIS — N76.0 BACTERIAL VAGINOSIS: Primary | ICD-10-CM

## 2023-09-07 PROCEDURE — 99213 OFFICE O/P EST LOW 20 MIN: CPT | Performed by: ORTHOPAEDIC SURGERY

## 2023-09-07 RX ORDER — METRONIDAZOLE 7.5 MG/G
GEL VAGINAL
Qty: 70 G | Refills: 0 | Status: SHIPPED | OUTPATIENT
Start: 2023-09-07 | End: 2023-09-14

## 2023-09-07 NOTE — PROGRESS NOTES
[facility] [facility]    bilateral, right    HYSTERECTOMY (CERVIX STATUS UNKNOWN)  2009    LARYNGOSCOPY  2016    PARTIAL HYSTERECTOMY (CERVIX NOT REMOVED)  2009    DUB -     UPPER GASTROINTESTINAL ENDOSCOPY  05/2019       Social History     Socioeconomic History    Marital status:      Spouse name: Not on file    Number of children: Not on file    Years of education: Not on file    Highest education level: Not on file   Occupational History    Not on file   Tobacco Use    Smoking status: Never    Smokeless tobacco: Never   Vaping Use    Vaping Use: Never used   Substance and Sexual Activity    Alcohol use: Yes     Comment: 1x 2 months    Drug use: No    Sexual activity: Yes     Partners: Male   Other Topics Concern    Not on file   Social History Narrative    Not on file     Social Determinants of Health     Financial Resource Strain: Low Risk     Difficulty of Paying Living Expenses: Not hard at all   Food Insecurity: No Food Insecurity    Worried About Running Out of Food in the Last Year: Never true    [address] in the Last Year: Never true   Transportation Needs: Unknown    Lack of Transportation (Medical): Not on file    Lack of Transportation (Non-Medical): No   Physical Activity: Not on file   Stress: Not on file   Social Connections: Not on file   Intimate Partner Violence: Not on file   Housing Stability: Unknown    Unable to Pay for Housing in the Last Year: Not on file    Number of Places Lived in the Last Year: Not on file    Unstable Housing in the Last Year: No       Family History   Problem Relation Age of Onset    Cancer Mother 76        melanoma    High Blood Pressure Father     Colon Cancer Maternal Grandfather 46    Other Paternal Grandmother         PE       Current Outpatient Medications on File Prior to Visit   Medication Sig Dispense Refill    terconazole (TERAZOL 3) 0.8 % vaginal cream Place vaginally nightly.  20 g 0    NONFORMULARY KOBI - Complete gut relief      dicyclomine (BENTYL) 20 MG

## 2023-09-08 LAB
BACTERIA GENITAL AEROBE CULT: ABNORMAL
BACTERIA GENITAL AEROBE CULT: ABNORMAL
ORGANISM: ABNORMAL

## 2023-09-11 ENCOUNTER — HOSPITAL ENCOUNTER (OUTPATIENT)
Dept: PHYSICAL THERAPY | Age: 53
Setting detail: THERAPIES SERIES
Discharge: HOME | End: 2023-09-11
Payer: COMMERCIAL

## 2023-09-11 PROCEDURE — 97033 APP MDLTY 1+IONTPHRSIS EA 15: CPT | Performed by: PHYSICAL THERAPIST

## 2023-09-11 PROCEDURE — 97035 APP MDLTY 1+ULTRASOUND EA 15: CPT | Performed by: PHYSICAL THERAPIST

## 2023-09-11 NOTE — FLOWSHEET NOTE
[facility]  [address], [address] [facility], [address]  Phone: [phone]  Fax [phone]     Physical Therapy Daily Treatment Note  Date:  2023    Patient Name:  Jason Grant    :  1970  MRN: [id number]  Restrictions/Precautions:    Medical/Treatment Diagnosis Information:  Diagnosis: M25.871 (ICD-10-CM) - Sesamoiditis of right foot  Treatment Diagnosis: increased pain; decreased ROM  Insurance/Certification information:  PT Insurance Information: BCBS/[place] BCBS  Physician Information:   Abigail Longo MD  Has the plan of care been signed (Y/N):        []  Yes  [x]  No     Date of Patient follow up with Physician:        Is this a Progress Report:     []  Yes  [x]  No        If Yes:  Date Range for reporting period:  Beginning   Ending     Progress report will be due (10 Rx or 30 days whichever is less): 84       Recertification will be due (POC Duration  / 90 days whichever is less): 11/15/23         Visit # Insurance Allowable Auth Required   17 30 []  Yes []  No        Functional Scale: LEFS= 58%    Date assessed:  5/15/23       Latex Allergy:  [x]NO      []YES   Preferred Language for Healthcare:   [x]English       []other:       Pain level:  3/10     SUBJECTIVE:  Pt saw the MD and he suggested surgery if the pain continues to be unchanged. Pt is doing the stretching and wearing the orthotics. Pt is giving this some thought. She may do surgery in 2024. Until then, she will continue to do the HEP. Pt would like to do US and ionto today. She had a death in the family. She will do the exercises at home later. OBJECTIVE:   Observation: great toe valgus(toe spacer); high arch posture  Pain at medial great toe.    Test measurements:    DF= 7 degrees  PF= 50 degrees  INV= 35 degrees  EV= 12 degrees  Great toe extension= 60 degrees    RESTRICTIONS/PRECAUTIONS: mild MTP OA; mild

## 2023-10-16 ENCOUNTER — OFFICE VISIT (OUTPATIENT)
Age: 53
End: 2023-10-16
Payer: COMMERCIAL

## 2023-10-16 VITALS
DIASTOLIC BLOOD PRESSURE: 76 MMHG | HEART RATE: 86 BPM | WEIGHT: 137.8 LBS | RESPIRATION RATE: 14 BRPM | SYSTOLIC BLOOD PRESSURE: 128 MMHG | BODY MASS INDEX: 23.52 KG/M2 | OXYGEN SATURATION: 99 % | TEMPERATURE: 97.5 F | HEIGHT: 64 IN

## 2023-10-16 DIAGNOSIS — K40.90 RIGHT INGUINAL HERNIA: ICD-10-CM

## 2023-10-16 DIAGNOSIS — M26.609 TMJ (TEMPOROMANDIBULAR JOINT SYNDROME): ICD-10-CM

## 2023-10-16 DIAGNOSIS — Z23 NEEDS FLU SHOT: ICD-10-CM

## 2023-10-16 DIAGNOSIS — Z01.419 ENCOUNTER FOR ANNUAL ROUTINE GYNECOLOGICAL EXAMINATION: Primary | ICD-10-CM

## 2023-10-16 DIAGNOSIS — B37.9 YEAST INFECTION: ICD-10-CM

## 2023-10-16 DIAGNOSIS — J30.9 ALLERGIC RHINITIS, UNSPECIFIED SEASONALITY, UNSPECIFIED TRIGGER: ICD-10-CM

## 2023-10-16 DIAGNOSIS — M50.30 DDD (DEGENERATIVE DISC DISEASE), CERVICAL: ICD-10-CM

## 2023-10-16 DIAGNOSIS — G50.0 TRIGEMINAL NEURALGIA: ICD-10-CM

## 2023-10-16 PROCEDURE — 90471 IMMUNIZATION ADMIN: CPT | Performed by: FAMILY MEDICINE

## 2023-10-16 PROCEDURE — 99214 OFFICE O/P EST MOD 30 MIN: CPT | Performed by: FAMILY MEDICINE

## 2023-10-16 PROCEDURE — 90674 CCIIV4 VAC NO PRSV 0.5 ML IM: CPT | Performed by: FAMILY MEDICINE

## 2023-10-16 RX ORDER — FLUCONAZOLE 150 MG/1
150 TABLET ORAL ONCE
Qty: 1 TABLET | Refills: 0 | Status: SHIPPED | OUTPATIENT
Start: 2023-10-16 | End: 2023-10-16

## 2023-10-16 RX ORDER — GABAPENTIN 100 MG/1
100 CAPSULE ORAL 3 TIMES DAILY
COMMUNITY

## 2023-10-16 NOTE — PROGRESS NOTES
Patient is a 48y.o. year old female presenting for a complete physical today. Last colonoscopy : 1/22- Dr. Kaitlynn Rivera- repeat in 1/32; 12/18 - [facility]; 7/21; 7/19; 7/18;   PAP: 7/22- Dr. Lora Kamara; 7/21;6/20   DEXA : 7/18 - nl   History of abnormal PAP: never;still  with cervix hysterectomy in 2009 due to bleeding /anemia. Last eye exam: 2/23  Current smoker: no   Self breast exam: yes but sporadic  Exercise: walks - 2 miles in am   Caffeine: 1 coffee/ day   Alcohol: rarely 0-1/ month    She has h/o right inguinal hernia with second surgery in 1999. She recalls a ripping sensation about 6-12 months while getting out of bed while visiting her mother in law to right groin. It has been most days it flares with activity, including grocery shopping, cleaning, lifting things, etc.   Her bowel movements are qd to qod, but occasionally straining. Using Metamucil sporadically. Takes probiotic and Linzess daily. Not using Miralax. She restarted Gabapentin 100 mg 2-3 times per day and helping with teeth pain  with h/o     She is not taking Mobic . She is to have foot surgery with Dr. Cynthia Graves after boot / Physical Therapy have not been helpful. She went to [employer] game last night . Patient's allergies and medications were reviewed. Patient's past medical, surgical, social , and family history were reviewed.      Past Medical History:   Diagnosis Date    Acid reflux     Allergic rhinitis     Anxiety     Atrophic vaginitis     DDD (degenerative disc disease), cervical     Dysmenorrhea     Dyspareunia in female     Esophagitis 05/2019    Fibrocystic breast     Gastritis 05/2019    HPV (human papilloma virus) infection 1992    Exposed back in college    Hyperlipidemia     Internal hemorrhoids 01/2022    per colonoscopy - Dr. Kaitlynn Rivera    Interstitial cystitis 10/23/2017    Iron deficiency anemia 2017    Menopause ovarian failure     Menorrhagia     PONV (postoperative nausea and vomiting)     Trigeminal

## 2023-10-17 LAB
HPV HR 12 DNA SPEC QL NAA+PROBE: NOT DETECTED
HPV16 DNA SPEC QL NAA+PROBE: NOT DETECTED
HPV16+18+H RISK 12 DNA SPEC-IMP: NORMAL
HPV18 DNA SPEC QL NAA+PROBE: NOT DETECTED

## 2023-10-23 DIAGNOSIS — K59.00 CONSTIPATION, UNSPECIFIED CONSTIPATION TYPE: ICD-10-CM

## 2023-10-23 NOTE — TELEPHONE ENCOUNTER
Requested Prescriptions     Pending Prescriptions Disp Refills    linaclotide (LINZESS) 145 MCG capsule [Pharmacy Med Name: Swann [facility]any 145MCG CAPS] 90 capsule 3     [id number]        Last OV: 10/16/23    Last labs: 23     F/u: None

## 2023-10-27 ENCOUNTER — TELEMEDICINE (OUTPATIENT)
Age: 53
End: 2023-10-27
Payer: COMMERCIAL

## 2023-10-27 ENCOUNTER — PATIENT MESSAGE (OUTPATIENT)
Age: 53
End: 2023-10-27

## 2023-10-27 DIAGNOSIS — L29.9 ITCH: ICD-10-CM

## 2023-10-27 DIAGNOSIS — I83.891 VARICOSE VEINS OF RIGHT LOWER EXTREMITY WITH OTHER COMPLICATIONS: Primary | ICD-10-CM

## 2023-10-27 PROCEDURE — 99213 OFFICE O/P EST LOW 20 MIN: CPT | Performed by: FAMILY MEDICINE

## 2023-10-27 NOTE — TELEPHONE ENCOUNTER
Ideally I would like to see you in person to discuss further, particularly if she is having any pain, redness or swelling. If any of this is occurring I would like to see you today . If more just itching and no pain/ swelling , it can be at her discretion over the next couple of weeks.

## 2023-10-27 NOTE — PROGRESS NOTES
10/27/2023    TELEHEALTH EVALUATION -- Audio/Visual (During [id number]-82 public health emergency)    Due to COVID 19 outbreak, patient's office visit was converted to a virtual visit. Patient was contacted and agreed to proceed with a virtual visit via [address] [place] [address] Visit  The risks and benefits of converting to a virtual visit were discussed in light of the current infectious disease epidemic. Patient also understood that insurance coverage and co-pays are up to their individual insurance plans. HPI:    Flori Guerra (:  1970) has requested an audio/video evaluation for the following concern(s):    Patient with h/o varicose veins with prior surgery and laser treatment and would like referral     Right leg issue with itching and  h/o of vein stripping to lower leg/ calf likely in  due to bulging and throbbing. Laser to right thigh was done likely in  when living at [place], [place], due to throbbing. Not usually having throbbing, but occasionally . Itching to right thigh started 1 week or so. Not tender or red or swollen. Sleeping okay . Not trying anything topically. No rash. Just having itching. Denies fever, chest pain , shortness of breath or cough. Review of Systems    Prior to Visit Medications    Medication Sig Taking? Authorizing Provider   linaclotide [facility]) 145 MCG capsule 1QAMBB Yes Ganga Rodriguez MD   gabapentin (NEURONTIN) 100 MG capsule Take 1 capsule by mouth 3 times daily. Yes ProviderMegha MD   estradiol (ESTRACE VAGINAL) 0.1 MG/GM vaginal cream Place 1 g vaginally Twice a Week Yes Ganga Rodriguez MD   docusate sodium (COLACE) 100 MG capsule Take 1 capsule by mouth 2 times daily  Patient taking differently: Take 1 capsule by mouth 2 times daily as needed Yes Carrie Ansari MD   Psyllium (METAMUCIL) 48.57 % POWD In 8 oz of water daily.  Yes Ganga Rodriguez MD   hydrOXYzine HCl (ATARAX) 10 MG tablet Take 2.5 tablets by mouth 3 times daily as needed for Anxiety

## 2023-11-02 ENCOUNTER — TELEPHONE (OUTPATIENT)
Dept: DERMATOLOGY | Age: 53
End: 2023-11-02

## 2023-11-02 ENCOUNTER — OFFICE VISIT (OUTPATIENT)
Dept: DERMATOLOGY | Age: 53
End: 2023-11-02
Payer: COMMERCIAL

## 2023-11-02 ENCOUNTER — PATIENT MESSAGE (OUTPATIENT)
Dept: DERMATOLOGY | Age: 53
End: 2023-11-02

## 2023-11-02 DIAGNOSIS — L21.9 SEBORRHEIC DERMATITIS: Primary | ICD-10-CM

## 2023-11-02 PROCEDURE — 99213 OFFICE O/P EST LOW 20 MIN: CPT | Performed by: DERMATOLOGY

## 2023-11-02 RX ORDER — FLUOCINONIDE TOPICAL SOLUTION USP, 0.05% 0.5 MG/ML
SOLUTION TOPICAL
Qty: 20 ML | Refills: 1 | Status: SHIPPED | OUTPATIENT
Start: 2023-11-02

## 2023-11-02 NOTE — PROGRESS NOTES
[facility]  Rose Mary Valadez MD  [address] [facility]  1970    48 y.o. female     Date of Visit: 11/2/2023    Chief Complaint: rash    History of Present Illness:    She presents today for new onset pruritic thickened itchy area on the back of her scalp. Review of Systems:  Gen: Feels well, good sense of health. Past Medical History, Family History, Surgical History, Medications and Allergies reviewed. Past Medical History:   Diagnosis Date    Acid reflux     Allergic rhinitis     Anxiety     Atrophic vaginitis     DDD (degenerative disc disease), cervical     Dysmenorrhea     Dyspareunia in female     Esophagitis 05/2019    Fibrocystic breast     Gastritis 05/2019    HPV (human papilloma virus) infection 1992    Exposed back in college    Hyperlipidemia     Internal hemorrhoids 01/2022    per colonoscopy - Dr. Robinson Hernandez    Interstitial cystitis 10/23/2017    Iron deficiency anemia 2017    Menopause ovarian failure     Menorrhagia     PONV (postoperative nausea and vomiting)     Trigeminal neuralgia     Vitamin D deficiency 07/2019    Vocal cord nodule 2016     Past Surgical History:   Procedure Laterality Date    APPENDECTOMY  1996    CERVICAL FUSION  2013    C4-6    COLONOSCOPY N/A 01/11/2022    COLONOSCOPY, POSSIBLE POLYPECTOMY performed by Tamara Hill MD at [facility] [address]    bilateral, right    HYSTERECTOMY ([facility])  2009    LARYNGOSCOPY  2016    PARTIAL HYSTERECTOMY (CERVIX NOT REMOVED)  2009    DUB -     UPPER GASTROINTESTINAL ENDOSCOPY  05/2019       No Known Allergies  Outpatient Medications Marked as Taking for the 11/2/23 encounter (Office Visit) with Jun Yeh MD   Medication Sig Dispense Refill    fluocinonide (LIDEX) 0.05 % external solution Apply to the red scaly areas on the scalp daily as needed.  20 mL 1    linaclotide (LINZESS) 145 MCG capsule 1QAMBB 90 capsule 3

## 2023-11-02 NOTE — TELEPHONE ENCOUNTER
Pt concerned MyChart messages for her daughter and her have not come through. States both she and daughter sent messages last night, both with photos. This pt has concern about scalp. Dtr, [place], has cystic acne issue and is requesting antibiotic. Pt will try to resend photo.

## 2023-11-02 NOTE — TELEPHONE ENCOUNTER
Called pt, confirmed MD sent daughter's Rx for Doxycycline to [address] [place] [address] for today 11/02/2023 @ 5:00pm

## 2023-11-09 ENCOUNTER — INITIAL CONSULT (OUTPATIENT)
Dept: BARIATRICS/WEIGHT MGMT | Age: 53
End: 2023-11-09
Payer: COMMERCIAL

## 2023-11-09 VITALS
HEART RATE: 78 BPM | HEIGHT: 64 IN | WEIGHT: 139 LBS | BODY MASS INDEX: 23.73 KG/M2 | SYSTOLIC BLOOD PRESSURE: 107 MMHG | DIASTOLIC BLOOD PRESSURE: 71 MMHG

## 2023-11-09 DIAGNOSIS — R10.31 RIGHT GROIN PAIN: Primary | ICD-10-CM

## 2023-11-09 DIAGNOSIS — M79.2 NERVE PAIN: Primary | ICD-10-CM

## 2023-11-09 PROCEDURE — 99213 OFFICE O/P EST LOW 20 MIN: CPT | Performed by: SURGERY

## 2023-11-10 NOTE — PROGRESS NOTES
[facility] ([facility]) Physicians   General & Laparoscopic Surgery  [employer]       HPI:    Tiara Welsh is very pleasant 48 y.o. female who is referred by Dr. Carmen Caldera   for consultation with regards to right groin pain. I saw patient about 1 year prior for same pain    Progressively worsening right groin pain. Pain is exacerbated with moving, straining and strenuous activities. The bulge is reducible. Patient denies any nausea, vomiting, fevers, chills, shortness of breath, chest pain, cough, constipation or difficulty urinating. Patient hx of hernia repair and as child and adult.      Past Medical History:   Diagnosis Date    Acid reflux     Allergic rhinitis     Anxiety     Atrophic vaginitis     DDD (degenerative disc disease), cervical     Dysmenorrhea     Dyspareunia in female     Esophagitis 05/2019    Fibrocystic breast     Gastritis 05/2019    HPV (human papilloma virus) infection 1992    Exposed back in college    Hyperlipidemia     Internal hemorrhoids 01/2022    per colonoscopy - Dr. Corey Malik    Interstitial cystitis 10/23/2017    Iron deficiency anemia 2017    Menopause ovarian failure     Menorrhagia     PONV (postoperative nausea and vomiting)     Trigeminal neuralgia     Vitamin D deficiency 07/2019    Vocal cord nodule 2016     Past Surgical History:   Procedure Laterality Date    APPENDECTOMY  1996    CERVICAL FUSION  2013    C4-6    COLONOSCOPY N/A 01/11/2022    COLONOSCOPY, POSSIBLE POLYPECTOMY performed by Bobby Palma MD at [address]    bilateral, right    [place]  2009    LARYNGOSCOPY  2016    PARTIAL HYSTERECTOMY (CERVIX NOT REMOVED)  2009    DUB -     UPPER GASTROINTESTINAL ENDOSCOPY  05/2019     Family History   Problem Relation Age of Onset    Cancer Mother 76        melanoma    High Blood Pressure Father     Colon Cancer Maternal Grandfather 46    Other Paternal Grandmother

## 2023-12-08 ENCOUNTER — PROCEDURE VISIT (OUTPATIENT)
Dept: VASCULAR SURGERY | Age: 53
End: 2023-12-08
Payer: COMMERCIAL

## 2023-12-08 ENCOUNTER — OFFICE VISIT (OUTPATIENT)
Dept: VASCULAR SURGERY | Age: 53
End: 2023-12-08
Payer: COMMERCIAL

## 2023-12-08 VITALS — TEMPERATURE: 97.5 F | HEIGHT: 64 IN | WEIGHT: 130 LBS | BODY MASS INDEX: 22.2 KG/M2

## 2023-12-08 DIAGNOSIS — I83.893 SYMPTOMATIC VARICOSE VEINS OF BOTH LOWER EXTREMITIES: Primary | ICD-10-CM

## 2023-12-08 DIAGNOSIS — I83.893 SYMPTOMATIC VARICOSE VEINS OF BOTH LOWER EXTREMITIES: ICD-10-CM

## 2023-12-08 PROCEDURE — 99203 OFFICE O/P NEW LOW 30 MIN: CPT | Performed by: SURGERY

## 2023-12-08 PROCEDURE — 93970 EXTREMITY STUDY: CPT | Performed by: SURGERY

## 2023-12-08 ASSESSMENT — ENCOUNTER SYMPTOMS
EYES NEGATIVE: 1
RESPIRATORY NEGATIVE: 1
CONSTIPATION: 1

## 2023-12-11 ENCOUNTER — PATIENT MESSAGE (OUTPATIENT)
Dept: DERMATOLOGY | Age: 53
End: 2023-12-11

## 2023-12-11 NOTE — TELEPHONE ENCOUNTER
Called and left message for patient to call back office. Please offer opening for 12/13/23 if still available.

## 2023-12-12 ENCOUNTER — TELEPHONE (OUTPATIENT)
Dept: DERMATOLOGY | Age: 53
End: 2023-12-12

## 2023-12-13 ENCOUNTER — OFFICE VISIT (OUTPATIENT)
Dept: DERMATOLOGY | Age: 53
End: 2023-12-13
Payer: COMMERCIAL

## 2023-12-13 DIAGNOSIS — R21 RASH: Primary | ICD-10-CM

## 2023-12-13 PROCEDURE — 99213 OFFICE O/P EST LOW 20 MIN: CPT | Performed by: DERMATOLOGY

## 2023-12-13 RX ORDER — TRIAMCINOLONE ACETONIDE 1 MG/G
CREAM TOPICAL
Qty: 30 G | Refills: 2 | Status: SHIPPED | OUTPATIENT
Start: 2023-12-13

## 2023-12-13 NOTE — PROGRESS NOTES
[facility]  Jaya Murguia MD  [address] [facility]  1970    48 y.o. female     Date of Visit: 12/13/2023    Chief Complaint: rash, itching    History of Present Illness:    She presents today for chronic itching on the right medial thigh. She experiences some lesions in that area. Has had some improvement with over-the-counter cortisone cream.      Review of Systems:  Gen: Feels well, good sense of health. Past Medical History, Family History, Surgical History, Medications and Allergies reviewed.     Past Medical History:   Diagnosis Date    Acid reflux     Allergic rhinitis     Anxiety     Atrophic vaginitis     DDD (degenerative disc disease), cervical     Dysmenorrhea     Dyspareunia in female     Esophagitis 05/2019    Fibrocystic breast     Gastritis 05/2019    HPV (human papilloma virus) infection 1992    Exposed back in college    Hyperlipidemia     Internal hemorrhoids 01/2022    per colonoscopy - Dr. Mio Mccoy    Interstitial cystitis 10/23/2017    Iron deficiency anemia 2017    Menopause ovarian failure     Menorrhagia     PONV (postoperative nausea and vomiting)     Trigeminal neuralgia     Vitamin D deficiency 07/2019    Vocal cord nodule 2016     Past Surgical History:   Procedure Laterality Date    APPENDECTOMY  1996    CERVICAL FUSION  2013    C4-6    COLONOSCOPY N/A 01/11/2022    COLONOSCOPY, POSSIBLE POLYPECTOMY performed by Gordo Yan MD at [facility] [address]    bilateral, right    HYSTERECTOMY ([facility])  2009    LARYNGOSCOPY  2016    PARTIAL HYSTERECTOMY (CERVIX NOT REMOVED)  2009    [facility] -     UPPER GASTROINTESTINAL ENDOSCOPY  05/2019       No Known Allergies  Outpatient Medications Marked as Taking for the 12/13/23 encounter (Office Visit) with Karlo Weir MD   Medication Sig Dispense Refill    triamcinolone (KENALOG) 0.1 % cream Apply to affected area twice daily for up to 2

## 2023-12-19 ENCOUNTER — TELEPHONE (OUTPATIENT)
Dept: VASCULAR SURGERY | Age: 53
End: 2023-12-19

## 2023-12-19 NOTE — TELEPHONE ENCOUNTER
Patient having problems with her thigh high compression socks.  They keep falling down to her knees.  She got them at her first appointment on 12/8/23.  She is leaving to go to [place] on Thursday and would like to know what she should do.  Will try to get some at a medical supply company.  Please call her at [phone].

## 2023-12-28 ENCOUNTER — TELEPHONE (OUTPATIENT)
Age: 53
End: 2023-12-28

## 2023-12-28 NOTE — TELEPHONE ENCOUNTER
Upper Respiratory Questions:    When did the symptoms start? Tuesday  Have you tested for COVID? (If yes go to COVIDQ) No    Have you tried any OTC medications? Nyquil and decongestant    Are you experiencing any of the following? Nasal congestion Yes    Facial/ sinus pain Yes    Headache Yes    Ear fullness/clogging yes    Post-nasal drip Yes    Cough (dry/wet/productive?) No  If productive what color is it? Is the cough worse at any point during the day (laying down/night) No    Sore throat No    Chest pain No    Chest congestion No    SOB No     Wheezing No    Chills No    Body Aches No    Loss of appetite Yes    Nausea/Vomiting No    Diarrhea No    Fatigue No    Able to keep hydrated? yes    Fever? (If yes- How high) No    SpO2:  N/A    Patient is in [place] now. Offered appointment tomorrow; but patient declined. She is wondering what else she can take.      Thank you

## 2023-12-28 NOTE — TELEPHONE ENCOUNTER
Push fluids - water / decaf tea. Use Netti pot or saline rinse. Mucinex prn . Robitussin or Nyquil if cough occurs. Likely viral etiology, which lasts for 5-6 days . A COVID test , particularly if fever occurs is also recommended.

## 2024-01-11 ENCOUNTER — OFFICE VISIT (OUTPATIENT)
Dept: ORTHOPEDIC SURGERY | Age: 54
End: 2024-01-11
Payer: COMMERCIAL

## 2024-01-11 ENCOUNTER — TELEPHONE (OUTPATIENT)
Dept: ORTHOPEDIC SURGERY | Age: 54
End: 2024-01-11

## 2024-01-11 ENCOUNTER — PREP FOR PROCEDURE (OUTPATIENT)
Dept: ORTHOPEDIC SURGERY | Age: 54
End: 2024-01-11

## 2024-01-11 VITALS — HEIGHT: 64 IN | WEIGHT: 130 LBS | BODY MASS INDEX: 22.2 KG/M2

## 2024-01-11 DIAGNOSIS — M25.871 SESAMOIDITIS OF RIGHT FOOT: Primary | ICD-10-CM

## 2024-01-11 PROCEDURE — 99214 OFFICE O/P EST MOD 30 MIN: CPT | Performed by: ORTHOPAEDIC SURGERY

## 2024-01-11 RX ORDER — SODIUM CHLORIDE 9 MG/ML
INJECTION, SOLUTION INTRAVENOUS PRN
Status: CANCELLED | OUTPATIENT
Start: 2024-01-11

## 2024-01-11 RX ORDER — ESTRADIOL 0.05 MG/D
1 PATCH, EXTENDED RELEASE TRANSDERMAL
COMMUNITY
Start: 2023-11-14

## 2024-01-11 RX ORDER — SODIUM CHLORIDE 0.9 % (FLUSH) 0.9 %
5-40 SYRINGE (ML) INJECTION PRN
Status: CANCELLED | OUTPATIENT
Start: 2024-01-11

## 2024-01-11 RX ORDER — SODIUM CHLORIDE 0.9 % (FLUSH) 0.9 %
5-40 SYRINGE (ML) INJECTION EVERY 12 HOURS SCHEDULED
Status: CANCELLED | OUTPATIENT
Start: 2024-01-11

## 2024-01-11 NOTE — TELEPHONE ENCOUNTER
PA requsted via [facility] by online w/clinicals.  Reference # [id number]

## 2024-01-11 NOTE — PROGRESS NOTES
CHIEF COMPLAINT: Right great toe plantar pain/ medial sesamoiditis.    HISTORY:  Ms. Merritt 53 y.o.  female presents today for follow-up visit for evaluation of right great toe pain which started spring 2022 as she was playing pickle ball. She reported some improvement with PT and is using orthotics that are custom from PT with some improvement. She is still having difficulty with pickleball and certain exercising like elliptical. She saw a podiatry treated with a boot and still in pain. She then saw Dr Ruvalcaba, ordered MRI She is complaining of sharp pain, 2/10 and showing no improvement. She has been using custom orthotics made by PT with no improvement.  He has tried numerous devices follow-up [employer] to attempt to relieve the pressure on her sesamoid with no improvement.  Pain is increase with standing and walking and shoe wear. Pain is sharp early in the morning with first few steps, dull achy pain by the end of the day. No radiation and no numbness and tingling sensation.  She has been using a doughnut and stretching her calf with no significant improvement.  No other complaint.  No h/o trauma or gout.    Past Medical History:   Diagnosis Date    Acid reflux     Allergic rhinitis     Anxiety     Atrophic vaginitis     DDD (degenerative disc disease), cervical     Dysmenorrhea     Dyspareunia in female     Esophagitis 05/2019    Fibrocystic breast     Gastritis 05/2019    HPV (human papilloma virus) infection 1992    Exposed back in college    Hyperlipidemia     Internal hemorrhoids 01/2022    per colonoscopy - Dr. Ureña    Interstitial cystitis 10/23/2017    Iron deficiency anemia 2017    Menopause ovarian failure     Menorrhagia     PONV (postoperative nausea and vomiting)     Trigeminal neuralgia     Vitamin D deficiency 07/2019    Vocal cord nodule 2016       Past Surgical History:   Procedure Laterality Date    APPENDECTOMY  1996    CERVICAL FUSION  2013    C4-6    COLONOSCOPY N/A 01/11/2022

## 2024-01-12 ENCOUNTER — ANESTHESIA EVENT (OUTPATIENT)
Dept: OPERATING ROOM | Age: 54
End: 2024-01-12
Payer: COMMERCIAL

## 2024-01-15 ENCOUNTER — ANESTHESIA (OUTPATIENT)
Dept: OPERATING ROOM | Age: 54
End: 2024-01-15
Payer: COMMERCIAL

## 2024-01-15 ENCOUNTER — HOSPITAL ENCOUNTER (OUTPATIENT)
Age: 54
Setting detail: OUTPATIENT SURGERY
Discharge: HOME | End: 2024-01-15
Attending: ORTHOPAEDIC SURGERY | Admitting: ORTHOPAEDIC SURGERY
Payer: COMMERCIAL

## 2024-01-15 VITALS
WEIGHT: 130 LBS | DIASTOLIC BLOOD PRESSURE: 65 MMHG | RESPIRATION RATE: 16 BRPM | HEART RATE: 54 BPM | HEIGHT: 64 IN | SYSTOLIC BLOOD PRESSURE: 104 MMHG | OXYGEN SATURATION: 92 % | TEMPERATURE: 96.8 F | BODY MASS INDEX: 22.2 KG/M2

## 2024-01-15 DIAGNOSIS — M25.871 SESAMOIDITIS OF RIGHT FOOT: Primary | ICD-10-CM

## 2024-01-15 PROCEDURE — 2580000003 HC RX 258: Performed by: ANESTHESIOLOGY

## 2024-01-15 PROCEDURE — 88311 DECALCIFY TISSUE: CPT

## 2024-01-15 PROCEDURE — 88304 TISSUE EXAM BY PATHOLOGIST: CPT

## 2024-01-15 PROCEDURE — 6360000002 HC RX W HCPCS: Performed by: ORTHOPAEDIC SURGERY

## 2024-01-15 PROCEDURE — A4217 STERILE WATER/SALINE, 500 ML: HCPCS | Performed by: ORTHOPAEDIC SURGERY

## 2024-01-15 PROCEDURE — 7100000001 HC PACU RECOVERY - ADDTL 15 MIN: Performed by: ORTHOPAEDIC SURGERY

## 2024-01-15 PROCEDURE — 3700000001 HC ADD 15 MINUTES (ANESTHESIA): Performed by: ORTHOPAEDIC SURGERY

## 2024-01-15 PROCEDURE — 2500000003 HC RX 250 WO HCPCS

## 2024-01-15 PROCEDURE — 3600000015 HC SURGERY LEVEL 5 ADDTL 15MIN: Performed by: ORTHOPAEDIC SURGERY

## 2024-01-15 PROCEDURE — 3600000005 HC SURGERY LEVEL 5 BASE: Performed by: ORTHOPAEDIC SURGERY

## 2024-01-15 PROCEDURE — 7100000011 HC PHASE II RECOVERY - ADDTL 15 MIN: Performed by: ORTHOPAEDIC SURGERY

## 2024-01-15 PROCEDURE — 6360000002 HC RX W HCPCS: Performed by: ANESTHESIOLOGY

## 2024-01-15 PROCEDURE — 2580000003 HC RX 258: Performed by: ORTHOPAEDIC SURGERY

## 2024-01-15 PROCEDURE — 6360000002 HC RX W HCPCS

## 2024-01-15 PROCEDURE — 7100000000 HC PACU RECOVERY - FIRST 15 MIN: Performed by: ORTHOPAEDIC SURGERY

## 2024-01-15 PROCEDURE — 7100000010 HC PHASE II RECOVERY - FIRST 15 MIN: Performed by: ORTHOPAEDIC SURGERY

## 2024-01-15 PROCEDURE — 6370000000 HC RX 637 (ALT 250 FOR IP): Performed by: ANESTHESIOLOGY

## 2024-01-15 PROCEDURE — 2709999900 HC NON-CHARGEABLE SUPPLY: Performed by: ORTHOPAEDIC SURGERY

## 2024-01-15 PROCEDURE — 3700000000 HC ANESTHESIA ATTENDED CARE: Performed by: ORTHOPAEDIC SURGERY

## 2024-01-15 RX ORDER — FENTANYL CITRATE 0.05 MG/ML
25 INJECTION, SOLUTION INTRAMUSCULAR; INTRAVENOUS EVERY 5 MIN PRN
Status: DISCONTINUED | OUTPATIENT
Start: 2024-01-15 | End: 2024-01-15 | Stop reason: HOSPADM

## 2024-01-15 RX ORDER — SODIUM CHLORIDE 9 MG/ML
INJECTION, SOLUTION INTRAVENOUS PRN
Status: DISCONTINUED | OUTPATIENT
Start: 2024-01-15 | End: 2024-01-15 | Stop reason: SDUPTHER

## 2024-01-15 RX ORDER — SODIUM CHLORIDE 0.9 % (FLUSH) 0.9 %
5-40 SYRINGE (ML) INJECTION EVERY 12 HOURS SCHEDULED
Status: DISCONTINUED | OUTPATIENT
Start: 2024-01-15 | End: 2024-01-15 | Stop reason: SDUPTHER

## 2024-01-15 RX ORDER — DROPERIDOL 2.5 MG/ML
0.62 INJECTION, SOLUTION INTRAMUSCULAR; INTRAVENOUS
Status: DISCONTINUED | OUTPATIENT
Start: 2024-01-15 | End: 2024-01-15 | Stop reason: HOSPADM

## 2024-01-15 RX ORDER — SODIUM CHLORIDE 0.9 % (FLUSH) 0.9 %
5-40 SYRINGE (ML) INJECTION PRN
Status: DISCONTINUED | OUTPATIENT
Start: 2024-01-15 | End: 2024-01-15 | Stop reason: SDUPTHER

## 2024-01-15 RX ORDER — SODIUM CHLORIDE 0.9 % (FLUSH) 0.9 %
5-40 SYRINGE (ML) INJECTION EVERY 12 HOURS SCHEDULED
Status: DISCONTINUED | OUTPATIENT
Start: 2024-01-15 | End: 2024-01-15 | Stop reason: HOSPADM

## 2024-01-15 RX ORDER — ONDANSETRON 2 MG/ML
4 INJECTION INTRAMUSCULAR; INTRAVENOUS
Status: DISCONTINUED | OUTPATIENT
Start: 2024-01-15 | End: 2024-01-15 | Stop reason: HOSPADM

## 2024-01-15 RX ORDER — LIDOCAINE HYDROCHLORIDE 20 MG/ML
INJECTION, SOLUTION EPIDURAL; INFILTRATION; INTRACAUDAL; PERINEURAL PRN
Status: DISCONTINUED | OUTPATIENT
Start: 2024-01-15 | End: 2024-01-15 | Stop reason: SDUPTHER

## 2024-01-15 RX ORDER — OXYCODONE HYDROCHLORIDE 5 MG/1
5 TABLET ORAL
Status: COMPLETED | OUTPATIENT
Start: 2024-01-15 | End: 2024-01-15

## 2024-01-15 RX ORDER — SODIUM CHLORIDE 9 MG/ML
INJECTION, SOLUTION INTRAVENOUS PRN
Status: DISCONTINUED | OUTPATIENT
Start: 2024-01-15 | End: 2024-01-15 | Stop reason: HOSPADM

## 2024-01-15 RX ORDER — BUPIVACAINE HYDROCHLORIDE 5 MG/ML
INJECTION, SOLUTION EPIDURAL; INTRACAUDAL
Status: COMPLETED | OUTPATIENT
Start: 2024-01-15 | End: 2024-01-15

## 2024-01-15 RX ORDER — PROPOFOL 10 MG/ML
INJECTION, EMULSION INTRAVENOUS PRN
Status: DISCONTINUED | OUTPATIENT
Start: 2024-01-15 | End: 2024-01-15 | Stop reason: SDUPTHER

## 2024-01-15 RX ORDER — SODIUM CHLORIDE 0.9 % (FLUSH) 0.9 %
5-40 SYRINGE (ML) INJECTION PRN
Status: DISCONTINUED | OUTPATIENT
Start: 2024-01-15 | End: 2024-01-15 | Stop reason: HOSPADM

## 2024-01-15 RX ORDER — SCOLOPAMINE TRANSDERMAL SYSTEM 1 MG/1
1 PATCH, EXTENDED RELEASE TRANSDERMAL ONCE
Status: DISCONTINUED | OUTPATIENT
Start: 2024-01-15 | End: 2024-01-15 | Stop reason: HOSPADM

## 2024-01-15 RX ORDER — PHENYLEPHRINE HCL IN 0.9% NACL 1 MG/10 ML
SYRINGE (ML) INTRAVENOUS PRN
Status: DISCONTINUED | OUTPATIENT
Start: 2024-01-15 | End: 2024-01-15 | Stop reason: SDUPTHER

## 2024-01-15 RX ORDER — FENTANYL CITRATE 50 UG/ML
INJECTION, SOLUTION INTRAMUSCULAR; INTRAVENOUS PRN
Status: DISCONTINUED | OUTPATIENT
Start: 2024-01-15 | End: 2024-01-15 | Stop reason: SDUPTHER

## 2024-01-15 RX ORDER — CEPHALEXIN 500 MG/1
500 CAPSULE ORAL 4 TIMES DAILY
Qty: 12 CAPSULE | Refills: 0 | Status: SHIPPED | OUTPATIENT
Start: 2024-01-15 | End: 2024-01-18

## 2024-01-15 RX ORDER — TRAMADOL HYDROCHLORIDE 50 MG/1
50 TABLET ORAL EVERY 6 HOURS PRN
Qty: 12 TABLET | Refills: 0 | Status: SHIPPED | OUTPATIENT
Start: 2024-01-15 | End: 2024-01-18

## 2024-01-15 RX ORDER — GLYCOPYRROLATE 0.2 MG/ML
INJECTION INTRAMUSCULAR; INTRAVENOUS PRN
Status: DISCONTINUED | OUTPATIENT
Start: 2024-01-15 | End: 2024-01-15 | Stop reason: SDUPTHER

## 2024-01-15 RX ORDER — DEXAMETHASONE SODIUM PHOSPHATE 4 MG/ML
INJECTION, SOLUTION INTRA-ARTICULAR; INTRALESIONAL; INTRAMUSCULAR; INTRAVENOUS; SOFT TISSUE PRN
Status: DISCONTINUED | OUTPATIENT
Start: 2024-01-15 | End: 2024-01-15 | Stop reason: SDUPTHER

## 2024-01-15 RX ORDER — ONDANSETRON 2 MG/ML
INJECTION INTRAMUSCULAR; INTRAVENOUS PRN
Status: DISCONTINUED | OUTPATIENT
Start: 2024-01-15 | End: 2024-01-15 | Stop reason: SDUPTHER

## 2024-01-15 RX ADMIN — FENTANYL CITRATE 25 MCG: 50 INJECTION INTRAMUSCULAR; INTRAVENOUS at 09:11

## 2024-01-15 RX ADMIN — OXYCODONE 5 MG: 5 TABLET ORAL at 10:54

## 2024-01-15 RX ADMIN — Medication 100 MCG: at 09:05

## 2024-01-15 RX ADMIN — Medication 100 MCG: at 09:13

## 2024-01-15 RX ADMIN — GLYCOPYRROLATE 0.2 MG: 0.2 INJECTION INTRAMUSCULAR; INTRAVENOUS at 08:57

## 2024-01-15 RX ADMIN — LIDOCAINE HYDROCHLORIDE 100 MG: 20 INJECTION, SOLUTION EPIDURAL; INFILTRATION; INTRACAUDAL; PERINEURAL at 08:44

## 2024-01-15 RX ADMIN — SODIUM CHLORIDE 2000 MG: 900 INJECTION INTRAVENOUS at 08:47

## 2024-01-15 RX ADMIN — PROPOFOL 150 MG: 10 INJECTION, EMULSION INTRAVENOUS at 08:44

## 2024-01-15 RX ADMIN — FENTANYL CITRATE 25 MCG: 0.05 INJECTION, SOLUTION INTRAMUSCULAR; INTRAVENOUS at 10:09

## 2024-01-15 RX ADMIN — SODIUM CHLORIDE: 9 INJECTION, SOLUTION INTRAVENOUS at 07:18

## 2024-01-15 RX ADMIN — FENTANYL CITRATE 25 MCG: 0.05 INJECTION, SOLUTION INTRAMUSCULAR; INTRAVENOUS at 09:56

## 2024-01-15 RX ADMIN — DEXAMETHASONE SODIUM PHOSPHATE 10 MG: 4 INJECTION, SOLUTION INTRAMUSCULAR; INTRAVENOUS at 08:48

## 2024-01-15 RX ADMIN — ONDANSETRON 4 MG: 2 INJECTION INTRAMUSCULAR; INTRAVENOUS at 09:01

## 2024-01-15 RX ADMIN — FENTANYL CITRATE 50 MCG: 50 INJECTION INTRAMUSCULAR; INTRAVENOUS at 08:47

## 2024-01-15 RX ADMIN — FENTANYL CITRATE 25 MCG: 50 INJECTION INTRAMUSCULAR; INTRAVENOUS at 09:17

## 2024-01-15 ASSESSMENT — PAIN - FUNCTIONAL ASSESSMENT
PAIN_FUNCTIONAL_ASSESSMENT: 0-10

## 2024-01-15 ASSESSMENT — PAIN DESCRIPTION - LOCATION: LOCATION: FOOT

## 2024-01-15 ASSESSMENT — PAIN DESCRIPTION - DESCRIPTORS
DESCRIPTORS: DISCOMFORT
DESCRIPTORS: DISCOMFORT;DULL
DESCRIPTORS: DISCOMFORT
DESCRIPTORS: DISCOMFORT

## 2024-01-15 ASSESSMENT — PAIN SCALES - GENERAL
PAINLEVEL_OUTOF10: 5

## 2024-01-15 ASSESSMENT — PAIN DESCRIPTION - PAIN TYPE: TYPE: SURGICAL PAIN

## 2024-01-15 ASSESSMENT — PAIN DESCRIPTION - FREQUENCY: FREQUENCY: CONTINUOUS

## 2024-01-15 ASSESSMENT — PAIN DESCRIPTION - ORIENTATION: ORIENTATION: RIGHT

## 2024-01-15 NOTE — TELEPHONE ENCOUNTER
Auth: # [id number]    Date Range: 01/15/24 thru 04/14/24  Type of SX:  Outpatient  Location: W  CPT: 41934   DX Code: M25.871  SX area: Rt foot  Insurance: [facility]

## 2024-01-15 NOTE — DISCHARGE INSTRUCTIONS
Post op instruction:  1- D/C home  2- Dx Right great toe plantar pain/ medial sesamoiditis.  3- PWB right foot on heel, postop shoe  4- Elevation surgical site, with ice  5- Keep Drsg dry and clean, 3 days, then BandAid.  6- F/U in 2 weeks.       Jourdan Diaz MD, 1/15/2024

## 2024-01-15 NOTE — ANESTHESIA POSTPROCEDURE EVALUATION
Department of Anesthesiology  Postprocedure Note    Patient: Bethany Merritt  MRN: [id number]  YOB: 1970  Date of evaluation: 1/15/2024    Procedure Summary       Date: 01/15/24 Room / Location: [address]    Anesthesia Start: 0839 Anesthesia Stop: 0925    Procedure: RIGHT FOOT MEDIAL SESAMOIDECTOMY (Right: Foot) Diagnosis:       Sesamoiditis of right foot      (Sesamoiditis of right foot [M25.871])    Surgeons: Jourdan Diaz MD Responsible Provider: Luis Fernando Sabillon MD    Anesthesia Type: general ASA Status: 2            Anesthesia Type: No value filed.    Lilia Phase I: Lilia Score: 10    Lilia Phase II: Lilia Score: 10    Anesthesia Post Evaluation    Patient location during evaluation: PACU  Patient participation: complete - patient participated  Level of consciousness: awake  Airway patency: patent  Nausea & Vomiting: no nausea and no vomiting  Cardiovascular status: blood pressure returned to baseline  Respiratory status: acceptable  Hydration status: stable  Comments: Vital signs stable  OK to discharge from Stage I post anesthesia care.  Care transferred from Anesthesiology department on discharge from perioperative area   Multimodal analgesia pain management approach  Pain management: satisfactory to patient    No notable events documented.

## 2024-01-15 NOTE — BRIEF OP NOTE
Brief Postoperative Note      Patient: Bethany Merritt  YOB: 1970  MRN: [id number]    Date of Procedure: 1/15/2024    Pre-Op Diagnosis Codes:     * Sesamoiditis of right foot [M25.871]    Post-Op Diagnosis: Same       Procedure(s):  RIGHT FOOT MEDIAL SESAMOIDECTOMY    Surgeon(s):  Jourdan Diaz MD    Assistant: RITESH Obregon    Anesthesia: General    Estimated Blood Loss (mL): Minimal    Complications: None    Specimens:   ID Type Source Tests Collected by Time Destination   A : right sesamoid Bone Bone SURGICAL PATHOLOGY Jourdan Diaz MD 1/15/2024 0906        Implants:  * No implants in log *      Drains: * No LDAs found *    Findings: Same.      Electronically signed by Jourdan Diaz MD on 1/15/2024 at 6:12 PM

## 2024-01-15 NOTE — H&P
Preoperative H&P Update    The patient's History and Physical in the medical record from 1/11/2024 was reviewed by me today.    Past Medical History:   Diagnosis Date    Acid reflux     Allergic rhinitis     Anesthesia complication     PATIENT STATES HER VISION GETS WORSE EVERY TIME SHE HAS ANESTHESIA    Anxiety     Atrophic vaginitis     Constipation     DDD (degenerative disc disease), cervical     Dysmenorrhea     Dyspareunia in female     Esophagitis 05/2019    Fibrocystic breast     Gastritis 05/2019    HPV (human papilloma virus) infection 1992    Exposed back in college    Hyperlipidemia     NO MEDS    Internal hemorrhoids 01/2022    per colonoscopy - Dr. Ureña    Interstitial cystitis 10/23/2017    Iron deficiency anemia 2017    Menopause ovarian failure     Menorrhagia     PONV (postoperative nausea and vomiting)     Trigeminal neuralgia     Vitamin D deficiency 07/2019    Vocal cord nodule 2016     Past Surgical History:   Procedure Laterality Date    APPENDECTOMY  1996    CERVICAL FUSION  2013    C4-6    COLONOSCOPY N/A 01/11/2022    COLONOSCOPY, POSSIBLE POLYPECTOMY performed by Evelio Ureña MD at [facility] END[facility]COPY    HEMORRHOID SURGERY      HERNIA REPAIR  1970, 1999    bilateral, right    LARYNGOSCOPY  2016    PARTIAL HYSTERECTOMY (CERVIX NOT REMOVED)  2009    DUB -     UPPER GASTROINTESTINAL ENDOSCOPY  05/2019     No current facility-administered medications on file prior to encounter.     Current Outpatient Medications on File Prior to Encounter   Medication Sig Dispense Refill    ЮЛИЯ 0.05 MG/24HR Place 1 patch onto the skin Twice a Week      triamcinolone (KENALOG) 0.1 % cream Apply to affected area twice daily for up to 2 weeks or until improved. (Patient taking differently: Apply topically as needed Apply to affected area twice daily for up to 2 weeks or until improved.) 30 g 2    fluocinonide (LIDEX) 0.05 % external solution Apply to the red scaly areas on the scalp daily as needed. 20 mL

## 2024-01-15 NOTE — PROGRESS NOTES
[facility] Pre-Admission Testing Electronic Communication Worksheet for OR/ENDO Procedures        Patient: Bethany Merritt    DOS: 1/15/24    Arrival Time: 0630    Surgery Time: 0830    Meds to Bed:  [x] YES    []  NO    Transportation Confirmed: [x] YES    []  NO  [facility]    History and Physical:  [] YES    []  NO  [x] N/A  If yes, please list doctor or Urgent Care and date of H&P: DR. GILL WILL DO H&P    Additional Clearance(Cardiac, Pulmonary, etc):  [] YES    [x]  NO    Pre-Admission Testing Visit:  [] YES    [x]  NO If no, do labs/testing need to be done DOS?  [] YES    [x]  NO    Medication Reconciliation Complete:  [x] YES    []  NO        Additional Notes:                Interview Complete: [x] YES    []  NO          Jaki Brandt, RN  5:33 PM  
Patient received from PACU awake, alert, VSS. Patient with Ace wrap, and boot to Rt. Foot. Positive Rt. Pedal puse, foot warm to touch with good cap refill, able to push. Will medicate for pain. Call light within reach and snack provided.  
Patient tolerated snack well, VSS. Rt. Foot with positive pedal pulse, warm to touch & good cap re-fill. Discharge instructions discussed with patient and , both verbalized understanding. Patient ready for discharge.   
Pt to PACU 9. VSS. Sleeping with oral airway in place.   
your surgery.    C-Difficile admission screening and protocol:       * Admitted with diarrhea?                         [] YES    [x]  NO     *Prior history of C-Diff. In last 3 months? [] YES    [x]  NO     *Antibiotic use in the past 6-8 weeks?      [x]  NO    []  YES                 If yes, which ANTIBIOTIC AND REASON______     *Prior hospitalization or nursing home in the last month? []  YES    [x]  NO        SAFETY FIRST..call before you fall

## 2024-01-16 ENCOUNTER — TELEPHONE (OUTPATIENT)
Dept: ORTHOPEDIC SURGERY | Age: 54
End: 2024-01-16

## 2024-01-16 NOTE — TELEPHONE ENCOUNTER
Patient spouse called asking if bandages stay on and when to start the keflex.  Spoke to patient and explained bandages stay on until post op appt and to start keflex now.

## 2024-01-16 NOTE — OP NOTE
[facility]           [address] [place] [address]                                OPERATIVE REPORT    PATIENT NAME: MAC MONTANO                      :        1970  MED REC NO:   [id number]                          ROOM:  ACCOUNT NO:   [id number]                           ADMIT DATE: 01/15/2024  PROVIDER:     Jourdan Diaz MD    DATE OF PROCEDURE:  01/15/2024    PRIMARY CARE PHYSICIAN:  Fabienne Rodriguez MD    PREOPERATIVE DIAGNOSIS:  Right foot great toe medial sesamoiditis.    POSTOPERATIVE DIAGNOSIS:  Right foot great toe medial sesamoiditis.    OPERATION PERFORMED:  Sesamoidectomy right foot first metatarsal/medial  sesamoid excision.    SURGEON:  Jourdan Diaz MD    ASSISTANT:  Azra Obregon CNP    ANESTHESIA:  General anesthesia.    ESTIMATED BLOOD LOSS:  Minimal.    COMPLICATIONS:  None.    SPECIMEN:  Right foot medial sesamoid for gross pathology.    INDICATIONS:  This is a 53-year-old white female who presented to our  office with a right great toe plantar medial pain.  She was diagnosed  with a medial sesamoiditis.  The patient has failed nonoperative  treatment including physical therapy and unloading of the medial  sesamoid.  All risks, benefits, and alternatives were discussed with the  patient.  She agreed to proceed with surgical excision.    OPERATIVE PROCEDURE:  The patient's right foot was marked.  She received  2 gm Ancef IV preoperatively.  The patient was then brought to the  operating room and underwent general anesthesia.  A well-padded  tourniquet was placed to right upper calf.  The right lower extremity  was then prepped and draped in regular sterile routine fashion.  A  time-out called was called confirming the patient's name, site, and  procedure.    Esmarch was used for exsanguination and tourniquet was inflated to 250  mmHg.  A medial incision was made over the great toe metatarsophalangeal  joint.  Careful dissection

## 2024-01-18 ENCOUNTER — TELEPHONE (OUTPATIENT)
Age: 54
End: 2024-01-18

## 2024-01-18 ENCOUNTER — TELEPHONE (OUTPATIENT)
Dept: ORTHOPEDIC SURGERY | Age: 54
End: 2024-01-18

## 2024-01-18 RX ORDER — FLUCONAZOLE 150 MG/1
150 TABLET ORAL ONCE
Qty: 2 TABLET | Refills: 0 | Status: SHIPPED | OUTPATIENT
Start: 2024-01-18 | End: 2024-01-18

## 2024-01-18 RX ORDER — FLUCONAZOLE 150 MG/1
150 TABLET ORAL
Qty: 2 TABLET | Refills: 0 | Status: CANCELLED | OUTPATIENT
Start: 2024-01-18 | End: 2024-01-24

## 2024-01-18 NOTE — TELEPHONE ENCOUNTER
Spoke with patient. She stated that the antibiotics are giving her a yeast infection. I suggested she try calling her OBGYN or PCP to see if they would call in a prescription. She stated she would call her PCP and see what they say.

## 2024-01-18 NOTE — TELEPHONE ENCOUNTER
General Question     Subject: MEDICATION QUESTION   Patient and /or Facility Request: MAC MONTANO  Contact Number: [phone]    PATIENT CALLED TO SPEAK WITH CLINICAL-WANTED TO DISCUSS MEDICATION FOR YEAST INFECTION.     PLEASE ADVISE

## 2024-01-18 NOTE — TELEPHONE ENCOUNTER
PATIENT HAD A FOOT PROCEDURE ON 01/15, HAS BEEN ON MANY MEDICATIONS INCLUDING STRONG ANTIBIOTICS. NOW HAS A VAGINAL YEAST INFECTION. SHE CALL THE SURGEON AND THEY ADVISED HER THAT A SCRIPT FOR YEAST MEDICATION NEEDED TO COME FROM US.     ARCHANA MURPHY    1/18/24  9:05 AM  Note     Spoke with patient. She stated that the antibiotics are giving her a yeast infection. I suggested she try calling her OBGYN or PCP to see if they would call in a prescription. She stated she would call her PCP and see what they say.                [facility] [id number] - [place], [place] - [address] - P [phone] - F [phone]   [address], [place] [address]       PLEASE CALL PATIENT ONCE SENT.         PENDED.ANITHA

## 2024-01-19 ENCOUNTER — TELEMEDICINE (OUTPATIENT)
Age: 54
End: 2024-01-19
Payer: COMMERCIAL

## 2024-01-19 ENCOUNTER — OFFICE VISIT (OUTPATIENT)
Dept: VASCULAR SURGERY | Age: 54
End: 2024-01-19
Payer: COMMERCIAL

## 2024-01-19 VITALS — BODY MASS INDEX: 22.2 KG/M2 | HEIGHT: 64 IN | WEIGHT: 130 LBS

## 2024-01-19 DIAGNOSIS — B37.31 VAGINAL YEAST INFECTION: ICD-10-CM

## 2024-01-19 DIAGNOSIS — K62.5 RECTAL BLEEDING: ICD-10-CM

## 2024-01-19 DIAGNOSIS — I83.893 SYMPTOMATIC VARICOSE VEINS OF BOTH LOWER EXTREMITIES: Primary | ICD-10-CM

## 2024-01-19 DIAGNOSIS — K64.9 HEMORRHOIDS, UNSPECIFIED HEMORRHOID TYPE: Primary | ICD-10-CM

## 2024-01-19 DIAGNOSIS — K59.00 CONSTIPATION, UNSPECIFIED CONSTIPATION TYPE: ICD-10-CM

## 2024-01-19 DIAGNOSIS — M79.671 RIGHT FOOT PAIN: ICD-10-CM

## 2024-01-19 PROCEDURE — 99213 OFFICE O/P EST LOW 20 MIN: CPT | Performed by: SURGERY

## 2024-01-19 PROCEDURE — 99214 OFFICE O/P EST MOD 30 MIN: CPT | Performed by: FAMILY MEDICINE

## 2024-01-19 RX ORDER — HYDROCORTISONE ACETATE 25 MG/1
25 SUPPOSITORY RECTAL EVERY 12 HOURS
Qty: 14 SUPPOSITORY | Refills: 0 | Status: SHIPPED | OUTPATIENT
Start: 2024-01-19 | End: 2024-01-26

## 2024-01-19 ASSESSMENT — PATIENT HEALTH QUESTIONNAIRE - PHQ9
SUM OF ALL RESPONSES TO PHQ QUESTIONS 1-9: 0
SUM OF ALL RESPONSES TO PHQ9 QUESTIONS 1 & 2: 0
2. FEELING DOWN, DEPRESSED OR HOPELESS: 0
1. LITTLE INTEREST OR PLEASURE IN DOING THINGS: 0

## 2024-01-19 NOTE — PROGRESS NOTES
Seen back for re-evaluation of B leg complaints - heaviness/fatigue and R thigh pruritis.  Pt has regularly been wearing the prescribed KH 20/30 mmHg stockings with some benefit as decreased fatigue. Also notes decreased itching in R thigh.  No reported edema, bleeding, tender cord, skin changes or ulceration.  Recent venous reflux scan performed on 12/8/2023 at [facility].    EXAM:  No edema, ulceration or dermatitis.    All VVs soft, nontender without erythema.     VRS - minimal deep reflux; no R leg superficial reflux; L SFJ and BK GSV reflux    A/P: Chronic superficial venous insufficiency L leg with secondary symptomatic varicose veins   Spider veins B legs   Pruritus R leg - not venous in origin based on test results.   SIGNIFICANT L AXIAL REFLUX with LARGE VARICOSITIES. Surgery is recommended - L BK GSV RFA + B stab phlebectomies  Consider eventual B leg sclerotherapy.   This was discussed in terms that the patient could understand.   The risks, including bleeding, clotting, bruising, swelling, neuritis, skin dimpling, infection, pigmentation, scarring, and mortality were discussed.   I answered all questions pertaining to surgery and post operative expectations related to return to work and daily activity.   Time spent counseling and coordination of care: 25 minutes.  More than 50% of visit was spent reviewing and discussing venous duplex scan.  She will continue compression stockings and schedule as recommended if desired.

## 2024-01-19 NOTE — PROGRESS NOTES
2024    TELEHEALTH EVALUATION -- Audio/Visual (During COVID-19 public health emergency)    Due to COVID 19 outbreak, patient's office visit was converted to a virtual visit.  Patient was contacted and agreed to proceed with a virtual visit via [employer] Video Visit  The risks and benefits of converting to a virtual visit were discussed in light of the current infectious disease epidemic.  Patient also understood that insurance coverage and co-pays are up to their individual insurance plans.    HPI:    Bethany Merritt (:  1970) has requested an audio/video evaluation for the following concern(s):    She is s/p right foot surgery on Monday with Dr. Diaz.  She developed vaginal itching, constipation and now rectal bleeding . No fever.      She took Tramadol for pain and Keflex for antibotic has stopped since Thursday . Last bowel movement was yesterday but had to strain with some blood and clots.  She had bowel movement this am and still had slight small clot and some mild bleeding with the bowel movement . No blood in her underwear.  No abdominal pain or fevers. She is taking Metamucil daily X 2 days. She is having soreness to rectal area.  H/o hemorrhoids with prior hemorrhoid surgery in .    She started with vaginal itching on Tuesday afternoon. She used Monistat and took Diflucan last night with some improvement.  She had been taking probiotic daily - womens' [facility] version.         Review of Systems    Prior to Visit Medications    Medication Sig Taking? Authorizing Provider   ЮЛИЯ 0.05 MG/24HR Place 1 patch onto the skin Twice a Week Yes Provider, MD Megha   triamcinolone (KENALOG) 0.1 % cream Apply to affected area twice daily for up to 2 weeks or until improved.  Patient taking differently: Apply topically as needed Apply to affected area twice daily for up to 2 weeks or until improved. Yes Neri Workman MD   fluocinonide (LIDEX) 0.05 % external solution Apply to the red scaly areas

## 2024-01-30 ENCOUNTER — OFFICE VISIT (OUTPATIENT)
Dept: ORTHOPEDIC SURGERY | Age: 54
End: 2024-01-30

## 2024-01-30 VITALS — WEIGHT: 130 LBS | HEIGHT: 64 IN | BODY MASS INDEX: 22.2 KG/M2

## 2024-01-30 DIAGNOSIS — M25.871 SESAMOIDITIS OF RIGHT FOOT: Primary | ICD-10-CM

## 2024-01-30 PROCEDURE — 99024 POSTOP FOLLOW-UP VISIT: CPT | Performed by: NURSE PRACTITIONER

## 2024-01-30 PROCEDURE — MISC245 PEDIFLEX GEL BUNION SPLINT: Performed by: NURSE PRACTITIONER

## 2024-01-30 PROCEDURE — APPNB15 APP NON BILLABLE TIME 0-15 MINS: Performed by: NURSE PRACTITIONER

## 2024-01-30 NOTE — PROGRESS NOTES
DIAGNOSIS:  Right sesamoiditis, status post medial sesamoidectomy excision    DATE OF SURGERY: 1/15/2024.    HISTORY OF PRESENT ILLNESS:    Ms. Merritt 54 y.o. female 2 weeks out from her surgery.  She has been partial weightbearing on the heel in a post-op shoe.  She denies any sharp pain. Rates pain a 3/10 VAS mild, aching, intermittent and are improving. Aggravating factors movement, walking. Alleviating factors elevation and rest. No fever or chills.  No numbness or tingling sensation.     PHYSICAL EXAMINATION:    The dressing was removed.  the incision is healing nicely.  No signs of any erythema or drainage, with minimal to no swelling.   No pain with big toe ROM. She has intact sensation in the right foot.     IMAGING:  X-rays were taken in the office today, 3 views of the right foot, and showed the removal of medial sesamoid    IMPRESSION:  2 weeks out from right Hallux rigidus cheilectomy., and doing well.     PLAN:   She will be partial weightbearing on the heel until the incision heals.  No heavy impact activities for 4 to 6 weeks.  She was instructed in incisional care.  Recommend a bunion silicon brace that was applied today and instructed in care.  She was instructed to use this for approximately 6 months.  She will come back in 6 weeks or as needed.           Procedures    Pediflex Gel Bunion Splint $15     Patient was supplied a Pediflex Gel Bunion Splint.  This retail item was supplied to provide functional support of the affected area.      Verbal and written instructions for the use of and application of this item were provided.  The patient was educated and fit by a healthcare professional with expert knowledge and specialization in brace application.  They were instructed to contact the office immediately should the equipment   result in increased pain, decreased sensation, increased swelling or worsening of the condition.     Azra Obregon, APRN - CNP

## 2024-02-02 DIAGNOSIS — R51.9 RIGHT SIDED FACIAL PAIN: ICD-10-CM

## 2024-02-02 DIAGNOSIS — M26.629 TMJ SYNDROME: ICD-10-CM

## 2024-02-02 DIAGNOSIS — G50.0 TRIGEMINAL NEURALGIA OF RIGHT SIDE OF FACE: ICD-10-CM

## 2024-02-02 RX ORDER — MELOXICAM 7.5 MG/1
TABLET ORAL
Qty: 90 TABLET | Refills: 0 | Status: SHIPPED | OUTPATIENT
Start: 2024-02-02

## 2024-02-02 NOTE — TELEPHONE ENCOUNTER
Requested Prescriptions     Pending Prescriptions Disp Refills    meloxicam (MOBIC) 7.5 MG tablet [Pharmacy Med Name: MELOXICAM 7.5MG TABS] 90 tablet 0     Sig: TAKE ONE TO TWO TABLETS BY MOUTH ONCE A DAY AS NEEDED         Last OV: 1/19/2024    Last labs: 8/17/2023     F/u: Visit date not found

## 2024-02-12 ENCOUNTER — TELEPHONE (OUTPATIENT)
Dept: ORTHOPEDIC SURGERY | Age: 54
End: 2024-02-12

## 2024-02-12 NOTE — TELEPHONE ENCOUNTER
The information below was reviewed. All questions were answered.      PLAN:   She will be partial weightbearing on the heel until the incision heals.  No heavy impact activities for 4 to 6 weeks.  She was instructed in incisional care.  Recommend a bunion silicon brace that was applied today and instructed in care.  She was instructed to use this for approximately 6 months.  She will come back in 6 weeks or as needed.

## 2024-02-12 NOTE — TELEPHONE ENCOUNTER
General Question     Subject: RIGHT FOOT   Patient and /or Facility Request: Bethany Merritt   Contact Number: [phone]     PATIENT CALLING REQUESTING A CALL BACK FROM SOMEONE IN DR GILL'S OFFICE REGARDING HER FOOT. PATIENT WANTS TO KNOW WHAT KIND OF ACTIVITIES CAN SHE DO AND DOES SHE NEED TO DO PHYSICAL THERAPY    PLEASE CALL THE BACK AT THE ABOVE NUMBER

## 2024-03-05 ENCOUNTER — OFFICE VISIT (OUTPATIENT)
Age: 54
End: 2024-03-05
Payer: COMMERCIAL

## 2024-03-05 VITALS
DIASTOLIC BLOOD PRESSURE: 72 MMHG | OXYGEN SATURATION: 98 % | TEMPERATURE: 97.4 F | HEIGHT: 64 IN | SYSTOLIC BLOOD PRESSURE: 106 MMHG | RESPIRATION RATE: 16 BRPM | HEART RATE: 78 BPM | BODY MASS INDEX: 24.04 KG/M2 | WEIGHT: 140.8 LBS

## 2024-03-05 DIAGNOSIS — I83.893 SYMPTOMATIC VARICOSE VEINS OF BOTH LOWER EXTREMITIES: ICD-10-CM

## 2024-03-05 DIAGNOSIS — G50.0 TRIGEMINAL NEURALGIA: ICD-10-CM

## 2024-03-05 DIAGNOSIS — N76.0 BACTERIAL VAGINOSIS: ICD-10-CM

## 2024-03-05 DIAGNOSIS — B96.89 BACTERIAL VAGINOSIS: ICD-10-CM

## 2024-03-05 DIAGNOSIS — R10.31 RLQ ABDOMINAL PAIN: ICD-10-CM

## 2024-03-05 DIAGNOSIS — M25.871 SESAMOIDITIS OF RIGHT FOOT: Primary | ICD-10-CM

## 2024-03-05 DIAGNOSIS — R10.31 RIGHT GROIN PAIN: ICD-10-CM

## 2024-03-05 PROCEDURE — 99214 OFFICE O/P EST MOD 30 MIN: CPT | Performed by: FAMILY MEDICINE

## 2024-03-05 RX ORDER — METRONIDAZOLE 7.5 MG/G
1 GEL VAGINAL 2 TIMES DAILY
Qty: 70 G | Refills: 0 | Status: SHIPPED | OUTPATIENT
Start: 2024-03-05 | End: 2024-03-10

## 2024-03-05 SDOH — ECONOMIC STABILITY: FOOD INSECURITY: WITHIN THE PAST 12 MONTHS, YOU WORRIED THAT YOUR FOOD WOULD RUN OUT BEFORE YOU GOT MONEY TO BUY MORE.: NEVER TRUE

## 2024-03-05 SDOH — ECONOMIC STABILITY: FOOD INSECURITY: WITHIN THE PAST 12 MONTHS, THE FOOD YOU BOUGHT JUST DIDN'T LAST AND YOU DIDN'T HAVE MONEY TO GET MORE.: NEVER TRUE

## 2024-03-05 SDOH — ECONOMIC STABILITY: INCOME INSECURITY: HOW HARD IS IT FOR YOU TO PAY FOR THE VERY BASICS LIKE FOOD, HOUSING, MEDICAL CARE, AND HEATING?: NOT HARD AT ALL

## 2024-03-05 NOTE — PROGRESS NOTES
Patient is here for pain to right lower abdomen / right groin . She saw Dr. Gallardo on 11/9/23 but he did not think she had a hernia. He saw her in 5/22 and ordered CT scan then for similar pain /issue, but she does think it got worse in fall 2023 , after she felt a ripping sensation when at her mother in laws .   She was referred to a pain specialist by Dr. Gallardo , who she saw for one visit.  She decided not to pursue the injection given she was told it would be only temporary relief.  Dr. Gallardo discussed seeing a physician at [facility], but did not give her information.  She has a h/o hernia repair as a child < 1 year old bilateral.  She had right re-repaired in 1996 prior to pregnancies.  Lifting groceries or the dog (20 lbs)  flares the pain for 2-3 days after doing something. She last flared the pain 2-3 days ago , but is feeling better now again .  Meloxicam does not help that much . Scrubbing floor may also flare the pain.   Normal bowel movements once per day.  No black stools or rectal bleeding.   Taking Gabapentin 100 mg bid usually for Trigeminal neuralgia, but did not increase to see if helped right groin pain.  Occasionally takes 300 mg per day- 100 mg tid .  No fever. Pain does not effect her sleep.      Patient is s/p right foot surgery ( post medial sesamoidectomy excision) with Dr. Diaz on 1/15/24 and saw Dr. Rivera on 1/19/24 as follow up to symptomatic varicose veins.  Right leg gets itching . Both legs throb above and below the knee.  She was using knee hi compression stockings mostly as the thigh highs tend to fall down.  She has struggled to wear the compression stockings since her foot surgery in mid 1/24 .  She is having some right foot pain .    After surgery she was on multiple antibiotics and though she had yeast infection so used over the counter meds which did not help so used left over Metrogel and helped.  She is requesting Metrogel vaginal cream to have on hand if needed given she gets

## 2024-03-08 ENCOUNTER — TELEPHONE (OUTPATIENT)
Dept: ORTHOPEDIC SURGERY | Age: 54
End: 2024-03-08

## 2024-03-08 NOTE — TELEPHONE ENCOUNTER
Patient is wanting to proceed with Surgery.   Please call patient to discuss or I can bring in for appt    Thank you

## 2024-03-11 NOTE — TELEPHONE ENCOUNTER
The patient is advised regarding the nature of their medical problem. All treatment options are fully discussed, including surgery, risks, complications, prognosis and postprocedure care.  All questions are answered.  Surgery will be scheduled at a time convenient to the patient.  They are asked to call me if they have any additional questions. The patient understands that the surgery will be done by Dr. Shiraz Maldonado.      The patient needs consent forms.

## 2024-03-12 ENCOUNTER — PREP FOR PROCEDURE (OUTPATIENT)
Dept: ORTHOPEDIC SURGERY | Age: 54
End: 2024-03-12

## 2024-03-12 PROBLEM — M65.30 TRIGGER FINGER: Status: ACTIVE | Noted: 2024-03-12

## 2024-03-13 ENCOUNTER — OFFICE VISIT (OUTPATIENT)
Age: 54
End: 2024-03-13
Payer: COMMERCIAL

## 2024-03-13 VITALS
OXYGEN SATURATION: 99 % | TEMPERATURE: 98.1 F | DIASTOLIC BLOOD PRESSURE: 68 MMHG | RESPIRATION RATE: 16 BRPM | HEART RATE: 88 BPM | BODY MASS INDEX: 23.96 KG/M2 | SYSTOLIC BLOOD PRESSURE: 108 MMHG | WEIGHT: 139.6 LBS

## 2024-03-13 DIAGNOSIS — B96.89 BV (BACTERIAL VAGINOSIS): Primary | ICD-10-CM

## 2024-03-13 DIAGNOSIS — L23.1 ALLERGIC CONTACT DERMATITIS DUE TO ADHESIVES: ICD-10-CM

## 2024-03-13 DIAGNOSIS — N76.0 BV (BACTERIAL VAGINOSIS): Primary | ICD-10-CM

## 2024-03-13 PROCEDURE — 99214 OFFICE O/P EST MOD 30 MIN: CPT | Performed by: FAMILY MEDICINE

## 2024-03-13 RX ORDER — CLINDAMYCIN PHOSPHATE 20 MG/G
1 CREAM VAGINAL NIGHTLY
Qty: 40 G | Refills: 0 | Status: SHIPPED | OUTPATIENT
Start: 2024-03-13 | End: 2024-03-20

## 2024-03-13 RX ORDER — METRONIDAZOLE 7.5 MG/G
GEL VAGINAL WEEKLY
Qty: 70 G | Refills: 0 | Status: SHIPPED | OUTPATIENT
Start: 2024-03-13

## 2024-03-13 RX ORDER — ESTRADIOL 0.05 MG/D
1 PATCH TRANSDERMAL WEEKLY
Qty: 12 PATCH | Refills: 3 | Status: SHIPPED | OUTPATIENT
Start: 2024-03-13

## 2024-03-13 NOTE — PATIENT INSTRUCTIONS
Clindamycin vaginal cream nightly x 7.  Then metronidazole gel once a week x 14 weeks ( until you use up 2 tubes of metronidazole).  Lactobacillus Acidophilus capsules: dissolve 6 in lukewarm sterile water ( can boil then cool water ) - irrigate vagina once a day x 6 days after finishing the Clindamycin then weekly x 3 months ( do 3 or 4 days after the Metronidazole).

## 2024-03-14 ENCOUNTER — OFFICE VISIT (OUTPATIENT)
Dept: ORTHOPEDIC SURGERY | Age: 54
End: 2024-03-14

## 2024-03-14 ENCOUNTER — TELEPHONE (OUTPATIENT)
Dept: ORTHOPEDIC SURGERY | Age: 54
End: 2024-03-14

## 2024-03-14 VITALS — BODY MASS INDEX: 23.73 KG/M2 | HEIGHT: 64 IN | WEIGHT: 139 LBS

## 2024-03-14 DIAGNOSIS — M26.629 TMJ SYNDROME: ICD-10-CM

## 2024-03-14 DIAGNOSIS — G50.0 TRIGEMINAL NEURALGIA OF RIGHT SIDE OF FACE: ICD-10-CM

## 2024-03-14 DIAGNOSIS — R51.9 RIGHT SIDED FACIAL PAIN: ICD-10-CM

## 2024-03-14 DIAGNOSIS — M25.871 SESAMOIDITIS OF RIGHT FOOT: Primary | ICD-10-CM

## 2024-03-14 PROCEDURE — 99024 POSTOP FOLLOW-UP VISIT: CPT | Performed by: NURSE PRACTITIONER

## 2024-03-14 PROCEDURE — APPNB15 APP NON BILLABLE TIME 0-15 MINS: Performed by: NURSE PRACTITIONER

## 2024-03-14 RX ORDER — MELOXICAM 7.5 MG/1
TABLET ORAL
Qty: 90 TABLET | Refills: 1 | Status: SHIPPED | OUTPATIENT
Start: 2024-03-14

## 2024-03-14 RX ORDER — FLUCONAZOLE 150 MG/1
150 TABLET ORAL EVERY OTHER DAY
Qty: 3 TABLET | Refills: 0 | Status: SHIPPED | OUTPATIENT
Start: 2024-03-14 | End: 2024-03-19

## 2024-03-14 NOTE — PROGRESS NOTES
DIAGNOSIS:  Right sesamoiditis, status post medial sesamoidectomy excision    DATE OF SURGERY: 1/15/2024.    HISTORY OF PRESENT ILLNESS:    Ms. Merritt 54 y.o. female 8 weeks out from her surgery.  She has been partial weightbearing on the heel in a post-op shoe.  She denies any minimal achy pain. Rates pain a 2/10 VAS mild, aching, intermittent and are improving. Aggravating factors movement, walking. Alleviating factors elevation and rest. Overall she is doing very well.  No fever or chills.  No numbness or tingling sensation.     PHYSICAL EXAMINATION:    The incision is healing nicely.  No signs of any erythema or drainage, with minimal to no swelling.   No pain with big toe ROM. She has intact sensation in the right foot.     IMAGING:  X-rays were taken in the office 1/30/2024, 3 views of the right foot, and showed the removal of medial sesamoid    IMPRESSION:  8 weeks out from right medial sesamoidectomy excision and doing well.     PLAN:   She can be weightbearing as tolerated.  Gradually return to normal activity as tolerated. Continue bunion silicon brace for 6 months.  She will come back in 6 weeks or as needed.             Azra Obregon, APRN - CNP

## 2024-03-14 NOTE — TELEPHONE ENCOUNTER
Requested Prescriptions     Pending Prescriptions Disp Refills    meloxicam (MOBIC) 7.5 MG tablet [Pharmacy Med Name: MELOXICAM 7.5MG TABS] 90 tablet 0     Sig: TAKE ONE TO TWO TABLETS BY MOUTH ONCE A DAY AS NEEDED       Last OV: 3/13/24    Last labs: 8/17/23     F/u: None

## 2024-03-14 NOTE — TELEPHONE ENCOUNTER
Appointment Request     Patient requesting earlier appointment: No  Appointment offered to patient: PATIENT NEEDING TO RESCHEDULE THE POST OP APPT ON 4/24, PATIENT CAN COME THE 22ND OR THE 25TH [facility] CALL TO ADVISE  Patient Contact Number: [phone]

## 2024-03-18 ENCOUNTER — TELEPHONE (OUTPATIENT)
Dept: ORTHOPEDIC SURGERY | Age: 54
End: 2024-03-18

## 2024-03-18 NOTE — TELEPHONE ENCOUNTER
PA requsted via [facility] online w/clinical.  Reference # Case ID# [id number].

## 2024-03-19 NOTE — TELEPHONE ENCOUNTER
Checked [facility] website, still pending.  Reference# [id number].

## 2024-03-22 ENCOUNTER — OFFICE VISIT (OUTPATIENT)
Age: 54
End: 2024-03-22

## 2024-03-22 VITALS
BODY MASS INDEX: 23.79 KG/M2 | HEART RATE: 56 BPM | DIASTOLIC BLOOD PRESSURE: 68 MMHG | SYSTOLIC BLOOD PRESSURE: 100 MMHG | RESPIRATION RATE: 16 BRPM | WEIGHT: 138.6 LBS | OXYGEN SATURATION: 98 % | TEMPERATURE: 98.4 F

## 2024-03-22 DIAGNOSIS — B96.89 BACTERIAL VAGINITIS: ICD-10-CM

## 2024-03-22 DIAGNOSIS — B37.31 VAGINAL YEAST INFECTION: ICD-10-CM

## 2024-03-22 DIAGNOSIS — N76.0 BACTERIAL VAGINITIS: ICD-10-CM

## 2024-03-22 DIAGNOSIS — F41.9 ANXIETY: ICD-10-CM

## 2024-03-22 DIAGNOSIS — E78.00 HYPERCHOLESTEROLEMIA: ICD-10-CM

## 2024-03-22 DIAGNOSIS — E55.9 VITAMIN D DEFICIENCY: ICD-10-CM

## 2024-03-22 DIAGNOSIS — M65.331 TRIGGER MIDDLE FINGER OF RIGHT HAND: ICD-10-CM

## 2024-03-22 DIAGNOSIS — Z01.818 PRE-OP EXAMINATION: Primary | ICD-10-CM

## 2024-03-22 DIAGNOSIS — M50.30 DDD (DEGENERATIVE DISC DISEASE), CERVICAL: ICD-10-CM

## 2024-03-22 NOTE — PROGRESS NOTES
028301 UNIT/GM powder Apply topically 2 times daily (Patient taking differently: Apply topically as needed) 60 g 0    docusate sodium (COLACE) 100 MG capsule Take 1 capsule by mouth 2 times daily (Patient taking differently: Take 1 capsule by mouth 2 times daily as needed) 90 capsule 3    Psyllium (METAMUCIL) 48.57 % POWD In 8 oz of water daily. (Patient taking differently: Take by mouth daily In 8 oz of water daily.) 822 g 3    hydrOXYzine HCl (ATARAX) 10 MG tablet Take 2.5 tablets by mouth 3 times daily as needed for Anxiety (sleeping difficulties) 40 tablet 1    Melatonin ER 5 MG TBCR Take 1 po qhs (Patient taking differently: Take 4 mg by mouth at bedtime Take 1 po qhs) 90 tablet 3    Probiotic Product (PROBIOTIC-10 PO) Take 1 tablet by mouth daily      cetirizine (ZYRTEC) 10 MG tablet Take 1 tablet by mouth daily      butalbital-acetaminophen-caffeine (FIORICET, ESGIC) -40 MG per tablet Take 1 tablet by mouth every 6 hours as needed for Headaches 25 tablet 1     No current facility-administered medications for this visit.       No Known Allergies    Social History     Tobacco Use    Smoking status: Never    Smokeless tobacco: Never   Vaping Use    Vaping Use: Never used   Substance Use Topics    Alcohol use: Yes     Comment: YEARLY    Drug use: Never        Family History   Problem Relation Age of Onset    Cancer Mother 75        melanoma    High Blood Pressure Father     Colon Cancer Maternal Grandfather 51    Other Paternal Grandmother         PE        Review Of Systems    Skin: no abnormal pigmentation, rash, scaling, itching, masses, hair or nail changes  Eyes: negative  Ears/Nose/Throat: negative  Respiratory: negative  Cardiovascular: negative  Gastrointestinal: negative  Genitourinary: negative  Musculoskeletal: negative  Neurologic: negative  Psychiatric: negative  Hematologic/Lymphatic/Immunologic: negative  Endocrine: negative       Objective:      LMP  (LMP Unknown)   General appearance -

## 2024-03-22 NOTE — TELEPHONE ENCOUNTER
Auth: # [id number]     Date Range: 4/16/2024 - 7/15/2024  Type of SX:  OUTPATIENT  Location: [facility]  CPT: 70397   DX Code: M65.30  SX area: R HAND  Insurance: [facility]

## 2024-03-22 NOTE — PATIENT INSTRUCTIONS
Take 1 Diflucan today and on Monday if needed for vaginal itching.    Start Metrogel vaginally on Wednesday     Vitamin D 1000 IU/day - D3;  Ensure USP Certified .    Calcium citrate 9446-3459 mg /day ideally in your diet throughout the day . Take a supplement if needed away from largest consumption of the day .

## 2024-03-25 DIAGNOSIS — Z01.818 PRE-OP EXAMINATION: ICD-10-CM

## 2024-03-25 DIAGNOSIS — E78.00 HYPERCHOLESTEROLEMIA: ICD-10-CM

## 2024-03-25 DIAGNOSIS — E55.9 VITAMIN D DEFICIENCY: ICD-10-CM

## 2024-03-25 LAB
25(OH)D3 SERPL-MCNC: 22.2 NG/ML
ALBUMIN SERPL-MCNC: 4.4 G/DL (ref 3.4–5)
ALBUMIN/GLOB SERPL: 1.8 {RATIO} (ref 1.1–2.2)
ALP SERPL-CCNC: 72 U/L (ref 40–129)
ALT SERPL-CCNC: 16 U/L (ref 10–40)
ANION GAP SERPL CALCULATED.3IONS-SCNC: 8 MMOL/L (ref 3–16)
AST SERPL-CCNC: 20 U/L (ref 15–37)
BILIRUB SERPL-MCNC: <0.2 MG/DL (ref 0–1)
BUN SERPL-MCNC: 12 MG/DL (ref 7–20)
CALCIUM SERPL-MCNC: 9.9 MG/DL (ref 8.3–10.6)
CHLORIDE SERPL-SCNC: 100 MMOL/L (ref 99–110)
CHOLEST SERPL-MCNC: 243 MG/DL (ref 0–199)
CO2 SERPL-SCNC: 31 MMOL/L (ref 21–32)
CREAT SERPL-MCNC: 0.7 MG/DL (ref 0.6–1.1)
DEPRECATED RDW RBC AUTO: 13.7 % (ref 12.4–15.4)
GFR SERPLBLD CREATININE-BSD FMLA CKD-EPI: >90 ML/MIN/{1.73_M2}
GLUCOSE SERPL-MCNC: 85 MG/DL (ref 70–99)
HCT VFR BLD AUTO: 38.4 % (ref 36–48)
HDLC SERPL-MCNC: 95 MG/DL (ref 40–60)
HGB BLD-MCNC: 12.9 G/DL (ref 12–16)
LDLC SERPL CALC-MCNC: 131 MG/DL
MCH RBC QN AUTO: 31.2 PG (ref 26–34)
MCHC RBC AUTO-ENTMCNC: 33.6 G/DL (ref 31–36)
MCV RBC AUTO: 93 FL (ref 80–100)
PLATELET # BLD AUTO: 254 K/UL (ref 135–450)
PMV BLD AUTO: 7.9 FL (ref 5–10.5)
POTASSIUM SERPL-SCNC: 4.3 MMOL/L (ref 3.5–5.1)
PROT SERPL-MCNC: 6.9 G/DL (ref 6.4–8.2)
RBC # BLD AUTO: 4.13 M/UL (ref 4–5.2)
SODIUM SERPL-SCNC: 139 MMOL/L (ref 136–145)
TRIGL SERPL-MCNC: 87 MG/DL (ref 0–150)
VLDLC SERPL CALC-MCNC: 17 MG/DL
WBC # BLD AUTO: 3.6 K/UL (ref 4–11)

## 2024-03-28 ENCOUNTER — OFFICE VISIT (OUTPATIENT)
Dept: SURGERY | Age: 54
End: 2024-03-28
Payer: COMMERCIAL

## 2024-03-28 VITALS — DIASTOLIC BLOOD PRESSURE: 62 MMHG | WEIGHT: 137 LBS | BODY MASS INDEX: 23.52 KG/M2 | SYSTOLIC BLOOD PRESSURE: 101 MMHG

## 2024-03-28 DIAGNOSIS — Z87.19 HISTORY OF RIGHT INGUINAL HERNIA: ICD-10-CM

## 2024-03-28 DIAGNOSIS — R10.31 RLQ ABDOMINAL PAIN: Primary | ICD-10-CM

## 2024-03-28 PROCEDURE — 99203 OFFICE O/P NEW LOW 30 MIN: CPT | Performed by: SURGERY

## 2024-03-28 ASSESSMENT — ENCOUNTER SYMPTOMS
EYES NEGATIVE: 1
ALLERGIC/IMMUNOLOGIC NEGATIVE: 1
RESPIRATORY NEGATIVE: 1
ABDOMINAL PAIN: 1

## 2024-03-28 NOTE — PROGRESS NOTES
hydrOXYzine HCl (ATARAX) 10 MG tablet Take 2.5 tablets by mouth 3 times daily as needed for Anxiety (sleeping difficulties) 2/3/23  Yes Fabienne Rodriguez MD   Melatonin ER 5 MG TBCR Take 1 po qhs  Patient taking differently: Take 4 mg by mouth at bedtime Take 1 po qhs 2/3/23  Yes Fabienne Rodriguez MD   Probiotic Product (PROBIOTIC-10 PO) Take 1 tablet by mouth daily   Yes Provider, MD Megha   cetirizine (ZYRTEC) 10 MG tablet Take 1 tablet by mouth daily   Yes Provider, MD Megha   butalbital-acetaminophen-caffeine (FIORICET, ESGIC) -40 MG per tablet Take 1 tablet by mouth every 6 hours as needed for Headaches 3/17/20  Yes Fabienne Rodriguez MD        Allergies:  Patient has no known allergies.    Social History:    reports that she has never smoked. She has never used smokeless tobacco. She reports current alcohol use. She reports that she does not use drugs.    Family History:        Problem Relation Age of Onset    Cancer Mother 75        melanoma    High Blood Pressure Father     Colon Cancer Maternal Grandfather 51    Other Paternal Grandmother         PE       REVIEW OF SYSTEMS:  Review of Systems   Constitutional: Negative.    HENT: Negative.     Eyes: Negative.    Respiratory: Negative.     Cardiovascular: Negative.    Gastrointestinal:  Positive for abdominal pain.   Endocrine: Negative.    Genitourinary: Negative.    Musculoskeletal: Negative.    Skin: Negative.    Allergic/Immunologic: Negative.    Neurological: Negative.    Hematological: Negative.    Psychiatric/Behavioral: Negative.         PHYSICAL EXAM:  VITALS:  /62   Wt 62.1 kg (137 lb)   LMP  (LMP Unknown)   BMI 23.52 kg/m²   CONSTITUTIONAL:  alert, no apparent distress and normal weight  EYES:  sclera clear  ENT:  normocepalic, without obvious abnormality  NECK:  supple, symmetrical, trachea midline  LUNGS:  clear to auscultation  CARDIOVASCULAR:  regular rate and rhythm  ABDOMEN:  scars noted are healed, normal bowel sounds,

## 2024-03-29 ENCOUNTER — PATIENT MESSAGE (OUTPATIENT)
Dept: DERMATOLOGY | Age: 54
End: 2024-03-29

## 2024-04-04 ENCOUNTER — HOSPITAL ENCOUNTER (OUTPATIENT)
Dept: CT IMAGING | Age: 54
Discharge: HOME | End: 2024-04-04
Payer: COMMERCIAL

## 2024-04-04 DIAGNOSIS — E78.00 HYPERCHOLESTEROLEMIA: ICD-10-CM

## 2024-04-04 PROCEDURE — 75571 CT HRT W/O DYE W/CA TEST: CPT

## 2024-04-08 NOTE — PROGRESS NOTES
Bethany M Merritt    Age 54 y.o.    female    1970    MRN [id number]    4/16/2024  Arrival Time_____________  OR Time____________20 Min     Procedure(s):  RIGHT MIDDLE FINGER TRIGGER FINGER RELEASE                      Monitor Anesthesia Care    Surgeon(s):  Shiraz Maldonado, MD       Phone [phone] (home)     Initals  Date  Info Source  Home  Cell         Work  _____________________________________________________________________  _____________________________________________________________________  _____________________________________________________________________  _____________________________________________________________________  _____________________________________________________________________    PCP _____________________________ Phone_________________     H&P  ________________  Bringing      Chart              Epic      DOS      Called________  EKG ________________   Bringing      Chart              Epic      DOS      Called________  LABS________________   Bringing     Chart              Epic      DOS      Called________  Cardiac Clearance ______ Bringing      Chart              Epic      DOS      Called________  Pulmonary Clearance____ Bringing      Chart              Epic      DOS      Called________    Cardiologist________________________ Phone___________________________  Pulmonologist_______________________Phone___________________________    ? Advance Directives   ? Mosque concerns / Waiver on Chart            PAT Communications________________  ? Pre-op Instructions Given /Understood          _________________________________  ? Directions to Surgery Center                          _________________________________  ? Transportation Home_______________      __________________________________  ? Crutches/Walker__________________        __________________________________    Orders: Hard copy/ EPIC                 Transcribed/ EPIC              _______Wt.    ________Pharmacy

## 2024-04-09 ENCOUNTER — PATIENT MESSAGE (OUTPATIENT)
Dept: DERMATOLOGY | Age: 54
End: 2024-04-09

## 2024-04-09 NOTE — TELEPHONE ENCOUNTER
Called and left message for patient to call back office.     Please offer 4/23/24 @ 1pm (add on) if still available.

## 2024-04-10 ENCOUNTER — HOSPITAL ENCOUNTER (OUTPATIENT)
Dept: CT IMAGING | Age: 54
Discharge: HOME | End: 2024-04-10
Attending: SURGERY
Payer: COMMERCIAL

## 2024-04-10 DIAGNOSIS — R10.31 RLQ ABDOMINAL PAIN: ICD-10-CM

## 2024-04-10 DIAGNOSIS — Z87.19 HISTORY OF RIGHT INGUINAL HERNIA: ICD-10-CM

## 2024-04-10 PROCEDURE — 74177 CT ABD & PELVIS W/CONTRAST: CPT

## 2024-04-10 PROCEDURE — 6360000004 HC RX CONTRAST MEDICATION: Performed by: SURGERY

## 2024-04-10 PROCEDURE — 2500000003 HC RX 250 WO HCPCS: Performed by: SURGERY

## 2024-04-10 RX ADMIN — IOPAMIDOL 75 ML: 755 INJECTION, SOLUTION INTRAVENOUS at 13:27

## 2024-04-10 RX ADMIN — BARIUM SULFATE 900 ML: 20 SUSPENSION ORAL at 13:27

## 2024-04-10 NOTE — PROGRESS NOTES
.  Date and time of surgery :  4/16/2024 at 0735 am            Arrival Time:  0600 am     Bring Picture ID and insurance card.  Please wear simple, loose fitting clothing to the hospital.   Do not bring valuables (money, credit cards, checkbooks, etc.)   Do not wear any makeup (including  eye makeup) and no nail polish or artificial nails on your fingers or toes.  DO NOT wear any jewelry or piercings on day of surgery.  All body piercing jewelry must be removed. Go to local jeweler to have removed if needed.  If you have dentures, they will be removed before going to the OR; we will provide you a container.  If you wear contact lenses or glasses, they will be removed; please bring a case for them.  Shower the evening before or morning of surgery with antibacterial soap.  Nothing to eat or drink after midnight the day before surgery.   You may brush your teeth and gargle the morning of surgery.  DO NOT SWALLOW WATER.   Do not take any morning meds the day of your surgery.  Aspirin, Ibuprofen, Advil, Naproxen, Vitamin E and other Anti-inflammatory products and supplements should be stopped for 5 -7days before surgery or as directed by your physician.  Do not smoke or drink any alcoholic beverages 24 hours prior to surgery.  This includes NA Beer. Refrain from the usage of any recreational drugs, including non-prescribed prescription drugs.   You MUST plan for a responsible adult to stay on site while you are here and take you home after your surgery. You will not be allowed to leave alone or drive yourself home. It is strongly suggested someone stay with you the first 24 hrs. Your surgery will be cancelled if you do not have a ride home.  To help prevent infection, change your sheets the night before surgery.   If you  have a Living Will and Durable Power of  for Healthcare, please bring in a copy.  Notify your Surgeon if you develop any illness between now and time of surgery. Cough, cold, fever, sore throat,

## 2024-04-16 ENCOUNTER — ANESTHESIA EVENT (OUTPATIENT)
Dept: OPERATING ROOM | Age: 54
End: 2024-04-16
Payer: COMMERCIAL

## 2024-04-16 ENCOUNTER — HOSPITAL ENCOUNTER (OUTPATIENT)
Age: 54
Setting detail: OUTPATIENT SURGERY
Discharge: HOME | End: 2024-04-16
Attending: ORTHOPAEDIC SURGERY | Admitting: ORTHOPAEDIC SURGERY
Payer: COMMERCIAL

## 2024-04-16 ENCOUNTER — ANESTHESIA (OUTPATIENT)
Dept: OPERATING ROOM | Age: 54
End: 2024-04-16
Payer: COMMERCIAL

## 2024-04-16 VITALS
DIASTOLIC BLOOD PRESSURE: 71 MMHG | TEMPERATURE: 97.5 F | BODY MASS INDEX: 22.2 KG/M2 | HEIGHT: 64 IN | OXYGEN SATURATION: 99 % | WEIGHT: 130 LBS | RESPIRATION RATE: 16 BRPM | SYSTOLIC BLOOD PRESSURE: 98 MMHG | HEART RATE: 65 BPM

## 2024-04-16 LAB — HCG UR QL: NEGATIVE

## 2024-04-16 PROCEDURE — 7100000010 HC PHASE II RECOVERY - FIRST 15 MIN: Performed by: ORTHOPAEDIC SURGERY

## 2024-04-16 PROCEDURE — 2709999900 HC NON-CHARGEABLE SUPPLY: Performed by: ORTHOPAEDIC SURGERY

## 2024-04-16 PROCEDURE — 7100000011 HC PHASE II RECOVERY - ADDTL 15 MIN: Performed by: ORTHOPAEDIC SURGERY

## 2024-04-16 PROCEDURE — A4217 STERILE WATER/SALINE, 500 ML: HCPCS | Performed by: ORTHOPAEDIC SURGERY

## 2024-04-16 PROCEDURE — 3700000000 HC ANESTHESIA ATTENDED CARE: Performed by: ORTHOPAEDIC SURGERY

## 2024-04-16 PROCEDURE — 84703 CHORIONIC GONADOTROPIN ASSAY: CPT

## 2024-04-16 PROCEDURE — 2500000003 HC RX 250 WO HCPCS: Performed by: ORTHOPAEDIC SURGERY

## 2024-04-16 PROCEDURE — 6360000002 HC RX W HCPCS: Performed by: ORTHOPAEDIC SURGERY

## 2024-04-16 PROCEDURE — 2500000003 HC RX 250 WO HCPCS: Performed by: NURSE ANESTHETIST, CERTIFIED REGISTERED

## 2024-04-16 PROCEDURE — 2580000003 HC RX 258: Performed by: ORTHOPAEDIC SURGERY

## 2024-04-16 PROCEDURE — 3600000003 HC SURGERY LEVEL 3 BASE: Performed by: ORTHOPAEDIC SURGERY

## 2024-04-16 PROCEDURE — 6360000002 HC RX W HCPCS: Performed by: NURSE ANESTHETIST, CERTIFIED REGISTERED

## 2024-04-16 PROCEDURE — 2580000003 HC RX 258: Performed by: ANESTHESIOLOGY

## 2024-04-16 RX ORDER — MEPERIDINE HYDROCHLORIDE 50 MG/ML
12.5 INJECTION INTRAMUSCULAR; INTRAVENOUS; SUBCUTANEOUS EVERY 5 MIN PRN
Status: CANCELLED | OUTPATIENT
Start: 2024-04-16

## 2024-04-16 RX ORDER — NALOXONE HYDROCHLORIDE 0.4 MG/ML
INJECTION, SOLUTION INTRAMUSCULAR; INTRAVENOUS; SUBCUTANEOUS PRN
Status: CANCELLED | OUTPATIENT
Start: 2024-04-16

## 2024-04-16 RX ORDER — LABETALOL HYDROCHLORIDE 5 MG/ML
5 INJECTION, SOLUTION INTRAVENOUS EVERY 10 MIN PRN
Status: CANCELLED | OUTPATIENT
Start: 2024-04-16

## 2024-04-16 RX ORDER — SODIUM CHLORIDE 9 MG/ML
INJECTION, SOLUTION INTRAVENOUS PRN
Status: CANCELLED | OUTPATIENT
Start: 2024-04-16

## 2024-04-16 RX ORDER — SODIUM CHLORIDE 9 MG/ML
INJECTION, SOLUTION INTRAVENOUS PRN
Status: DISCONTINUED | OUTPATIENT
Start: 2024-04-16 | End: 2024-04-16 | Stop reason: HOSPADM

## 2024-04-16 RX ORDER — LIDOCAINE HYDROCHLORIDE 20 MG/ML
INJECTION, SOLUTION EPIDURAL; INFILTRATION; INTRACAUDAL; PERINEURAL PRN
Status: DISCONTINUED | OUTPATIENT
Start: 2024-04-16 | End: 2024-04-16 | Stop reason: SDUPTHER

## 2024-04-16 RX ORDER — SODIUM CHLORIDE 0.9 % (FLUSH) 0.9 %
5-40 SYRINGE (ML) INJECTION EVERY 12 HOURS SCHEDULED
Status: DISCONTINUED | OUTPATIENT
Start: 2024-04-16 | End: 2024-04-16 | Stop reason: HOSPADM

## 2024-04-16 RX ORDER — PROPOFOL 10 MG/ML
INJECTION, EMULSION INTRAVENOUS PRN
Status: DISCONTINUED | OUTPATIENT
Start: 2024-04-16 | End: 2024-04-16 | Stop reason: SDUPTHER

## 2024-04-16 RX ORDER — HYDROMORPHONE HYDROCHLORIDE 1 MG/ML
0.25 INJECTION, SOLUTION INTRAMUSCULAR; INTRAVENOUS; SUBCUTANEOUS EVERY 10 MIN PRN
Status: CANCELLED | OUTPATIENT
Start: 2024-04-16

## 2024-04-16 RX ORDER — OXYCODONE HYDROCHLORIDE 5 MG/1
5 TABLET ORAL PRN
Status: CANCELLED | OUTPATIENT
Start: 2024-04-16 | End: 2024-04-16

## 2024-04-16 RX ORDER — LIDOCAINE HYDROCHLORIDE 10 MG/ML
1 INJECTION, SOLUTION EPIDURAL; INFILTRATION; INTRACAUDAL; PERINEURAL
Status: DISCONTINUED | OUTPATIENT
Start: 2024-04-16 | End: 2024-04-16 | Stop reason: HOSPADM

## 2024-04-16 RX ORDER — OXYCODONE HYDROCHLORIDE 5 MG/1
10 TABLET ORAL PRN
Status: CANCELLED | OUTPATIENT
Start: 2024-04-16 | End: 2024-04-16

## 2024-04-16 RX ORDER — SODIUM CHLORIDE 0.9 % (FLUSH) 0.9 %
5-40 SYRINGE (ML) INJECTION PRN
Status: CANCELLED | OUTPATIENT
Start: 2024-04-16

## 2024-04-16 RX ORDER — ONDANSETRON 2 MG/ML
4 INJECTION INTRAMUSCULAR; INTRAVENOUS
Status: CANCELLED | OUTPATIENT
Start: 2024-04-16

## 2024-04-16 RX ORDER — MAGNESIUM HYDROXIDE 1200 MG/15ML
LIQUID ORAL CONTINUOUS PRN
Status: COMPLETED | OUTPATIENT
Start: 2024-04-16 | End: 2024-04-16

## 2024-04-16 RX ORDER — SODIUM CHLORIDE 0.9 % (FLUSH) 0.9 %
5-40 SYRINGE (ML) INJECTION EVERY 12 HOURS SCHEDULED
Status: CANCELLED | OUTPATIENT
Start: 2024-04-16

## 2024-04-16 RX ORDER — SODIUM CHLORIDE, SODIUM LACTATE, POTASSIUM CHLORIDE, CALCIUM CHLORIDE 600; 310; 30; 20 MG/100ML; MG/100ML; MG/100ML; MG/100ML
INJECTION, SOLUTION INTRAVENOUS CONTINUOUS
Status: DISCONTINUED | OUTPATIENT
Start: 2024-04-16 | End: 2024-04-16 | Stop reason: HOSPADM

## 2024-04-16 RX ORDER — DIPHENHYDRAMINE HYDROCHLORIDE 50 MG/ML
12.5 INJECTION INTRAMUSCULAR; INTRAVENOUS
Status: CANCELLED | OUTPATIENT
Start: 2024-04-16 | End: 2024-04-17

## 2024-04-16 RX ORDER — HYDROMORPHONE HYDROCHLORIDE 1 MG/ML
0.5 INJECTION, SOLUTION INTRAMUSCULAR; INTRAVENOUS; SUBCUTANEOUS EVERY 10 MIN PRN
Status: CANCELLED | OUTPATIENT
Start: 2024-04-16

## 2024-04-16 RX ORDER — SODIUM CHLORIDE 0.9 % (FLUSH) 0.9 %
5-40 SYRINGE (ML) INJECTION PRN
Status: DISCONTINUED | OUTPATIENT
Start: 2024-04-16 | End: 2024-04-16 | Stop reason: HOSPADM

## 2024-04-16 RX ADMIN — PROPOFOL 50 MG: 10 INJECTION, EMULSION INTRAVENOUS at 07:44

## 2024-04-16 RX ADMIN — PROPOFOL 50 MG: 10 INJECTION, EMULSION INTRAVENOUS at 07:46

## 2024-04-16 RX ADMIN — LIDOCAINE HYDROCHLORIDE 50 MG: 20 INJECTION, SOLUTION EPIDURAL; INFILTRATION; INTRACAUDAL; PERINEURAL at 07:42

## 2024-04-16 RX ADMIN — SODIUM CHLORIDE, POTASSIUM CHLORIDE, SODIUM LACTATE AND CALCIUM CHLORIDE: 600; 310; 30; 20 INJECTION, SOLUTION INTRAVENOUS at 07:06

## 2024-04-16 RX ADMIN — PROPOFOL 50 MG: 10 INJECTION, EMULSION INTRAVENOUS at 07:42

## 2024-04-16 ASSESSMENT — PAIN SCALES - GENERAL
PAINLEVEL_OUTOF10: 0

## 2024-04-16 ASSESSMENT — PAIN - FUNCTIONAL ASSESSMENT: PAIN_FUNCTIONAL_ASSESSMENT: 0-10

## 2024-04-16 ASSESSMENT — PAIN DESCRIPTION - DESCRIPTORS: DESCRIPTORS: ACHING

## 2024-04-16 NOTE — DISCHARGE INSTRUCTIONS
Post-Operative Instructions    Tendonitis Release:    Keep hand elevated with fingers above eye-level to control swelling.  Keep hand and bandage clean and dry.  Do not change or unwrap bandage.  Please leave bandage in place until your follow-up appointment.  Maintain finger motion by fully straightening and fully bending fingers at least once an hour (while awake).  This may cause some discomfort, but will not damage surgery.  You may use your operated hand for lightweight tasks (e.g. writing, eating, dressing, etc.).  NO LIFTING, CARRYING OR HEAVY USE.    Most Patients do not have \"Serious Pain\" after this procedure and thus most patients do not require prescription pain medication.  You may take over the counter medication (Tylenol, Advil, Aleve, etc.) as needed.  If you are unable to tolerate the discomfort after your surgery and the OTC medications do not provide some relief, you may contact our office to discuss other options..    Please call the office at [phone]  in 24 - 48 hours to schedule a follow up appointment for 7 - 10  days after surgery.  Please call the office at [phone]  if you have any questions or problems.           Shiraz Maldonado MD    ANESTHESIA DISCHARGE INSTRUCTIONS    You are under the influence of drugs- do not drink alcohol, drive a car, operate machinery(such as power tools, kitchen appliances, etc), sign legal documents, or make any important decisions for 24 hours (or while on pain medications).   Children should not ride bikes or skate boards or play on gym sets  for 24 hours after surgery.  A responsible adult should be with you for 24 hours.  Rest at home today- increase activity as tolerated.  Progress slowly to a regular diet unless your physician has instructed you otherwise. Drink plenty of water.    CALL YOUR DOCTOR IF YOU:  Have moderate to severe nausea or vomiting AND are unable to hold down fluids or prescribed medications.  Have bright red bloody drainage

## 2024-04-16 NOTE — ANESTHESIA PRE PROCEDURE
04/10/24 1125 04/16/24 0642   BP:  108/65   Pulse:  70   Resp:  16   Temp:  97.5 °F (36.4 °C)   TempSrc:  Oral   SpO2:  100%   Weight: 59 kg (130 lb) 59 kg (130 lb)   Height: 1.626 m (5' 4\") 1.626 m (5' 4.02\")                                              BP Readings from Last 3 Encounters:   04/16/24 108/65   03/28/24 101/62   03/22/24 100/68       NPO Status: Time of last liquid consumption: 2300                        Time of last solid consumption: 1830                        Date of last liquid consumption: 04/15/24                        Date of last solid food consumption: 04/15/24    BMI:   Wt Readings from Last 3 Encounters:   04/16/24 59 kg (130 lb)   03/28/24 62.1 kg (137 lb)   03/22/24 62.9 kg (138 lb 9.6 oz)     Body mass index is 22.3 kg/m².    CBC:   Lab Results   Component Value Date/Time    WBC 3.6 03/25/2024 09:04 AM    RBC 4.13 03/25/2024 09:04 AM    HGB 12.9 03/25/2024 09:04 AM    HCT 38.4 03/25/2024 09:04 AM    MCV 93.0 03/25/2024 09:04 AM    RDW 13.7 03/25/2024 09:04 AM     03/25/2024 09:04 AM       CMP:   Lab Results   Component Value Date/Time     03/25/2024 09:04 AM    K 4.3 03/25/2024 09:04 AM     03/25/2024 09:04 AM    CO2 31 03/25/2024 09:04 AM    BUN 12 03/25/2024 09:04 AM    CREATININE 0.7 03/25/2024 09:04 AM    GFRAA >60 05/05/2022 09:23 AM    AGRATIO 1.8 03/25/2024 09:04 AM    LABGLOM >90 03/25/2024 09:04 AM    GLUCOSE 85 03/25/2024 09:04 AM    PROT 6.9 03/25/2024 09:04 AM    CALCIUM 9.9 03/25/2024 09:04 AM    BILITOT <0.2 03/25/2024 09:04 AM    ALKPHOS 72 03/25/2024 09:04 AM    AST 20 03/25/2024 09:04 AM    ALT 16 03/25/2024 09:04 AM       POC Tests: No results for input(s): \"POCGLU\", \"POCNA\", \"POCK\", \"POCCL\", \"POCBUN\", \"POCHEMO\", \"POCHCT\" in the last 72 hours.    Coags: No results found for: \"PROTIME\", \"INR\", \"APTT\"    HCG (If Applicable):   Lab Results   Component Value Date    PREGTESTUR Negative 04/16/2024        ABGs: No results found for: \"PHART\", \"PO2ART\",

## 2024-04-16 NOTE — H&P
Pre-operative Update of H&P:    I  have seen & examined Ms. Bethany Merritt related solely to her hand and upper extremity conditions, prior to the scheduled procedure on the date of her surgery.  The indications for the planned surgical procedure & and her upper-extremity condition are unchanged.

## 2024-04-16 NOTE — ANESTHESIA POSTPROCEDURE EVALUATION
Department of Anesthesiology  Postprocedure Note    Patient: Bethany Merritt  MRN: [id number]  YOB: 1970  Date of evaluation: 4/16/2024    Procedure Summary       Date: 04/16/24 Room / Location: [address]    Anesthesia Start: 0737 Anesthesia Stop: 0750    Procedure: RIGHT MIDDLE FINGER TRIGGER FINGER RELEASE (Right: Fingers) Diagnosis:       Trigger finger      (Trigger finger [M65.30])    Surgeons: Shiraz Maldonado MD Responsible Provider: Kvng Vital MD    Anesthesia Type: MAC ASA Status: 2            Anesthesia Type: No value filed.    Lilia Phase I: Lilia Score: 10    Lilia Phase II: Lilia Score: 9    Anesthesia Post Evaluation    Patient location during evaluation: PACU  Patient participation: complete - patient participated  Level of consciousness: awake and alert  Pain score: 0  Airway patency: patent  Nausea & Vomiting: no nausea and no vomiting  Cardiovascular status: blood pressure returned to baseline  Respiratory status: acceptable  Hydration status: stable  Pain management: adequate    No notable events documented.

## 2024-04-16 NOTE — OP NOTE
OPERATIVE REPORT          Patient:  Behtany Merritt    YOB: 1970  Date of Service:  4/16/2024   Location:  [facility]        Preoperative Diagnosis:  Right Middle Finger trigger finger.    Postoperative Diagnosis: Same.    Procedure: Right Middle Finger trigger finger release (A1 pulley release).    Surgeon:    Shiraz Maldonado MD    Surgical Assistant:    Hospital Supplied Assistant    Anesthesia:  Local with sedation.    Blood Loss:  Minimal.     Complications:  None.      Tourniquet Time:  1 minutes.    Indications:  Ms. Bethany Merritt  is a 54 y.o.  year old female with a Right Middle Finger trigger finger. I have discussed preoperatively with her the complications, limitations, expectations, alternatives and risks of the planned surgical care.  She has voiced an understanding of our discussion.  All of her questions have been fully answered to her satisfaction, and she has provided written informed consent to proceed.    Procedure:  After written consent was obtained and the proper operative site was identified and marked, Ms. Bethany Merritt  was brought to the operating room, placed in the supine position on the operating room table with the Right arm extended upon a hand table. Under an appropriate level of sedation, local anesthetic (1% Lidocaine and 1/2% Marcaine both without Epinephrine) was instilled in the planned surgical field. Her Right upper extremity was prepped and draped in the usual sterile fashion.    After Eshmarch exsanguination the pneumo-tourniquet was inflated to 250 milimeters of mercury.      A 1 centimeter oblique incision was fashioned over the base of the flexor tendon sheath of the Right Middle Finger.  Dissection was carried carefully through the subcutaneous tissues, taking great care to identify and protect the neurovascular structures.  The flexor tendon sheath was carefully identified and cleared of surrounding soft tissue. The A1 pulley was identified

## 2024-04-16 NOTE — ANESTHESIA PRE-OP
Awake and alert with no complaints. VS stable. Discharge instructions reviewed with patient/responsible adult and understanding verbalized. Discharge instructions copies given. Discharge criteria met per hospital policy. Patient discharged home with belongings.

## 2024-04-22 ENCOUNTER — TELEPHONE (OUTPATIENT)
Dept: SURGERY | Age: 54
End: 2024-04-22

## 2024-04-22 ENCOUNTER — PATIENT MESSAGE (OUTPATIENT)
Age: 54
End: 2024-04-22

## 2024-04-22 ENCOUNTER — OFFICE VISIT (OUTPATIENT)
Age: 54
End: 2024-04-22
Payer: COMMERCIAL

## 2024-04-22 VITALS
DIASTOLIC BLOOD PRESSURE: 62 MMHG | BODY MASS INDEX: 23.85 KG/M2 | HEART RATE: 72 BPM | OXYGEN SATURATION: 100 % | WEIGHT: 139 LBS | TEMPERATURE: 98.1 F | SYSTOLIC BLOOD PRESSURE: 102 MMHG | RESPIRATION RATE: 16 BRPM

## 2024-04-22 DIAGNOSIS — N76.0 BV (BACTERIAL VAGINOSIS): ICD-10-CM

## 2024-04-22 DIAGNOSIS — N95.2 ATROPHIC VAGINITIS: ICD-10-CM

## 2024-04-22 DIAGNOSIS — N89.8 VAGINAL IRRITATION: Primary | ICD-10-CM

## 2024-04-22 DIAGNOSIS — B96.89 BV (BACTERIAL VAGINOSIS): ICD-10-CM

## 2024-04-22 PROCEDURE — 99214 OFFICE O/P EST MOD 30 MIN: CPT | Performed by: FAMILY MEDICINE

## 2024-04-22 NOTE — TELEPHONE ENCOUNTER
Certainly the antibiotics with the Metrogel could be contributing to yeast infection.  If she is worsening, I would like to see her today -noon.  Ensure she is still taking a probiotic daily .  The Diflucan may take 24-48 hours to help, but her symptoms should not worsen.

## 2024-04-22 NOTE — TELEPHONE ENCOUNTER
I called pt and informed her of the results. She requested that I send her the pain management physician's information to her thru [employer].      ----- Message from Hector Mcgarry MD sent at 4/22/2024 11:18 AM EDT -----  Please call pt. No recurrent hernia or surgical problem on exam or CT. Recommend referral to pain mgmt if symptoms continue. No surgery to do. Could use some laxatives for constipation.  CJ

## 2024-04-22 NOTE — PROGRESS NOTES
tenderness.  Adnexa normal in size without masses or tenderness.   Skin: no rashes. Non tender.     ASSESSMENT/  PLAN:  1. Vaginal irritation  - Likely atrophic vaginitis/ dryness is contributing.    - Increase Estrogen vaginal cream to 2x/ week - patient thinking Friday and Monday or Tuesday.   - Avoid further use of Diflucan and monitor over the next 4 days.    - Hold on repeat vaginal probe / culture given some improvement in symptoms and just took Diflucan < 24 hours ago.     2. Atrophic vaginitis  - Increase Estrogen vaginal cream to 2x/ week - patient thinking Friday and Tuesday.     3. BV (bacterial vaginosis)- ( with h/o recurrence )  - Given recurrences , continue vaginal Probiotic once weekly Monday and oral probiotic daily.  - continue Metrogel vaginally once weekly (Wednesdays) until mid-end  of 6/24.   - Discussed if over treatment for yeast can precipitate recurrence of BV.      F/u if no improvement in 5-7d/ prn increased symptoms.

## 2024-04-22 NOTE — TELEPHONE ENCOUNTER
From: Bethany Merritt  To: Dr. Fabienne Rodriguez  Sent: 4/22/2024 7:24 AM EDT  Subject: Vaginal discomfort     Hi Dr. Rodriguez -   My vaginal discomfort is reoccurring again. I am continuing to do the 1X weekly metro- gel . But, this weekend it felt like a yeast infection was coming on again :( I took a leftover Diflugan pill I had last night . I had my trigger finger surgery last week . Maybe, they gave me antibiotics through my IV . Or would the Metro gel be throwing off my good bacteria ? Appreciate your insight . If I need to come in , please let me know. I have 11:00-2:30 open today .     Thank you ,   Bethany

## 2024-04-23 ENCOUNTER — TELEPHONE (OUTPATIENT)
Dept: DERMATOLOGY | Age: 54
End: 2024-04-23

## 2024-04-23 NOTE — TELEPHONE ENCOUNTER
Patient has an appt today and one of the spots she want to have Dr. Workman look at is covered from another procedure she cannot remove bandage until tomorrow she wants to know if she should cancel or have him just look at the one spot does not want to wait until Oct to come in Please call her at [phone]

## 2024-04-24 ENCOUNTER — OFFICE VISIT (OUTPATIENT)
Dept: ORTHOPEDIC SURGERY | Age: 54
End: 2024-04-24

## 2024-04-24 DIAGNOSIS — Z98.890 STATUS POST TRIGGER FINGER RELEASE: Primary | ICD-10-CM

## 2024-04-24 PROCEDURE — 99024 POSTOP FOLLOW-UP VISIT: CPT | Performed by: ORTHOPAEDIC SURGERY

## 2024-04-24 NOTE — PATIENT INSTRUCTIONS
Postoperative Instructions After Trigger Finger Release    Dr. Shiraz Maldonado          After bandages are removed one week from surgery, you may chose to wear a small bandage over the incision if you wish, though you do not need to.  Keep incision dry until sutures have fully dissolved  or it has been 14 days since your surgery. Thereafter, you may wash with mild soap and water and shower normally.   Once your stiches have fully disappeared & skin appears normal, you should begin gently massaging the incision with Vitamin E (may use Vitamin E lotion or contents of Vitamin E capsule).   Work hard on motion of the fingers and wrist, straightening each finger fully and bending each finger fully, bending wrist forward and bending wrist backwards. Do not be concerned if you experience discomfort.  This will not damage the surgery.  You may begin using the hand as it feels comfortable beginning 12-14 days from the day of surgery. You may not feel entirely comfortable gripping or lifting heavy objects for several weeks.  You may expect to see some skin “peel” off around the incision.  You may be left with a small area of “pink baby skin”. This is quite normal.    Thank you for choosing [facility] for your Hand and Upper Extremity needs.  If we can be of any further assistance to you, please do not hesitate to contact us.    Office Phone Number:  [phone]  or  [phone]

## 2024-04-24 NOTE — PROGRESS NOTES
Ms. Bethany Merritt returns today in follow-up of her recent right Middle Finger A1 Pulley (Trigger Finger) Release done approximately 8 days ago.  She has done well noting mild discomfort and no other reported complications.    She notes pre-operative symptoms to be significantly improved at this time.    Physical Exam:  Bandage intact and well cared for  Skin incision is healing well, no significant drainage, no dehiscence, no significant erythema.  Digital range of motion is limited by pain in the Middle Finger, normal in all other digits.  Wrist range of motion is full.  Sensation is normal in the Middle Finger.  Vascular examination reveals normal, good capillary refill, and good color.  Swelling is minimal.  Her preoperative triggering is significantly improved.    Impression:  Ms. Bethany Merritt is doing well after recent right Middle Finger Trigger Finger Release.    Plan:  Ms. Bethany Merritt is instructed in work on Active & Passive range of motion of the digits, wrist, & elbow.  These modalities were specifically demonstrated to her today.  We discussed the appropriateness of gradual resumption of use of the operated hand and the return to normal use as comfort allows.  She is given instructions regarding management of the fresh surgical incision and progressive use of desensitization and tissue massage techniques.  We discussed the appropriate expectations and timeline for symptom improvement.    She is provided a written patient instruction sheet titled: Postoperative Instructions After Trigger Finger Release.    I have asked Ms. Bethany Merritt to follow-up with me or contact me by telephone over the next 2-4 weeks if her symptoms have not fully resolved or if she has not regained full & painless return of function.     She is also specifically instructed to return to the office or call for an appointment sooner if her symptoms are changing or worsening prior to that time.

## 2024-04-25 ENCOUNTER — PATIENT MESSAGE (OUTPATIENT)
Age: 54
End: 2024-04-25

## 2024-04-25 NOTE — TELEPHONE ENCOUNTER
From: Bethany Merritt  To: Dr. Fabienne Rodriguez  Sent: 4/25/2024 7:21 AM EDT  Subject: Kristopher Summers is coming into town today . Any appointments available ? Like 3:00? We are getting her passport done at 1:00 then she plans to head back to school tonight .     - Bethany

## 2024-04-29 ENCOUNTER — TELEPHONE (OUTPATIENT)
Dept: ORTHOPEDIC SURGERY | Age: 54
End: 2024-04-29

## 2024-04-29 NOTE — TELEPHONE ENCOUNTER
General Question     Subject: POSTOP QUESTION-SWELLING  Patient and /or Facility Request: [facility]  Contact Number: [phone]    PATIENT CALLED TO INQUIRE OF RT HAND SWELLING AND WETHER TO RECOMMEND PT    PLEASE ADVISE

## 2024-04-29 NOTE — TELEPHONE ENCOUNTER
Spoke with pt. Pt concerned over swelling and some stiffness, let her know that everything sound as to be expected and she should continue to work on ROM.

## 2024-05-01 ENCOUNTER — OFFICE VISIT (OUTPATIENT)
Dept: DERMATOLOGY | Age: 54
End: 2024-05-01
Payer: COMMERCIAL

## 2024-05-01 DIAGNOSIS — L24.9 IRRITANT DERMATITIS: ICD-10-CM

## 2024-05-01 DIAGNOSIS — L82.1 SK (SEBORRHEIC KERATOSIS): ICD-10-CM

## 2024-05-01 DIAGNOSIS — L81.4 SOLAR LENTIGO: Primary | ICD-10-CM

## 2024-05-01 PROCEDURE — 99213 OFFICE O/P EST LOW 20 MIN: CPT | Performed by: DERMATOLOGY

## 2024-05-01 NOTE — PROGRESS NOTES
[facility]  Neri Workman MD  [phone]      Bethany Merritt  1970    54 y.o. female     Date of Visit: 5/1/2024    Chief Complaint: skin lesions    History of Present Illness:    1.  She will 2 persistent pigmented lesions on the lateral portions of the face.    2.  She reports a darker growth on the right anterior temporal scalp at the hairline.    3.  She reports itching and rash associated with application of estrogen patches.    She has a family history of melanoma:  Mom with hx of melanoma.       Review of Systems:  Gen: Feels well, good sense of health.    Past Medical History, Family History, Surgical History, Medications and Allergies reviewed.    Past Medical History:   Diagnosis Date    Acid reflux     Allergic rhinitis     Anesthesia complication     PATIENT STATES HER VISION GETS WORSE EVERY TIME SHE HAS ANESTHESIA    Anxiety     Atrophic vaginitis     Constipation     DDD (degenerative disc disease), cervical     Dysmenorrhea     Dyspareunia in female     Esophagitis 05/2019    Fibrocystic breast     Gastritis 05/2019    HPV (human papilloma virus) infection 1992    Exposed back in college    Hyperlipidemia     NO MEDS    Internal hemorrhoids 01/2022    per colonoscopy - Dr. Ureña    Interstitial cystitis 10/23/2017    Iron deficiency anemia 2017    Menopause ovarian failure     Menorrhagia     PONV (postoperative nausea and vomiting)     Trigeminal neuralgia     Vitamin D deficiency 07/2019    Vocal cord nodule 2016     Past Surgical History:   Procedure Laterality Date    APPENDECTOMY  1996    CERVICAL FUSION  2013    C4-6    COLONOSCOPY N/A 01/11/2022    COLONOSCOPY, POSSIBLE POLYPECTOMY performed by Evelio Ureña MD at [facility] ENDOSCOPY    FOOT SURGERY Right 1/15/2024    RIGHT FOOT MEDIAL SESAMOIDECTOMY performed by Jourdan Diaz MD at [facility] OR    HAND SURGERY Right 4/16/2024    RIGHT MIDDLE FINGER TRIGGER FINGER RELEASE performed by Shiraz Maldonado MD at [facility]

## 2024-05-23 ENCOUNTER — TELEMEDICINE (OUTPATIENT)
Age: 54
End: 2024-05-23
Payer: COMMERCIAL

## 2024-05-23 ENCOUNTER — PATIENT MESSAGE (OUTPATIENT)
Age: 54
End: 2024-05-23

## 2024-05-23 DIAGNOSIS — N95.1 VAGINAL DRYNESS, MENOPAUSAL: Primary | ICD-10-CM

## 2024-05-23 DIAGNOSIS — R21 RASH: ICD-10-CM

## 2024-05-23 PROCEDURE — 99213 OFFICE O/P EST LOW 20 MIN: CPT | Performed by: FAMILY MEDICINE

## 2024-05-23 RX ORDER — ESTRADIOL 0.1 MG/G
1 CREAM VAGINAL
Qty: 3 EACH | Refills: 1 | Status: SHIPPED | OUTPATIENT
Start: 2024-05-24

## 2024-05-23 NOTE — PROGRESS NOTES
05/15/2029    Colorectal Cancer Screen  01/11/2032    Flu vaccine  Completed    Shingles vaccine  Completed    Hepatitis C screen  Completed    HIV screen  Completed    Hepatitis A vaccine  Aged Out    Hib vaccine  Aged Out    Polio vaccine  Aged Out    Meningococcal (ACWY) vaccine  Aged Out    Pneumococcal 0-64 years Vaccine  Aged Out       Family History   Problem Relation Age of Onset    Cancer Mother 75        melanoma    High Blood Pressure Father     Colon Cancer Maternal Grandfather 51    Other Paternal Grandmother         PE       PHYSICAL EXAMINATION:    Vital Signs: (As obtained by patient/caregiver or practitioner observation)         5/23/2024     1:17 PM   Patient-Reported Vitals   Patient-Reported Weight 133lb   Patient-Reported Temperature No fever lately, feels good   Patient-Reported LMP Hysterectomy         Heart rate= 80  Respiratory rate= 14 Temperature= 98      Constitutional:  Appears well-developed and well-nourished. No apparent distress                              Mental status:  Alert and awake. Oriented to person/place/time. Able to follow commands       Eyes: EOM intact. Sclera-normal. No erythema of conjunctiva. No eye discharge.     HENT: Normocephalic, atraumatic.  Mouth/Throat: normal. Mucous membranes are moist.      External Ears: Normal       Neck: No visualized mass      Pulmonary/Chest:  Respiratory effort normal.  No visualized signs of difficulty breathing or respiratory distress         Musculoskeletal:   Normal gait with no signs of ataxia. Normal range of motion of neck.            Neurological:         No Facial Asymmetry (Cranial nerve 7 motor function) (limited exam to video visit) .  No gaze palsy              Skin:                     No significant exanthematous lesions or discoloration noted on facial skin                                        Psychiatric:          Normal Affect. No Hallucinations           Other pertinent observable physical exam findings:

## 2024-05-23 NOTE — TELEPHONE ENCOUNTER
From: Bethany Merritt  To: Dr. Fabienne Rodriguez  Sent: 5/23/2024 10:12 AM EDT  Subject: Estrogen patch     Hi Dr. Rodriguez - my estrogen patches are causing me rashes in multiple places no matter where I put them. Can you please contact me about alternative options ? Thank you so much .     Bethany

## 2024-05-24 ENCOUNTER — OFFICE VISIT (OUTPATIENT)
Age: 54
End: 2024-05-24
Payer: COMMERCIAL

## 2024-05-24 VITALS
RESPIRATION RATE: 16 BRPM | HEART RATE: 80 BPM | BODY MASS INDEX: 23.9 KG/M2 | WEIGHT: 140 LBS | HEIGHT: 64 IN | SYSTOLIC BLOOD PRESSURE: 106 MMHG | OXYGEN SATURATION: 98 % | DIASTOLIC BLOOD PRESSURE: 70 MMHG | TEMPERATURE: 98 F

## 2024-05-24 DIAGNOSIS — N89.8 VAGINAL DISCHARGE: Primary | ICD-10-CM

## 2024-05-24 DIAGNOSIS — Z78.0 MENOPAUSE: ICD-10-CM

## 2024-05-24 DIAGNOSIS — N89.8 VAGINAL DRYNESS: ICD-10-CM

## 2024-05-24 DIAGNOSIS — N89.8 VAGINAL ITCHING: ICD-10-CM

## 2024-05-24 PROCEDURE — 99214 OFFICE O/P EST MOD 30 MIN: CPT | Performed by: FAMILY MEDICINE

## 2024-05-24 RX ORDER — ESTRADIOL 1 MG/1
1 TABLET ORAL DAILY
Qty: 30 TABLET | Refills: 1 | Status: SHIPPED | OUTPATIENT
Start: 2024-05-24

## 2024-05-24 RX ORDER — FLUCONAZOLE 150 MG/1
150 TABLET ORAL ONCE
Qty: 2 TABLET | Refills: 0 | Status: SHIPPED | OUTPATIENT
Start: 2024-05-24 | End: 2024-05-24

## 2024-05-24 NOTE — PROGRESS NOTES
Patient is here for vaginal discharge , itching. Itching started last Thursday .  She drank iced coffee on Wednesday .  She started using creams from [employer] on Monday- Wednesdays .  Vaginal discharge was noticed last night - white and thick.  She is not taking a bath .       Rash is fading to area of estrogen patch .      Review of Systems    ROS: All other systems were reviewed and are negative .  Patient's allergies and medications were reviewed.  Patient's past medical, surgical, social , and family history were reviewed.    OBJECTIVE:  /70 (Site: Left Upper Arm, Position: Sitting, Cuff Size: Medium Adult)   Pulse 80   Temp 98 °F (36.7 °C) (Temporal)   Resp 16   Ht 1.626 m (5' 4\")   Wt 63.5 kg (140 lb)   LMP  (LMP Unknown)   SpO2 98%   BMI 24.03 kg/m²     Physical Exam    General: NAD, cooperative, alert and oriented X 3. Mood / affect is good.good insight. well hydrated.  Neck : no lymphadenopathy, supple, FROM  CV: Regular rate and rhythm , no murmurs/ rub/ gallop. No edema.   Lungs : CTA bilaterally, breathing comfortably  Abdomen: positive bowel sounds, soft , non tender, non distended. No hepatosplenomegaly. No CVA tenderness.  Skin: left lower abdomen with fading patchy erythematous rash. Non tender.     ASSESSMENT/  PLAN:  1. Vaginal discharge/ 2. Vaginal itching  - Empirically will treat with Diflucan.    - Avoid other oils / topical creams as likely contributed to symptoms.    - fluconazole (DIFLUCAN) 150 MG tablet; Take 1 tablet by mouth once for 1 dose May repeat X 1 after 1 week if symptoms persist.  Dispense: 2 tablet; Refill: 0  - C.trachomatis N.gonorrhoeae DNA  - Culture, Genital  - Vaginal Pathogens Probes *A    3. Vaginal dryness  - Increase Estrogen cream vaginally to tid.  Decrease probiotic to once weekly . Continue Metrogel once weekly . Avoid other topical creams to vaginal area.    - Stop Estrogen patch due to rash/ skin irritation   - change to oral estrogen 1 mg qd , with

## 2024-05-25 LAB
CANDIDA DNA VAG QL NAA+PROBE: NORMAL
G VAGINALIS DNA SPEC QL NAA+PROBE: NORMAL
T VAGINALIS DNA VAG QL NAA+PROBE: NORMAL

## 2024-05-26 LAB — BACTERIA GENITAL AEROBE CULT: NORMAL

## 2024-05-28 LAB
BACTERIA GENITAL AEROBE CULT: ABNORMAL
BACTERIA GENITAL AEROBE CULT: ABNORMAL
C TRACH DNA CVX QL NAA+PROBE: NEGATIVE
N GONORRHOEA DNA CERV MUCUS QL NAA+PROBE: NEGATIVE
ORGANISM: ABNORMAL

## 2024-05-28 RX ORDER — METRONIDAZOLE 500 MG/1
500 TABLET ORAL 2 TIMES DAILY
Qty: 14 TABLET | Refills: 0 | Status: SHIPPED | OUTPATIENT
Start: 2024-05-28 | End: 2024-06-04

## 2024-06-11 ENCOUNTER — OFFICE VISIT (OUTPATIENT)
Dept: GYNECOLOGY | Age: 54
End: 2024-06-11
Payer: COMMERCIAL

## 2024-06-11 VITALS — HEART RATE: 68 BPM | WEIGHT: 138 LBS | BODY MASS INDEX: 23.69 KG/M2 | OXYGEN SATURATION: 97 %

## 2024-06-11 DIAGNOSIS — N76.0 RECURRENT VAGINITIS: Primary | ICD-10-CM

## 2024-06-11 PROCEDURE — 99214 OFFICE O/P EST MOD 30 MIN: CPT | Performed by: OBSTETRICS & GYNECOLOGY

## 2024-06-11 NOTE — PROGRESS NOTES
and laboratory testing we can make recommendations for treatment.  We discussed that this may include prophylaxis using MetroGel once or twice a week as she has been doing.    Management options, where appropriate, were discussed.  Diagnoses were discussed in detail; patient voiced understanding.   Future direction:  If she does not improve with current management, further work-up or treatment may be necessary.  Warnings and instructions were given.  Her questions were answered.  Bethany voices understanding.     I have spent >30 minutes to complete the necessary history, physical examination, review and present imaging studies/laboratory tests, review findings, formulate a treatment plan, educating/explaining condition and address questions, and shared decision making            Please note that some or all of this record was generated using voice recognition software. If there are any questions about the content of this document, please contact Dr. Ballard as some errors in transcription may have occurred.

## 2024-06-17 ENCOUNTER — PATIENT MESSAGE (OUTPATIENT)
Age: 54
End: 2024-06-17

## 2024-06-17 ENCOUNTER — TELEPHONE (OUTPATIENT)
Dept: GYNECOLOGY | Age: 54
End: 2024-06-17

## 2024-06-17 ENCOUNTER — OFFICE VISIT (OUTPATIENT)
Age: 54
End: 2024-06-17
Payer: COMMERCIAL

## 2024-06-17 VITALS — HEART RATE: 83 BPM | OXYGEN SATURATION: 99 %

## 2024-06-17 DIAGNOSIS — N95.2 VAGINAL ATROPHY: ICD-10-CM

## 2024-06-17 DIAGNOSIS — N90.89 VULVAR IRRITATION: Primary | ICD-10-CM

## 2024-06-17 DIAGNOSIS — Z87.42 HISTORY OF VAGINITIS: ICD-10-CM

## 2024-06-17 PROCEDURE — 99214 OFFICE O/P EST MOD 30 MIN: CPT | Performed by: OBSTETRICS & GYNECOLOGY

## 2024-06-17 RX ORDER — ESTRADIOL 10 UG/1
1 INSERT VAGINAL
Qty: 8 EACH | Refills: 11 | Status: SHIPPED | OUTPATIENT
Start: 2024-06-17 | End: 2024-06-18

## 2024-06-17 NOTE — TELEPHONE ENCOUNTER
Future Appointments   Date Time Provider Department Center   6/17/2024  1:45 PM Parminder Ballard MD [facility]   7/18/2024 10:00 AM Fabienne Rodriguez MD [facility]  [facility]   7/22/2024 10:15 AM Neri Workman MD [facility]

## 2024-06-17 NOTE — TELEPHONE ENCOUNTER
From: Bethany Merritt  To: Dr. Fabienne Rodriguez  Sent: 6/17/2024 1:41 PM EDT  Subject: Ear patches for motion sickness     Hi Dr. Rodriguez   We are leaving on our [facility] cruise in 2 weeks . Can you please order me ear patches [facility] ? I used them on the boat & the helicopter tour . Thank you , Bethany

## 2024-06-17 NOTE — PROGRESS NOTES
exam:  Pulse 83   LMP  (LMP Unknown)   SpO2 99%  There is no height or weight on file to calculate BMI.  No LMP recorded (lmp unknown). Patient has had a hysterectomy.  Constitutional: alert, no acute distress, non-toxic, normal respiratory effort  Eyes: Sclera are clear and nonicteric.  Noninjected.  Lids are grossly normal.  Pupils are round equal.  Irises are grossly normal.  Mouth/ENT: Lips, teeth, gums grossly normal.  Psychiatric: Judgment and insight are intact.  Mood and affect are appropriate, calm.  Skin:  no lesions,no rashes  Neck: Symmetric without gross mass effect.  Trachea midline.  Respiratory: Normal respiratory effort.  No accessory muscles used.  Gastrointestinal/Abdominal: Abdomen soft, non-tender. No rebound or guarding. No masses, no hepatosplenomegaly, no hernia  Genitourinary:  Vulva:  no external lesions,normal hair distribution,no inguinal adenopathy  No evidence of trauma.  No erythema or edema.  Vagina: Pink mild atrophy cuff intact  Bladder without mass, nontender.  Urethra, and Urethral meatus grossly normal without mass and nontender  Rectum/anus:  ++ external hemorrhoids,no lesions  History and Examination chaperoned by Medical Assistant     Diagnosis Orders   1. Vulvar irritation        2. Vaginal atrophy  IMVEXXY MAINTENANCE PACK 10 MCG INST      3. History of vaginitis  VAGINAL PATHOGENS PROBE *A        Differential diagnosis and management/testing options:  Postcoital vulvar irritation.  This is likely related to localized trauma/skin friction.  We discussed continuing to use a personal lubricant and options were reviewed with her.  I discouraged her from using excessive amounts of lubricant or using the KY intravaginal beads.  I encouraged her to continue using vaginal estrogen for treatment of her atrophy and related symptoms as this has the highest probability of giving her symptomatic relief.  She had questions about additional options and these were reviewed as above.  She

## 2024-06-17 NOTE — TELEPHONE ENCOUNTER
Pt called in because she has a lot of burning when using the Estrace vaginal cream. She has had problems taking estragin she is able to come in today if needed.

## 2024-06-18 DIAGNOSIS — N95.2 VAGINAL ATROPHY: ICD-10-CM

## 2024-06-18 RX ORDER — SCOLOPAMINE TRANSDERMAL SYSTEM 1 MG/1
1 PATCH, EXTENDED RELEASE TRANSDERMAL
Qty: 3 PATCH | Refills: 0 | Status: SHIPPED | OUTPATIENT
Start: 2024-06-18

## 2024-06-18 RX ORDER — ESTRADIOL 10 UG/1
1 INSERT VAGINAL
Qty: 8 EACH | Refills: 11 | Status: SHIPPED | OUTPATIENT
Start: 2024-06-20

## 2024-06-18 NOTE — TELEPHONE ENCOUNTER
Patient is having some complication getting medication filled due to insurance. Requesting the Imvexxy be sent to a different pharmacy. They are going to help her get her prescription at a discounted rate      Please fax to [phone]   [facility]       Patient can be reached at [phone]

## 2024-06-21 NOTE — PROGRESS NOTES
Subjective:      Patient ID: Bethany Merritt 54 y.o. female. is here for evaluation for vaginal discharge.       HPI    Recurrent - 5-6 times in the past year.   Itching, yellow vaginal discharge.  Some while mucous on the labia.  May be triggered by intercourse.  Last treated for BV in January.   Had antibx before sxs in January but not recently.  Monistat is not effective.     Has rash at site of the estrogen patch; thinks  changed.    PAP + HPV neg 10/2023  Vag cx + candida 9/2023  Vag probe + candida and gardnerella 9/2023.  Lab Results   Component Value Date/Time     08/17/2023 03:55 PM    K 4.0 08/17/2023 03:55 PM     08/17/2023 03:55 PM    CO2 31 08/17/2023 03:55 PM    BUN 18 08/17/2023 03:55 PM    CREATININE 0.7 08/17/2023 03:55 PM    GLUCOSE 89 08/17/2023 03:55 PM    CALCIUM 10.1 08/17/2023 03:55 PM    LABGLOM >60 08/17/2023 03:55 PM       Hemoglobin A1C   Date Value Ref Range Status   07/21/2021 5.3 See comment % Final     Comment:     Comment:  Diagnosis of Diabetes: > or = 6.5%  Increased risk of diabetes (Prediabetes): 5.7-6.4%  Glycemic Control: Nonpregnant Adults: <7.0%                    Pregnant: <6.0%              Outpatient Medications Marked as Taking for the 3/13/24 encounter (Office Visit) with Sandra Ruth MD   Medication Sig Dispense Refill    meloxicam (MOBIC) 7.5 MG tablet TAKE ONE TO TWO TABLETS BY MOUTH ONCE A DAY AS NEEDED 90 tablet 0    ЮЛИЯ 0.05 MG/24HR Place 1 patch onto the skin Twice a Week      triamcinolone (KENALOG) 0.1 % cream Apply to affected area twice daily for up to 2 weeks or until improved. (Patient taking differently: Apply topically as needed Apply to affected area twice daily for up to 2 weeks or until improved.) 30 g 2    fluocinonide (LIDEX) 0.05 % external solution Apply to the red scaly areas on the scalp daily as needed. 20 mL 1    linaclotide (LINZESS) 145 MCG capsule 1QAMBB (Patient taking differently: Take 1 capsule by mouth  Follow-up appointment on 6/28/24 at 11:00 am.      Tera Burton [facility]  Case Management  [phone]

## 2024-06-24 ENCOUNTER — TELEPHONE (OUTPATIENT)
Dept: ORTHOPEDIC SURGERY | Age: 54
End: 2024-06-24

## 2024-06-24 RX ORDER — GABAPENTIN 100 MG/1
100 CAPSULE ORAL 3 TIMES DAILY
Qty: 270 CAPSULE | Refills: 0 | Status: SHIPPED | OUTPATIENT
Start: 2024-06-24 | End: 2024-09-22

## 2024-06-24 NOTE — TELEPHONE ENCOUNTER
Appointment Request     Patient requesting earlier appointment: Yes  Appointment offered to patient: 7-3-24 FU R FOOT /REQ JT INJ  Patient Contact Number: [phone]

## 2024-06-24 NOTE — TELEPHONE ENCOUNTER
I spoke with the patient. She is having increased right foot pain. This is the foot she had surgery on early this year. She is concerned about this newer pain and requested an office visit. I scheduled her for tomorrow at  at 1:45 pm.

## 2024-06-24 NOTE — TELEPHONE ENCOUNTER
Requested Prescriptions     Pending Prescriptions Disp Refills    gabapentin (NEURONTIN) 100 MG capsule [Pharmacy Med Name: GABAPENTIN 100MG CAPS] 270 capsule 2     Sig: Take 1 capsule by mouth in the morning, at noon, and at bedtime.          Last OV: 5/24/2024     Last labs: 3/25/24     F/u: 7/18/24

## 2024-06-25 ENCOUNTER — OFFICE VISIT (OUTPATIENT)
Dept: ORTHOPEDIC SURGERY | Age: 54
End: 2024-06-25
Payer: COMMERCIAL

## 2024-06-25 VITALS — WEIGHT: 138 LBS | HEIGHT: 64 IN | BODY MASS INDEX: 23.56 KG/M2

## 2024-06-25 DIAGNOSIS — M25.871 SESAMOIDITIS OF RIGHT FOOT: Primary | ICD-10-CM

## 2024-06-25 PROCEDURE — 99214 OFFICE O/P EST MOD 30 MIN: CPT | Performed by: ORTHOPAEDIC SURGERY

## 2024-06-25 RX ORDER — NAPROXEN 500 MG/1
500 TABLET ORAL 2 TIMES DAILY WITH MEALS
Qty: 60 TABLET | Refills: 0 | Status: SHIPPED | OUTPATIENT
Start: 2024-06-25 | End: 2024-07-25

## 2024-06-25 NOTE — PROGRESS NOTES
DIAGNOSIS:    1- Right sesamoiditis, status post medial sesamoidectomy excision  2- Medial right great toe pain at surgical scar.    DATE OF SURGERY: 1/15/2024.    HISTORY OF PRESENT ILLNESS:    Bethnay Merritt 54 y.o. female 6 months out from her surgery.  She has been weightbearing in regular shoes.  She c/o pain over the scar that she noticed after she discontinued the bunion brace. Rates pain a 5/10 VAS mild, aching, intermittent and are improving. Aggravating factors movement, walking. Alleviating factors elevation and rest. Overall she is doing well.  No fever or chills.  No numbness or tingling sensation.        Past Medical History:   Diagnosis Date    Acid reflux     Allergic rhinitis     Anesthesia complication     PATIENT STATES HER VISION GETS WORSE EVERY TIME SHE HAS ANESTHESIA    Anxiety     Atrophic vaginitis     Constipation     DDD (degenerative disc disease), cervical     Dysmenorrhea     Dyspareunia in female     Esophagitis 05/2019    Fibrocystic breast     Gastritis 05/2019    HPV (human papilloma virus) infection 1992    Exposed back in college    Hyperlipidemia     NO MEDS    Internal hemorrhoids 01/2022    per colonoscopy - Dr. Ureña    Interstitial cystitis 10/23/2017    Iron deficiency anemia 2017    Menopause ovarian failure     Menorrhagia     PONV (postoperative nausea and vomiting)     Trigeminal neuralgia     Vitamin D deficiency 07/2019    Vocal cord nodule 2016       Past Surgical History:   Procedure Laterality Date    APPENDECTOMY  1996    CERVICAL FUSION  2013    C4-6    COLONOSCOPY N/A 01/11/2022    COLONOSCOPY, POSSIBLE POLYPECTOMY performed by Evelio Ureña MD at [facility] ENDOSCOPY    FOOT SURGERY Right 1/15/2024    RIGHT FOOT MEDIAL SESAMOIDECTOMY performed by Jourdan Diaz MD at [facility] OR    HAND SURGERY Right 4/16/2024    RIGHT MIDDLE FINGER TRIGGER FINGER RELEASE performed by Shiraz Maldonado MD at [facility] OR    HEMORRHOID SURGERY      HERNIA REPAIR  1970, 1999

## 2024-06-27 ENCOUNTER — PATIENT MESSAGE (OUTPATIENT)
Age: 54
End: 2024-06-27

## 2024-06-27 DIAGNOSIS — B96.89 BV (BACTERIAL VAGINOSIS): ICD-10-CM

## 2024-06-27 DIAGNOSIS — N76.0 BV (BACTERIAL VAGINOSIS): ICD-10-CM

## 2024-06-28 RX ORDER — METRONIDAZOLE 7.5 MG/G
GEL VAGINAL WEEKLY
Qty: 70 G | Refills: 0 | Status: SHIPPED | OUTPATIENT
Start: 2024-06-28

## 2024-06-28 NOTE — TELEPHONE ENCOUNTER
From: Bethany Mreritt  To: Dr. Fabienne Rodriguez  Sent: 6/27/2024 11:37 PM EDT  Subject: Rash     Hi Dr. Rodriguez ,   Is there a possibility I can please get a metro gel script sent to [facility] to take on the cruise just in case I have an issue ? I am also packing an extra yeast pill that I have . I am just trying to be cautiously optimistic that I won’t have any issues . Thank you .     Bethany

## 2024-06-28 NOTE — TELEPHONE ENCOUNTER
Requested Prescriptions     Pending Prescriptions Disp Refills    metroNIDAZOLE (METROGEL) 0.75 % vaginal gel 70 g 0     Sig: Place vaginally once a week         Last OV: 5/24/2024    Last labs: 3//25/2024     F/u: 7/18/2024

## 2024-07-01 ENCOUNTER — HOSPITAL ENCOUNTER (OUTPATIENT)
Dept: GENERAL RADIOLOGY | Age: 54
Discharge: HOME | End: 2024-07-01
Payer: COMMERCIAL

## 2024-07-01 ENCOUNTER — OFFICE VISIT (OUTPATIENT)
Age: 54
End: 2024-07-01
Payer: COMMERCIAL

## 2024-07-01 VITALS
WEIGHT: 137.2 LBS | SYSTOLIC BLOOD PRESSURE: 102 MMHG | BODY MASS INDEX: 23.55 KG/M2 | RESPIRATION RATE: 16 BRPM | TEMPERATURE: 97.2 F | DIASTOLIC BLOOD PRESSURE: 66 MMHG | HEART RATE: 70 BPM | OXYGEN SATURATION: 98 %

## 2024-07-01 DIAGNOSIS — M25.551 RIGHT HIP PAIN: ICD-10-CM

## 2024-07-01 DIAGNOSIS — F41.9 ANXIETY: Primary | ICD-10-CM

## 2024-07-01 DIAGNOSIS — N89.8 VAGINAL DRYNESS: ICD-10-CM

## 2024-07-01 DIAGNOSIS — F42.9 OBSESSIVE-COMPULSIVE DISORDER, UNSPECIFIED TYPE: ICD-10-CM

## 2024-07-01 DIAGNOSIS — M76.31 ILIOTIBIAL BAND SYNDROME, RIGHT: ICD-10-CM

## 2024-07-01 DIAGNOSIS — N89.8 VAGINAL IRRITATION: ICD-10-CM

## 2024-07-01 DIAGNOSIS — M70.61 TROCHANTERIC BURSITIS OF RIGHT HIP: ICD-10-CM

## 2024-07-01 PROCEDURE — 73502 X-RAY EXAM HIP UNI 2-3 VIEWS: CPT

## 2024-07-01 PROCEDURE — 99214 OFFICE O/P EST MOD 30 MIN: CPT | Performed by: FAMILY MEDICINE

## 2024-07-01 RX ORDER — ESCITALOPRAM OXALATE 5 MG/1
5 TABLET ORAL DAILY
Qty: 30 TABLET | Refills: 1 | Status: SHIPPED | OUTPATIENT
Start: 2024-07-01

## 2024-07-01 NOTE — PROGRESS NOTES
. ROM exercises.  Modify activities.   - Restart Mobic 7.5 mg 1-2 pills/ day prn.   Stop Naproxen.    - XR HIP 2-3 VW W PELVIS RIGHT; Future and follow up after completed/ prn.     6. Trochanteric bursitis of right hip  - Moist heat . ROM exercises.  Modify activities.  If flares after hiking , try ice X 48 hours then moist heat.    -  Restart Mobic 7.5 mg 1-2 pills/ day prn.   Stop Naproxen.    - Discussed concern with flaring with hiking.  Recommended to stay on paved path as much as possible and minimize uneven terrain.      7. Iliotibial band syndrome, right  - Moist heat . ROM exercises.  Modify activities. If flares after hiking , try ice X 48 hours then moist heat.    - Discussed concern with flaring with hiking.  Recommended to stay on paved path as much as possible and minimize uneven terrain.      Follow up 4 weeks/ prn.

## 2024-07-02 ENCOUNTER — OFFICE VISIT (OUTPATIENT)
Dept: ORTHOPEDIC SURGERY | Age: 54
End: 2024-07-02

## 2024-07-02 VITALS — BODY MASS INDEX: 23.39 KG/M2 | HEIGHT: 64 IN | WEIGHT: 137 LBS | RESPIRATION RATE: 15 BRPM

## 2024-07-02 DIAGNOSIS — M65.30 TRIGGER FINGER, ACQUIRED: Primary | ICD-10-CM

## 2024-07-02 PROCEDURE — 99024 POSTOP FOLLOW-UP VISIT: CPT | Performed by: ORTHOPAEDIC SURGERY

## 2024-07-02 NOTE — PROGRESS NOTES
The patient is now 2 1/2 months post op release of her right middle trigger finger.  She reports no residual triggering, but is concerned that the area of surgery is still red, warm, swollen and mildly painful.  The patient's social history, past medical history, family history, medications, allergies and review of systems have been reviewed, dated 7/2/24 and are recorded in the chart.     On physical examination the distal palm at the base of the finger is mildly swollen, erythematous and tender. However finger is not swollen or erythematous.  The surgical scar is nicely healed, without evidence of infection or hypertrophy.  There is full active range of motion, without any triggering.  Skin is intact, as is distal circulation and sensation.  Gross muscle strength is normal bilaterally.  Hand and wrist joints are stable.  There are no subcutaneous nodules or enlarged epitrochlear lymph nodes.     The patient is reassured that she is doing well and that her current symptoms are usual and expected at this time postoperatively and do not suggest any post surgical complication.    The usual course of events in the resolution of the symptoms associated with this surgery is fully discussed with the patient.  As long as they progress as expected, they do not need to return for further follow up.  They are, however, urged to call or return if they have questions or concerns.

## 2024-07-03 ENCOUNTER — PATIENT MESSAGE (OUTPATIENT)
Age: 54
End: 2024-07-03

## 2024-07-03 DIAGNOSIS — K64.9 HEMORRHOIDS, UNSPECIFIED HEMORRHOID TYPE: ICD-10-CM

## 2024-07-03 RX ORDER — HYDROCORTISONE ACETATE 25 MG/1
25 SUPPOSITORY RECTAL EVERY 12 HOURS
Qty: 14 SUPPOSITORY | Refills: 0 | Status: SHIPPED | OUTPATIENT
Start: 2024-07-03 | End: 2024-07-10

## 2024-07-03 NOTE — TELEPHONE ENCOUNTER
I sent the patient a [facility] message, but please reach out to her, as I believe she leaves Friday for vacation and would need an appointment this am since we are closed tomorrow.

## 2024-07-03 NOTE — TELEPHONE ENCOUNTER
From: Bethany Merritt  Sent: 7/3/2024 7:05 AM EDT  To: [facility]  Subject: Hemorrhoids     Sorry . Another question : Wondering if I need to take an antibiotic with me on the cruise just incase an infection occurs due to having a ruptured hemorrhoid. Right now I do not have a fever . Just sore .     Bethany

## 2024-07-18 ENCOUNTER — OFFICE VISIT (OUTPATIENT)
Age: 54
End: 2024-07-18
Payer: COMMERCIAL

## 2024-07-18 VITALS
HEART RATE: 65 BPM | WEIGHT: 136.3 LBS | DIASTOLIC BLOOD PRESSURE: 70 MMHG | SYSTOLIC BLOOD PRESSURE: 108 MMHG | BODY MASS INDEX: 23.4 KG/M2 | RESPIRATION RATE: 16 BRPM | OXYGEN SATURATION: 98 % | TEMPERATURE: 97.7 F

## 2024-07-18 DIAGNOSIS — Z78.9 PARTICIPANT IN HEALTH AND WELLNESS PLAN: ICD-10-CM

## 2024-07-18 DIAGNOSIS — D72.819 LEUKOPENIA, UNSPECIFIED TYPE: ICD-10-CM

## 2024-07-18 DIAGNOSIS — K59.00 CONSTIPATION, UNSPECIFIED CONSTIPATION TYPE: Primary | ICD-10-CM

## 2024-07-18 DIAGNOSIS — Z23 NEED FOR HEPATITIS B BOOSTER VACCINATION: ICD-10-CM

## 2024-07-18 DIAGNOSIS — K62.5 RECTAL BLEEDING: ICD-10-CM

## 2024-07-18 DIAGNOSIS — E55.9 VITAMIN D DEFICIENCY: ICD-10-CM

## 2024-07-18 DIAGNOSIS — F42.9 OBSESSIVE-COMPULSIVE DISORDER, UNSPECIFIED TYPE: ICD-10-CM

## 2024-07-18 DIAGNOSIS — E78.00 HYPERCHOLESTEROLEMIA: ICD-10-CM

## 2024-07-18 DIAGNOSIS — F41.9 ANXIETY: ICD-10-CM

## 2024-07-18 PROCEDURE — 99214 OFFICE O/P EST MOD 30 MIN: CPT | Performed by: FAMILY MEDICINE

## 2024-07-18 RX ORDER — DOCUSATE SODIUM 100 MG/1
100 CAPSULE, LIQUID FILLED ORAL 2 TIMES DAILY PRN
Qty: 90 CAPSULE | Refills: 3 | COMMUNITY
Start: 2024-07-18

## 2024-07-18 RX ORDER — ESCITALOPRAM OXALATE 10 MG/1
10 TABLET ORAL DAILY
Qty: 30 TABLET | Refills: 1 | Status: SHIPPED | OUTPATIENT
Start: 2024-07-18

## 2024-07-18 RX ORDER — PLECANATIDE 3 MG/1
3 TABLET ORAL DAILY
Qty: 30 TABLET | Refills: 5
Start: 2024-07-18

## 2024-07-18 NOTE — PROGRESS NOTES
Patient is here for follow up of rectal bleeding.  She did she [facility] at [facility] yesterday and is to schedule colonoscopy with Dr. Ureña  on 8/9/24.      She was changed to Trulance 3 mg qd, but has only taken 1 dose to date.  She did not have success with Linzess 290 mg qd in the past. She is taking Metamucil powder 1x/ day.   She was doing Colace and Miralax on the cruise and Metamucil capsules.  She is having rectal pain.  Some rectal bleeding wiping only .  Not in underwear .  No longer blood in the toilet. She did Anusol bid for 5 days. She repeated the Anusol after 2 days.  Used Anusol last night due to soreness.  Taking flaxseed oil.  She is usually having bowel movement qd to qod - harder and straining.  Denies abdominal pain / cramping.     She states she is obsessing about things and is so respects thinks it is due to not being a busy or occupied with things.  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   She is planning on taking a course through [employer] for [employer].  She would like to potentially volunteer in this capacity.  She finds she has some anxiety and is inquiring about the Lexapro , which she takes 5 mg qd.  She is not using the Hydroxyzine and is usually sleeping okay .     Last colonoscopy : 1/22- Dr. Ureña- repeat in 1/32; 12/18 - [place]   Mammo:8/23; 8/22; 7/21; 7/19; 7/18;   PAP: 10/23; 7/22- Dr. Mccurdy; 7/21;6/20   H/o Abnormal PAP:   HPV only.   DEXA : 7/18 - nl     Review of Systems    ROS: All other systems were reviewed and are negative .  Patient's allergies and medications were reviewed.  Patient's past medical, surgical, social , and family history were reviewed.      Wt Readings from Last 3 Encounters:   07/18/24 61.8 kg (136 lb 4.8 oz)   07/02/24 62.1 kg (137 lb)   07/01/24 62.2 kg (137 lb 3.2 oz)       OBJECTIVE:  /70 (Site: Right Upper Arm, Position: Sitting)   Pulse 65   Temp 97.7 °F (36.5 °C) (Temporal)   Resp 16

## 2024-07-22 ENCOUNTER — OFFICE VISIT (OUTPATIENT)
Dept: DERMATOLOGY | Age: 54
End: 2024-07-22
Payer: COMMERCIAL

## 2024-07-22 DIAGNOSIS — L24.9 IRRITANT DERMATITIS: ICD-10-CM

## 2024-07-22 DIAGNOSIS — L82.1 SK (SEBORRHEIC KERATOSIS): Primary | ICD-10-CM

## 2024-07-22 DIAGNOSIS — D22.9 MULTIPLE NEVI: ICD-10-CM

## 2024-07-22 PROCEDURE — 99213 OFFICE O/P EST LOW 20 MIN: CPT | Performed by: DERMATOLOGY

## 2024-07-22 NOTE — PROGRESS NOTES
(Patient taking differently: Take 4 mg by mouth at bedtime Take 1 po qhs) 90 tablet 3    Probiotic Product (PROBIOTIC-10 PO) Take 1 tablet by mouth daily      cetirizine (ZYRTEC) 10 MG tablet Take 1 tablet by mouth daily           Physical Examination     Full skin examination except for the skin below the underwear.    Well appearing.    1.  Left calf with a stuck on appearing round brown macule.     2.  Left abdomen with a round scaly and crusted pink patch.     3.  Back and extremities with multiple round smooth brown macules.  Left mid medial thigh with a round soft smooth skin colored papule.        Assessment and Plan     1. SK (seborrheic keratosis)     Reassurance.      2. Irritant dermatitis from estrogen patches    Triamcinolone 0.1% cream twice daily for up to 2 weeks or until improved.       3. Multiple nevi - benign appearing    Sun protective behaviors, including use of at least SPF 30 sunscreen, and self skin examinations were encouraged.  Call for any new or concerning lesions.           Return in about 1 year (around 7/22/2025).    --Neri Workman MD

## 2024-08-26 ENCOUNTER — OFFICE VISIT (OUTPATIENT)
Age: 54
End: 2024-08-26
Payer: COMMERCIAL

## 2024-08-26 ENCOUNTER — HOSPITAL ENCOUNTER (OUTPATIENT)
Dept: MAMMOGRAPHY | Age: 54
Discharge: HOME | End: 2024-08-26
Payer: COMMERCIAL

## 2024-08-26 VITALS
OXYGEN SATURATION: 97 % | BODY MASS INDEX: 23.39 KG/M2 | DIASTOLIC BLOOD PRESSURE: 76 MMHG | HEIGHT: 64 IN | RESPIRATION RATE: 16 BRPM | TEMPERATURE: 97.8 F | SYSTOLIC BLOOD PRESSURE: 106 MMHG | WEIGHT: 137 LBS | HEART RATE: 81 BPM

## 2024-08-26 VITALS — WEIGHT: 137 LBS | HEIGHT: 64 IN | BODY MASS INDEX: 23.39 KG/M2

## 2024-08-26 DIAGNOSIS — K62.5 RECTAL BLEEDING: ICD-10-CM

## 2024-08-26 DIAGNOSIS — K59.00 CONSTIPATION, UNSPECIFIED CONSTIPATION TYPE: ICD-10-CM

## 2024-08-26 DIAGNOSIS — Z12.31 VISIT FOR SCREENING MAMMOGRAM: ICD-10-CM

## 2024-08-26 DIAGNOSIS — S40.021A ARM CONTUSION, RIGHT, INITIAL ENCOUNTER: ICD-10-CM

## 2024-08-26 DIAGNOSIS — L29.9 ITCHING: Primary | ICD-10-CM

## 2024-08-26 DIAGNOSIS — M25.522 LEFT ELBOW PAIN: ICD-10-CM

## 2024-08-26 DIAGNOSIS — N95.1 VAGINAL DRYNESS, MENOPAUSAL: ICD-10-CM

## 2024-08-26 DIAGNOSIS — F41.9 ANXIETY: ICD-10-CM

## 2024-08-26 DIAGNOSIS — F42.9 OBSESSIVE-COMPULSIVE DISORDER, UNSPECIFIED TYPE: ICD-10-CM

## 2024-08-26 PROCEDURE — 77063 BREAST TOMOSYNTHESIS BI: CPT

## 2024-08-26 PROCEDURE — 99214 OFFICE O/P EST MOD 30 MIN: CPT | Performed by: FAMILY MEDICINE

## 2024-08-26 RX ORDER — ESCITALOPRAM OXALATE 5 MG/1
5 TABLET ORAL DAILY
Qty: 30 TABLET | Refills: 1 | Status: SHIPPED | OUTPATIENT
Start: 2024-08-26

## 2024-08-26 RX ORDER — ESTRADIOL 0.1 MG/G
1 CREAM VAGINAL
Qty: 1 EACH | Refills: 1
Start: 2024-08-26

## 2024-08-26 RX ORDER — HYDROXYZINE HYDROCHLORIDE 10 MG/1
25 TABLET, FILM COATED ORAL EVERY 8 HOURS PRN
Qty: 60 TABLET | Refills: 1 | Status: SHIPPED | OUTPATIENT
Start: 2024-08-26

## 2024-08-26 NOTE — PROGRESS NOTES
Patient is here for pubic itching off and on the past 2 weeks. No rash.  She used to take Hydroxyzine but not taking. She does take Zyrtec or Claritin qd .   She also fell 3 weeks ago and had bruising to RUE forearm , which is faded and hit left elbow with sore spot to touch. No pain with movement , including opening lids/jars.  Right handed.  She kayaked this weekend without difficulties.      Sleeping okay with 4 mg of Melatonin.  She has increased Lexapro from 5 to 10 mg qd.  She feels she is getting more headaches.  She increased the Lexapro on 7/18/24.  Her headaches have been more the past 2 weeks. She is having headaches 2-3 times per week, not daily.  Does not take anything for headaches .  4-5/10.  No visual problems. No photophobia.   She does feel the Lexapro is helping.  She does not usually get headaches.       Some itching groin/ pubic area. No rash .  She denies abnormal vaginal bleeding, itching, odor,  discharge or unusual pelvic pain, no dysuria, frequency or hematuria.     She is using estrogen pill vaginally per Gynecology , Dr. Ballard, but does not think it helps enough as sex can be painful.  Using pills twice per week - estrogen cream vaginally at the opening- both are twice per week.  Rash has resolved she was getting that appeared to be related to estrogen patch , as it stopped once she stopped estrogen patch.     Taking Trulance 3 mg qd , Metamucil powder 1x/ day  Uing Miralax 1 capful qod . Her bowel movement are about qd to qod.  Some straining at times.    She was to have colonoscopy on 8/9/24 but had to reschedule due to prep - Sutab prep.  Rescheduled 9/16/24 with      Review of Systems    ROS: All other systems were reviewed and are negative .  Patient's allergies and medications were reviewed.  Patient's past medical, surgical, social , and family history were reviewed.    OBJECTIVE:  /76 (Site: Left Upper Arm, Position: Sitting, Cuff Size: Medium Adult)   Pulse 81    Temp 97.8 °F (36.6 °C) (Temporal)   Resp 16   Ht 1.626 m (5' 4\")   Wt 62.1 kg (137 lb)   LMP  (LMP Unknown)   SpO2 97%   BMI 23.52 kg/m²     Physical Exam    General: NAD, cooperative, alert and oriented X 3. Mood / affect is good.good insight. well hydrated.  Neck : no lymphadenopathy, supple, FROM  Upper extremities : DTRs 2+ biceps/ triceps/ brachioradialis bilateral.  FROM. Strength 5/5.  Tenderness to right forearm.  Left elbow with tenderness . No swelling.  No pain with movement at elbow.    CV: Regular rate and rhythm , no murmurs/ rub/ gallop. No edema.   Lungs : CTA bilaterally, breathing comfortably  Abdomen: positive bowel sounds, soft , non tender, non distended. No hepatosplenomegaly. No CVA tenderness.  Skin: no rashes. Non tender.     ASSESSMENT/  PLAN:  1. Itching- pubic  - Monitor given intermittent .  - Recommended looser clothing.    - Restart Hydroxyzine 10-25 mg tid prn.    - hydrOXYzine HCl (ATARAX) 10 MG tablet; Take 2.5 tablets by mouth every 8 hours as needed for Anxiety or Itching (sleeping difficulties)  Dispense: 60 tablet; Refill: 1    2. Rectal bleeding  - Rescheduled for colonoscopy with Dr. Ureña.  Discussed Hygea prep versus sigmoidoscopy potentially.     3. Constipation, unspecified constipation type  - Improved. Continue Trulance 3 mg qd , Metamucil powder 1x/ day  Uing Miralax 1 capful qod , but discussed increasing to qd given some straining.     4. Anxiety  - Decrease Lexapro to 5 mg qd given her concerns with headache and itching, but likely headaches are more weather related. Monitor.  Discussed increasing to 7.5 mg qd if needed.    - hydrOXYzine HCl (ATARAX) 10 MG tablet; Take 2.5 tablets by mouth every 8 hours as needed for Anxiety or Itching (sleeping difficulties)  Dispense: 60 tablet; Refill: 1  - escitalopram (LEXAPRO) 5 MG tablet; Take 1 tablet by mouth daily  Dispense: 30 tablet; Refill: 1    5. Obsessive-compulsive disorder, unspecified type  - Improved on 10

## 2024-09-11 ENCOUNTER — OFFICE VISIT (OUTPATIENT)
Dept: ORTHOPEDIC SURGERY | Age: 54
End: 2024-09-11
Payer: COMMERCIAL

## 2024-09-11 VITALS — BODY MASS INDEX: 23.39 KG/M2 | WEIGHT: 137 LBS | HEIGHT: 64 IN | RESPIRATION RATE: 16 BRPM

## 2024-09-11 DIAGNOSIS — M65.30 TRIGGER FINGER, ACQUIRED: Primary | ICD-10-CM

## 2024-09-11 PROCEDURE — 99213 OFFICE O/P EST LOW 20 MIN: CPT | Performed by: ORTHOPAEDIC SURGERY

## 2024-09-30 ENCOUNTER — PATIENT MESSAGE (OUTPATIENT)
Dept: DERMATOLOGY | Age: 54
End: 2024-09-30

## 2024-09-30 ENCOUNTER — OFFICE VISIT (OUTPATIENT)
Dept: DERMATOLOGY | Age: 54
End: 2024-09-30
Payer: COMMERCIAL

## 2024-09-30 DIAGNOSIS — L71.0 PERIORAL DERMATITIS: Primary | ICD-10-CM

## 2024-09-30 DIAGNOSIS — B07.8 FLAT WART: ICD-10-CM

## 2024-09-30 PROCEDURE — 99213 OFFICE O/P EST LOW 20 MIN: CPT | Performed by: DERMATOLOGY

## 2024-09-30 PROCEDURE — 17110 DESTRUCTION B9 LES UP TO 14: CPT | Performed by: DERMATOLOGY

## 2024-09-30 RX ORDER — TACROLIMUS 1 MG/G
OINTMENT TOPICAL
Qty: 30 G | Refills: 0 | Status: SHIPPED | OUTPATIENT
Start: 2024-09-30

## 2024-09-30 NOTE — PROGRESS NOTES
1970, 1999    bilateral, right    HYSTERECTOMY (CERVIX STATUS UNKNOWN)      LARYNGOSCOPY  2016    PARTIAL HYSTERECTOMY (CERVIX NOT REMOVED)  2009    DUB -     UPPER GASTROINTESTINAL ENDOSCOPY  05/2019       No Known Allergies  Outpatient Medications Marked as Taking for the 9/30/24 encounter (Office Visit) with Neri Workman MD   Medication Sig Dispense Refill    tacrolimus (PROTOPIC) 0.1 % ointment Apply to affected area twice daily until improved. 30 g 0    hydrOXYzine HCl (ATARAX) 10 MG tablet Take 2.5 tablets by mouth every 8 hours as needed for Anxiety or Itching (sleeping difficulties) 60 tablet 1    escitalopram (LEXAPRO) 5 MG tablet Take 1 tablet by mouth daily 30 tablet 1    estradiol (ESTRACE VAGINAL) 0.1 MG/GM vaginal cream Place 1 g vaginally three times a week 1 each 1    docusate sodium (COLACE) 100 MG capsule Take 1 capsule by mouth 2 times daily as needed for Constipation 90 capsule 3    Plecanatide (TRULANCE) 3 MG TABS Take 3 mg by mouth daily 30 tablet 5    escitalopram (LEXAPRO) 10 MG tablet Take 1 tablet by mouth daily 30 tablet 1    gabapentin (NEURONTIN) 100 MG capsule Take 1 capsule by mouth in the morning, at noon, and at bedtime for 90 days. 270 capsule 0    IMVEXXY MAINTENANCE PACK 10 MCG INST Place 1 Insert vaginally Twice a Week 8 each 11    carboxymethylcellulose 1 % ophthalmic solution Place 1 drop into both eyes 2 times daily      meloxicam (MOBIC) 7.5 MG tablet TAKE ONE TO TWO TABLETS BY MOUTH ONCE A DAY AS NEEDED 90 tablet 1    triamcinolone (KENALOG) 0.1 % cream Apply to affected area twice daily for up to 2 weeks or until improved. (Patient taking differently: Apply topically as needed Apply to affected area twice daily for up to 2 weeks or until improved.) 30 g 2    fluocinonide (LIDEX) 0.05 % external solution Apply to the red scaly areas on the scalp daily as needed. 20 mL 1    Psyllium (METAMUCIL) 48.57 % POWD In 8 oz of water daily. (Patient taking differently: Take by

## 2024-10-02 ENCOUNTER — OFFICE VISIT (OUTPATIENT)
Age: 54
End: 2024-10-02
Payer: COMMERCIAL

## 2024-10-02 VITALS
RESPIRATION RATE: 14 BRPM | BODY MASS INDEX: 23.9 KG/M2 | HEART RATE: 76 BPM | WEIGHT: 140 LBS | HEIGHT: 64 IN | TEMPERATURE: 98 F | DIASTOLIC BLOOD PRESSURE: 70 MMHG | OXYGEN SATURATION: 98 % | SYSTOLIC BLOOD PRESSURE: 106 MMHG

## 2024-10-02 DIAGNOSIS — J30.9 ALLERGIC RHINITIS, UNSPECIFIED SEASONALITY, UNSPECIFIED TRIGGER: ICD-10-CM

## 2024-10-02 DIAGNOSIS — R10.30 DISCOMFORT OF GROIN, UNSPECIFIED LATERALITY: ICD-10-CM

## 2024-10-02 DIAGNOSIS — H69.93 DYSFUNCTION OF BOTH EUSTACHIAN TUBES: ICD-10-CM

## 2024-10-02 DIAGNOSIS — H60.501 ACUTE OTITIS EXTERNA OF RIGHT EAR, UNSPECIFIED TYPE: Primary | ICD-10-CM

## 2024-10-02 DIAGNOSIS — N89.8 VAGINAL DRYNESS: ICD-10-CM

## 2024-10-02 PROCEDURE — 99214 OFFICE O/P EST MOD 30 MIN: CPT | Performed by: FAMILY MEDICINE

## 2024-10-02 RX ORDER — NEOMYCIN SULFATE, POLYMYXIN B SULFATE, HYDROCORTISONE 3.5; 10000; 1 MG/ML; [USP'U]/ML; MG/ML
4 SOLUTION/ DROPS AURICULAR (OTIC) 3 TIMES DAILY
Qty: 10 ML | Refills: 0 | Status: SHIPPED | OUTPATIENT
Start: 2024-10-02 | End: 2024-10-09

## 2024-10-02 NOTE — PROGRESS NOTES
hepatosplenomegaly. No CVA tenderness.  Skin: no rashes. Non tender.     ASSESSMENT/  PLAN:  1. Acute otitis externa of right ear, unspecified type  - Reminded again to avoid use of Q-tips.    - neomycin-polymyxin-hydrocortisone (CORTISPORIN) 3.5-03999-0 otic solution; Place 4 drops into the right ear 3 times daily for 7 days Instill into right Ear  Dispense: 10 mL; Refill: 0    2. Allergic rhinitis, unspecified seasonality, unspecified trigger  - Continue Nasacort NS qhs and Zyrtec qd prn.      3. Dysfunction of both eustachian tubes  - Continue Nasacort NS qhs and use Sudafed prn sparingly .     4. Vaginal dryness  -  Monitor with restart of Estrogen patch and continued use sparingly of vaginal estrogen cream. Stop Imvexxy vaginally.     5. Discomfort of groin, unspecified laterality  - Recommended continue to avoid wearing underwear at night and use boxer style during the day.  - Hydrocortisone 1% cream over the counter or Triamcinolone cream bid prn sparingly.      F/u if no improvement 5-7d/ prn increased symptoms.

## 2024-10-15 DIAGNOSIS — K58.1 IRRITABLE BOWEL SYNDROME WITH CONSTIPATION: Primary | ICD-10-CM

## 2024-10-15 RX ORDER — TENAPANOR HYDROCHLORIDE 53.2 MG/1
50 TABLET ORAL 2 TIMES DAILY
Qty: 180 TABLET | Refills: 1
Start: 2024-10-15

## 2024-10-21 DIAGNOSIS — Z23 NEED FOR HEPATITIS B BOOSTER VACCINATION: ICD-10-CM

## 2024-10-21 DIAGNOSIS — D72.819 LEUKOPENIA, UNSPECIFIED TYPE: ICD-10-CM

## 2024-10-21 DIAGNOSIS — K59.00 CONSTIPATION, UNSPECIFIED CONSTIPATION TYPE: ICD-10-CM

## 2024-10-21 DIAGNOSIS — E55.9 VITAMIN D DEFICIENCY: ICD-10-CM

## 2024-10-21 DIAGNOSIS — E78.00 HYPERCHOLESTEROLEMIA: ICD-10-CM

## 2024-10-21 LAB
25(OH)D3 SERPL-MCNC: 22 NG/ML
ALBUMIN SERPL-MCNC: 4.5 G/DL (ref 3.4–5)
ALBUMIN/GLOB SERPL: 2 {RATIO} (ref 1.1–2.2)
ALP SERPL-CCNC: 78 U/L (ref 40–129)
ALT SERPL-CCNC: 23 U/L (ref 10–40)
ANION GAP SERPL CALCULATED.3IONS-SCNC: 10 MMOL/L (ref 3–16)
AST SERPL-CCNC: 25 U/L (ref 15–37)
BASOPHILS # BLD: 0 K/UL (ref 0–0.2)
BASOPHILS NFR BLD: 0.7 %
BILIRUB SERPL-MCNC: 0.3 MG/DL (ref 0–1)
BUN SERPL-MCNC: 17 MG/DL (ref 7–20)
CALCIUM SERPL-MCNC: 9.7 MG/DL (ref 8.3–10.6)
CHLORIDE SERPL-SCNC: 102 MMOL/L (ref 99–110)
CHOLEST SERPL-MCNC: 245 MG/DL (ref 0–199)
CO2 SERPL-SCNC: 29 MMOL/L (ref 21–32)
CREAT SERPL-MCNC: 0.8 MG/DL (ref 0.6–1.1)
DEPRECATED RDW RBC AUTO: 13.8 % (ref 12.4–15.4)
EOSINOPHIL # BLD: 0.2 K/UL (ref 0–0.6)
EOSINOPHIL NFR BLD: 4.5 %
GFR SERPLBLD CREATININE-BSD FMLA CKD-EPI: 87 ML/MIN/{1.73_M2}
GLUCOSE SERPL-MCNC: 86 MG/DL (ref 70–99)
HBV SURFACE AB SERPL IA-ACNC: >1000 MIU/ML
HCT VFR BLD AUTO: 38.6 % (ref 36–48)
HDLC SERPL-MCNC: 103 MG/DL (ref 40–60)
HGB BLD-MCNC: 12.9 G/DL (ref 12–16)
LDLC SERPL CALC-MCNC: 131 MG/DL
LYMPHOCYTES # BLD: 1.5 K/UL (ref 1–5.1)
LYMPHOCYTES NFR BLD: 37.7 %
MCH RBC QN AUTO: 31 PG (ref 26–34)
MCHC RBC AUTO-ENTMCNC: 33.5 G/DL (ref 31–36)
MCV RBC AUTO: 92.6 FL (ref 80–100)
MONOCYTES # BLD: 0.4 K/UL (ref 0–1.3)
MONOCYTES NFR BLD: 9.2 %
NEUTROPHILS # BLD: 1.9 K/UL (ref 1.7–7.7)
NEUTROPHILS NFR BLD: 47.9 %
PLATELET # BLD AUTO: 256 K/UL (ref 135–450)
PMV BLD AUTO: 7.7 FL (ref 5–10.5)
POTASSIUM SERPL-SCNC: 4.1 MMOL/L (ref 3.5–5.1)
PROT SERPL-MCNC: 6.8 G/DL (ref 6.4–8.2)
RBC # BLD AUTO: 4.17 M/UL (ref 4–5.2)
SODIUM SERPL-SCNC: 141 MMOL/L (ref 136–145)
TRIGL SERPL-MCNC: 57 MG/DL (ref 0–150)
TSH SERPL DL<=0.005 MIU/L-ACNC: 2.18 UIU/ML (ref 0.27–4.2)
VLDLC SERPL CALC-MCNC: 11 MG/DL
WBC # BLD AUTO: 4 K/UL (ref 4–11)

## 2024-10-22 ENCOUNTER — OFFICE VISIT (OUTPATIENT)
Dept: ENT CLINIC | Age: 54
End: 2024-10-22
Payer: COMMERCIAL

## 2024-10-22 VITALS
HEIGHT: 64 IN | BODY MASS INDEX: 24.01 KG/M2 | SYSTOLIC BLOOD PRESSURE: 116 MMHG | DIASTOLIC BLOOD PRESSURE: 76 MMHG | RESPIRATION RATE: 16 BRPM | HEART RATE: 75 BPM | WEIGHT: 140.6 LBS | TEMPERATURE: 97.3 F

## 2024-10-22 DIAGNOSIS — H92.01 RIGHT EAR PAIN: Primary | ICD-10-CM

## 2024-10-22 PROCEDURE — 99213 OFFICE O/P EST LOW 20 MIN: CPT | Performed by: OTOLARYNGOLOGY

## 2024-10-22 ASSESSMENT — ENCOUNTER SYMPTOMS
PHOTOPHOBIA: 0
EYE REDNESS: 0
VOICE CHANGE: 0
EYE ITCHING: 0
CHOKING: 0
EYE PAIN: 0
RHINORRHEA: 0
DIARRHEA: 0
FACIAL SWELLING: 0
SHORTNESS OF BREATH: 0
SINUS PAIN: 0
TROUBLE SWALLOWING: 0
SINUS PRESSURE: 0
STRIDOR: 0
COUGH: 0
COLOR CHANGE: 0
NAUSEA: 0
SORE THROAT: 0

## 2024-10-22 NOTE — PROGRESS NOTES
[facility]  [address] , [address]  [place] [address]  P: [phone]  F: [phone]       Patient     Bethany Merritt  1970    ChiefComplaint     Chief Complaint   Patient presents with    Ear Problem     Right was having some pain family doctor put her medication it has gotten better buy still feels like it is clogged       History of Present Illness     Bethany Merritt is a pleasant 54 y.o. female who presents for new issue of right ear pain.  Last week, the patient had a constant pain in the right ear.  There was no otorrhea or hearing loss.  She saw her PCP and she was noted to have some cerumen in the right ear canal and potentially infection.  The cerumen is removed and she was placed on a eardrop for swimmer's ear.  Symptoms have resolved.  She does have a history of TMJ.  She wears a bite guard at night.    Past Medical History     Past Medical History:   Diagnosis Date    Acid reflux     Allergic rhinitis     Anesthesia complication     PATIENT STATES HER VISION GETS WORSE EVERY TIME SHE HAS ANESTHESIA    Anxiety     Atrophic vaginitis     Constipation     DDD (degenerative disc disease), cervical     Dysmenorrhea     Dyspareunia in female     Esophagitis 05/2019    Fibrocystic breast     Gastritis 05/2019    HPV (human papilloma virus) infection 1992    Exposed back in college    Hyperlipidemia     NO MEDS    Internal hemorrhoids 01/2022    per colonoscopy - Dr. Ureña    Interstitial cystitis 10/23/2017    Iron deficiency anemia 2017    Irritable bowel syndrome with constipation     Menopause ovarian failure     Menorrhagia     PONV (postoperative nausea and vomiting)     Trigeminal neuralgia     Vitamin D deficiency 07/2019    Vocal cord nodule 2016       Past Surgical History     Past Surgical History:   Procedure Laterality Date    APPENDECTOMY  1996    CERVICAL FUSION  2013    C4-6    COLONOSCOPY N/A 01/11/2022    COLONOSCOPY, POSSIBLE POLYPECTOMY performed by Evelio

## 2024-10-23 PROBLEM — D50.9 IRON DEFICIENCY ANEMIA: Status: ACTIVE | Noted: 2017-01-01

## 2024-10-24 ENCOUNTER — OFFICE VISIT (OUTPATIENT)
Age: 54
End: 2024-10-24

## 2024-10-24 VITALS
HEIGHT: 64 IN | HEART RATE: 69 BPM | BODY MASS INDEX: 23.61 KG/M2 | SYSTOLIC BLOOD PRESSURE: 102 MMHG | RESPIRATION RATE: 16 BRPM | DIASTOLIC BLOOD PRESSURE: 64 MMHG | TEMPERATURE: 98.6 F | OXYGEN SATURATION: 97 % | WEIGHT: 138.3 LBS

## 2024-10-24 DIAGNOSIS — N30.10 INTERSTITIAL CYSTITIS: ICD-10-CM

## 2024-10-24 DIAGNOSIS — J30.9 ALLERGIC RHINITIS, UNSPECIFIED SEASONALITY, UNSPECIFIED TRIGGER: ICD-10-CM

## 2024-10-24 DIAGNOSIS — Z00.00 ROUTINE GENERAL MEDICAL EXAMINATION AT A HEALTH CARE FACILITY: Primary | ICD-10-CM

## 2024-10-24 DIAGNOSIS — E55.9 VITAMIN D DEFICIENCY: ICD-10-CM

## 2024-10-24 DIAGNOSIS — K58.1 IRRITABLE BOWEL SYNDROME WITH CONSTIPATION: ICD-10-CM

## 2024-10-24 DIAGNOSIS — M50.30 DDD (DEGENERATIVE DISC DISEASE), CERVICAL: ICD-10-CM

## 2024-10-24 DIAGNOSIS — Z01.419 GYNECOLOGIC EXAM NORMAL: ICD-10-CM

## 2024-10-24 DIAGNOSIS — N95.2 ATROPHIC VAGINITIS: ICD-10-CM

## 2024-10-24 DIAGNOSIS — N94.10 DYSPAREUNIA IN FEMALE: ICD-10-CM

## 2024-10-24 DIAGNOSIS — D50.9 IRON DEFICIENCY ANEMIA, UNSPECIFIED IRON DEFICIENCY ANEMIA TYPE: ICD-10-CM

## 2024-10-24 DIAGNOSIS — K59.00 CONSTIPATION, UNSPECIFIED CONSTIPATION TYPE: ICD-10-CM

## 2024-10-24 DIAGNOSIS — F41.9 ANXIETY: ICD-10-CM

## 2024-10-24 DIAGNOSIS — G50.0 TRIGEMINAL NEURALGIA: ICD-10-CM

## 2024-10-24 DIAGNOSIS — E78.5 HYPERLIPIDEMIA, UNSPECIFIED HYPERLIPIDEMIA TYPE: ICD-10-CM

## 2024-10-24 DIAGNOSIS — Z23 NEEDS FLU SHOT: ICD-10-CM

## 2024-10-24 DIAGNOSIS — K64.4 INTERNAL AND EXTERNAL HEMORRHOIDS WITHOUT COMPLICATION: ICD-10-CM

## 2024-10-24 DIAGNOSIS — K64.8 INTERNAL AND EXTERNAL HEMORRHOIDS WITHOUT COMPLICATION: ICD-10-CM

## 2024-10-24 DIAGNOSIS — K21.9 GASTROESOPHAGEAL REFLUX DISEASE WITHOUT ESOPHAGITIS: ICD-10-CM

## 2024-10-24 LAB
BILIRUBIN, POC: NORMAL
BLOOD URINE, POC: NORMAL
CLARITY, POC: CLEAR
COLOR, POC: YELLOW
GLUCOSE URINE, POC: NORMAL MG/DL
KETONES, POC: NORMAL MG/DL
LEUKOCYTE EST, POC: NORMAL
NITRITE, POC: NORMAL
PH, POC: 7.5
PROTEIN, POC: NORMAL MG/DL
SPECIFIC GRAVITY, POC: 1.02
UROBILINOGEN, POC: 0.2 MG/DL

## 2024-10-24 RX ORDER — ESTRADIOL 0.05 MG/D
1 PATCH, EXTENDED RELEASE TRANSDERMAL
Qty: 24 PATCH | Refills: 3
Start: 2024-10-24 | End: 2024-10-25 | Stop reason: SDUPTHER

## 2024-10-24 NOTE — PROGRESS NOTES
Immunizations Administered       Name Date Dose Route    Influenza, FLUCELVAX, (age 6 mo+) IM, Trivalent PF, 0.5mL 10/24/2024 0.5 mL Intramuscular    Site: Deltoid- Left    Lot: [id number]    NDC: 50289-967-67            
masses are noted.  No skin or nipple changes or axillary nodes.  : .Vagina and vulva are normal;  no discharge is noted.  Cervix normal without lesions. Uterus anteverted and mobile, normal in size and shape without tenderness.  Adnexa normal in size without masses or tenderness. Pap Smear - is completed today.   Lower extremities : DTRs 2+ knees and ankles bilateral.  FROM. Strength 5/5. Negative straight leg-raise. No edema or erythema bilateral.  normal peripheral pulses.   Neuro: Cranial nerves 2-12 are normal. Deep tendon reflexes are 2+ and equal to all extremities.  No focal sensory, or motor deficit noted.  Skin: no rashes or suspicious lesions.      ASSESSMENT:   1. Routine general medical examination at a health care facility  - InBody Analysis was done and questionnaires were reviewed.    - InBody Analysis showed slight increase in segmental lean analysis to upper extremities and slight decrease to lower extremities .     2. Gynecologic exam normal  - PAP SMEAR  - Human papillomavirus (HPV) DNA probe thin prep high risk    3. Atrophic vaginitis  -  Continue Astrid estrogen patch which she is tolerating and finding more helpful versus Imvexxy vaginally.  Using Estrogen vaginally cream 2x/ week.      4. Interstitial cystitis  - Stable.  Asymptomatic and urinalysis is negative.    - POCT Urinalysis no Micro    5. DDD (degenerative disc disease), cervical  - Stable. Moist heat . ROM exercises.  Modify activities.     6. Allergic rhinitis, unspecified seasonality, unspecified trigger  - Stable.     7. Dyspareunia in female  - Improved on Astrid and estrogen vaginally cream 2d/week.  - POCT Urinalysis no Micro    8. Vitamin D deficiency  - Decreased on recent labs.  Admits to not taking Vitamin D supplement regularly.  - Increase Vitamin D3 to 5000 IU/ day and encouraged to take it regularly.      9. Trigeminal neuralgia  - Stable. Continue Gabapentin 100 mg tid.      10. Internal and external hemorrhoids

## 2024-10-25 ENCOUNTER — PATIENT MESSAGE (OUTPATIENT)
Age: 54
End: 2024-10-25

## 2024-10-25 DIAGNOSIS — F42.9 OBSESSIVE-COMPULSIVE DISORDER, UNSPECIFIED TYPE: ICD-10-CM

## 2024-10-25 DIAGNOSIS — F41.9 ANXIETY: ICD-10-CM

## 2024-10-25 RX ORDER — ESTRADIOL 0.05 MG/D
1 PATCH, EXTENDED RELEASE TRANSDERMAL
Qty: 24 PATCH | Refills: 1 | Status: SHIPPED | OUTPATIENT
Start: 2024-10-28

## 2024-10-25 RX ORDER — ESCITALOPRAM OXALATE 10 MG/1
10 TABLET ORAL DAILY
Qty: 90 TABLET | Refills: 1 | Status: SHIPPED | OUTPATIENT
Start: 2024-10-25

## 2024-10-25 NOTE — TELEPHONE ENCOUNTER
Requested Prescriptions     Pending Prescriptions Disp Refills    estradiol (ЮЛИЯ) 0.05 MG/24HR 24 patch 3     Sig: Place 1 patch onto the skin Twice a Week         Last OV: 10/24/2024    Last labs: 10/21/2024     F/u: 1/27/2025      escitalopram (LEXAPRO) 10 MG tablet  sent back in separate encounter

## 2024-10-25 NOTE — TELEPHONE ENCOUNTER
Requested Prescriptions     Pending Prescriptions Disp Refills    escitalopram (LEXAPRO) 10 MG tablet [Pharmacy Med Name: ESCITALOPRAM 10 MG TABLET] 30 tablet 1     Sig: TAKE 1 TABLET BY MOUTH DAILY         Last OV: 10/24/2024    Last labs: 10/21/2024     F/u: 1/27/2025

## 2024-10-30 ENCOUNTER — PATIENT MESSAGE (OUTPATIENT)
Dept: DERMATOLOGY | Age: 54
End: 2024-10-30

## 2024-10-31 RX ORDER — HYDROCORTISONE 25 MG/G
CREAM TOPICAL
Qty: 30 G | Refills: 0 | Status: SHIPPED | OUTPATIENT
Start: 2024-10-31

## 2024-10-31 RX ORDER — DOXYCYCLINE HYCLATE 20 MG
20 TABLET ORAL 2 TIMES DAILY
Qty: 60 TABLET | Refills: 0 | Status: SHIPPED | OUTPATIENT
Start: 2024-10-31

## 2024-11-04 ENCOUNTER — OFFICE VISIT (OUTPATIENT)
Age: 54
End: 2024-11-04
Payer: COMMERCIAL

## 2024-11-04 VITALS
RESPIRATION RATE: 16 BRPM | TEMPERATURE: 98 F | WEIGHT: 140 LBS | SYSTOLIC BLOOD PRESSURE: 106 MMHG | DIASTOLIC BLOOD PRESSURE: 72 MMHG | HEART RATE: 68 BPM | BODY MASS INDEX: 23.9 KG/M2 | HEIGHT: 64 IN | OXYGEN SATURATION: 98 %

## 2024-11-04 DIAGNOSIS — N89.8 VAGINAL IRRITATION: Primary | ICD-10-CM

## 2024-11-04 DIAGNOSIS — L98.9 FACIAL LESION: ICD-10-CM

## 2024-11-04 PROCEDURE — 99213 OFFICE O/P EST LOW 20 MIN: CPT | Performed by: FAMILY MEDICINE

## 2024-11-04 RX ORDER — PLECANATIDE 3 MG/1
TABLET ORAL
COMMUNITY

## 2024-11-04 RX ORDER — POLYETHYLENE GLYCOL 3350 17 G/17G
17 POWDER, FOR SOLUTION ORAL DAILY PRN
COMMUNITY

## 2024-11-04 NOTE — PROGRESS NOTES
Patient is here for vaginal irritation.  She was started on Doxycycline 100 mg bid X 30 days , which was prescribed by dermatologist, Dr. Workman for right side of lip lesion and started on 10/31/24.   She was placed on Tacrolimus ointment and was not improving.      She took Diflucan on Saturday am, 11/2/24.  She did go to [employer] and was hot on 10/27/24 and then yesterday . She wore linen pants. The prior weekend she wore jeans and was hot / sweaty.  She did use a vaginal moisturizer. Diflucan has helped somewhat.      Review of Systems    ROS: All other systems were reviewed and are negative .  Patient's allergies and medications were reviewed.  Patient's past medical, surgical, social , and family history were reviewed.    OBJECTIVE:  /72 (Site: Left Upper Arm, Position: Sitting, Cuff Size: Medium Adult)   Pulse 68   Temp 98 °F (36.7 °C) (Temporal)   Resp 16   Ht 1.626 m (5' 4\")   Wt 63.5 kg (140 lb)   LMP  (LMP Unknown)   SpO2 98%   BMI 24.03 kg/m²     Physical Exam    General: NAD, cooperative, alert and oriented X 3. Mood / affect is good.good insight. well hydrated.  Neck : no lymphadenopathy, supple, FROM  CV: Regular rate and rhythm , no murmurs/ rub/ gallop. No edema.   Lungs : CTA bilaterally, breathing comfortably  Abdomen: positive bowel sounds, soft , non tender, non distended. No hepatosplenomegaly.   No CVA tenderness.  :  Vaginal / vulvar area with erythema. Mild irritation.;  clear discharge is noted.  Cervix normal without lesions. Uterus anteverted and mobile, normal in size and shape without tenderness.  Adnexa normal in size without masses or tenderness.    ASSESSMENT/  PLAN:  1. Vaginal irritation  - Encouraged to avoid using vaginal moisturizer or topical products other than estrogen cream for dryness.    - Hold on further treatment pending results.  - Likely Doxycycline contributed to symptoms , as well as topical vaginal moisturizer and sweating.  Would like the patient

## 2024-11-05 LAB
BV BACTERIA DNA VAG QL NAA+PROBE: NOT DETECTED
C GLABRATA DNA VAG QL NAA+PROBE: ABNORMAL
C GLABRATA DNA VAG QL NAA+PROBE: NOT DETECTED
C KRUSEI DNA VAG QL NAA+PROBE: NOT DETECTED
CANDIDA DNA VAG QL NAA+PROBE: DETECTED
T VAGINALIS DNA VAG QL NAA+PROBE: NOT DETECTED

## 2024-11-05 RX ORDER — FLUCONAZOLE 150 MG/1
150 TABLET ORAL ONCE
Qty: 2 TABLET | Refills: 0 | Status: SHIPPED | OUTPATIENT
Start: 2024-11-05 | End: 2024-11-05

## 2024-11-06 ENCOUNTER — PATIENT MESSAGE (OUTPATIENT)
Dept: DERMATOLOGY | Age: 54
End: 2024-11-06

## 2024-11-08 ENCOUNTER — TELEPHONE (OUTPATIENT)
Age: 54
End: 2024-11-08

## 2024-11-08 LAB — BACTERIA GENITAL AEROBE CULT: NORMAL

## 2024-11-08 NOTE — TELEPHONE ENCOUNTER
Pt vaginal area still bright red and painful. She does not feel the Diflucan is working, she does have one left that she will take.    She has decided to stop taking the doxycycline because of this reaction.    She is wondering if there is anything topical that she can use?    She is leaving to go to [place] at 2:00 pm today, hence the [employer].

## 2024-11-08 NOTE — TELEPHONE ENCOUNTER
Pt called to speak with a nurse she stated she had questions regarding some of the medications she is taking. Please advise.     [phone] (home)

## 2024-11-08 NOTE — TELEPHONE ENCOUNTER
Agree with stopping the Doxycycline. She took 3 Diflucan- Saturday , Monday after her appointment with me and today.    Discussed if needed to use Monistat vaginally very sparingly to avoid causing BV.   She has one Diflucan left if needed , but advised to wait to repeat dose for 2-3 days .  Spoke with the patient .

## 2024-11-15 ENCOUNTER — TELEPHONE (OUTPATIENT)
Age: 54
End: 2024-11-15

## 2024-11-15 NOTE — TELEPHONE ENCOUNTER
Update:   Patient called in still having sxs of bright redness, white discharge, little to no improvement     Pt wanted to speak with nurse April, I told her id send a message back     F/U from Bethany Talamantes RN     SS    11/8/24 11:41 AM  Note     Pt vaginal area still bright red and painful.

## 2024-11-15 NOTE — TELEPHONE ENCOUNTER
Spoke with pt:    Took last Diflucan 3 days ago, she has taken 3 over the past two weeks.    Redness is better, but still there.    Has white discharge, but also has a \"white film\" on labia that is not associated with discharge.     She is wondering if you have any other suggestions, otherwise she will try the regular Monistat-type vaginal inserts. Pt is frustrated.    She is leaving [place] at 5pm today for the weekend, in case something is prescribed: [facility]

## 2024-11-15 NOTE — TELEPHONE ENCOUNTER
An appointment is preferred given ongoing symptoms.   Continue to use estrogen vaginal cream sparingly.

## 2024-11-20 ENCOUNTER — OFFICE VISIT (OUTPATIENT)
Dept: DERMATOLOGY | Age: 54
End: 2024-11-20
Payer: COMMERCIAL

## 2024-11-20 DIAGNOSIS — L71.0 PERIORAL DERMATITIS: Primary | ICD-10-CM

## 2024-11-20 DIAGNOSIS — B07.8 FLAT WART: ICD-10-CM

## 2024-11-20 PROCEDURE — 99213 OFFICE O/P EST LOW 20 MIN: CPT | Performed by: DERMATOLOGY

## 2024-11-20 PROCEDURE — 17110 DESTRUCTION B9 LES UP TO 14: CPT | Performed by: DERMATOLOGY

## 2024-11-20 NOTE — PROGRESS NOTES
[facility]  Neri Workman MD  [phone]      Bethany Merritt  1970    54 y.o. female     Date of Visit: 11/20/2024    Chief Complaint: rash     History of Present Illness:    1.  She returns for perioral dermatitis on the right perioral region - improved with doxycycline but developed yeast infection.  Perioral dermatitis not clearing with tacrolimus 0.1% ointment.     2.  She reports a new flat wart on the right hand.    She has a family history of melanoma:  Mom with hx of melanoma.   Brother with history of BCCs.       Review of Systems:  Gen: Feels well, good sense of health.    Past Medical History, Family History, Surgical History, Medications and Allergies reviewed.    Past Medical History:   Diagnosis Date    Acid reflux     Allergic rhinitis     Anesthesia complication     PATIENT STATES HER VISION GETS WORSE EVERY TIME SHE HAS ANESTHESIA    Anxiety     Atrophic vaginitis     Constipation     DDD (degenerative disc disease), cervical     Dysmenorrhea     Dyspareunia in female     Esophagitis 05/2019    Fibrocystic breast     Gastritis 05/2019    HPV (human papilloma virus) infection 1992    Exposed back in college    Hyperlipidemia     NO MEDS    Internal hemorrhoids 01/2022    per colonoscopy - Dr. Ureña    Interstitial cystitis 10/23/2017    Iron deficiency anemia 2017    Irritable bowel syndrome with constipation     Menopause ovarian failure     Menorrhagia     PONV (postoperative nausea and vomiting)     Trigeminal neuralgia     Vitamin D deficiency 07/2019    Vitamin D deficiency     Vocal cord nodule 2016     Past Surgical History:   Procedure Laterality Date    APPENDECTOMY  1996    CERVICAL FUSION  2013    C4-6    COLONOSCOPY N/A 01/11/2022    COLONOSCOPY, POSSIBLE POLYPECTOMY performed by Evelio Ureña MD at [facility] ENDOSCOPY    FOOT SURGERY Right 01/15/2024    RIGHT FOOT MEDIAL SESAMOIDECTOMY performed by Jourdan Diaz MD at [facility] OR    HAND SURGERY Right

## 2024-11-25 ENCOUNTER — OFFICE VISIT (OUTPATIENT)
Age: 54
End: 2024-11-25
Payer: COMMERCIAL

## 2024-11-25 VITALS
OXYGEN SATURATION: 97 % | RESPIRATION RATE: 16 BRPM | TEMPERATURE: 97.7 F | HEART RATE: 72 BPM | HEIGHT: 64 IN | SYSTOLIC BLOOD PRESSURE: 108 MMHG | WEIGHT: 142 LBS | BODY MASS INDEX: 24.24 KG/M2 | DIASTOLIC BLOOD PRESSURE: 74 MMHG

## 2024-11-25 DIAGNOSIS — N89.8 VAGINAL DISCHARGE: Primary | ICD-10-CM

## 2024-11-25 DIAGNOSIS — N89.8 VAGINAL IRRITATION: ICD-10-CM

## 2024-11-25 PROCEDURE — 99214 OFFICE O/P EST MOD 30 MIN: CPT | Performed by: FAMILY MEDICINE

## 2024-11-25 NOTE — PROGRESS NOTES
Patient is here for vaginal  issues.  Daughter is coming home for Thanksgiving.  Boys well be gone at in laws.  Her son, expecting twins in 5/24 , lives in [place] , and has a job .  Daughter in la grew up in [place] / [place].  Pharmacist son lives in [place] and is in residency.     She took Diflucan , 11/2/24 and 11/4/24 and 11/8/24 .  Likely took another Diflucan about 11/11/24. She is confused with dates of Monistat as 1 week is unexplained.    She is having creamy , white vaginal discharge , which started over the weekend. She finished Monistat likely 3 days ago but confuse with dates and only took 1 - 7 day packet.  Some redness and itching /irritation.  Denies urinary symptoms - frequency or burning/ dysuria. She was concerned with holiday coming up .       Review of Systems    ROS: All other systems were reviewed and are negative .  Patient's allergies and medications were reviewed.  Patient's past medical, surgical, social , and family history were reviewed.    OBJECTIVE:  /74 (Site: Left Upper Arm, Position: Sitting, Cuff Size: Medium Adult)   Pulse 72   Temp 97.7 °F (36.5 °C) (Temporal)   Resp 16   Ht 1.626 m (5' 4\")   Wt 64.4 kg (142 lb)   LMP  (LMP Unknown)   SpO2 97%   BMI 24.37 kg/m²     Physical Exam    General: NAD, cooperative, alert and oriented X 3. Mood / affect is good.good insight. well hydrated.  Neck : no lymphadenopathy, supple, FROM  CV: Regular rate and rhythm , no murmurs/ rub/ gallop. No edema.   Lungs : CTA bilaterally, breathing comfortably  Abdomen: positive bowel sounds, soft , non tender, non distended. No hepatosplenomegaly. No CVA tenderness.  : Vagina and vulva are normal;  clear discharge is noted. No erythema at vaginal vault.  Cervix normal without lesions. Uterus anteverted and mobile, normal in size and shape without tenderness.  Adnexa normal in size without masses or tenderness.  Skin: no rashes. Non tender.     ASSESSMENT/  PLAN:  1. Vaginal

## 2024-11-26 DIAGNOSIS — B96.89 BACTERIAL VAGINOSIS: Primary | ICD-10-CM

## 2024-11-26 DIAGNOSIS — N76.0 BACTERIAL VAGINOSIS: Primary | ICD-10-CM

## 2024-11-26 LAB
BV BACTERIA DNA VAG QL NAA+PROBE: DETECTED
C GLABRATA DNA VAG QL NAA+PROBE: ABNORMAL
C GLABRATA DNA VAG QL NAA+PROBE: NOT DETECTED
C KRUSEI DNA VAG QL NAA+PROBE: NOT DETECTED
CANDIDA DNA VAG QL NAA+PROBE: NOT DETECTED
T VAGINALIS DNA VAG QL NAA+PROBE: NOT DETECTED

## 2024-11-26 RX ORDER — METRONIDAZOLE 500 MG/1
500 TABLET ORAL 2 TIMES DAILY
Qty: 14 TABLET | Refills: 0 | Status: SHIPPED | OUTPATIENT
Start: 2024-11-26 | End: 2024-12-03

## 2024-12-06 ENCOUNTER — OFFICE VISIT (OUTPATIENT)
Dept: ENT CLINIC | Age: 54
End: 2024-12-06

## 2024-12-06 VITALS
SYSTOLIC BLOOD PRESSURE: 111 MMHG | RESPIRATION RATE: 16 BRPM | TEMPERATURE: 97.3 F | HEART RATE: 66 BPM | HEIGHT: 64 IN | BODY MASS INDEX: 23.9 KG/M2 | WEIGHT: 140 LBS | DIASTOLIC BLOOD PRESSURE: 71 MMHG

## 2024-12-06 DIAGNOSIS — H92.02 LEFT EAR PAIN: ICD-10-CM

## 2024-12-06 DIAGNOSIS — H69.93 DYSFUNCTION OF BOTH EUSTACHIAN TUBES: ICD-10-CM

## 2024-12-06 DIAGNOSIS — J30.9 ALLERGIC RHINITIS, UNSPECIFIED SEASONALITY, UNSPECIFIED TRIGGER: Primary | ICD-10-CM

## 2024-12-06 DIAGNOSIS — J34.3 NASAL TURBINATE HYPERTROPHY: ICD-10-CM

## 2024-12-06 ASSESSMENT — ENCOUNTER SYMPTOMS
COLOR CHANGE: 0
EYE PAIN: 0
SORE THROAT: 0
NAUSEA: 0
STRIDOR: 0
RHINORRHEA: 0
TROUBLE SWALLOWING: 0
COUGH: 0
EYE ITCHING: 0
SHORTNESS OF BREATH: 0
CHOKING: 0
FACIAL SWELLING: 0
EYE REDNESS: 0
VOICE CHANGE: 0
SINUS PAIN: 0
PHOTOPHOBIA: 0
DIARRHEA: 0
SINUS PRESSURE: 0

## 2024-12-06 NOTE — PROGRESS NOTES
[facility]  [address] , [address]  [place] [address]  P: [phone]  F: [phone]       Patient     Bethany Merritt  1970    ChiefComplaint     Chief Complaint   Patient presents with    Ear Problem     Fluid in left ear has been on medication no help but today feeling better       History of Present Illness     Bethany Merritt is a pleasant 54 y.o. female who presents for sensation of fluid in the left ear.  She had some muffled hearing and pressure for a few days.  Prior to this, her  had upper respiratory infection.  She started Sudafed and Nasacort.  After using Nasacort for 3 to 4 days, symptoms essentially resolved.  She also takes cetirizine every evening.  She does have some persistent nasal obstruction issues.     Past Medical History     Past Medical History:   Diagnosis Date    Acid reflux     Allergic rhinitis     Anesthesia complication     PATIENT STATES HER VISION GETS WORSE EVERY TIME SHE HAS ANESTHESIA    Anxiety     Atrophic vaginitis     Constipation     DDD (degenerative disc disease), cervical     Dysmenorrhea     Dyspareunia in female     Esophagitis 05/2019    Fibrocystic breast     Gastritis 05/2019    HPV (human papilloma virus) infection 1992    Exposed back in college    Hyperlipidemia     NO MEDS    Internal hemorrhoids 01/2022    per colonoscopy - Dr. Ureña    Interstitial cystitis 10/23/2017    Iron deficiency anemia 2017    Irritable bowel syndrome with constipation     Menopause ovarian failure     Menorrhagia     PONV (postoperative nausea and vomiting)     Trigeminal neuralgia     Vitamin D deficiency 07/2019    Vitamin D deficiency     Vocal cord nodule 2016       Past Surgical History     Past Surgical History:   Procedure Laterality Date    APPENDECTOMY  1996    CERVICAL FUSION  2013    C4-6    COLONOSCOPY N/A 01/11/2022    COLONOSCOPY, POSSIBLE POLYPECTOMY performed by Evelio Ureña MD at [facility] ENDOSCOPY    FOOT SURGERY Right

## 2024-12-30 RX ORDER — GABAPENTIN 100 MG/1
100 CAPSULE ORAL 3 TIMES DAILY
Qty: 270 CAPSULE | Refills: 0 | Status: SHIPPED | OUTPATIENT
Start: 2024-12-30 | End: 2025-03-30

## 2024-12-30 NOTE — TELEPHONE ENCOUNTER
Patient called in    Needs more than 1 refill she says on medication - \"takes it everyday\"     gabapentin (NEURONTIN) 100 MG capsule     Patient says it was prescribed by Dr Ruth previously, \"maybe Dr Rodriguez was out of office\"      LOV:  11/25/24    LABS: 11/04/24     F/U: 1/27/25

## 2025-01-20 DIAGNOSIS — K59.00 CONSTIPATION, UNSPECIFIED CONSTIPATION TYPE: Primary | ICD-10-CM

## 2025-01-20 RX ORDER — LUBIPROSTONE 8 UG/1
8 CAPSULE ORAL 2 TIMES DAILY WITH MEALS
Qty: 60 CAPSULE | Refills: 5
Start: 2025-01-20

## 2025-02-03 ENCOUNTER — OFFICE VISIT (OUTPATIENT)
Age: 55
End: 2025-02-03
Payer: COMMERCIAL

## 2025-02-03 VITALS
RESPIRATION RATE: 16 BRPM | WEIGHT: 144.8 LBS | OXYGEN SATURATION: 98 % | SYSTOLIC BLOOD PRESSURE: 112 MMHG | HEIGHT: 64 IN | DIASTOLIC BLOOD PRESSURE: 76 MMHG | BODY MASS INDEX: 24.72 KG/M2 | TEMPERATURE: 97.1 F | HEART RATE: 71 BPM

## 2025-02-03 DIAGNOSIS — N95.2 ATROPHIC VAGINITIS: ICD-10-CM

## 2025-02-03 DIAGNOSIS — R21 RASH: Primary | ICD-10-CM

## 2025-02-03 PROCEDURE — 99213 OFFICE O/P EST LOW 20 MIN: CPT | Performed by: FAMILY MEDICINE

## 2025-02-03 RX ORDER — HALOBETASOL PROPIONATE 0.05 %
OINTMENT (GRAM) TOPICAL
Qty: 15 G | Refills: 0 | Status: SHIPPED | OUTPATIENT
Start: 2025-02-03

## 2025-02-03 SDOH — ECONOMIC STABILITY: FOOD INSECURITY: WITHIN THE PAST 12 MONTHS, THE FOOD YOU BOUGHT JUST DIDN'T LAST AND YOU DIDN'T HAVE MONEY TO GET MORE.: NEVER TRUE

## 2025-02-03 SDOH — ECONOMIC STABILITY: FOOD INSECURITY: WITHIN THE PAST 12 MONTHS, YOU WORRIED THAT YOUR FOOD WOULD RUN OUT BEFORE YOU GOT MONEY TO BUY MORE.: NEVER TRUE

## 2025-02-03 ASSESSMENT — PATIENT HEALTH QUESTIONNAIRE - PHQ9
SUM OF ALL RESPONSES TO PHQ QUESTIONS 1-9: 0
2. FEELING DOWN, DEPRESSED OR HOPELESS: NOT AT ALL
SUM OF ALL RESPONSES TO PHQ QUESTIONS 1-9: 0
1. LITTLE INTEREST OR PLEASURE IN DOING THINGS: NOT AT ALL
SUM OF ALL RESPONSES TO PHQ9 QUESTIONS 1 & 2: 0

## 2025-02-03 NOTE — PROGRESS NOTES
Patient is here for rash - left sided abdomen.  She has been off the patches for > 1 month. She stopped the vaginal cream about 10 days ago.  Itching.  Increasing in size.  No right sided rash.  Rash has been present for 6 months off and on and seemed to flare with estrogen patch.  She felt the rash flared with estrogen vaginal cream use at times too. Itching .  Used Hydrocortisone cream given by Dr. Workman, which help but did not resolve the rash .  She has been using Calamine cream / liquid .  She occasionally uses other over the counter topical products. Denies fever.          Review of Systems    ROS: All other systems were reviewed and are negative .  Patient's allergies and medications were reviewed.  Patient's past medical, surgical, social , and family history were reviewed.    OBJECTIVE:  /76 (Site: Left Upper Arm, Position: Sitting, Cuff Size: Medium Adult)   Pulse 71   Temp 97.1 °F (36.2 °C) (Temporal)   Resp 16   Ht 1.626 m (5' 4\")   Wt 65.7 kg (144 lb 12.8 oz)   LMP  (LMP Unknown)   SpO2 98%   BMI 24.85 kg/m²     Physical Exam    General: NAD, cooperative, alert and oriented X 3. Mood / affect is good.good insight. well hydrated.  Neck : no lymphadenopathy, supple, FROM  CV: Regular rate and rhythm , no murmurs/ rub/ gallop. No edema.   Lungs : CTA bilaterally, breathing comfortably  Abdomen: positive bowel sounds, soft , non tender, non distended. No hepatosplenomegaly. No CVA tenderness.  Skin: erythematous , patchy rash to left lateral abdomen ( see picture)- slightly raised. Non tender.     ASSESSMENT/  PLAN:  1. Rash  - Stop all over the counter products , as they may be contributing to persistent rash.   - Trial of Halobetasol (ULTRAVATE) 0.05 % ointment; Apply topically 2 times daily.  Dispense: 15 g; Refill: 0  - Hold estrogen for 2 weeks.   - Discussed and recommended follow up with dermatologist, Dr. Workman, if no improvement in 2-3 weeks/ prn.    2. Atrophic vaginitis  - Hold

## 2025-02-06 ENCOUNTER — OFFICE VISIT (OUTPATIENT)
Dept: OBGYN CLINIC | Age: 55
End: 2025-02-06
Payer: COMMERCIAL

## 2025-02-06 VITALS
HEART RATE: 75 BPM | WEIGHT: 143.4 LBS | BODY MASS INDEX: 24.61 KG/M2 | DIASTOLIC BLOOD PRESSURE: 70 MMHG | OXYGEN SATURATION: 99 % | SYSTOLIC BLOOD PRESSURE: 116 MMHG

## 2025-02-06 DIAGNOSIS — N95.2 VAGINAL ATROPHY: ICD-10-CM

## 2025-02-06 DIAGNOSIS — Z76.89 ENCOUNTER TO ESTABLISH CARE WITH NEW DOCTOR: Primary | ICD-10-CM

## 2025-02-06 DIAGNOSIS — Z78.0 POST-MENOPAUSAL: ICD-10-CM

## 2025-02-06 DIAGNOSIS — L27.0 DRUG-INDUCED SKIN RASH: ICD-10-CM

## 2025-02-06 DIAGNOSIS — Z90.711 HISTORY OF PARTIAL HYSTERECTOMY: ICD-10-CM

## 2025-02-06 PROCEDURE — 99203 OFFICE O/P NEW LOW 30 MIN: CPT | Performed by: OBSTETRICS & GYNECOLOGY

## 2025-02-06 NOTE — PROGRESS NOTES
SURGERY Right 04/16/2024    RIGHT MIDDLE FINGER TRIGGER FINGER RELEASE performed by Shiraz Maldonado MD at [facility] OR    HEMORRHOID SURGERY      HERNIA REPAIR  1970, 1999    bilateral, right    HYSTERECTOMY (CERVIX STATUS UNKNOWN)      LARYNGOSCOPY  2016    PARTIAL HYSTERECTOMY (CERVIX NOT REMOVED)  2009    DUB -     UPPER GASTROINTESTINAL ENDOSCOPY  05/2019       Allergies:  No Known Allergies    Family History:  Family History   Problem Relation Age of Onset    Cancer Mother 75        melanoma    High Blood Pressure Father     Colon Cancer Maternal Grandfather 51    Other Paternal Grandmother         PE       Social History:  Social History     Socioeconomic History    Marital status:      Spouse name: None    Number of children: None    Years of education: None    Highest education level: None   Tobacco Use    Smoking status: Never    Smokeless tobacco: Never   Vaping Use    Vaping status: Never Used   Substance and Sexual Activity    Alcohol use: Not Currently    Drug use: No    Sexual activity: Yes     Partners: Male     Social Determinants of Health     Financial Resource Strain: Low Risk  (3/5/2024)    Overall Financial Resource Strain (CARDIA)     Difficulty of Paying Living Expenses: Not hard at all   Food Insecurity: No Food Insecurity (2/3/2025)    Hunger Vital Sign     Worried About Running Out of Food in the Last Year: Never true     Ran Out of Food in the Last Year: Never true   Transportation Needs: No Transportation Needs (2/3/2025)    PRAPARE - Transportation     Lack of Transportation (Medical): No     Lack of Transportation (Non-Medical): No    [facility]    Interpersonal Safety   Housing Stability: Low Risk  (2/3/2025)    Housing Stability Vital Sign     Unable to Pay for Housing in the Last Year: No     Number of Times Moved in the Last Year: 0     Homeless in the Last Year: No       Objective:  BP

## 2025-02-12 RX ORDER — CONJUGATED ESTROGENS 0.62 MG/G
0.5 CREAM VAGINAL DAILY
Qty: 30 G | Refills: 0 | Status: SHIPPED | OUTPATIENT
Start: 2025-02-12

## 2025-02-13 ENCOUNTER — TELEPHONE (OUTPATIENT)
Dept: OBGYN CLINIC | Age: 55
End: 2025-02-13

## 2025-02-13 NOTE — TELEPHONE ENCOUNTER
Merced from [facility] in [place] called in wanting to clarify instructions on premarin cream. It states \"Sig: Place 0.5 g vaginally daily (2-3 times a week for maintenance)\"    Please advise.    Call back # [phone]

## 2025-02-24 ENCOUNTER — OFFICE VISIT (OUTPATIENT)
Age: 55
End: 2025-02-24
Payer: COMMERCIAL

## 2025-02-24 VITALS
RESPIRATION RATE: 16 BRPM | DIASTOLIC BLOOD PRESSURE: 64 MMHG | HEART RATE: 67 BPM | TEMPERATURE: 97.5 F | OXYGEN SATURATION: 97 % | BODY MASS INDEX: 24.75 KG/M2 | SYSTOLIC BLOOD PRESSURE: 110 MMHG | WEIGHT: 144.2 LBS

## 2025-02-24 DIAGNOSIS — M65.931 TENOSYNOVITIS OF RIGHT WRIST: ICD-10-CM

## 2025-02-24 DIAGNOSIS — N89.8 VAGINAL DISCHARGE: ICD-10-CM

## 2025-02-24 DIAGNOSIS — N89.8 VAGINAL IRRITATION: ICD-10-CM

## 2025-02-24 DIAGNOSIS — N89.8 VAGINAL DRYNESS: ICD-10-CM

## 2025-02-24 DIAGNOSIS — L30.9 ECZEMA, UNSPECIFIED TYPE: ICD-10-CM

## 2025-02-24 DIAGNOSIS — N89.8 VAGINAL ITCHING: Primary | ICD-10-CM

## 2025-02-24 PROCEDURE — 99214 OFFICE O/P EST MOD 30 MIN: CPT | Performed by: FAMILY MEDICINE

## 2025-02-24 NOTE — PROGRESS NOTES
Patient is here for vaginal itching X yesterday .  She notices milky white discharge.  She was in [place] at her parent's house. [facility] [place]. Her son was in [place], [place]. (45 minutes apart). Her son is finishing up pharmacy school.  Renting a condo. 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  She is from [place].        No urinary frequency or dysuria.  No fever.   She did not use anything over the counter .   She has been off Estrogen /estradiol since mid 1/25 .  She saw Gynecologist , Dr. William , at [facility] and recommended she stop all estrogen for 2-3 months to see if rash resolved to left lower abdomen.  Rash is improving and the Halobetasol ointment I prescribed is helping. She did not use in [place] and is still doing okay . Premarin topically was prescribed by GYN, but told to hold and observe for 2-3 months, but may need to restart due to vaginal dryness.      She complains of right wrist pain off and on . It is better today since she has been taking it easy while in [place]. She was not doing weights or kidd , which both irritate right wrist.  She is right handed.  Push ups will flare the pain.   She has rested since Thursday last week since she was in [place] visiting parents and son.       Review of Systems    ROS: All other systems were reviewed and are negative .  Patient's allergies and medications were reviewed.  Patient's past medical, surgical, social , and family history were reviewed.    OBJECTIVE:  /64 (Site: Right Upper Arm, Position: Sitting, Cuff Size: Medium Adult)   Pulse 67   Temp 97.5 °F (36.4 °C) (Temporal)   Resp 16   Wt 65.4 kg (144 lb 3.2 oz)   LMP  (LMP Unknown)   SpO2 97%   BMI 24.75 kg/m²     Physical Exam    General: NAD, cooperative, alert and oriented X 3. Mood / affect is good.good insight. well hydrated.  Neck : no lymphadenopathy, supple, FROM  RUE: mild tenderness to right wrist dorsum. No edema or erythema. FROM. Strength 5/5,

## 2025-02-26 DIAGNOSIS — F42.9 OBSESSIVE-COMPULSIVE DISORDER, UNSPECIFIED TYPE: ICD-10-CM

## 2025-02-26 DIAGNOSIS — F41.9 ANXIETY: ICD-10-CM

## 2025-02-26 DIAGNOSIS — N76.0 BACTERIAL VAGINOSIS: ICD-10-CM

## 2025-02-26 DIAGNOSIS — B96.89 BACTERIAL VAGINOSIS: ICD-10-CM

## 2025-02-26 RX ORDER — METRONIDAZOLE 500 MG/1
500 TABLET ORAL 2 TIMES DAILY
Qty: 14 TABLET | Refills: 0 | Status: SHIPPED | OUTPATIENT
Start: 2025-02-26 | End: 2025-03-05

## 2025-02-26 RX ORDER — ESCITALOPRAM OXALATE 10 MG/1
10 TABLET ORAL DAILY
Qty: 90 TABLET | Refills: 1 | Status: SHIPPED | OUTPATIENT
Start: 2025-02-26

## 2025-02-26 NOTE — TELEPHONE ENCOUNTER
Requested Prescriptions     Pending Prescriptions Disp Refills    escitalopram (LEXAPRO) 10 MG tablet 90 tablet 1     Sig: Take 1 tablet by mouth daily         Last OV: 2/24/2025    Last labs: 10/21/2024     F/u: 10/27/2025

## 2025-03-05 ENCOUNTER — OFFICE VISIT (OUTPATIENT)
Age: 55
End: 2025-03-05
Payer: COMMERCIAL

## 2025-03-05 VITALS
SYSTOLIC BLOOD PRESSURE: 108 MMHG | BODY MASS INDEX: 24.07 KG/M2 | HEART RATE: 67 BPM | HEIGHT: 64 IN | TEMPERATURE: 97.3 F | WEIGHT: 141 LBS | OXYGEN SATURATION: 97 % | RESPIRATION RATE: 16 BRPM | DIASTOLIC BLOOD PRESSURE: 70 MMHG

## 2025-03-05 DIAGNOSIS — M65.931 TENOSYNOVITIS OF RIGHT WRIST: ICD-10-CM

## 2025-03-05 DIAGNOSIS — M79.645 BILATERAL THUMB PAIN: ICD-10-CM

## 2025-03-05 DIAGNOSIS — N76.0 BACTERIAL VAGINOSIS: ICD-10-CM

## 2025-03-05 DIAGNOSIS — S83.421A SPRAIN OF LATERAL COLLATERAL LIGAMENT OF RIGHT KNEE, INITIAL ENCOUNTER: Primary | ICD-10-CM

## 2025-03-05 DIAGNOSIS — S29.011A INTERCOSTAL MUSCLE STRAIN, INITIAL ENCOUNTER: ICD-10-CM

## 2025-03-05 DIAGNOSIS — B96.89 BACTERIAL VAGINOSIS: ICD-10-CM

## 2025-03-05 DIAGNOSIS — M79.644 BILATERAL THUMB PAIN: ICD-10-CM

## 2025-03-05 DIAGNOSIS — M22.2X1 PATELLOFEMORAL SYNDROME OF RIGHT KNEE: ICD-10-CM

## 2025-03-05 PROCEDURE — 99214 OFFICE O/P EST MOD 30 MIN: CPT | Performed by: FAMILY MEDICINE

## 2025-03-05 NOTE — PROGRESS NOTES
Patient is here for right wrist pain follow up and right rib pain.  She is wearing/ using wrist brace during the day with rigid bar and is helping.    Rarely taking Aleve. Working out flares bilateral thumb and wrist pain.      Right knee pain flared after hiking in [place] x 7 months.  Flares with work out / prolonged walking.  Pain is anterior knee.  No swelling.  Occasionally posterior knee .      Last dose of Flagyl was yesterday . No heartburn / indigestion.  She started with sharp right rib pain X3 days - Monday . She bent down and felt a sharp pain.  No abdominal pain . No nausea, vomiting, diarrhea , constipation.  Last bowel movement was yesterday and normal.  Taking a deep breath does not cause pain.       Review of Systems    ROS: All other systems were reviewed and are negative .  Patient's allergies and medications were reviewed.  Patient's past medical, surgical, social , and family history were reviewed.    OBJECTIVE:  /70 (Site: Left Upper Arm, Position: Sitting, Cuff Size: Medium Adult)   Pulse 67   Temp 97.3 °F (36.3 °C) (Temporal)   Resp 16   Ht 1.626 m (5' 4\")   Wt 64 kg (141 lb)   LMP  (LMP Unknown)   SpO2 97%   BMI 24.20 kg/m²     Physical Exam    General: NAD, cooperative, alert and oriented X 3. Mood / affect is good.good insight. well hydrated.  Neck : no lymphadenopathy, supple, FROM  CV: Regular rate and rhythm , no murmurs/ rub/ gallop. No edema.   Right wrist: minimal tenderness. No edema. Some pain with resistance .   Lungs : CTA bilaterally, breathing comfortably. Tenderness to right lower rib . No edema, erythema or ecchymosis.    Abdomen: positive bowel sounds, soft , non tender, non distended. No hepatosplenomegaly. No CVA tenderness.  Right knee: tenderness to lateral patella> medial.  FROM. Strength 5/5.  Able to squat and squat walk.   Skin: no rashes. Non tender.     ASSESSMENT/  PLAN:  1. Sprain of lateral collateral ligament of right knee, initial

## 2025-03-05 NOTE — PATIENT INSTRUCTIONS
Glucosamine chondroitin - Try taking daily for 1-2 months.   Consider using Tumeric with black pepper for absorption of Tumeric.      Use a knee brace when hiking ,  prolonged walks or uneven terrain.

## 2025-03-26 ENCOUNTER — PATIENT MESSAGE (OUTPATIENT)
Age: 55
End: 2025-03-26

## 2025-04-07 ENCOUNTER — OFFICE VISIT (OUTPATIENT)
Age: 55
End: 2025-04-07
Payer: COMMERCIAL

## 2025-04-07 DIAGNOSIS — L71.9 ROSACEA: Primary | ICD-10-CM

## 2025-04-07 PROCEDURE — 99213 OFFICE O/P EST LOW 20 MIN: CPT | Performed by: DERMATOLOGY

## 2025-04-07 RX ORDER — MINOCYCLINE 15 MG/G
AEROSOL, FOAM TOPICAL
Qty: 30 G | Refills: 2 | Status: SHIPPED | OUTPATIENT
Start: 2025-04-07

## 2025-04-07 NOTE — PROGRESS NOTES
[facility]  Neri Workman MD  [phone]      Bethany Merritt  1970    55 y.o. female     Date of Visit: 4/7/2025    Chief Complaint: rash    History of Present Illness:    She presents today for a persistent papular eruption on the right medial cheek.  She reports metronidazole 0.75% cream was drying and not effective.    She has a history of perioral dermatitis that previously improved with doxycycline but developed a yeast infection with use.      Review of Systems:  Gen: Feels well, good sense of health.    Past Medical History, Family History, Surgical History, Medications and Allergies reviewed.    Past Medical History:   Diagnosis Date    Acid reflux     Allergic rhinitis     Anesthesia complication     PATIENT STATES HER VISION GETS WORSE EVERY TIME SHE HAS ANESTHESIA    Anxiety     Atrophic vaginitis     Constipation     DDD (degenerative disc disease), cervical     Drug-induced skin rash 2/6/2025    Dysmenorrhea     Dyspareunia in female     Esophagitis 05/2019    Fibrocystic breast     Gastritis 05/2019    HPV (human papilloma virus) infection 1992    Exposed back in college    Hyperlipidemia     NO MEDS    Internal hemorrhoids 01/2022    per colonoscopy - Dr. Ureña    Interstitial cystitis 10/23/2017    Iron deficiency anemia 2017    Irritable bowel syndrome with constipation     Menopause ovarian failure     Menorrhagia     Overactive bladder 2017    PONV (postoperative nausea and vomiting)     Trigeminal neuralgia     Vitamin D deficiency 07/2019    Vitamin D deficiency     Vocal cord nodule 2016     Past Surgical History:   Procedure Laterality Date    APPENDECTOMY  1996    CERVICAL FUSION  2013    C4-6    COLONOSCOPY N/A 01/11/2022    COLONOSCOPY, POSSIBLE POLYPECTOMY performed by Evelio Ureña MD at [facility] ENDOSCOPY    FOOT SURGERY Right 01/15/2024    RIGHT FOOT MEDIAL SESAMOIDECTOMY performed by Jourdan Diaz MD at [facility] OR    HAND SURGERY Right 04/16/2024

## 2025-04-08 DIAGNOSIS — K64.4 INFLAMED EXTERNAL HEMORRHOID: Primary | ICD-10-CM

## 2025-04-11 ENCOUNTER — OFFICE VISIT (OUTPATIENT)
Dept: OBGYN CLINIC | Age: 55
End: 2025-04-11
Payer: COMMERCIAL

## 2025-04-11 VITALS
HEART RATE: 70 BPM | OXYGEN SATURATION: 99 % | BODY MASS INDEX: 24.24 KG/M2 | SYSTOLIC BLOOD PRESSURE: 108 MMHG | HEIGHT: 64 IN | WEIGHT: 142 LBS | DIASTOLIC BLOOD PRESSURE: 65 MMHG

## 2025-04-11 DIAGNOSIS — N95.2 VAGINAL ATROPHY: Primary | ICD-10-CM

## 2025-04-11 PROCEDURE — 99213 OFFICE O/P EST LOW 20 MIN: CPT | Performed by: OBSTETRICS & GYNECOLOGY

## 2025-04-11 NOTE — PROGRESS NOTES
[facility]   Return Gyn Office Visit    CC:   Chief Complaint   Patient presents with    Follow-up     Medication Follow up        HPI:  55 y.o. who presents to [facility] to discuss medication changes.   She is doing well today but has been having issues with medication.   Has concerns about HRT -- has been doing estrogen patches x 5 yrs -- had skin rashes (over a year of skin changes), ? Dermatitis with estrogen   Is able to use vaginal cream } but does have rash on her sides when she uses her medication   PCP is treating her as eczema   Sex was painful -- this is why she started OTC. HF are better.   Has seen dermatology for the rash -- 2 weeks off of estrogen for now.   No breast issues / involvement   Mammogram up to date   Hysterectomy in 2009 -- Doing well.   Pap 2024 normal.     Review of Systems - The following ROS was otherwise negative, except as noted in the HPI: constitutional, respiratory, cardiovascular, gastrointestinal, genitourinary    Objective:  /65   Pulse 70   Ht 1.626 m (5' 4\")   Wt 64.4 kg (142 lb)   LMP  (LMP Unknown)   SpO2 99%   BMI 24.37 kg/m²   General: Alert, well appearing, no acute distress   Resp effort within normal limits   Abdomen: Soft, nontender, nondistended   Pelvic: deferred   Skin: No visible lesions / rashes / concerning nevus   Extremities: No redness or tenderness, neg Roxanne's sign  Osteopathic: no TART changes    Assessment/Plan   Diagnosis Orders   1. Vaginal atrophy  Estradiol Acetate 0.05 MG/24HR RING           Follow Up   Return in about 3 months (around 7/11/2025) for Med Check or as needed .    Samantha William DO

## 2025-04-17 ENCOUNTER — OFFICE VISIT (OUTPATIENT)
Age: 55
End: 2025-04-17
Payer: COMMERCIAL

## 2025-04-17 ENCOUNTER — TELEPHONE (OUTPATIENT)
Age: 55
End: 2025-04-17

## 2025-04-17 ENCOUNTER — TELEPHONE (OUTPATIENT)
Dept: OBGYN CLINIC | Age: 55
End: 2025-04-17

## 2025-04-17 VITALS
DIASTOLIC BLOOD PRESSURE: 70 MMHG | WEIGHT: 143.6 LBS | OXYGEN SATURATION: 99 % | RESPIRATION RATE: 16 BRPM | HEART RATE: 71 BPM | BODY MASS INDEX: 24.52 KG/M2 | SYSTOLIC BLOOD PRESSURE: 106 MMHG | TEMPERATURE: 97.3 F | HEIGHT: 64 IN

## 2025-04-17 DIAGNOSIS — J30.9 ALLERGIC RHINITIS, UNSPECIFIED SEASONALITY, UNSPECIFIED TRIGGER: ICD-10-CM

## 2025-04-17 DIAGNOSIS — N89.8 VAGINAL DISCHARGE: ICD-10-CM

## 2025-04-17 DIAGNOSIS — H93.8X1 IRRITATION OF RIGHT EAR: ICD-10-CM

## 2025-04-17 DIAGNOSIS — N89.8 VAGINAL DRYNESS: ICD-10-CM

## 2025-04-17 DIAGNOSIS — N95.2 ATROPHIC VAGINITIS: ICD-10-CM

## 2025-04-17 DIAGNOSIS — N89.8 VAGINAL DISCHARGE: Primary | ICD-10-CM

## 2025-04-17 DIAGNOSIS — K64.4 INTERNAL AND EXTERNAL HEMORRHOIDS WITHOUT COMPLICATION: ICD-10-CM

## 2025-04-17 DIAGNOSIS — N89.8 VAGINAL IRRITATION: Primary | ICD-10-CM

## 2025-04-17 DIAGNOSIS — K64.8 INTERNAL AND EXTERNAL HEMORRHOIDS WITHOUT COMPLICATION: ICD-10-CM

## 2025-04-17 PROCEDURE — 99214 OFFICE O/P EST MOD 30 MIN: CPT | Performed by: FAMILY MEDICINE

## 2025-04-17 RX ORDER — METRONIDAZOLE 500 MG/1
500 TABLET ORAL 2 TIMES DAILY
Qty: 14 TABLET | Refills: 0 | Status: SHIPPED | OUTPATIENT
Start: 2025-04-17 | End: 2025-04-24

## 2025-04-17 NOTE — PROGRESS NOTES
Patient is here for vaginal irritation started yesterday .   White /gray discharge was noticed yesterday  evening.   Some itching.   She has estrogen ring internally .   Rash has recurred with some itching.  GYN is Dr. William. She prescribed estrogen ring and started it on Monday . She last used the estrogen vaginal cream 1.5 weeks ago.  Her last bacterial vaginosis was 2/24/25 and 11/24. Denies abdominal or back pain . Last intercourse was 1 week ago.   She is using ointment from GI , Dr. Becka moss for hemorrhoids , who also recommended she use flushable wipes non scented.  She does not wear underwear at night and changes PJ bottoms daily.    She is using wipes non fragrant baby wipes sporadically.   She uses BaDay usually.  She tends to rinse the wipes, due to toilet paper may be too rough.        Her son's wife is expecting identical twin boys and scheduled for 5/12/25.       Check right ear with slight irritation/ pressure . No sharp pain .   H/o allergies. Using Nasacort nightly and Zyrtec.      Review of Systems    ROS: All other systems were reviewed and are negative .  Patient's allergies and medications were reviewed.  Patient's past medical, surgical, social , and family history were reviewed.    OBJECTIVE:  /70 (BP Site: Left Upper Arm, Patient Position: Sitting, BP Cuff Size: Medium Adult)   Pulse 71   Temp 97.3 °F (36.3 °C) (Temporal)   Resp 16   Ht 1.626 m (5' 4\")   Wt 65.1 kg (143 lb 9.6 oz)   LMP  (LMP Unknown)   SpO2 99%   BMI 24.65 kg/m²     Physical Exam    General: NAD, cooperative, alert and oriented X 3. Mood / affect is good.good insight. well hydrated.  HEENT: PERRLA, EOMI, TMs - clear. No erythema of EACs.  Non tender. Nasopharynx clear.    Neck : no lymphadenopathy, supple, FROM  CV: Regular rate and rhythm , no murmurs/ rub/ gallop. No edema.   Lungs : CTA bilaterally, breathing comfortably  Abdomen: positive bowel sounds, soft , non tender, non distended. No

## 2025-04-17 NOTE — TELEPHONE ENCOUNTER
Pt calling for same day evaluation. She says she has a ring, estrogen and has the rash again to thighs. Not a new symptom. Also says she believes she has a vaginal bacterial infection. Reports 2 days of odor,milky white discharge,itch. Reports it has been getting worse. Pt will reach out to her PCP to see if appointment available as well. Please advise

## 2025-04-18 ENCOUNTER — RESULTS FOLLOW-UP (OUTPATIENT)
Age: 55
End: 2025-04-18

## 2025-04-18 DIAGNOSIS — K64.4 INTERNAL AND EXTERNAL HEMORRHOIDS WITHOUT COMPLICATION: Primary | ICD-10-CM

## 2025-04-18 DIAGNOSIS — K64.8 INTERNAL AND EXTERNAL HEMORRHOIDS WITHOUT COMPLICATION: Primary | ICD-10-CM

## 2025-04-18 RX ORDER — METRONIDAZOLE 500 MG/1
500 TABLET ORAL
Qty: 14 TABLET | Refills: 0 | OUTPATIENT
Start: 2025-04-18 | End: 2025-04-25

## 2025-04-18 RX ORDER — TOLNAFTATE 10 MG/ML
SOLUTION TOPICAL
Qty: 1 EACH | Refills: 0
Start: 2025-04-18

## 2025-04-18 NOTE — TELEPHONE ENCOUNTER
I am out of office this afternoon   Please send in Flagyl for suspected infection   We may not be able to use estrogen moving forward -- recommend she remove device and see if this improves the rash     ALH

## 2025-04-18 NOTE — TELEPHONE ENCOUNTER
Called pt and made her aware of Dr William's response. Let her know flagyl has been called into her listed pharmacy and left on their voicemail. Pended phone in order. Please sign or advise.

## 2025-04-18 NOTE — TELEPHONE ENCOUNTER
There was a PA received for Minocycline Foam, but there is not a current RX on file.    If you want PA please submit new RX, and resend this PA request back to the pool    If this requires a response please respond to the pool ( P [facility] MEDICATION PRE-AUTH).      Thank you please advise patient.

## 2025-04-21 ENCOUNTER — OFFICE VISIT (OUTPATIENT)
Dept: SURGERY | Age: 55
End: 2025-04-21
Payer: COMMERCIAL

## 2025-04-21 ENCOUNTER — TELEPHONE (OUTPATIENT)
Age: 55
End: 2025-04-21

## 2025-04-21 VITALS — BODY MASS INDEX: 24.55 KG/M2 | WEIGHT: 143 LBS

## 2025-04-21 DIAGNOSIS — K60.2 ANAL FISSURE: Primary | ICD-10-CM

## 2025-04-21 PROCEDURE — 99203 OFFICE O/P NEW LOW 30 MIN: CPT | Performed by: SURGERY

## 2025-04-21 RX ORDER — MINOCYCLINE 15 MG/G
AEROSOL, FOAM TOPICAL
Qty: 30 G | Refills: 2 | Status: SHIPPED | OUTPATIENT
Start: 2025-04-21

## 2025-04-21 NOTE — PROGRESS NOTES
sphincter; external exam only   EXT/NEURO: normal gait, strength/sensation grossly intact in all extremities    [facility] progress note reviewed 8- describes banding and normal colonoscopy 2022     [facility] progress note reviewed 1- describes chronic constipation, hemorrhoids      Assessment:     Hemorrhoids, fissure     Plan:     Discussed switch from steroid/nifedipine compound to plain nifedipine versus Botox versus sphincterotomy.     Discussed treatment options for anal fissure. Discussed medical management and success rates with chronic fissure. This is certainly an option if patient does not want surgery. Discussed risks, success and recurrence rates for botox injection and sphincterotomy. Discussed risks of incontinence with each procedure. Discussed I don't typically recommend suppository based therapy as this may cause trauma to fissure.    Called nifedipine to Alex Sinclair M.D.  4/21/25   10:34 AM

## 2025-04-21 NOTE — TELEPHONE ENCOUNTER
Submitted PA for Zilxi  [facility] Key: [id number] STATUS: PENDING.    Follow up done daily; if no decision with in three days we will refax.  If another three days goes by with no decision will call the insurance for status.

## 2025-04-23 ENCOUNTER — CLINICAL SUPPORT (OUTPATIENT)
Age: 55
End: 2025-04-23
Payer: COMMERCIAL

## 2025-04-23 ENCOUNTER — TELEPHONE (OUTPATIENT)
Age: 55
End: 2025-04-23

## 2025-04-23 ENCOUNTER — TELEMEDICINE (OUTPATIENT)
Age: 55
End: 2025-04-23
Payer: COMMERCIAL

## 2025-04-23 VITALS — TEMPERATURE: 99.6 F

## 2025-04-23 DIAGNOSIS — J02.9 SORE THROAT: Primary | ICD-10-CM

## 2025-04-23 DIAGNOSIS — U07.1 COVID: Primary | ICD-10-CM

## 2025-04-23 LAB
INFLUENZA A ANTIGEN, POC: NEGATIVE
INFLUENZA B ANTIGEN, POC: NEGATIVE
LOT EXPIRE DATE: ABNORMAL
LOT KIT NUMBER: ABNORMAL
S PYO AG THROAT QL: NORMAL
SARS-COV-2, POC: DETECTED
VALID INTERNAL CONTROL: YES
VENDOR AND KIT NAME POC: ABNORMAL

## 2025-04-23 PROCEDURE — 87428 SARSCOV & INF VIR A&B AG IA: CPT | Performed by: FAMILY MEDICINE

## 2025-04-23 PROCEDURE — 99213 OFFICE O/P EST LOW 20 MIN: CPT | Performed by: FAMILY MEDICINE

## 2025-04-23 PROCEDURE — 87880 STREP A ASSAY W/OPTIC: CPT | Performed by: FAMILY MEDICINE

## 2025-04-23 NOTE — PROGRESS NOTES
2025    TELEHEALTH EVALUATION -- Audio/Visual (During COVID-19 public health emergency)    Due to COVID 19 outbreak, patient's office visit was converted to a virtual visit.  Patient was contacted and agreed to proceed with a virtual visit via [employer] Video Visit  The risks and benefits of converting to a virtual visit were discussed in light of the current infectious disease epidemic.  Patient also understood that insurance coverage and co-pays are up to their individual insurance plans.    HPI:    Bethany Merritt (:  1970) has requested an audio/video evaluation for the following concern(s):    COVID. Positive at nurse visit in my office today earlier.   She noticed some fluid in her ear last Thursday .  Her sore throat started on Monday after .  No significant fever.   post nasal drip and mild nasal congestion.  Minimal cough .  Fatigue.  Sleeping okay with Melatonin.  Took Dayquil this am.  Micheal , her  , had a cough , but is feeling fine.   Both tested negative -  and daughter, this am . Some headaches and aches.        She was to leave to [employer] game tonight, [employer] concert tomorrow night and to mother in laws- Friday -  .     Review of Systems    Prior to Visit Medications    Medication Sig Taking? Authorizing Provider   Minocycline HCl Micronized (ZILXI) 1.5 % FOAM Apply to the face daily for rosacea. Yes Neri Workman MD   metroNIDAZOLE (FLAGYL) 500 MG tablet Take 1 tablet by mouth in the morning and 1 tablet in the evening. Do all this for 7 days. Do not consume alcohol while taking medication.. Yes Samantha William,    tolnaftate (FORMULA 3 THE TREATMENT) 1 % external solution Apply topically tid prn hemorrhoids - Nifedipine, Lidocaine, Hydrocortisone Yes Fabienne Rodriguez MD   Estradiol Acetate 0.05 MG/24HR RING Place 1 Ring vaginally every 3 months Yes Samantha William DO   escitalopram (LEXAPRO) 10 MG tablet Take 1 tablet by mouth daily Yes Fabienne Rodriguez MD

## 2025-04-23 NOTE — TELEPHONE ENCOUNTER
Patient is check to see the status for her PA for medication Minocycline Foam  Medication to pharmacy on

## 2025-04-23 NOTE — TELEPHONE ENCOUNTER
Pt came in for a Nurse Visit today to be swabbed.     Congestion, productive cough (green-yellow), low-grade fever x 3 days.    Pt was swabbed for COVID, FLU and Strep, with COVID being positive.

## 2025-04-24 NOTE — TELEPHONE ENCOUNTER
Please see telephone encounter from 4/17/25. Thanks!    If this requires a response please respond to the pool ( P [facility] MEDICATION PRE-AUTH).      Thank you please advise patient.

## 2025-04-28 RX ORDER — IVERMECTIN 10 MG/G
CREAM TOPICAL
Qty: 45 G | Refills: 2 | Status: SHIPPED | OUTPATIENT
Start: 2025-04-28

## 2025-05-03 ENCOUNTER — PATIENT MESSAGE (OUTPATIENT)
Age: 55
End: 2025-05-03

## 2025-05-03 DIAGNOSIS — N76.0 BACTERIAL VAGINOSIS: Primary | ICD-10-CM

## 2025-05-03 DIAGNOSIS — B96.89 BACTERIAL VAGINOSIS: Primary | ICD-10-CM

## 2025-05-03 DIAGNOSIS — N89.8 VAGINAL IRRITATION: ICD-10-CM

## 2025-05-05 RX ORDER — METRONIDAZOLE 500 MG/1
500 TABLET ORAL 2 TIMES DAILY
Qty: 14 TABLET | Refills: 0 | Status: SHIPPED | OUTPATIENT
Start: 2025-05-05 | End: 2025-05-12

## 2025-05-05 RX ORDER — METRONIDAZOLE 7.5 MG/G
GEL VAGINAL
Qty: 70 G | Refills: 0 | Status: SHIPPED | OUTPATIENT
Start: 2025-05-05 | End: 2025-05-12

## 2025-05-05 NOTE — TELEPHONE ENCOUNTER
Left message for pt saying I needed to collect more information/requested call back and also offering a VV if that may be better for her.

## 2025-05-05 NOTE — TELEPHONE ENCOUNTER
Spoke with pt, she understands all instructions, especially about using both medications at the same time.

## 2025-05-05 NOTE — TELEPHONE ENCOUNTER
Please call the patient to ensure she is fairly certain her symptoms are froms BV and not a yeast infection, prior to me sending in a prescription.Please verify symptoms  - urinary frequency , dysuria, vaginal discharge,itching, irritation , odor.  Ensure no fever or abdominal pain.  Please offer her an appointment today given she is leaving tomorrow I believe per message.

## 2025-05-05 NOTE — TELEPHONE ENCOUNTER
Due to appointments and such pt is not available at all before she leaves.     Pt very red, swollen and sore.    Pt would like a refill on her METROCREAM and is also wondering if she should be put back on Flagyl again?

## 2025-05-05 NOTE — TELEPHONE ENCOUNTER
Spoke with patient   Doesn't want to come in office  Went for a run , still irritated  Patient says she took some  metroNIDAZOLE (METROGEL) 0.75 % vaginal gel  for relief   Says someone is welcome to follow up with her

## 2025-05-14 ENCOUNTER — OFFICE VISIT (OUTPATIENT)
Age: 55
End: 2025-05-14
Payer: COMMERCIAL

## 2025-05-14 VITALS
RESPIRATION RATE: 18 BRPM | SYSTOLIC BLOOD PRESSURE: 110 MMHG | TEMPERATURE: 97.3 F | HEIGHT: 64 IN | WEIGHT: 140.8 LBS | HEART RATE: 72 BPM | DIASTOLIC BLOOD PRESSURE: 70 MMHG | BODY MASS INDEX: 24.04 KG/M2 | OXYGEN SATURATION: 97 %

## 2025-05-14 DIAGNOSIS — R10.11 RUQ ABDOMINAL PAIN: Primary | ICD-10-CM

## 2025-05-14 DIAGNOSIS — S29.011A INTERCOSTAL MUSCLE STRAIN, INITIAL ENCOUNTER: ICD-10-CM

## 2025-05-14 PROCEDURE — 99214 OFFICE O/P EST MOD 30 MIN: CPT | Performed by: FAMILY MEDICINE

## 2025-05-14 NOTE — PROGRESS NOTES
Patient is here for right sided chest pain .  Twins were born at 4:30 am yesterday am and are at .  One twin is fine and the other is being checked for neurological  issues.  Born at 37 weeks. She was induced.   5 lb 9 oz and other is 6 lb 5 oz. (Harris and Suhail)  Mom is doing fine at .  Her son graduated in [place] 1 week ago . (Wed & Fri graduation).  Got back Friday night.      No  heartburn / indigestion .  No hoarseness. No cough or clearing throat.  No throat irritation.  No change with eating.  At times wiping down the shower bending over will flare the pain - uses right arm.  Was taking advil but not consistently.  No cough .  Sleeping okay with Melatonin 4 mg.   She had prior to COVID 3-4 weeks ago.  She had a similar episode 5 years ago or so.  No diarrhea .  Some constipation .  Last bowel movement was yesterday .  Takes Miralax qd and Trulance qd .  Denies shortness of breath or palpitations.          Review of Systems    ROS: All other systems were reviewed and are negative .  Patient's allergies and medications were reviewed.  Patient's past medical, surgical, social , and family history were reviewed.    Wt Readings from Last 3 Encounters:   05/14/25 63.9 kg (140 lb 12.8 oz)   04/21/25 64.9 kg (143 lb)   04/17/25 65.1 kg (143 lb 9.6 oz)       OBJECTIVE:  /70 (BP Site: Left Upper Arm, Patient Position: Sitting, BP Cuff Size: Medium Adult)   Pulse 72   Temp 97.3 °F (36.3 °C) (Temporal)   Resp 18   Ht 1.626 m (5' 4\")   Wt 63.9 kg (140 lb 12.8 oz)   LMP  (LMP Unknown)   SpO2 97%   BMI 24.17 kg/m²     Physical Exam    General: NAD, cooperative, alert and oriented X 3. Mood / affect is good.good insight. well hydrated.  Neck : no lymphadenopathy, supple, FROM  CV: Regular rate and rhythm , no murmurs/ rub/ gallop. No edema.   Lungs : CTA bilaterally, breathing comfortably. Tenderness to right intercostal rib area - below nipple line. No erythema or eccymosis.    Abdomen: positive bowel

## 2025-05-21 ENCOUNTER — HOSPITAL ENCOUNTER (OUTPATIENT)
Dept: ULTRASOUND IMAGING | Age: 55
Discharge: HOME | End: 2025-05-21
Payer: COMMERCIAL

## 2025-05-21 ENCOUNTER — RESULTS FOLLOW-UP (OUTPATIENT)
Age: 55
End: 2025-05-21

## 2025-05-21 ENCOUNTER — TELEPHONE (OUTPATIENT)
Age: 55
End: 2025-05-21

## 2025-05-21 DIAGNOSIS — R10.11 RUQ ABDOMINAL PAIN: ICD-10-CM

## 2025-05-21 PROCEDURE — 76705 ECHO EXAM OF ABDOMEN: CPT

## 2025-05-21 NOTE — TELEPHONE ENCOUNTER
Pt called in referring to her US results and wonders what should be the next step.    Pt also saw that the US date as well as the CT date which Dr. Hipolito Tayolr used for comparison were the same, 4/10/24.  I called Dr. Taylor's office to request that the date on the report be changed to correct US date.

## 2025-05-21 NOTE — TELEPHONE ENCOUNTER
Abdominal ultrasound is normal, including gall bladder.   If you are still having abdominal pain / chest pain despite taking Meloxicam, start Prilosec / Omeprazole 20 mg daily and follow GERD diet - avoid fatty foods, spicy , acidic foods / beverages, as well as caffeine /alcohol and eating late at night.  Please follow up if not improving over the next 5-7 days.  I sent Result message to the patient .

## 2025-05-29 ENCOUNTER — OFFICE VISIT (OUTPATIENT)
Age: 55
End: 2025-05-29
Payer: COMMERCIAL

## 2025-05-29 VITALS
WEIGHT: 144.4 LBS | SYSTOLIC BLOOD PRESSURE: 100 MMHG | TEMPERATURE: 97.9 F | BODY MASS INDEX: 24.65 KG/M2 | HEIGHT: 64 IN | OXYGEN SATURATION: 97 % | DIASTOLIC BLOOD PRESSURE: 60 MMHG | HEART RATE: 74 BPM

## 2025-05-29 DIAGNOSIS — R07.89 CHEST WALL PAIN: ICD-10-CM

## 2025-05-29 DIAGNOSIS — R10.11 RUQ ABDOMINAL PAIN: ICD-10-CM

## 2025-05-29 DIAGNOSIS — F42.9 OBSESSIVE-COMPULSIVE DISORDER, UNSPECIFIED TYPE: ICD-10-CM

## 2025-05-29 DIAGNOSIS — K21.9 GASTROESOPHAGEAL REFLUX DISEASE WITHOUT ESOPHAGITIS: ICD-10-CM

## 2025-05-29 DIAGNOSIS — N64.4 BREAST PAIN, RIGHT: Primary | ICD-10-CM

## 2025-05-29 DIAGNOSIS — F41.9 ANXIETY: ICD-10-CM

## 2025-05-29 PROCEDURE — 99214 OFFICE O/P EST MOD 30 MIN: CPT | Performed by: FAMILY MEDICINE

## 2025-05-29 RX ORDER — OMEPRAZOLE 20 MG/1
20 CAPSULE, DELAYED RELEASE ORAL DAILY
COMMUNITY

## 2025-05-29 RX ORDER — ESCITALOPRAM OXALATE 10 MG/1
10 TABLET ORAL DAILY
Qty: 90 TABLET | Refills: 0 | Status: SHIPPED | OUTPATIENT
Start: 2025-05-29

## 2025-05-29 RX ORDER — MELOXICAM 7.5 MG/1
7.5 TABLET ORAL 2 TIMES DAILY PRN
Qty: 60 TABLET | Refills: 1 | Status: SHIPPED | OUTPATIENT
Start: 2025-05-29

## 2025-05-29 RX ORDER — MELOXICAM 7.5 MG/1
7.5 TABLET ORAL 2 TIMES DAILY
COMMUNITY

## 2025-05-29 NOTE — PROGRESS NOTES
Patient is here for follow up of abdominal ultrasound and RUQ abdominal pain.  She is feeling right breast/ chest pain . Tender at breast.   No masses to breast felt but h/o fibrocystic breast disease.   8 oz each of caffeine/ decaf.   No alcohol.   Small chocolate per day .   Last mammogram on 8/26/24.     Bending down and driving will flare the pain .  Not related to eating or diet.   She feels a weird sensation and sound.       She has silent GERD .  No dry cough .  No clearing of throat or throat irritation.  Occasional hoarseness.     She is taking Mobic bid and Omeprazole 1-2 times per day .   Mobic seems to help .     Review of Systems    ROS: All other systems were reviewed and are negative .  Patient's allergies and medications were reviewed.  Patient's past medical, surgical, social , and family history were reviewed.    OBJECTIVE:  LMP  (LMP Unknown)     Physical Exam    General: NAD, cooperative, alert and oriented X 3. Mood / affect is good.good insight. well hydrated.  Neck : no lymphadenopathy, supple, FROM  CV: Regular rate and rhythm , no murmurs/ rub/ gallop. No edema.   Lungs : CTA bilaterally, breathing comfortably  Breasts are symmetric.  No dominant, discrete, fixed  or suspicious masses are noted.  No skin or nipple changes or axillary nodes. Tenderness to right breast at nipple line and below bilateral.  Tenderness is greatest medial to nipple line.   Tenderness to left, medial , lower rib area. No erythema or ecchymosis.   Abdomen: positive bowel sounds, soft , non tender, non distended. No hepatosplenomegaly. No CVA tenderness.  Skin: no rashes. Non tender.     ASSESSMENT/  PLAN:  1. Breast pain, right  - likely fibrocystic and pectoralis / intercostal strain contributing.   - Avoid / minimize caffeine.  - meloxicam (MOBIC) 7.5 MG tablet; Take 1 tablet by mouth 2 times daily  - US BREAST COMPLETE RIGHT; Future and follow up after completed/ prn.   - meloxicam (MOBIC) 7.5 MG tablet; Take 1

## 2025-06-07 ENCOUNTER — TELEPHONE (OUTPATIENT)
Age: 55
End: 2025-06-07

## 2025-06-07 DIAGNOSIS — H10.33 ACUTE BACTERIAL CONJUNCTIVITIS OF BOTH EYES: Primary | ICD-10-CM

## 2025-06-07 RX ORDER — MOXIFLOXACIN 5 MG/ML
1 SOLUTION/ DROPS OPHTHALMIC 3 TIMES DAILY
Qty: 3 ML | Refills: 0 | Status: SHIPPED | OUTPATIENT
Start: 2025-06-07 | End: 2025-06-14

## 2025-06-07 NOTE — TELEPHONE ENCOUNTER
Woke up this am with red crusty eyes. No pain, visual disturbance or photophobia.   Diagnosis Orders   1. Acute bacterial conjunctivitis of both eyes  moxifloxacin (VIGAMOX) 0.5 % ophthalmic solution       Call or return to clinic prn if these symptoms worsen or fail to improve as anticipated.

## 2025-06-09 ENCOUNTER — RESULTS FOLLOW-UP (OUTPATIENT)
Age: 55
End: 2025-06-09

## 2025-06-09 ENCOUNTER — HOSPITAL ENCOUNTER (OUTPATIENT)
Dept: MAMMOGRAPHY | Age: 55
Discharge: HOME | End: 2025-06-09
Payer: COMMERCIAL

## 2025-06-09 ENCOUNTER — HOSPITAL ENCOUNTER (OUTPATIENT)
Dept: ULTRASOUND IMAGING | Age: 55
Discharge: HOME | End: 2025-06-09
Payer: COMMERCIAL

## 2025-06-09 VITALS — WEIGHT: 140 LBS | HEIGHT: 64 IN | BODY MASS INDEX: 23.9 KG/M2

## 2025-06-09 DIAGNOSIS — N64.4 BREAST PAIN, RIGHT: ICD-10-CM

## 2025-06-09 PROCEDURE — G0279 TOMOSYNTHESIS, MAMMO: HCPCS

## 2025-06-09 PROCEDURE — 76642 ULTRASOUND BREAST LIMITED: CPT

## 2025-06-17 ENCOUNTER — HOSPITAL ENCOUNTER (OUTPATIENT)
Dept: LAB | Age: 55
Discharge: HOME | End: 2025-06-17
Payer: COMMERCIAL

## 2025-06-17 DIAGNOSIS — R10.11 RUQ ABDOMINAL PAIN: Primary | ICD-10-CM

## 2025-06-17 DIAGNOSIS — R10.11 RUQ ABDOMINAL PAIN: ICD-10-CM

## 2025-06-17 LAB
ALBUMIN SERPL-MCNC: 4.5 G/DL (ref 3.4–5)
ALP SERPL-CCNC: 79 U/L (ref 40–129)
ALT SERPL-CCNC: 22 U/L (ref 10–40)
AST SERPL-CCNC: 26 U/L (ref 15–37)
BILIRUB DIRECT SERPL-MCNC: <0.1 MG/DL (ref 0–0.3)
BILIRUB INDIRECT SERPL-MCNC: 0.2 MG/DL (ref 0–1)
BILIRUB SERPL-MCNC: 0.3 MG/DL (ref 0–1)
LIPASE SERPL-CCNC: 44 U/L (ref 13–60)
PROT SERPL-MCNC: 7.2 G/DL (ref 6.4–8.2)

## 2025-06-17 PROCEDURE — 83690 ASSAY OF LIPASE: CPT

## 2025-06-17 PROCEDURE — 80076 HEPATIC FUNCTION PANEL: CPT

## 2025-06-17 PROCEDURE — 36415 COLL VENOUS BLD VENIPUNCTURE: CPT

## 2025-06-27 ENCOUNTER — RESULTS FOLLOW-UP (OUTPATIENT)
Dept: GASTROENTEROLOGY | Age: 55
End: 2025-06-27

## 2025-07-23 ENCOUNTER — OFFICE VISIT (OUTPATIENT)
Age: 55
End: 2025-07-23

## 2025-07-23 DIAGNOSIS — L71.9 ROSACEA: Primary | ICD-10-CM

## 2025-07-23 DIAGNOSIS — L82.1 SK (SEBORRHEIC KERATOSIS): ICD-10-CM

## 2025-07-23 DIAGNOSIS — D22.72: ICD-10-CM

## 2025-07-23 DIAGNOSIS — D22.9 MULTIPLE NEVI: ICD-10-CM

## 2025-07-23 NOTE — PROGRESS NOTES
[facility]  Neri Workman MD  [phone]      Bethany Merritt  1970    55 y.o. female     Date of Visit: 7/23/2025    Chief Complaint: skin lesions of concern    History of Present Illness:    1.  She has a history of acne rosacea which has been better with switching cleansers.  She was previously prescribed ivermectin 1% cream and used initially.    Metronidazole 0.75% cream was drying and not effective.     2.  She has multiple moles on the trunk and extremities-not aware of any concerning changes.    3.  She reports a couple of persistent growths on the upper chest and also on the left calf.    4.  She also reports an enlarging intermittently inflamed lesion on the left medial thigh.    She has a family history of melanoma:  Mom with hx of melanoma.   Brother with history of BCCs.     Review of Systems:  Gen: Feels well, good sense of health.    Past Medical History, Family History, Surgical History, Medications and Allergies reviewed.    Past Medical History:   Diagnosis Date    Acid reflux     Allergic rhinitis     Anesthesia complication     PATIENT STATES HER VISION GETS WORSE EVERY TIME SHE HAS ANESTHESIA    Anxiety     Atrophic vaginitis     Constipation     DDD (degenerative disc disease), cervical     Diffuse cystic mastopathy     Drug-induced skin rash 02/06/2025    Dysmenorrhea     Dyspareunia in female     Esophagitis 05/2019    Fibrocystic breast     Gastritis 05/2019    HPV (human papilloma virus) infection 1992    Exposed back in college    Hyperlipidemia     NO MEDS    Internal hemorrhoids 01/2022    per colonoscopy - Dr. Ureña    Interstitial cystitis 10/23/2017    Iron deficiency anemia 2017    Irritable bowel syndrome with constipation     Menopause ovarian failure     Menorrhagia     Overactive bladder 2017    PONV (postoperative nausea and vomiting)     Trigeminal neuralgia     Vitamin D deficiency 07/2019    Vitamin D deficiency     Vocal cord nodule 2016

## 2025-07-23 NOTE — PATIENT INSTRUCTIONS

## 2025-07-28 LAB — DERMATOLOGY PATHOLOGY REPORT: NORMAL

## 2025-08-11 ENCOUNTER — ANESTHESIA (OUTPATIENT)
Dept: ENDOSCOPY | Age: 55
End: 2025-08-11
Payer: COMMERCIAL

## 2025-08-11 ENCOUNTER — ANESTHESIA EVENT (OUTPATIENT)
Dept: ENDOSCOPY | Age: 55
End: 2025-08-11
Payer: COMMERCIAL

## 2025-08-11 ENCOUNTER — HOSPITAL ENCOUNTER (OUTPATIENT)
Age: 55
Setting detail: OUTPATIENT SURGERY
Discharge: HOME | End: 2025-08-11
Attending: INTERNAL MEDICINE | Admitting: INTERNAL MEDICINE
Payer: COMMERCIAL

## 2025-08-11 VITALS
SYSTOLIC BLOOD PRESSURE: 119 MMHG | DIASTOLIC BLOOD PRESSURE: 69 MMHG | RESPIRATION RATE: 16 BRPM | WEIGHT: 140 LBS | HEART RATE: 63 BPM | BODY MASS INDEX: 23.9 KG/M2 | TEMPERATURE: 97.7 F | OXYGEN SATURATION: 99 % | HEIGHT: 64 IN

## 2025-08-11 DIAGNOSIS — R10.11 RUQ ABDOMINAL PAIN: ICD-10-CM

## 2025-08-11 DIAGNOSIS — K21.9 GASTROESOPHAGEAL REFLUX DISEASE WITHOUT ESOPHAGITIS: ICD-10-CM

## 2025-08-11 PROCEDURE — 7100000011 HC PHASE II RECOVERY - ADDTL 15 MIN: Performed by: INTERNAL MEDICINE

## 2025-08-11 PROCEDURE — 3700000000 HC ANESTHESIA ATTENDED CARE: Performed by: INTERNAL MEDICINE

## 2025-08-11 PROCEDURE — 88305 TISSUE EXAM BY PATHOLOGIST: CPT

## 2025-08-11 PROCEDURE — 6360000002 HC RX W HCPCS: Performed by: NURSE ANESTHETIST, CERTIFIED REGISTERED

## 2025-08-11 PROCEDURE — 2709999900 HC NON-CHARGEABLE SUPPLY: Performed by: INTERNAL MEDICINE

## 2025-08-11 PROCEDURE — 2580000003 HC RX 258: Performed by: ANESTHESIOLOGY

## 2025-08-11 PROCEDURE — 3609012400 HC EGD TRANSORAL BIOPSY SINGLE/MULTIPLE: Performed by: INTERNAL MEDICINE

## 2025-08-11 PROCEDURE — 7100000010 HC PHASE II RECOVERY - FIRST 15 MIN: Performed by: INTERNAL MEDICINE

## 2025-08-11 PROCEDURE — 2580000003 HC RX 258: Performed by: NURSE ANESTHETIST, CERTIFIED REGISTERED

## 2025-08-11 RX ORDER — SODIUM CHLORIDE, SODIUM LACTATE, POTASSIUM CHLORIDE, CALCIUM CHLORIDE 600; 310; 30; 20 MG/100ML; MG/100ML; MG/100ML; MG/100ML
INJECTION, SOLUTION INTRAVENOUS CONTINUOUS
Status: DISCONTINUED | OUTPATIENT
Start: 2025-08-11 | End: 2025-08-11 | Stop reason: HOSPADM

## 2025-08-11 RX ORDER — OMEPRAZOLE 20 MG/1
40 CAPSULE, DELAYED RELEASE ORAL DAILY
Qty: 60 CAPSULE | Refills: 3 | Status: SHIPPED | OUTPATIENT
Start: 2025-08-11 | End: 2025-09-10

## 2025-08-11 RX ORDER — SODIUM CHLORIDE 0.9 % (FLUSH) 0.9 %
5-40 SYRINGE (ML) INJECTION PRN
Status: DISCONTINUED | OUTPATIENT
Start: 2025-08-11 | End: 2025-08-11 | Stop reason: HOSPADM

## 2025-08-11 RX ORDER — LIDOCAINE HYDROCHLORIDE 10 MG/ML
0.3 INJECTION, SOLUTION EPIDURAL; INFILTRATION; INTRACAUDAL; PERINEURAL
Status: DISCONTINUED | OUTPATIENT
Start: 2025-08-11 | End: 2025-08-11 | Stop reason: HOSPADM

## 2025-08-11 RX ORDER — NIFEDIPINE
POWDER (GRAM) MISCELLANEOUS
COMMUNITY
Start: 2025-06-17

## 2025-08-11 RX ORDER — FENTANYL CITRATE 50 UG/ML
50 INJECTION, SOLUTION INTRAMUSCULAR; INTRAVENOUS EVERY 5 MIN PRN
Refills: 0 | Status: CANCELLED | OUTPATIENT
Start: 2025-08-11

## 2025-08-11 RX ORDER — SODIUM CHLORIDE, SODIUM LACTATE, POTASSIUM CHLORIDE, CALCIUM CHLORIDE 600; 310; 30; 20 MG/100ML; MG/100ML; MG/100ML; MG/100ML
INJECTION, SOLUTION INTRAVENOUS
Status: DISCONTINUED | OUTPATIENT
Start: 2025-08-11 | End: 2025-08-11 | Stop reason: SDUPTHER

## 2025-08-11 RX ORDER — FENTANYL CITRATE 50 UG/ML
25 INJECTION, SOLUTION INTRAMUSCULAR; INTRAVENOUS EVERY 5 MIN PRN
Refills: 0 | Status: CANCELLED | OUTPATIENT
Start: 2025-08-11

## 2025-08-11 RX ORDER — SODIUM CHLORIDE 0.9 % (FLUSH) 0.9 %
5-40 SYRINGE (ML) INJECTION PRN
Status: CANCELLED | OUTPATIENT
Start: 2025-08-11

## 2025-08-11 RX ORDER — SODIUM CHLORIDE 9 MG/ML
INJECTION, SOLUTION INTRAVENOUS PRN
Status: CANCELLED | OUTPATIENT
Start: 2025-08-11

## 2025-08-11 RX ORDER — LIDOCAINE HYDROCHLORIDE 20 MG/ML
INJECTION, SOLUTION INFILTRATION; PERINEURAL
Status: DISCONTINUED | OUTPATIENT
Start: 2025-08-11 | End: 2025-08-11 | Stop reason: SDUPTHER

## 2025-08-11 RX ORDER — SODIUM CHLORIDE 0.9 % (FLUSH) 0.9 %
5-40 SYRINGE (ML) INJECTION EVERY 12 HOURS SCHEDULED
Status: CANCELLED | OUTPATIENT
Start: 2025-08-11

## 2025-08-11 RX ORDER — ONDANSETRON 2 MG/ML
4 INJECTION INTRAMUSCULAR; INTRAVENOUS
Status: CANCELLED | OUTPATIENT
Start: 2025-08-11

## 2025-08-11 RX ORDER — SODIUM CHLORIDE 9 MG/ML
INJECTION, SOLUTION INTRAVENOUS PRN
Status: DISCONTINUED | OUTPATIENT
Start: 2025-08-11 | End: 2025-08-11 | Stop reason: HOSPADM

## 2025-08-11 RX ORDER — PROPOFOL 10 MG/ML
INJECTION, EMULSION INTRAVENOUS
Status: DISCONTINUED | OUTPATIENT
Start: 2025-08-11 | End: 2025-08-11 | Stop reason: SDUPTHER

## 2025-08-11 RX ORDER — SODIUM CHLORIDE 0.9 % (FLUSH) 0.9 %
5-40 SYRINGE (ML) INJECTION EVERY 12 HOURS SCHEDULED
Status: DISCONTINUED | OUTPATIENT
Start: 2025-08-11 | End: 2025-08-11 | Stop reason: HOSPADM

## 2025-08-11 RX ADMIN — PROPOFOL 50 MG: 10 INJECTION, EMULSION INTRAVENOUS at 07:45

## 2025-08-11 RX ADMIN — SODIUM CHLORIDE, SODIUM LACTATE, POTASSIUM CHLORIDE, AND CALCIUM CHLORIDE: .6; .31; .03; .02 INJECTION, SOLUTION INTRAVENOUS at 07:37

## 2025-08-11 RX ADMIN — SODIUM CHLORIDE, SODIUM LACTATE, POTASSIUM CHLORIDE, AND CALCIUM CHLORIDE: .6; .31; .03; .02 INJECTION, SOLUTION INTRAVENOUS at 07:12

## 2025-08-11 RX ADMIN — LIDOCAINE HYDROCHLORIDE 50 MG: 20 INJECTION, SOLUTION INFILTRATION; PERINEURAL at 07:42

## 2025-08-11 RX ADMIN — PROPOFOL 100 MG: 10 INJECTION, EMULSION INTRAVENOUS at 07:42

## 2025-08-11 ASSESSMENT — PAIN - FUNCTIONAL ASSESSMENT
PAIN_FUNCTIONAL_ASSESSMENT: 0-10

## 2025-08-21 ENCOUNTER — PATIENT MESSAGE (OUTPATIENT)
Age: 55
End: 2025-08-21

## 2025-08-21 RX ORDER — FLUCONAZOLE 150 MG/1
150 TABLET ORAL ONCE
Qty: 1 TABLET | Refills: 0 | Status: SHIPPED | OUTPATIENT
Start: 2025-08-21 | End: 2025-08-21

## 2025-08-25 ENCOUNTER — PATIENT MESSAGE (OUTPATIENT)
Age: 55
End: 2025-08-25

## 2025-08-27 ENCOUNTER — HOSPITAL ENCOUNTER (OUTPATIENT)
Dept: MAMMOGRAPHY | Age: 55
Discharge: HOME | End: 2025-08-27
Payer: COMMERCIAL

## 2025-08-27 VITALS — BODY MASS INDEX: 23.9 KG/M2 | WEIGHT: 140 LBS | HEIGHT: 64 IN

## 2025-08-27 DIAGNOSIS — Z12.31 VISIT FOR SCREENING MAMMOGRAM: ICD-10-CM

## 2025-08-27 PROCEDURE — 77067 SCR MAMMO BI INCL CAD: CPT

## 2025-08-28 ENCOUNTER — OFFICE VISIT (OUTPATIENT)
Age: 55
End: 2025-08-28
Payer: COMMERCIAL

## 2025-08-28 VITALS
HEIGHT: 64 IN | HEART RATE: 73 BPM | RESPIRATION RATE: 16 BRPM | BODY MASS INDEX: 24.52 KG/M2 | DIASTOLIC BLOOD PRESSURE: 76 MMHG | OXYGEN SATURATION: 97 % | TEMPERATURE: 98.3 F | SYSTOLIC BLOOD PRESSURE: 108 MMHG | WEIGHT: 143.6 LBS

## 2025-08-28 DIAGNOSIS — N89.8 VAGINAL IRRITATION: Primary | ICD-10-CM

## 2025-08-28 DIAGNOSIS — M79.644 FINGER PAIN, RIGHT: ICD-10-CM

## 2025-08-28 DIAGNOSIS — N95.1 MENOPAUSAL VAGINAL DRYNESS: ICD-10-CM

## 2025-08-28 PROCEDURE — 99214 OFFICE O/P EST MOD 30 MIN: CPT | Performed by: FAMILY MEDICINE

## 2025-08-29 ENCOUNTER — OFFICE VISIT (OUTPATIENT)
Age: 55
End: 2025-08-29
Payer: COMMERCIAL

## 2025-08-29 DIAGNOSIS — L71.9 ROSACEA: Primary | ICD-10-CM

## 2025-08-29 LAB
BV BACTERIA DNA VAG QL NAA+PROBE: NOT DETECTED
C GLABRATA DNA VAG QL NAA+PROBE: NORMAL
C GLABRATA DNA VAG QL NAA+PROBE: NOT DETECTED
C KRUSEI DNA VAG QL NAA+PROBE: NOT DETECTED
CANDIDA DNA VAG QL NAA+PROBE: NOT DETECTED
T VAGINALIS DNA VAG QL NAA+PROBE: NOT DETECTED

## 2025-08-29 PROCEDURE — 99213 OFFICE O/P EST LOW 20 MIN: CPT | Performed by: DERMATOLOGY

## (undated) DEVICE — TOWEL,OR,DSP,ST,BLUE,STD,4/PK,20PK/CS: Brand: MEDLINE

## (undated) DEVICE — COTTON UNDERCAST PADDING,REGULAR FINISH: Brand: WEBRIL

## (undated) DEVICE — C-ARM PACK: Brand: C-ARM COVER

## (undated) DEVICE — ELECTRODE,ECG,STRESS,FOAM,3PK: Brand: MEDLINE

## (undated) DEVICE — TUBING, SUCTION, 1/4" X 12', STRAIGHT: Brand: MEDLINE

## (undated) DEVICE — T-DRAPE,EXTREMITY,STERILE: Brand: MEDLINE

## (undated) DEVICE — SOLUTION IRRIG 1000ML 0.9% SOD CHL USP POUR PLAS BTL

## (undated) DEVICE — STRIP,CLOSURE,WOUND,MEDI-STRIP,1/2X4: Brand: MEDLINE

## (undated) DEVICE — ENDOSCOPIC KIT 2 12 FT OP4 DE2 GWN SYR

## (undated) DEVICE — DRAPE,U/SHT,SPLIT,FILM,60X84,STERILE: Brand: MEDLINE

## (undated) DEVICE — INTENDED FOR TISSUE SEPARATION, AND OTHER PROCEDURES THAT REQUIRE A SHARP SURGICAL BLADE TO PUNCTURE OR CUT.: Brand: BARD-PARKER ® STAINLESS STEEL BLADES

## (undated) DEVICE — SYRINGE IRRIG 60ML SFT PLIABLE BLB EZ TO GRP 1 HND USE W/

## (undated) DEVICE — SUTURE VCRL + SZ 3-0 L18IN ABSRB UD SH 1/2 CIR TAPERCUT NDL VCP864D

## (undated) DEVICE — BANDAGE COMPR W4INXL12FT E DISP ESMARCH EVEN

## (undated) DEVICE — SPONGE GZ W4XL4IN COT 12 PLY TYP VII WVN C FLD DSGN STERILE

## (undated) DEVICE — GLOVE SURG SZ 8 CRM LTX FREE POLYISOPRENE POLYMER BEAD ANTI

## (undated) DEVICE — GLOVE SURG SZ 8 L12IN FNGR THK94MIL STD WHT LTX FREE

## (undated) DEVICE — PADDING,UNDERCAST,COTTON, 4"X4YD STERILE: Brand: MEDLINE

## (undated) DEVICE — ESMARCH P/F TEXTURED 4" X 12' 10/BX

## (undated) DEVICE — SHEET,DRAPE,53X77,STERILE: Brand: MEDLINE

## (undated) DEVICE — CANNULA NSL AD TBNG L7FT PVC STR NONFLARED PRNG O2 DEL W STD

## (undated) DEVICE — BANDAGE COMPR W4INXL15FT BGE E SGL LAYERED CLP CLSR

## (undated) DEVICE — SOLUTION IV IRRIG 500ML 0.9% SODIUM CHL 2F7123

## (undated) DEVICE — UNDERPAD HOSP W30XL36IN WHT SUP ABSRB POLYMER AIR PERM DISP

## (undated) DEVICE — GLOVE SURG SZ 65 L12IN FNGR THK79MIL GRN LTX FREE

## (undated) DEVICE — SUTURE NONABSORBABLE MONOFILAMENT 4-0 FS-2 18 IN ETHILON 662H

## (undated) DEVICE — FORCEPS BX L240CM JAW DIA2.8MM L CAP W/ NDL MIC MESH TOOTH

## (undated) DEVICE — PADDING CAST W4INXL4YD NONSTERILE COT RAYON MICROPLEATED

## (undated) DEVICE — SYRINGE MED 10ML LUERLOCK TIP W/O SFTY DISP

## (undated) DEVICE — ELECTRODE EKG WHT FOAM TEARDROP SHP MEDGEL

## (undated) DEVICE — WILLIS PACK: Brand: MEDLINE INDUSTRIES, INC.

## (undated) DEVICE — BITE BLK L LUMN SZ 20X27MM GRN PLAS FLX SIDE PRT SMOOTH

## (undated) DEVICE — GOWN SIRUS NONREIN XL W/TWL: Brand: MEDLINE INDUSTRIES, INC.

## (undated) DEVICE — SUTURE VCRL + SZ 2-0 L18IN ABSRB UD CT1 L36MM 1/2 CIR VCP839D

## (undated) DEVICE — GLOVE SURG SZ 65 THK91MIL LTX FREE SYN POLYISOPRENE

## (undated) DEVICE — NEEDLE HYPO 18GA L1.5IN PNK POLYPR HUB S STL REG BVL STR

## (undated) DEVICE — WRAP COHESIVE W2INXL5YD TAN SELF ADH BNDG HND NON STERILE TEAR CARING

## (undated) DEVICE — NEEDLE HYPO 25GA L1.5IN BLU POLYPR HUB S STL REG BVL STR

## (undated) DEVICE — DRESSING,GAUZE,XEROFORM,CURAD,1"X8",ST: Brand: CURAD

## (undated) DEVICE — STANDARD HYPODERMIC NEEDLE,POLYPROPYLENE HUB: Brand: MONOJECT

## (undated) DEVICE — SHOE POSTOP M M 7.5-9 WOM 8.5-10 HI ANK SQUARED STRP RIG

## (undated) DEVICE — UNDERGLOVE SURG SZ 8 FNGR THK0.21MIL GRN LTX BEAD CUF

## (undated) DEVICE — GLOVE ORANGE PI 7   MSG9070

## (undated) DEVICE — MERCY HEALTH WEST TURNOVER: Brand: MEDLINE INDUSTRIES, INC.

## (undated) DEVICE — MINOR SET UP: Brand: MEDLINE INDUSTRIES, INC.